# Patient Record
Sex: FEMALE | Race: BLACK OR AFRICAN AMERICAN | NOT HISPANIC OR LATINO | Employment: OTHER | URBAN - METROPOLITAN AREA
[De-identification: names, ages, dates, MRNs, and addresses within clinical notes are randomized per-mention and may not be internally consistent; named-entity substitution may affect disease eponyms.]

---

## 2017-09-26 ENCOUNTER — HOSPITAL ENCOUNTER (EMERGENCY)
Facility: HOSPITAL | Age: 62
Discharge: HOME | End: 2017-09-26
Attending: EMERGENCY MEDICINE | Admitting: EMERGENCY MEDICINE
Payer: COMMERCIAL

## 2017-09-26 ENCOUNTER — APPOINTMENT (EMERGENCY)
Dept: RADIOLOGY | Facility: HOSPITAL | Age: 62
End: 2017-09-26
Payer: COMMERCIAL

## 2017-09-26 ENCOUNTER — APPOINTMENT (EMERGENCY)
Dept: CT IMAGING | Facility: HOSPITAL | Age: 62
End: 2017-09-26
Payer: COMMERCIAL

## 2017-09-26 VITALS
TEMPERATURE: 97.6 F | HEART RATE: 94 BPM | RESPIRATION RATE: 20 BRPM | WEIGHT: 292 LBS | OXYGEN SATURATION: 98 % | DIASTOLIC BLOOD PRESSURE: 94 MMHG | SYSTOLIC BLOOD PRESSURE: 112 MMHG

## 2017-09-26 DIAGNOSIS — H10.9 CONJUNCTIVITIS: Primary | ICD-10-CM

## 2017-09-26 LAB
ANION GAP SERPL CALCULATED.3IONS-SCNC: 7 MMOL/L (ref 4–13)
ATRIAL RATE: 96 BPM
BASOPHILS # BLD AUTO: 0.03 THOUSANDS/ΜL (ref 0–0.1)
BASOPHILS NFR BLD AUTO: 0 % (ref 0–1)
BUN SERPL-MCNC: 14 MG/DL (ref 5–25)
CALCIUM SERPL-MCNC: 9.2 MG/DL (ref 8.3–10.1)
CHLORIDE SERPL-SCNC: 105 MMOL/L (ref 100–108)
CO2 SERPL-SCNC: 30 MMOL/L (ref 21–32)
CREAT SERPL-MCNC: 1.12 MG/DL (ref 0.6–1.3)
EOSINOPHIL # BLD AUTO: 0.42 THOUSAND/ΜL (ref 0–0.61)
EOSINOPHIL NFR BLD AUTO: 4 % (ref 0–6)
ERYTHROCYTE [DISTWIDTH] IN BLOOD BY AUTOMATED COUNT: 15.2 % (ref 11.6–15.1)
GFR SERPL CREATININE-BSD FRML MDRD: 60 ML/MIN/1.73SQ M
GLUCOSE SERPL-MCNC: 140 MG/DL (ref 65–140)
HCT VFR BLD AUTO: 38.5 % (ref 34.8–46.1)
HGB BLD-MCNC: 13.1 G/DL (ref 11.5–15.4)
LYMPHOCYTES # BLD AUTO: 4.01 THOUSANDS/ΜL (ref 0.6–4.47)
LYMPHOCYTES NFR BLD AUTO: 38 % (ref 14–44)
MCH RBC QN AUTO: 29.8 PG (ref 26.8–34.3)
MCHC RBC AUTO-ENTMCNC: 34 G/DL (ref 31.4–37.4)
MCV RBC AUTO: 88 FL (ref 82–98)
MONOCYTES # BLD AUTO: 0.73 THOUSAND/ΜL (ref 0.17–1.22)
MONOCYTES NFR BLD AUTO: 7 % (ref 4–12)
NEUTROPHILS # BLD AUTO: 5.3 THOUSANDS/ΜL (ref 1.85–7.62)
NEUTS SEG NFR BLD AUTO: 51 % (ref 43–75)
NRBC BLD AUTO-RTO: 0 /100 WBCS
P AXIS: 54 DEGREES
PLATELET # BLD AUTO: 261 THOUSANDS/UL (ref 149–390)
PMV BLD AUTO: 9.7 FL (ref 8.9–12.7)
POTASSIUM SERPL-SCNC: 3.7 MMOL/L (ref 3.5–5.3)
PR INTERVAL: 164 MS
QRS AXIS: 37 DEGREES
QRSD INTERVAL: 82 MS
QT INTERVAL: 336 MS
QTC INTERVAL: 424 MS
RBC # BLD AUTO: 4.4 MILLION/UL (ref 3.81–5.12)
SODIUM SERPL-SCNC: 142 MMOL/L (ref 136–145)
SPECIMEN SOURCE: NORMAL
T WAVE AXIS: 61 DEGREES
TROPONIN I BLD-MCNC: 0 NG/ML (ref 0–0.08)
VENTRICULAR RATE: 96 BPM
WBC # BLD AUTO: 10.49 THOUSAND/UL (ref 4.31–10.16)

## 2017-09-26 PROCEDURE — 96361 HYDRATE IV INFUSION ADD-ON: CPT

## 2017-09-26 PROCEDURE — 36415 COLL VENOUS BLD VENIPUNCTURE: CPT | Performed by: EMERGENCY MEDICINE

## 2017-09-26 PROCEDURE — 93005 ELECTROCARDIOGRAM TRACING: CPT | Performed by: EMERGENCY MEDICINE

## 2017-09-26 PROCEDURE — 96360 HYDRATION IV INFUSION INIT: CPT

## 2017-09-26 PROCEDURE — 71020 HB CHEST X-RAY 2VW FRONTAL&LATL: CPT

## 2017-09-26 PROCEDURE — 85025 COMPLETE CBC W/AUTO DIFF WBC: CPT | Performed by: EMERGENCY MEDICINE

## 2017-09-26 PROCEDURE — 99284 EMERGENCY DEPT VISIT MOD MDM: CPT

## 2017-09-26 PROCEDURE — 80048 BASIC METABOLIC PNL TOTAL CA: CPT | Performed by: EMERGENCY MEDICINE

## 2017-09-26 PROCEDURE — 84484 ASSAY OF TROPONIN QUANT: CPT

## 2017-09-26 PROCEDURE — 70487 CT MAXILLOFACIAL W/DYE: CPT

## 2017-09-26 RX ORDER — PANTOPRAZOLE SODIUM 40 MG/1
40 TABLET, DELAYED RELEASE ORAL DAILY
COMMUNITY
End: 2018-09-14 | Stop reason: SDUPTHER

## 2017-09-26 RX ORDER — MONTELUKAST SODIUM 10 MG/1
10 TABLET ORAL DAILY
COMMUNITY
End: 2018-09-14 | Stop reason: SDUPTHER

## 2017-09-26 RX ORDER — LORATADINE 10 MG/1
10 TABLET ORAL DAILY
COMMUNITY
End: 2018-08-02 | Stop reason: SDUPTHER

## 2017-09-26 RX ORDER — ACETAMINOPHEN 325 MG/1
650 TABLET ORAL EVERY 6 HOURS PRN
COMMUNITY
End: 2018-09-14 | Stop reason: SDUPTHER

## 2017-09-26 RX ORDER — ATORVASTATIN CALCIUM 40 MG/1
40 TABLET, FILM COATED ORAL DAILY
COMMUNITY
End: 2018-09-14 | Stop reason: SDUPTHER

## 2017-09-26 RX ORDER — TOBRAMYCIN 3 MG/ML
1 SOLUTION/ DROPS OPHTHALMIC
Status: DISCONTINUED | OUTPATIENT
Start: 2017-09-26 | End: 2017-09-26 | Stop reason: HOSPADM

## 2017-09-26 RX ORDER — AMLODIPINE BESYLATE 10 MG/1
5 TABLET ORAL DAILY
COMMUNITY
End: 2018-09-14 | Stop reason: SDUPTHER

## 2017-09-26 RX ADMIN — IOHEXOL 85 ML: 350 INJECTION, SOLUTION INTRAVENOUS at 15:58

## 2017-09-26 RX ADMIN — TOBRAMYCIN 1 DROP: 3 SOLUTION OPHTHALMIC at 17:15

## 2017-09-26 RX ADMIN — SODIUM CHLORIDE 1000 ML: 0.9 INJECTION, SOLUTION INTRAVENOUS at 14:38

## 2017-12-24 ENCOUNTER — APPOINTMENT (EMERGENCY)
Dept: RADIOLOGY | Facility: HOSPITAL | Age: 62
DRG: 720 | End: 2017-12-24
Payer: COMMERCIAL

## 2017-12-24 ENCOUNTER — HOSPITAL ENCOUNTER (INPATIENT)
Facility: HOSPITAL | Age: 62
LOS: 3 days | Discharge: HOME | DRG: 720 | End: 2017-12-27
Attending: EMERGENCY MEDICINE | Admitting: FAMILY MEDICINE
Payer: COMMERCIAL

## 2017-12-24 DIAGNOSIS — R50.9 FEVER: ICD-10-CM

## 2017-12-24 DIAGNOSIS — R05.9 COUGH: ICD-10-CM

## 2017-12-24 DIAGNOSIS — R68.89 FLU-LIKE SYMPTOMS: Primary | ICD-10-CM

## 2017-12-24 PROBLEM — R79.89 ELEVATED SERUM CREATININE: Status: ACTIVE | Noted: 2017-12-24

## 2017-12-24 PROBLEM — A41.9 SEPSIS (HCC): Status: ACTIVE | Noted: 2017-12-24

## 2017-12-24 PROBLEM — E11.69 DIABETES MELLITUS TYPE 2 IN OBESE (HCC): Status: ACTIVE | Noted: 2017-12-24

## 2017-12-24 PROBLEM — E66.9 DIABETES MELLITUS TYPE 2 IN OBESE (HCC): Status: ACTIVE | Noted: 2017-12-24

## 2017-12-24 PROBLEM — K21.9 GERD (GASTROESOPHAGEAL REFLUX DISEASE): Status: ACTIVE | Noted: 2017-12-24

## 2017-12-24 PROBLEM — I10 HTN (HYPERTENSION): Status: ACTIVE | Noted: 2017-12-24

## 2017-12-24 PROBLEM — E66.01 MORBID OBESITY (HCC): Status: ACTIVE | Noted: 2017-12-24

## 2017-12-24 PROBLEM — J45.901 ASTHMA EXACERBATION: Status: ACTIVE | Noted: 2017-12-24

## 2017-12-24 PROBLEM — E78.5 HLD (HYPERLIPIDEMIA): Status: ACTIVE | Noted: 2017-12-24

## 2017-12-24 LAB
ALBUMIN SERPL BCP-MCNC: 3.5 G/DL (ref 3.5–5)
ALP SERPL-CCNC: 102 U/L (ref 46–116)
ALT SERPL W P-5'-P-CCNC: 23 U/L (ref 12–78)
ANION GAP SERPL CALCULATED.3IONS-SCNC: 10 MMOL/L (ref 4–13)
APTT PPP: 31 SECONDS (ref 23–35)
AST SERPL W P-5'-P-CCNC: 18 U/L (ref 5–45)
BASOPHILS # BLD AUTO: 0.02 THOUSANDS/ΜL (ref 0–0.1)
BASOPHILS NFR BLD AUTO: 0 % (ref 0–1)
BILIRUB SERPL-MCNC: 0.29 MG/DL (ref 0.2–1)
BUN SERPL-MCNC: 14 MG/DL (ref 5–25)
CALCIUM SERPL-MCNC: 8.9 MG/DL (ref 8.3–10.1)
CHLORIDE SERPL-SCNC: 99 MMOL/L (ref 100–108)
CO2 SERPL-SCNC: 28 MMOL/L (ref 21–32)
CREAT SERPL-MCNC: 1.37 MG/DL (ref 0.6–1.3)
EOSINOPHIL # BLD AUTO: 0.11 THOUSAND/ΜL (ref 0–0.61)
EOSINOPHIL NFR BLD AUTO: 1 % (ref 0–6)
ERYTHROCYTE [DISTWIDTH] IN BLOOD BY AUTOMATED COUNT: 14.5 % (ref 11.6–15.1)
GFR SERPL CREATININE-BSD FRML MDRD: 47 ML/MIN/1.73SQ M
GLUCOSE SERPL-MCNC: 113 MG/DL (ref 65–140)
HCT VFR BLD AUTO: 38.9 % (ref 34.8–46.1)
HGB BLD-MCNC: 13.1 G/DL (ref 11.5–15.4)
INR PPP: 1.04 (ref 0.86–1.16)
LACTATE SERPL-SCNC: 1.3 MMOL/L (ref 0.5–2)
LACTATE SERPL-SCNC: 2.5 MMOL/L (ref 0.5–2)
LYMPHOCYTES # BLD AUTO: 2.08 THOUSANDS/ΜL (ref 0.6–4.47)
LYMPHOCYTES NFR BLD AUTO: 20 % (ref 14–44)
MCH RBC QN AUTO: 29.6 PG (ref 26.8–34.3)
MCHC RBC AUTO-ENTMCNC: 33.7 G/DL (ref 31.4–37.4)
MCV RBC AUTO: 88 FL (ref 82–98)
MONOCYTES # BLD AUTO: 1.42 THOUSAND/ΜL (ref 0.17–1.22)
MONOCYTES NFR BLD AUTO: 14 % (ref 4–12)
NEUTROPHILS # BLD AUTO: 6.67 THOUSANDS/ΜL (ref 1.85–7.62)
NEUTS SEG NFR BLD AUTO: 65 % (ref 43–75)
NRBC BLD AUTO-RTO: 0 /100 WBCS
NT-PROBNP SERPL-MCNC: 217 PG/ML
PLATELET # BLD AUTO: 222 THOUSANDS/UL (ref 149–390)
PMV BLD AUTO: 9.9 FL (ref 8.9–12.7)
POTASSIUM SERPL-SCNC: 4 MMOL/L (ref 3.5–5.3)
PROT SERPL-MCNC: 7.7 G/DL (ref 6.4–8.2)
PROTHROMBIN TIME: 13.6 SECONDS (ref 12.1–14.4)
RBC # BLD AUTO: 4.43 MILLION/UL (ref 3.81–5.12)
SODIUM SERPL-SCNC: 137 MMOL/L (ref 136–145)
SPECIMEN SOURCE: NORMAL
TROPONIN I BLD-MCNC: 0.01 NG/ML (ref 0–0.08)
WBC # BLD AUTO: 10.3 THOUSAND/UL (ref 4.31–10.16)

## 2017-12-24 PROCEDURE — 93005 ELECTROCARDIOGRAM TRACING: CPT

## 2017-12-24 PROCEDURE — 87449 NOS EACH ORGANISM AG IA: CPT | Performed by: PHYSICIAN ASSISTANT

## 2017-12-24 PROCEDURE — 85730 THROMBOPLASTIN TIME PARTIAL: CPT

## 2017-12-24 PROCEDURE — 87798 DETECT AGENT NOS DNA AMP: CPT | Performed by: EMERGENCY MEDICINE

## 2017-12-24 PROCEDURE — 96361 HYDRATE IV INFUSION ADD-ON: CPT

## 2017-12-24 PROCEDURE — 87040 BLOOD CULTURE FOR BACTERIA: CPT

## 2017-12-24 PROCEDURE — 83605 ASSAY OF LACTIC ACID: CPT

## 2017-12-24 PROCEDURE — 71020 HB CHEST X-RAY 2VW FRONTAL&LATL: CPT

## 2017-12-24 PROCEDURE — 85025 COMPLETE CBC W/AUTO DIFF WBC: CPT

## 2017-12-24 PROCEDURE — 84484 ASSAY OF TROPONIN QUANT: CPT

## 2017-12-24 PROCEDURE — 80053 COMPREHEN METABOLIC PANEL: CPT

## 2017-12-24 PROCEDURE — 94640 AIRWAY INHALATION TREATMENT: CPT

## 2017-12-24 PROCEDURE — 83880 ASSAY OF NATRIURETIC PEPTIDE: CPT | Performed by: EMERGENCY MEDICINE

## 2017-12-24 PROCEDURE — 99285 EMERGENCY DEPT VISIT HI MDM: CPT

## 2017-12-24 PROCEDURE — 96365 THER/PROPH/DIAG IV INF INIT: CPT

## 2017-12-24 PROCEDURE — 85610 PROTHROMBIN TIME: CPT

## 2017-12-24 PROCEDURE — 36415 COLL VENOUS BLD VENIPUNCTURE: CPT

## 2017-12-24 RX ORDER — ONDANSETRON 2 MG/ML
4 INJECTION INTRAMUSCULAR; INTRAVENOUS EVERY 6 HOURS PRN
Status: DISCONTINUED | OUTPATIENT
Start: 2017-12-24 | End: 2017-12-27 | Stop reason: HOSPADM

## 2017-12-24 RX ORDER — ASPIRIN 81 MG/1
81 TABLET ORAL DAILY
Status: DISCONTINUED | OUTPATIENT
Start: 2017-12-25 | End: 2017-12-27 | Stop reason: HOSPADM

## 2017-12-24 RX ORDER — BENZONATATE 100 MG/1
100 CAPSULE ORAL 3 TIMES DAILY
Status: DISCONTINUED | OUTPATIENT
Start: 2017-12-24 | End: 2017-12-27 | Stop reason: HOSPADM

## 2017-12-24 RX ORDER — METHYLPREDNISOLONE SODIUM SUCCINATE 125 MG/2ML
125 INJECTION, POWDER, LYOPHILIZED, FOR SOLUTION INTRAMUSCULAR; INTRAVENOUS ONCE
Status: COMPLETED | OUTPATIENT
Start: 2017-12-24 | End: 2017-12-25

## 2017-12-24 RX ORDER — ACETAMINOPHEN 325 MG/1
975 TABLET ORAL ONCE
Status: COMPLETED | OUTPATIENT
Start: 2017-12-24 | End: 2017-12-24

## 2017-12-24 RX ORDER — LORATADINE 10 MG/1
10 TABLET ORAL DAILY
Status: DISCONTINUED | OUTPATIENT
Start: 2017-12-25 | End: 2017-12-27 | Stop reason: HOSPADM

## 2017-12-24 RX ORDER — ACETAMINOPHEN 325 MG/1
650 TABLET ORAL EVERY 6 HOURS PRN
Status: DISCONTINUED | OUTPATIENT
Start: 2017-12-24 | End: 2017-12-27 | Stop reason: HOSPADM

## 2017-12-24 RX ORDER — AMLODIPINE BESYLATE 5 MG/1
5 TABLET ORAL DAILY
Status: DISCONTINUED | OUTPATIENT
Start: 2017-12-25 | End: 2017-12-27 | Stop reason: HOSPADM

## 2017-12-24 RX ORDER — IPRATROPIUM BROMIDE AND ALBUTEROL SULFATE 2.5; .5 MG/3ML; MG/3ML
3 SOLUTION RESPIRATORY (INHALATION) ONCE
Status: COMPLETED | OUTPATIENT
Start: 2017-12-24 | End: 2017-12-24

## 2017-12-24 RX ORDER — PANTOPRAZOLE SODIUM 40 MG/1
40 TABLET, DELAYED RELEASE ORAL DAILY
Status: DISCONTINUED | OUTPATIENT
Start: 2017-12-25 | End: 2017-12-27 | Stop reason: HOSPADM

## 2017-12-24 RX ORDER — HEPARIN SODIUM 5000 [USP'U]/ML
5000 INJECTION, SOLUTION INTRAVENOUS; SUBCUTANEOUS EVERY 8 HOURS SCHEDULED
Status: DISCONTINUED | OUTPATIENT
Start: 2017-12-24 | End: 2017-12-27 | Stop reason: HOSPADM

## 2017-12-24 RX ORDER — MONTELUKAST SODIUM 10 MG/1
10 TABLET ORAL DAILY
Status: DISCONTINUED | OUTPATIENT
Start: 2017-12-25 | End: 2017-12-27 | Stop reason: HOSPADM

## 2017-12-24 RX ORDER — AZITHROMYCIN 250 MG/1
500 TABLET, FILM COATED ORAL EVERY 24 HOURS
Status: DISCONTINUED | OUTPATIENT
Start: 2017-12-25 | End: 2017-12-26

## 2017-12-24 RX ORDER — ATORVASTATIN CALCIUM 40 MG/1
40 TABLET, FILM COATED ORAL DAILY
Status: DISCONTINUED | OUTPATIENT
Start: 2017-12-25 | End: 2017-12-27 | Stop reason: HOSPADM

## 2017-12-24 RX ORDER — LEVALBUTEROL 1.25 MG/.5ML
1.25 SOLUTION, CONCENTRATE RESPIRATORY (INHALATION)
Status: DISCONTINUED | OUTPATIENT
Start: 2017-12-24 | End: 2017-12-26

## 2017-12-24 RX ORDER — GUAIFENESIN 600 MG
600 TABLET, EXTENDED RELEASE 12 HR ORAL EVERY 12 HOURS SCHEDULED
Status: DISCONTINUED | OUTPATIENT
Start: 2017-12-24 | End: 2017-12-27 | Stop reason: HOSPADM

## 2017-12-24 RX ORDER — METHYLPREDNISOLONE SODIUM SUCCINATE 40 MG/ML
40 INJECTION, POWDER, LYOPHILIZED, FOR SOLUTION INTRAMUSCULAR; INTRAVENOUS EVERY 8 HOURS SCHEDULED
Status: DISCONTINUED | OUTPATIENT
Start: 2017-12-25 | End: 2017-12-25

## 2017-12-24 RX ADMIN — SODIUM CHLORIDE 1000 ML: 0.9 INJECTION, SOLUTION INTRAVENOUS at 19:50

## 2017-12-24 RX ADMIN — SODIUM CHLORIDE 1000 ML: 0.9 INJECTION, SOLUTION INTRAVENOUS at 18:40

## 2017-12-24 RX ADMIN — ACETAMINOPHEN 975 MG: 325 TABLET, FILM COATED ORAL at 18:38

## 2017-12-24 RX ADMIN — GUAIFENESIN 600 MG: 600 TABLET, EXTENDED RELEASE ORAL at 23:48

## 2017-12-24 RX ADMIN — CEFTRIAXONE 1000 MG: 1 INJECTION, SOLUTION INTRAVENOUS at 19:42

## 2017-12-24 RX ADMIN — AZITHROMYCIN MONOHYDRATE 500 MG: 500 INJECTION, POWDER, LYOPHILIZED, FOR SOLUTION INTRAVENOUS at 20:38

## 2017-12-24 RX ADMIN — IPRATROPIUM BROMIDE AND ALBUTEROL SULFATE 3 ML: .5; 3 SOLUTION RESPIRATORY (INHALATION) at 18:39

## 2017-12-24 RX ADMIN — HEPARIN SODIUM 5000 UNITS: 5000 INJECTION, SOLUTION INTRAVENOUS; SUBCUTANEOUS at 23:48

## 2017-12-24 RX ADMIN — BENZONATATE 100 MG: 100 CAPSULE ORAL at 23:48

## 2017-12-24 NOTE — ED ATTENDING ATTESTATION
Pushpa Vegas MD, saw and evaluated the patient  All available labs and X-rays were ordered by me or the resident and have been reviewed by myself  I discussed the patient with the resident / non-physician and agree with the resident's / non-physician practitioner's findings and plan as documented in the resident's / non-physician practicitioner's note, except where noted  At this point, I agree with the current assessment done in the ED  Chief Complaint   Patient presents with    Fever - 9 weeks to 74 years     Fever, chills, productive cough, white mucous for 2 months, chest pain, shortness of breath       This is a 60year old female presenting for URI symptoms x2 months worse in the last few  She has been having subjective fevers during this time  Feeling extremely short of breath  She believes that this represents flu  PMH:  - HLD  - HTN  - DM  - GERD  PSH:  - Hysterectomy  - Bilateral knee arthroscopy  No smoking, drinking, drugs  PE:  Vitals:    12/24/17 2327 12/25/17 0300 12/25/17 0700 12/25/17 0754   BP: 114/61 167/72 101/59    Pulse: 98 100 105    Resp: 22 18 20    Temp: 99 2 °F (37 3 °C) 98 4 °F (36 9 °C) 100 5 °F (38 1 °C)    TempSrc: Temporal Temporal Tympanic    SpO2: 94% 94% 96% 93%   Weight:       Height:       General: VSS, NAD, awake, alert  Well-nourished, well-developed  Appears stated age  Speaking normally in full sentences  Head: Normocephalic, atraumatic, nontender  Eyes: PERRL, EOM-I  No diplopia  No hyphema  No subconjunctival hemorrhages  Symmetrical lids  ENT: Atraumatic external nose and ears  Dry MM  No malocclusion  No stridor  Normal phonation  No drooling  Normal swallowing  Neck: Symmetric, trachea midline  No JVD  CV: Tachycardic (HR 120s) +S1/S2  No murmurs or gallops  Peripheral pulses +2 throughout  No chest wall tenderness  Lungs:   Coarse breath sounds bilaterally but low sounding 2/2 body hjabitus  No retractions  Mild tachypnea     Abd: +BS, soft, NT/ND    MSK:   FROM   Back:   No rashes  Skin: Dry, intact  Neuro: AAOx3, GCS 15, CN II-XII grossly intact  Motor grossly intact  Psychiatric/Behavioral: Appropriate mood and affect   Exam: deferred  A:  - Ill  P:  - Will do septic workup  - Likely admit   - Flu? PNA? UTI?  - 13 point ROS was performed and all are normal unless stated in the history above  - Nursing note reviewed  Vitals reviewed  - Orders placed by myself and/or advanced practitioner / resident     - Previous chart was reviewed  - No language barrier    - History obtained from patient  - There are no limitations to the history obtained  - Critical care time: Not applicable for this patient  Final Diagnosis:  1  Flu-like symptoms    2  Fever    3   Cough        ED Course      Medications   acetaminophen (TYLENOL) tablet 650 mg (650 mg Oral Given 12/25/17 0821)   amLODIPine (NORVASC) tablet 5 mg (5 mg Oral Not Given 12/25/17 0821)   aspirin (ECOTRIN LOW STRENGTH) EC tablet 81 mg (81 mg Oral Given 12/25/17 0821)   atorvastatin (LIPITOR) tablet 40 mg (40 mg Oral Given 12/25/17 0821)   loratadine (CLARITIN) tablet 10 mg (10 mg Oral Given 12/25/17 0821)   montelukast (SINGULAIR) tablet 10 mg (10 mg Oral Given 12/25/17 0821)   pantoprazole (PROTONIX) EC tablet 40 mg (40 mg Oral Given 12/25/17 0647)   ondansetron (ZOFRAN) injection 4 mg (not administered)   heparin (porcine) subcutaneous injection 5,000 Units (5,000 Units Subcutaneous Given 12/25/17 0647)   levalbuterol (XOPENEX) inhalation solution 1 25 mg (1 25 mg Nebulization Given 12/25/17 0754)   benzonatate (TESSALON PERLES) capsule 100 mg (100 mg Oral Given 12/25/17 0821)   guaiFENesin (MUCINEX) 12 hr tablet 600 mg (600 mg Oral Given 12/25/17 0821)   methylPREDNISolone sodium succinate (Solu-MEDROL) injection 125 mg (125 mg Intravenous Given 12/25/17 0041)     Followed by   methylPREDNISolone sodium succinate (Solu-MEDROL) injection 40 mg (40 mg Intravenous Given 12/25/17 0645)   azithromycin (ZITHROMAX) tablet 500 mg (not administered)   cefTRIAXone (ROCEPHIN) IVPB (premix) 1,000 mg (not administered)   sodium chloride 0 9 % bolus 1,000 mL (1,000 mL Intravenous New Bag 12/24/17 1840)   acetaminophen (TYLENOL) tablet 975 mg (975 mg Oral Given 12/24/17 1838)   ipratropium-albuterol (DUO-NEB) 0 5-2 5 mg/mL inhalation solution 3 mL (3 mL Nebulization Given 12/24/17 1839)   cefTRIAXone (ROCEPHIN) IVPB (premix) 1,000 mg (0 mg Intravenous Stopped 12/24/17 2022)   azithromycin (ZITHROMAX) 500 mg in sodium chloride 0 9% 250mL IVPB 500 mg (0 mg Intravenous Stopped 12/24/17 2154)   sodium chloride 0 9 % bolus 3,000 mL (0 mL Intravenous Stopped 12/24/17 2153)     XR chest 2 views   ED Interpretation   Poor penetrates, unable to evaluate lower lobes on the PA view  Possible consolidation in the lower lobes, no obvious consolidations in the upper lobes  Orders Placed This Encounter   Procedures    ED ECG Documentation Only    Blood culture #1    Blood culture #2    Influenza A/B and RSV by PCR (indicated for patients >2 mo of age)    Strep Pneumoniae, Urine    Sputum culture and Gram stain    XR chest 2 views    APTT    Protime-INR    CBC and differential    Comprehensive metabolic panel    Lactic Acid x2    B-type natriuretic peptide    Basic metabolic panel    CBC (With Platelets)    Platelet count    Legionella antigen, urine    Diet Odell/CHO Controlled; Consistent Carbohydrate Diet Level 1 (4 carb servings/60 grams CHO/meal)    Insert and maintain peripheral IV x 2 (18 gauge or >)    Continuous cardiac monitoring    POCT troponin    Nursing communcation Continue IV as ordered     [facility] Notify admitting physician    Notify admitting physician on arrival    Telemetry monitoring    Activity as tolerated    Vital signs (specify frequency)    Notify physician (specify)    Up with assistance    Up and OOB as tolerated    Place sequential compression device  Incentive spirometry    Level 1-Full Code: all life saving measures are indicated    Droplet isolation status    POCT urinalysis dipstick    ECG 12 lead    ECG 12 lead    Inpatient Admission (expected length of stay for this patient is greater than two midnights)     Labs Reviewed   CBC AND DIFFERENTIAL - Abnormal        Result Value Ref Range Status    WBC 10 30 (*) 4 31 - 10 16 Thousand/uL Final    RBC 4 43  3 81 - 5 12 Million/uL Final    Hemoglobin 13 1  11 5 - 15 4 g/dL Final    Hematocrit 38 9  34 8 - 46 1 % Final    MCV 88  82 - 98 fL Final    MCH 29 6  26 8 - 34 3 pg Final    MCHC 33 7  31 4 - 37 4 g/dL Final    RDW 14 5  11 6 - 15 1 % Final    MPV 9 9  8 9 - 12 7 fL Final    Platelets 992  091 - 390 Thousands/uL Final    nRBC 0  /100 WBCs Final    Neutrophils Relative 65  43 - 75 % Final    Lymphocytes Relative 20  14 - 44 % Final    Monocytes Relative 14 (*) 4 - 12 % Final    Eosinophils Relative 1  0 - 6 % Final    Basophils Relative 0  0 - 1 % Final    Neutrophils Absolute 6 67  1 85 - 7 62 Thousands/µL Final    Lymphocytes Absolute 2 08  0 60 - 4 47 Thousands/µL Final    Monocytes Absolute 1 42 (*) 0 17 - 1 22 Thousand/µL Final    Eosinophils Absolute 0 11  0 00 - 0 61 Thousand/µL Final    Basophils Absolute 0 02  0 00 - 0 10 Thousands/µL Final   COMPREHENSIVE METABOLIC PANEL - Abnormal     Sodium 137  136 - 145 mmol/L Final    Potassium 4 0  3 5 - 5 3 mmol/L Final    Chloride 99 (*) 100 - 108 mmol/L Final    CO2 28  21 - 32 mmol/L Final    Anion Gap 10  4 - 13 mmol/L Final    BUN 14  5 - 25 mg/dL Final    Creatinine 1 37 (*) 0 60 - 1 30 mg/dL Final    Comment: Standardized to IDMS reference method    Glucose 113  65 - 140 mg/dL Final    Comment:   If the patient is fasting, the ADA then defines impaired fasting glucose as > 100 mg/dL and diabetes as > or equal to 123 mg/dL    Specimen collection should occur prior to Sulfasalazine administration due to the potential for falsely depressed results  Specimen collection should occur prior to Sulfapyridine administration due to the potential for falsely elevated results  Calcium 8 9  8 3 - 10 1 mg/dL Final    AST 18  5 - 45 U/L Final    Comment:   Specimen collection should occur prior to Sulfasalazine administration due to the potential for falsely depressed results  ALT 23  12 - 78 U/L Final    Comment:   Specimen collection should occur prior to Sulfasalazine administration due to the potential for falsely depressed results  Alkaline Phosphatase 102  46 - 116 U/L Final    Total Protein 7 7  6 4 - 8 2 g/dL Final    Albumin 3 5  3 5 - 5 0 g/dL Final    Total Bilirubin 0 29  0 20 - 1 00 mg/dL Final    eGFR 47  ml/min/1 73sq m Final    Narrative:     National Kidney Disease Education Program recommendations are as follows:  GFR calculation is accurate only with a steady state creatinine  Chronic Kidney disease less than 60 ml/min/1 73 sq  meters  Kidney failure less than 15 ml/min/1 73 sq  meters  LACTIC ACID, PLASMA - Abnormal     LACTIC ACID 2 5 (*) 0 5 - 2 0 mmol/L Final    Narrative:     Result may be elevated if tourniquet was used during collection  APTT - Normal    PTT 31  23 - 35 seconds Final    Narrative: Therapeutic Heparin Range = 60-90 seconds   PROTIME-INR - Normal    Protime 13 6  12 1 - 14 4 seconds Final    INR 1 04  0 86 - 1 16 Final   LACTIC ACID, PLASMA - Normal    LACTIC ACID 1 3  0 5 - 2 0 mmol/L Final    Narrative:     Result may be elevated if tourniquet was used during collection     POCT TROPONIN - Normal    POC Troponin I 0 01  0 00 - 0 08 ng/ml Final    Specimen Type VENOUS   Final    Narrative:     [employer] i-Stat handheld analyzer 99% cutoff is > 0 08ng/mL in network Emergency Departments    o cTnI 99% cutoff is useful only when applied to patients in the clinical setting of myocardial ischemia  o cTnI 99% cutoff should be interpreted in the context of clinical history, ECG findings and possibly cardiac imaging to establish correct diagnosis  o cTnI 99% cutoff may be suggestive but clearly not indicative of a coronary event without the clinical setting of myocardial ischemia  BLOOD CULTURE   BLOOD CULTURE   INFLUENZA A/B AND RSV, PCR   POCT URINALYSIS DIPSTICK     Time reflects when diagnosis was documented in both MDM as applicable and the Disposition within this note     Time User Action Codes Description Comment    12/24/2017  8:00 PM Floy Deter Add [R68 89] Flu-like symptoms     12/24/2017  8:01 PM Floy Deter Add [R50 9] Fever     12/24/2017  8:01 PM Floy Deter Add [R05] Cough       ED Disposition     ED Disposition Condition Comment    Admit  Case was discussed with MAYELIN and the patient's admission status was agreed to be Admission Status: inpatient status to the service of Dr Anish Pisano  Follow-up Information    None       Current Discharge Medication List      CONTINUE these medications which have NOT CHANGED    Details   acetaminophen (TYLENOL) 325 mg tablet Take 650 mg by mouth every 6 (six) hours as needed for mild pain      amLODIPine (NORVASC) 10 mg tablet Take 5 mg by mouth daily      aspirin 81 MG tablet Take 81 mg by mouth daily      atorvastatin (LIPITOR) 40 mg tablet Take 40 mg by mouth daily      loratadine (CLARITIN) 10 mg tablet Take 10 mg by mouth daily      montelukast (SINGULAIR) 10 mg tablet Take 10 mg by mouth daily      pantoprazole (PROTONIX) 40 mg tablet Take 40 mg by mouth daily           No discharge procedures on file  Prior to Admission Medications   Prescriptions Last Dose Informant Patient Reported?  Taking?   acetaminophen (TYLENOL) 325 mg tablet   Yes Yes   Sig: Take 650 mg by mouth every 6 (six) hours as needed for mild pain   amLODIPine (NORVASC) 10 mg tablet   Yes Yes   Sig: Take 5 mg by mouth daily   aspirin 81 MG tablet   Yes Yes   Sig: Take 81 mg by mouth daily   atorvastatin (LIPITOR) 40 mg tablet   Yes Yes   Sig: Take 40 mg by mouth daily   loratadine (CLARITIN) 10 mg tablet   Yes Yes   Sig: Take 10 mg by mouth daily   montelukast (SINGULAIR) 10 mg tablet   Yes Yes   Sig: Take 10 mg by mouth daily   pantoprazole (PROTONIX) 40 mg tablet   Yes Yes   Sig: Take 40 mg by mouth daily      Facility-Administered Medications: None       Portions of the record may have been created with voice recognition software  Occasional wrong word or "sound a like" substitutions may have occurred due to the inherent limitations of voice recognition software  Read the chart carefully and recognize, using context, where substitutions have occurred      Electronically signed by:  Tami Buckner

## 2017-12-24 NOTE — ED NOTES
Dr Benito Luevano and Dr Brenda Avalos at pts bedside for evaluation        Nicholas Reis RN  12/24/17 1142

## 2017-12-24 NOTE — ED PROVIDER NOTES
History  Chief Complaint   Patient presents with    Fever - 9 weeks to 74 years     Fever, chills, productive cough, white mucous for 2 months, chest pain, shortness of breath      HPI  61 yo female with hx of DM, HTN, HLD and COPD presenting for evaluation of flu like symptoms  Patient states for the past 2 months, she has had intermittent flu like symptoms and a cough  However, 3 days ago, symptoms become worse again  She states she has a productive cough of white phlegm, does complain of intermittent chest pain especially when she coughs  Patient says she has had subjective fevers and chills throughout the past 3 days  She says the cough has gotten worse, says she has gotten lightheaded over the past couple days as well  Patient does complain of shortness of breath at rest because of the coughing  She denies any sore throat or rhinorrhea, she says she feels a little congested  Patient also complains of diffuse myalgias  Patient did not get flu shot  Patient stated that on Friday, she was around other sick people  Denies any dyusia, no diarrhea, no nausea or vomiting  She denies smoking history  Prior to Admission Medications   Prescriptions Last Dose Informant Patient Reported?  Taking?   acetaminophen (TYLENOL) 325 mg tablet   Yes Yes   Sig: Take 650 mg by mouth every 6 (six) hours as needed for mild pain   amLODIPine (NORVASC) 10 mg tablet   Yes Yes   Sig: Take 5 mg by mouth daily   aspirin 81 MG tablet   Yes Yes   Sig: Take 81 mg by mouth daily   atorvastatin (LIPITOR) 40 mg tablet   Yes Yes   Sig: Take 40 mg by mouth daily   loratadine (CLARITIN) 10 mg tablet   Yes Yes   Sig: Take 10 mg by mouth daily   montelukast (SINGULAIR) 10 mg tablet   Yes Yes   Sig: Take 10 mg by mouth daily   pantoprazole (PROTONIX) 40 mg tablet   Yes Yes   Sig: Take 40 mg by mouth daily      Facility-Administered Medications: None       Past Medical History:   Diagnosis Date    Diabetes mellitus ([facility] [address] )     GERD (gastroesophageal reflux disease)     Hyperlipidemia     Hypertension        Past Surgical History:   Procedure Laterality Date    BILATERAL KNEE ARTHROSCOPY      HYSTERECTOMY         History reviewed  No pertinent family history  I have reviewed and agree with the history as documented  Social History   Substance Use Topics    Smoking status: Never Smoker    Smokeless tobacco: Never Used    Alcohol use No        Review of Systems    Constitutional: Negative for appetite change, positive for chills and fever  HENT: Positive for congestion, negative for rhinorrhea and sore throat  Eyes: Negative for photophobia, pain and visual disturbance  Respiratory:  Positive for cough, chest tightness and shortness of breath  Cardiovascular:  Positive for chest pain, negative for palpitations and leg swelling  Gastrointestinal: Negative for abdominal pain, diarrhea, nausea and vomiting  Genitourinary: Negative for dysuria, flank pain and hematuria  Musculoskeletal: Negative for back pain, neck pain and neck stiffness  Skin: Negative for color change, rash and wound  Neurological: Negative for dizziness, numbness  Positive for headaches  All other systems reviewed and are negative      Physical Exam  ED Triage Vitals [12/24/17 1754]   Temperature Pulse Respirations Blood Pressure SpO2   (!) 103 °F (39 4 °C) (!) 129 (!) 30 (!) 169/104 95 %      Temp Source Heart Rate Source Patient Position - Orthostatic VS BP Location FiO2 (%)   Oral Monitor Sitting Left arm --      Pain Score       7           Orthostatic Vital Signs  Vitals:    12/24/17 1754 12/24/17 1820 12/24/17 1929 12/24/17 2021   BP: (!) 169/104 167/70 140/64 158/71   Pulse: (!) 129 (!) 126 (!) 119 (!) 112   Patient Position - Orthostatic VS: Sitting Lying Lying Lying       Physical Exam  /71   Pulse (!) 112   Temp (!) 101 9 °F (38 8 °C) (Oral)   Resp (!) 24   Wt 136 kg (300 lb)   SpO2 95%     General Appearance:  Alert, cooperative, no distress, appears stated age, obese   Head:  Normocephalic, without obvious abnormality, atraumatic   Eyes:  PERRL, conjunctiva/corneas clear, EOM's intact       Nose: Nares normal, septum midline,mucosa normal, no drainage or sinus tenderness   Throat: Lips, mucosa, and tongue normal; teeth and gums normal   Neck: Supple, symmetrical, trachea midline, no adenopathy   Back:   Symmetric, no curvature, ROM normal, no CVA tenderness   Lungs:   Crackles in the bases, mild wheezing in both lung fields   Heart:  Tachycardic, S1 and S2 normal, no murmur, rub, or gallop   Abdomen:   Soft, non-tender, bowel sounds active all four quadrants   Pelvic: Deferred   Extremities: Extremities normal, atraumatic, no cyanosis or edema   Pulses: 2+ and symmetric   Skin: Skin color, texture, turgor normal, no rashes or lesions   Neurologic:      Psychiatric: Moves all extremities, sensation and strength in tact in all extremities    Normal mood and affect         ED Medications  Medications   sodium chloride 0 9 % bolus 1,000 mL (1,000 mL Intravenous New Bag 12/24/17 1840)   azithromycin (ZITHROMAX) 500 mg in sodium chloride 0 9% 250mL IVPB 500 mg (not administered)   sodium chloride 0 9 % bolus 3,000 mL (1,000 mL Intravenous New Bag 12/24/17 1950)   acetaminophen (TYLENOL) tablet 975 mg (975 mg Oral Given 12/24/17 1838)   ipratropium-albuterol (DUO-NEB) 0 5-2 5 mg/mL inhalation solution 3 mL (3 mL Nebulization Given 12/24/17 1839)   cefTRIAXone (ROCEPHIN) IVPB (premix) 1,000 mg (0 mg Intravenous Stopped 12/24/17 2022)       Diagnostic Studies  Results Reviewed     Procedure Component Value Units Date/Time    Lactic Acid x2 [[id number]] Collected:  12/24/17 2005    Lab Status:   In process Specimen:  Blood from Arm, Left Updated:  12/24/17 2008    POCT troponin [[id number]]  (Normal) Collected:  12/24/17 1848    Lab Status:  Final result Updated:  12/24/17 1901     POC Troponin I 0 01 ng/ml      Specimen Type VENOUS Narrative:         [employer] i-Stat handheld analyzer 99% cutoff is > 0 08ng/mL in network Emergency Departments    o cTnI 99% cutoff is useful only when applied to patients in the clinical setting of myocardial ischemia  o cTnI 99% cutoff should be interpreted in the context of clinical history, ECG findings and possibly cardiac imaging to establish correct diagnosis  o cTnI 99% cutoff may be suggestive but clearly not indicative of a coronary event without the clinical setting of myocardial ischemia  Influenza A/B and RSV by PCR (indicated for patients >2 mo of age) [[id number]] Collected:  12/24/17 1853    Lab Status: In process Specimen:  Nasopharyngeal from Nasopharyngeal Swab Updated:  12/24/17 1900    Lactic Acid x2 [[id number]]  (Abnormal) Collected:  12/24/17 1805    Lab Status:  Final result Specimen:  Blood from Arm, Left Updated:  12/24/17 1845     LACTIC ACID 2 5 (HH) mmol/L     Narrative:         Result may be elevated if tourniquet was used during collection  B-type natriuretic peptide [[id number]]     Lab Status:  No result Specimen:  Blood     Comprehensive metabolic panel [[id number]]  (Abnormal) Collected:  12/24/17 1805    Lab Status:  Final result Specimen:  Blood from Arm, Left Updated:  12/24/17 1839     Sodium 137 mmol/L      Potassium 4 0 mmol/L      Chloride 99 (L) mmol/L      CO2 28 mmol/L      Anion Gap 10 mmol/L      BUN 14 mg/dL      Creatinine 1 37 (H) mg/dL      Glucose 113 mg/dL      Calcium 8 9 mg/dL      AST 18 U/L      ALT 23 U/L      Alkaline Phosphatase 102 U/L      Total Protein 7 7 g/dL      Albumin 3 5 g/dL      Total Bilirubin 0 29 mg/dL      eGFR 47 ml/min/1 73sq m     Narrative:         National Kidney Disease Education Program recommendations are as follows:  GFR calculation is accurate only with a steady state creatinine  Chronic Kidney disease less than 60 ml/min/1 73 sq  meters  Kidney failure less than 15 ml/min/1 73 sq  meters      APTT [[id number]]  (Normal) Collected: 12/24/17 1805    Lab Status:  Final result Specimen:  Blood from Arm, Left Updated:  12/24/17 1833     PTT 31 seconds     Narrative: Therapeutic Heparin Range = 60-90 seconds    Protime-INR [[id number]]  (Normal) Collected:  12/24/17 1805    Lab Status:  Final result Specimen:  Blood from Arm, Left Updated:  12/24/17 1833     Protime 13 6 seconds      INR 1 04    POCT urinalysis dipstick [[id number]]     Lab Status:  No result Specimen:  Urine     CBC and differential [[id number]]  (Abnormal) Collected:  12/24/17 1805    Lab Status:  Final result Specimen:  Blood from Arm, Left Updated:  12/24/17 1828     WBC 10 30 (H) Thousand/uL      RBC 4 43 Million/uL      Hemoglobin 13 1 g/dL      Hematocrit 38 9 %      MCV 88 fL      MCH 29 6 pg      MCHC 33 7 g/dL      RDW 14 5 %      MPV 9 9 fL      Platelets 686 Thousands/uL      nRBC 0 /100 WBCs      Neutrophils Relative 65 %      Lymphocytes Relative 20 %      Monocytes Relative 14 (H) %      Eosinophils Relative 1 %      Basophils Relative 0 %      Neutrophils Absolute 6 67 Thousands/µL      Lymphocytes Absolute 2 08 Thousands/µL      Monocytes Absolute 1 42 (H) Thousand/µL      Eosinophils Absolute 0 11 Thousand/µL      Basophils Absolute 0 02 Thousands/µL     Blood culture #2 [[id number]] Collected:  12/24/17 1805    Lab Status: In process Specimen:  Blood from Arm, Left Updated:  12/24/17 1821    Blood culture #1 [[id number]] Collected:  12/24/17 1815    Lab Status: In process Specimen:  Blood from Arm, Right Updated:  12/24/17 1821                 XR chest 2 views   ED Interpretation by Ortega Dietrich MD (12/24 1929)   Poor penetrates, unable to evaluate lower lobes on the PA view  Possible consolidation in the lower lobes, no obvious consolidations in the upper lobes              Procedures  ECG 12 Lead Documentation  Date/Time: 12/24/2017 6:52 PM  Performed by: Mary Chavez by: ISHMAEL Velasco     Indications / Diagnosis:  Tachycardia  ECG reviewed by me, the ED Provider: yes    Patient location:  ED  Previous ECG:     Previous ECG:  Unavailable  Interpretation:     Interpretation: abnormal    Rate:     ECG rate:  128    ECG rate assessment: tachycardic    Rhythm:     Rhythm: sinus tachycardia    Ectopy:     Ectopy: none    QRS:     QRS axis:  Normal    QRS intervals:  Normal  Conduction:     Conduction: normal    ST segments:     ST segments:  Normal          Phone Consults  ED Phone Contact    ED Course  ED Course          MDM   Patient with possible viral syndrome versus influenza versus pneumonia versus other infectious source  Patient does meet SIRS criteria with tachycardia and fever  Will get septic workup, fluid resuscitation, reassess  CritCare Time    Disposition  Final diagnoses:   Flu-like symptoms   Fever   Cough     Time reflects when diagnosis was documented in both MDM as applicable and the Disposition within this note     Time User Action Codes Description Comment    12/24/2017  8:00 PM Alin Daily Add [R68 89] Flu-like symptoms     12/24/2017  8:01 PM Alin Daily Add [R50 9] Fever     12/24/2017  8:01 PM Alin Daily Add [R05] Cough       ED Disposition     ED Disposition Condition Comment    Admit  Case was discussed with MAYELIN and the patient's admission status was agreed to be Admission Status: inpatient status to the service of Dr Radha Gomez  Follow-up Information    None       Patient's Medications   Discharge Prescriptions    No medications on file     No discharge procedures on file  ED Provider  Attending physically available and evaluated Robert Sanchez I managed the patient along with the ED Attending      Electronically Signed by         Amanda Jimenez MD  Resident  12/24/17 5542

## 2017-12-25 LAB
ANION GAP SERPL CALCULATED.3IONS-SCNC: 6 MMOL/L (ref 4–13)
ATRIAL RATE: 128 BPM
BACTERIA SPT RESP CULT: NORMAL
BUN SERPL-MCNC: 11 MG/DL (ref 5–25)
CALCIUM SERPL-MCNC: 8.6 MG/DL (ref 8.3–10.1)
CHLORIDE SERPL-SCNC: 103 MMOL/L (ref 100–108)
CO2 SERPL-SCNC: 29 MMOL/L (ref 21–32)
CREAT SERPL-MCNC: 1.12 MG/DL (ref 0.6–1.3)
ERYTHROCYTE [DISTWIDTH] IN BLOOD BY AUTOMATED COUNT: 14.7 % (ref 11.6–15.1)
FLUAV AG SPEC QL: DETECTED
FLUBV AG SPEC QL: ABNORMAL
GFR SERPL CREATININE-BSD FRML MDRD: 60 ML/MIN/1.73SQ M
GLUCOSE SERPL-MCNC: 174 MG/DL (ref 65–140)
GRAM STN SPEC: NORMAL
HCT VFR BLD AUTO: 38.4 % (ref 34.8–46.1)
HGB BLD-MCNC: 13 G/DL (ref 11.5–15.4)
L PNEUMO1 AG UR QL IA.RAPID: NEGATIVE
MCH RBC QN AUTO: 29.7 PG (ref 26.8–34.3)
MCHC RBC AUTO-ENTMCNC: 33.9 G/DL (ref 31.4–37.4)
MCV RBC AUTO: 88 FL (ref 82–98)
P AXIS: 75 DEGREES
PLATELET # BLD AUTO: 196 THOUSANDS/UL (ref 149–390)
PMV BLD AUTO: 9.9 FL (ref 8.9–12.7)
POTASSIUM SERPL-SCNC: 4.2 MMOL/L (ref 3.5–5.3)
PR INTERVAL: 140 MS
QRS AXIS: 69 DEGREES
QRSD INTERVAL: 82 MS
QT INTERVAL: 300 MS
QTC INTERVAL: 438 MS
RBC # BLD AUTO: 4.37 MILLION/UL (ref 3.81–5.12)
RSV B RNA SPEC QL NAA+PROBE: ABNORMAL
S PNEUM AG UR QL: NEGATIVE
SODIUM SERPL-SCNC: 138 MMOL/L (ref 136–145)
T WAVE AXIS: 73 DEGREES
VENTRICULAR RATE: 128 BPM
WBC # BLD AUTO: 6.44 THOUSAND/UL (ref 4.31–10.16)

## 2017-12-25 PROCEDURE — 94760 N-INVAS EAR/PLS OXIMETRY 1: CPT

## 2017-12-25 PROCEDURE — 85027 COMPLETE CBC AUTOMATED: CPT | Performed by: PHYSICIAN ASSISTANT

## 2017-12-25 PROCEDURE — 80048 BASIC METABOLIC PNL TOTAL CA: CPT | Performed by: PHYSICIAN ASSISTANT

## 2017-12-25 PROCEDURE — 87205 SMEAR GRAM STAIN: CPT | Performed by: PHYSICIAN ASSISTANT

## 2017-12-25 PROCEDURE — 94640 AIRWAY INHALATION TREATMENT: CPT

## 2017-12-25 RX ORDER — BUDESONIDE AND FORMOTEROL FUMARATE DIHYDRATE 160; 4.5 UG/1; UG/1
2 AEROSOL RESPIRATORY (INHALATION)
Status: DISCONTINUED | OUTPATIENT
Start: 2017-12-25 | End: 2017-12-27 | Stop reason: HOSPADM

## 2017-12-25 RX ORDER — SODIUM CHLORIDE FOR INHALATION 0.9 %
3 VIAL, NEBULIZER (ML) INHALATION
Status: DISCONTINUED | OUTPATIENT
Start: 2017-12-25 | End: 2017-12-26

## 2017-12-25 RX ADMIN — METHYLPREDNISOLONE SODIUM SUCCINATE 40 MG: 40 INJECTION, POWDER, FOR SOLUTION INTRAMUSCULAR; INTRAVENOUS at 06:45

## 2017-12-25 RX ADMIN — ISODIUM CHLORIDE 3 ML: 0.03 SOLUTION RESPIRATORY (INHALATION) at 19:46

## 2017-12-25 RX ADMIN — BENZONATATE 100 MG: 100 CAPSULE ORAL at 17:54

## 2017-12-25 RX ADMIN — BUDESONIDE AND FORMOTEROL FUMARATE DIHYDRATE 2 PUFF: 160; 4.5 AEROSOL RESPIRATORY (INHALATION) at 11:26

## 2017-12-25 RX ADMIN — BUDESONIDE AND FORMOTEROL FUMARATE DIHYDRATE 2 PUFF: 160; 4.5 AEROSOL RESPIRATORY (INHALATION) at 19:28

## 2017-12-25 RX ADMIN — BENZONATATE 100 MG: 100 CAPSULE ORAL at 08:21

## 2017-12-25 RX ADMIN — MONTELUKAST SODIUM 10 MG: 10 TABLET, FILM COATED ORAL at 08:21

## 2017-12-25 RX ADMIN — LEVALBUTEROL 1.25 MG: 1.25 SOLUTION, CONCENTRATE RESPIRATORY (INHALATION) at 13:18

## 2017-12-25 RX ADMIN — HEPARIN SODIUM 5000 UNITS: 5000 INJECTION, SOLUTION INTRAVENOUS; SUBCUTANEOUS at 14:52

## 2017-12-25 RX ADMIN — HEPARIN SODIUM 5000 UNITS: 5000 INJECTION, SOLUTION INTRAVENOUS; SUBCUTANEOUS at 21:44

## 2017-12-25 RX ADMIN — CEFTRIAXONE 1000 MG: 1 INJECTION, SOLUTION INTRAVENOUS at 19:27

## 2017-12-25 RX ADMIN — LEVALBUTEROL 1.25 MG: 1.25 SOLUTION, CONCENTRATE RESPIRATORY (INHALATION) at 19:35

## 2017-12-25 RX ADMIN — ATORVASTATIN CALCIUM 40 MG: 40 TABLET, FILM COATED ORAL at 08:21

## 2017-12-25 RX ADMIN — BENZONATATE 100 MG: 100 CAPSULE ORAL at 21:44

## 2017-12-25 RX ADMIN — LORATADINE 10 MG: 10 TABLET ORAL at 08:21

## 2017-12-25 RX ADMIN — ACETAMINOPHEN 650 MG: 325 TABLET, FILM COATED ORAL at 08:21

## 2017-12-25 RX ADMIN — ASPIRIN 81 MG: 81 TABLET, COATED ORAL at 08:21

## 2017-12-25 RX ADMIN — GUAIFENESIN 600 MG: 600 TABLET, EXTENDED RELEASE ORAL at 08:21

## 2017-12-25 RX ADMIN — AZITHROMYCIN 500 MG: 250 TABLET, FILM COATED ORAL at 19:26

## 2017-12-25 RX ADMIN — LEVALBUTEROL 1.25 MG: 1.25 SOLUTION, CONCENTRATE RESPIRATORY (INHALATION) at 07:54

## 2017-12-25 RX ADMIN — GUAIFENESIN 600 MG: 600 TABLET, EXTENDED RELEASE ORAL at 21:44

## 2017-12-25 RX ADMIN — HEPARIN SODIUM 5000 UNITS: 5000 INJECTION, SOLUTION INTRAVENOUS; SUBCUTANEOUS at 06:47

## 2017-12-25 RX ADMIN — METHYLPREDNISOLONE SODIUM SUCCINATE 125 MG: 125 INJECTION, POWDER, FOR SOLUTION INTRAMUSCULAR; INTRAVENOUS at 00:41

## 2017-12-25 RX ADMIN — PANTOPRAZOLE SODIUM 40 MG: 40 TABLET, DELAYED RELEASE ORAL at 06:47

## 2017-12-25 NOTE — H&P
H&P- Ce Araya 1955, 60 y o  female MRN: [id number]    Unit/Bed#: E2 -01 Encounter: [id number]    Primary Care Provider: Jes Rainey MD   Date and time admitted to hospital: 12/24/2017  5:55 PM      History and Physical - [facility]    Patient Information: Surinder Pereira 60 y o  female MRN: [id number]  Unit/Bed#: E2 -15 Encounter: [id number]  Admitting Physician: Jeromy Beaver PA-C  PCP: Jes Rainey MD  Date of Admission:  12/24/17    Assessment/Plan:    Hospital Problem List:     Principal Problem:    Sepsis [facility])  Active Problems:    Flu-like symptoms    Asthma exacerbation    Elevated serum creatinine    Morbid obesity ([facility] [address] )    GERD (gastroesophageal reflux disease)    Diabetes mellitus type 2 in obese ([facility] )    HTN (hypertension)    HLD (hyperlipidemia)      * Sepsis ([facility] )   Assessment & Plan    Suspect secondary to upper respiratory illness  Given acute symptoms consider secondary to URI complicated by asthmatic bronchitis, given intermittent symptoms and persistence of symptoms for the last 2 months consider infectious bronchitis, CXR appears clean in upper lungs b/l, pending official read but poor penetration due to decreased inspiration limiting examination of lower lung fields  By a high fever 103°, tachycardia and tachypnea, patient with only leukocytosis of 10  Lactic acid on admission 2 5 improved to 1 3 on 3/4 L of normal saline IV fluid bolus provided by ED  F/u blood cultures, pt rec'd rocephin/azithromax, will continue for now given modest Xray results  Check sputum culture, legionella/strep pneumo ag, agree w/o r/o flu by ED  Continue to treat pna        Asthma exacerbation   Assessment & Plan    Suspect 2* viral URI however s/sx have been intermittent and waxing/waning over last 2 mo, also consider infectious bronchitis superimposed on asthma vs occult PNA    Consider flu  Modest asthma exacerbation, pt w/constant smoke exposure from 2* smoke form neighbor, no known allergens  Pt on RA, mild conversational dyspnea, sitting up right w/mild abdominal muscle use  Recd duoneb in ED  Start on tessalon, mucinex, xopenex scheduled, given poor airway movement and modest moderate to end expiratory wheezing will provide solumedrol 125mg IV and switch to 40mg IV q 8H continue singulair, continue claritin        Flu-like symptoms   Assessment & Plan    Pt does not get the flu vaccine  Continue droplet precautions f/u flu by pcr        Elevated serum creatinine   Assessment & Plan    No ED but pt does appear dehydrated  rec'd 3L NS IVF bolus by ED provider  Will d/c 4th L NS IVF, repeat BMP in am, encourage PO intake        HLD (hyperlipidemia)   Assessment & Plan    Continue statin        HTN (hypertension)   Assessment & Plan    Continue amlodipine 5mg daily  Start hydralazine 5mg prn SBP >160mmHg        Diabetes mellitus type 2 in obese [facility])   Assessment & Plan    Check hgb a1c, pt not on any medications  Start POC BS and SSI given steroids          GERD (gastroesophageal reflux disease)   Assessment & Plan    Continue PPI        Morbid obesity ([address] )   Assessment & Plan    Pt may have component of restrictive lung disease given significant morbid obesity  Recommend OP f/u with pulmonology given asthma and morbid obesity                ·     VTE Prophylaxis: Heparin  / sequential compression device   Code Status: Full code  POLST: There is no POLST form on file for this patient (pre-hospital)    Anticipated Length of Stay:  Patient will be admitted on an Inpatient basis with an anticipated length of stay of  Greater than 2 midnights  Justification for Hospital Stay: sepsis, asthma exacerbation    Total Time for Visit, including Counseling / Coordination of Care: 45 minutes  Greater than 50% of this total time spent on direct patient counseling and coordination of care  Chief Complaint:   Shortness of breath, cough times 3 days      History of Present Illness:    Hayden Cain is a 60 y o  female who is a Catholic presents with PMH of asthma morbid obesity,, hypertension hyperlipidemia and diabetes coming the ED for progressive shortness of breath and cough for the last the in patient noticed that her symptoms were starting to flare when she was having difficulty completing her normal daily chores  She was becoming short of breath with exertion and presyncopal   She denies any falls  She reports her cough has been getting worse is most recently productive of yellowish to clear sputum  She has had subjective fevers, chills/rigors and muscle aches  She has substernal chest pain which is worse with cough and deep inspiration  She reports that she believes her friend had passed on his head cold to her  Overall, the patient reports this been ongoing intermittently with symptoms improving and resolving for the last 2 months  She has not seen anyone for this previously  She does not take her flu vaccine  She reports that her neighbor smoking in their apartment (it is a non smoking building) exacerbates her SOB  She has no pets, has thin rugs which were just cleaned, and is a never smoker  Review of Systems:    Review of Systems   Constitutional: Positive for chills, fatigue and fever  Negative for appetite change and diaphoresis  HENT: Positive for postnasal drip and rhinorrhea  Negative for congestion, sinus pain and sore throat  Respiratory: Positive for cough and shortness of breath  Cardiovascular: Negative for chest pain and palpitations  Gastrointestinal: Negative for abdominal pain, diarrhea, nausea and vomiting  Musculoskeletal: Negative for arthralgias  Neurological: Positive for light-headedness and headaches  All other systems reviewed and are negative        Past Medical and Surgical History:     Past Medical History:   Diagnosis Date    Diabetes mellitus ([facility] [address] )     GERD (gastroesophageal reflux disease)     Hyperlipidemia     Hypertension        Past Surgical History:   Procedure Laterality Date    BILATERAL KNEE ARTHROSCOPY      HYSTERECTOMY         Meds/Allergies:    Prior to Admission medications    Medication Sig Start Date End Date Taking? Authorizing Provider   acetaminophen (TYLENOL) 325 mg tablet Take 650 mg by mouth every 6 (six) hours as needed for mild pain   Yes Historical Provider, MD   amLODIPine (NORVASC) 10 mg tablet Take 5 mg by mouth daily   Yes Historical Provider, MD   aspirin 81 MG tablet Take 81 mg by mouth daily   Yes Historical Provider, MD   atorvastatin (LIPITOR) 40 mg tablet Take 40 mg by mouth daily   Yes Historical Provider, MD   loratadine (CLARITIN) 10 mg tablet Take 10 mg by mouth daily   Yes Historical Provider, MD   montelukast (SINGULAIR) 10 mg tablet Take 10 mg by mouth daily   Yes Historical Provider, MD   pantoprazole (PROTONIX) 40 mg tablet Take 40 mg by mouth daily   Yes Historical Provider, MD     I have reviewed home medications with patient personally  Allergies: Allergies   Allergen Reactions    Januvia [Sitagliptin] Swelling    Clindamycin     Hydrocodone     Morphine Hives    Omeprazole     Penicillins     Percocet [Oxycodone-Acetaminophen]     Tolterodine     Ciprofloxacin Rash       Social History:     Marital Status: Single   Occupation:   Patient Pre-hospital Living Situation:   Patient Pre-hospital Level of Mobility:   Patient Pre-hospital Diet Restrictions:   Substance Use History:   History   Alcohol Use No     History   Smoking Status    Never Smoker   Smokeless Tobacco    Never Used     History   Drug Use No       Family History:    History reviewed  No pertinent family history      Physical Exam:     Vitals:   Blood Pressure: 121/53 (12/24/17 2109)  Pulse: (!) 108 (12/24/17 2109)  Temperature: 99 °F (37 2 °C) (12/24/17 2109)  Temp Source: Temporal (12/24/17 2109)  Respirations: 20 (12/24/17 2109)  Weight - Scale: 136 kg (300 lb) (12/24/17 0534)  SpO2: 95 % (12/24/17 2109)    Physical Exam   Constitutional: She appears well-developed  Appears morbidly obese, appears stated age, with mild work of breathing   HENT:   Head: Normocephalic and atraumatic  Right Ear: External ear normal    Left Ear: External ear normal    Nose: Nose normal    Mouth/Throat: No oropharyngeal exudate  No posterior pharyngeal exudate or erythema, mucous membranes are significantly dry   Eyes: Conjunctivae are normal  Right eye exhibits no discharge  Left eye exhibits no discharge  No scleral icterus  Neck: Normal range of motion  Cardiovascular: Normal rate, regular rhythm, normal heart sounds and intact distal pulses  Exam reveals no gallop and no friction rub  No murmur heard  Pulmonary/Chest: No respiratory distress  She has wheezes  She has no rales  She exhibits no tenderness  Somewhat quiet mid to end expiratory wheezing in mid and lower lung fields bilaterally, significantly decreased air movement diffusely  Mild conversational dyspnea, mild abdominal muscle/work of breathing, no retractions or tripodding or nasal flaring   Abdominal: Soft  She exhibits no distension  There is no tenderness  There is no rebound and no guarding  Musculoskeletal: She exhibits edema (trace pretibial edema, no edema of dp b/l)  Lymphadenopathy:     She has no cervical adenopathy  Neurological: She is alert  Skin: Skin is warm and dry  She is not diaphoretic  Psychiatric: She has a normal mood and affect  Vitals reviewed  Additional Data:     Lab Results: I have personally reviewed pertinent reports          Results from last 7 days  Lab Units 12/24/17  1805   WBC Thousand/uL 10 30*   HEMOGLOBIN g/dL 13 1   HEMATOCRIT % 38 9   PLATELETS Thousands/uL 222   NEUTROS PCT % 65   LYMPHS PCT % 20   MONOS PCT % 14*   EOS PCT % 1       Results from last 7 days  Lab Units 12/24/17  1805   SODIUM mmol/L 137   POTASSIUM mmol/L 4 0   CHLORIDE mmol/L 99*   CO2 mmol/L 28 BUN mg/dL 14   CREATININE mg/dL 1 37*   CALCIUM mg/dL 8 9   TOTAL PROTEIN g/dL 7 7   BILIRUBIN TOTAL mg/dL 0 29   ALK PHOS U/L 102   ALT U/L 23   AST U/L 18   GLUCOSE RANDOM mg/dL 113       Results from last 7 days  Lab Units 12/24/17  1805   INR  1 04       Imaging: I have personally reviewed pertinent films in PACS and upper airways are free of infiltrate or v ascular congestion  lower airways appear opaque suspect 2* poor inspiration/penetration    No results found  EKG, Pathology, and Other Studies Reviewed on Admission:   · EKG:     Allscripts / Epic Records Reviewed: Yes     ** Please Note: This note has been constructed using a voice recognition system   **

## 2017-12-25 NOTE — PLAN OF CARE

## 2017-12-25 NOTE — ASSESSMENT & PLAN NOTE
No ED but pt does appear dehydrated  rec'd 3L NS IVF bolus by ED provider  Will d/c 4th L NS IVF, repeat BMP in am, encourage PO intake

## 2017-12-25 NOTE — ASSESSMENT & PLAN NOTE
Suspect secondary to upper respiratory illness    Given acute symptoms consider secondary to URI complicated by asthmatic bronchitis, given intermittent symptoms and persistence of symptoms for the last 2 months consider infectious bronchitis, CXR appears clean in upper lungs b/l, pending official read but poor penetration due to decreased inspiration limiting examination of lower lung fields  By a high fever 103°, tachycardia and tachypnea, patient with only leukocytosis of 10  Lactic acid on admission 2 5 improved to 1 3 on 3/4 L of normal saline IV fluid bolus provided by ED  F/u blood cultures, pt rec'd rocephin/azithromax, will continue for now given modest Xray results  Check sputum culture, legionella/strep pneumo ag, agree w/o r/o flu by ED  Continue antibiotics for possible occult CAP

## 2017-12-25 NOTE — PROGRESS NOTES
Progress Note - Ce Araya 60 y o  female MRN: [id number]    Unit/Bed#: E2 -01 Encounter: [id number]    Assessment/Plan:    Acute respiratory distress  appears related to COPD exacerbation with bronchitis, await final chest x-ray report, reviewed poor inspiratory effort continue azithromycin and Rocephin until cultures sputum and blood a finalized    COPD exacerbation   related to bronchitis restart inhalers continue nebulizer treatments and oxygen as needed will DC IV steroids    Morbid obesity   would benefit from weight loss    GERD     continue PPI for acid control    Dyslipidemia    continue statin for LDL control    Hypertension    control with Norvasc    Hyperglycemia   possible steroid induced will discontinue IV steroids carbohydrate restricted diet and check a m  glucoses     Subjective:   Still with cough mostly nonproductive less short of breath slight fever no chest pain nausea vomiting or diarrhea appetite okay    Objective:     Vitals: Blood pressure 101/59, pulse 105, temperature 100 5 °F (38 1 °C), temperature source Tympanic, resp  rate 20, height 4' 10" (1 473 m), weight 136 kg (300 lb), SpO2 93 %  ,Body mass index is 62 7 kg/m²          Results from last 7 days  Lab Units 12/25/17  0520 12/24/17  1805   WBC Thousand/uL 6 44 10 30*   HEMOGLOBIN g/dL 13 0 13 1   HEMATOCRIT % 38 4 38 9   PLATELETS Thousands/uL 196 222   INR   --  1 04       Results from last 7 days  Lab Units 12/25/17  0520 12/24/17  1805   SODIUM mmol/L 138 137   POTASSIUM mmol/L 4 2 4 0   CHLORIDE mmol/L 103 99*   CO2 mmol/L 29 28   BUN mg/dL 11 14   CREATININE mg/dL 1 12 1 37*   CALCIUM mg/dL 8 6 8 9   TOTAL PROTEIN g/dL  --  7 7   BILIRUBIN TOTAL mg/dL  --  0 29   ALK PHOS U/L  --  102   ALT U/L  --  23   AST U/L  --  18   GLUCOSE RANDOM mg/dL 174* 113       Scheduled Meds:    amLODIPine 5 mg Oral Daily   aspirin 81 mg Oral Daily   atorvastatin 40 mg Oral Daily   azithromycin 500 mg Oral Q24H   benzonatate 100 mg Oral TID   cefTRIAXone 1,000 mg Intravenous Q24H   guaiFENesin 600 mg Oral Q12H [facility]   heparin (porcine) 5,000 Units Subcutaneous Q8H [facility]   levalbuterol 1 25 mg Nebulization TID   loratadine 10 mg Oral Daily   methylPREDNISolone sodium succinate 40 mg Intravenous Q8H [facility]   montelukast 10 mg Oral Daily   pantoprazole 40 mg Oral Daily       Continuous Infusions:     Physical exam:  General appearance:  Alert oriented x3 no distress interaction appropriate  Head/Eyes:  Nonicteric PERRL EOMI  Neck:  Supple  Lungs:  Severe decreased BS bilateral no wheezing rhonchi or rales  Heart: normal S1 S2 regular  Abdomen:  Obese distended nontender with bowel sounds  Extremities:  1+ edema  Skin: no rash    Invasive Devices     Peripheral Intravenous Line            Peripheral IV 12/24/17 Left Forearm less than 1 day    Peripheral IV 12/24/17 Right Forearm less than 1 day                  VTE Pharmacologic Prophylaxis:  Heparin  VTE Mechanical Prophylaxis:  SCDs                     Counseling / Coordination of Care  Total floor / unit time spent today 30   minutes  Greater than 50% of total time was spent with the patient and / or family counseling and / or coordination of care    A description of the counseling / coordination of care:

## 2017-12-25 NOTE — ASSESSMENT & PLAN NOTE
Pt may have component of restrictive lung disease given significant morbid obesity  Recommend OP f/u with pulmonology given asthma and morbid obesity

## 2017-12-25 NOTE — ASSESSMENT & PLAN NOTE
Suspect 2* viral URI however s/sx have been intermittent and waxing/waning over last 2 mo, also consider infectious bronchitis superimposed on asthma vs occult PNA    Consider flu  Modest asthma exacerbation, pt w/constant smoke exposure from 2* smoke form neighbor, no known allergens  Pt on RA, mild conversational dyspnea, sitting up right w/mild abdominal muscle use  Recd duoneb in ED  Start on tessalon, mucinex, xopenex scheduled, given poor airway movement and modest moderate to end expiratory wheezing will provide solumedrol 125mg IV and switch to 40mg IV q 8H continue singulair, continue claritin

## 2017-12-25 NOTE — ED NOTES
Second bag of NSS started at this time  2,000mL still need to be administered        Laura Hendrix RN  12/24/17 4340

## 2017-12-26 LAB
ANION GAP SERPL CALCULATED.3IONS-SCNC: 5 MMOL/L (ref 4–13)
BUN SERPL-MCNC: 23 MG/DL (ref 5–25)
CALCIUM SERPL-MCNC: 8.8 MG/DL (ref 8.3–10.1)
CHLORIDE SERPL-SCNC: 105 MMOL/L (ref 100–108)
CO2 SERPL-SCNC: 30 MMOL/L (ref 21–32)
CREAT SERPL-MCNC: 1.28 MG/DL (ref 0.6–1.3)
GFR SERPL CREATININE-BSD FRML MDRD: 51 ML/MIN/1.73SQ M
GLUCOSE SERPL-MCNC: 133 MG/DL (ref 65–140)
POTASSIUM SERPL-SCNC: 4.1 MMOL/L (ref 3.5–5.3)
SODIUM SERPL-SCNC: 140 MMOL/L (ref 136–145)

## 2017-12-26 PROCEDURE — 94760 N-INVAS EAR/PLS OXIMETRY 1: CPT

## 2017-12-26 PROCEDURE — 80048 BASIC METABOLIC PNL TOTAL CA: CPT | Performed by: INTERNAL MEDICINE

## 2017-12-26 PROCEDURE — 94640 AIRWAY INHALATION TREATMENT: CPT

## 2017-12-26 RX ORDER — DIPHENHYDRAMINE HYDROCHLORIDE 50 MG/ML
25 INJECTION INTRAMUSCULAR; INTRAVENOUS EVERY 6 HOURS PRN
Status: DISCONTINUED | OUTPATIENT
Start: 2017-12-26 | End: 2017-12-27 | Stop reason: HOSPADM

## 2017-12-26 RX ORDER — PREDNISONE 20 MG/1
20 TABLET ORAL DAILY
Status: DISCONTINUED | OUTPATIENT
Start: 2017-12-26 | End: 2017-12-27 | Stop reason: HOSPADM

## 2017-12-26 RX ADMIN — HEPARIN SODIUM 5000 UNITS: 5000 INJECTION, SOLUTION INTRAVENOUS; SUBCUTANEOUS at 21:21

## 2017-12-26 RX ADMIN — BENZONATATE 100 MG: 100 CAPSULE ORAL at 09:36

## 2017-12-26 RX ADMIN — GUAIFENESIN 600 MG: 600 TABLET, EXTENDED RELEASE ORAL at 21:21

## 2017-12-26 RX ADMIN — ISODIUM CHLORIDE 3 ML: 0.03 SOLUTION RESPIRATORY (INHALATION) at 08:00

## 2017-12-26 RX ADMIN — GUAIFENESIN 600 MG: 600 TABLET, EXTENDED RELEASE ORAL at 09:36

## 2017-12-26 RX ADMIN — PREDNISONE 20 MG: 20 TABLET ORAL at 09:55

## 2017-12-26 RX ADMIN — HEPARIN SODIUM 5000 UNITS: 5000 INJECTION, SOLUTION INTRAVENOUS; SUBCUTANEOUS at 13:07

## 2017-12-26 RX ADMIN — MONTELUKAST SODIUM 10 MG: 10 TABLET, FILM COATED ORAL at 09:36

## 2017-12-26 RX ADMIN — DIPHENHYDRAMINE HYDROCHLORIDE 25 MG: 50 INJECTION, SOLUTION INTRAMUSCULAR; INTRAVENOUS at 16:11

## 2017-12-26 RX ADMIN — LORATADINE 10 MG: 10 TABLET ORAL at 09:36

## 2017-12-26 RX ADMIN — LEVALBUTEROL 1.25 MG: 1.25 SOLUTION, CONCENTRATE RESPIRATORY (INHALATION) at 08:00

## 2017-12-26 RX ADMIN — ATORVASTATIN CALCIUM 40 MG: 40 TABLET, FILM COATED ORAL at 09:36

## 2017-12-26 RX ADMIN — PANTOPRAZOLE SODIUM 40 MG: 40 TABLET, DELAYED RELEASE ORAL at 06:06

## 2017-12-26 RX ADMIN — DIPHENHYDRAMINE HYDROCHLORIDE 25 MG: 50 INJECTION, SOLUTION INTRAMUSCULAR; INTRAVENOUS at 09:55

## 2017-12-26 RX ADMIN — ASPIRIN 81 MG: 81 TABLET, COATED ORAL at 09:36

## 2017-12-26 RX ADMIN — AMLODIPINE BESYLATE 5 MG: 5 TABLET ORAL at 09:36

## 2017-12-26 RX ADMIN — BENZONATATE 100 MG: 100 CAPSULE ORAL at 21:21

## 2017-12-26 RX ADMIN — BENZONATATE 100 MG: 100 CAPSULE ORAL at 16:11

## 2017-12-26 RX ADMIN — ACETAMINOPHEN 650 MG: 325 TABLET, FILM COATED ORAL at 18:54

## 2017-12-26 RX ADMIN — BUDESONIDE AND FORMOTEROL FUMARATE DIHYDRATE 2 PUFF: 160; 4.5 AEROSOL RESPIRATORY (INHALATION) at 19:27

## 2017-12-26 RX ADMIN — HEPARIN SODIUM 5000 UNITS: 5000 INJECTION, SOLUTION INTRAVENOUS; SUBCUTANEOUS at 05:40

## 2017-12-26 NOTE — PROGRESS NOTES
Progress Note - Ce Araya 60 y o  female MRN: [id number]    Unit/Bed#: E2 -01 Encounter: [id number]    Assessment/Plan:    Acute respiratory distress  acute influenza A infection and related COPD exacerbation, will discontinue antibiotics blood cultures no growth negative Legionella and pneumococcal antigen and sputum appears normal conrad    COPD exacerbation   secondary to influenza A Will continue inhaler and add low-dose steroid    Influenza A    patient was symptoms greater than 48 hours will provide supportive care    Morbid obesity    would benefit from weight loss    Dyslipidemia    continue statin for LDL control     Bhavani Garcia    appears related to a Rocephin allergy this is been discontinued and will provide Benadryl p r n  Subjective:   Feeling better, developing bumps and itching of the skin mostly arms and chest less shortness of breath less cough no chest pain no nausea vomiting diarrhea no fevers chills today appetite good      Objective:     Vitals: Blood pressure 147/87, pulse 101, temperature 97 9 °F (36 6 °C), temperature source Tympanic, resp  rate 18, height 4' 10" (1 473 m), weight 136 kg (299 lb 13 2 oz), SpO2 96 %  ,Body mass index is 62 66 kg/m²  Results from last 7 days  Lab Units 12/25/17  0520 12/24/17  1805   WBC Thousand/uL 6 44 10 30*   HEMOGLOBIN g/dL 13 0 13 1   HEMATOCRIT % 38 4 38 9   PLATELETS Thousands/uL 196 222   INR   --  1 04       Results from last 7 days  Lab Units 12/26/17  0534  12/24/17  1805   SODIUM mmol/L 140  < > 137   POTASSIUM mmol/L 4 1  < > 4 0   CHLORIDE mmol/L 105  < > 99*   CO2 mmol/L 30  < > 28   BUN mg/dL 23  < > 14   CREATININE mg/dL 1 28  < > 1 37*   CALCIUM mg/dL 8 8  < > 8 9   TOTAL PROTEIN g/dL  --   --  7 7   BILIRUBIN TOTAL mg/dL  --   --  0 29   ALK PHOS U/L  --   --  102   ALT U/L  --   --  23   AST U/L  --   --  18   GLUCOSE RANDOM mg/dL 133  < > 113   < > = values in this interval not displayed      Scheduled Meds:    amLODIPine 5 mg Oral Daily   aspirin 81 mg Oral Daily   atorvastatin 40 mg Oral Daily   azithromycin 500 mg Oral Q24H   benzonatate 100 mg Oral TID   budesonide-formoterol 2 puff Inhalation BID   guaiFENesin 600 mg Oral Q12H [address]   heparin (porcine) 5,000 Units Subcutaneous Q8H [address]   loratadine 10 mg Oral Daily   montelukast 10 mg Oral Daily   pantoprazole 40 mg Oral Daily   predniSONE 20 mg Oral Daily       Continuous Infusions:     Physical exam:  General appearance:  Alert oriented x3 no distress interaction appropriate   Head/Eyes:  Nonicteric PERRL EOMI  Neck:  Supple  Lungs:  Decreased BS bilateral wheezing   Heart: normal S1 S2 regular  Abdomen:  Obese nontender with bowel sounds  Extremities: no edema  Skin: no rash    Invasive Devices     Peripheral Intravenous Line            Peripheral IV 12/24/17 Left Forearm 1 day    Peripheral IV 12/24/17 Right Forearm 1 day                  VTE Pharmacologic Prophylaxis:  heparin   VTE Mechanical Prophylaxis:  SCDs                     Counseling / Coordination of Care  Total floor / unit time spent today  30   minutes  Greater than 50% of total time was spent with the patient and / or family counseling and / or coordination of care    A description of the counseling / coordination of care:

## 2017-12-26 NOTE — PROGRESS NOTES
Pt c/o of swelling in lower lip and hives on b/l arms; notified [facility], Benedryl and prednisone given; pt at first was stating that she should not have steroids because she is allergic; allergies reviewed w/pt again; then pt was "not sure" about allergy to steroids; rechecked pt at 1100, pt stated she was feeling better after the benedryl

## 2017-12-26 NOTE — CASE MANAGEMENT
Initial Clinical Review    Admission: Date/Time/Statement: 12/24/17 @ 2002     Orders Placed This Encounter   Procedures    Inpatient Admission (expected length of stay for this patient is greater than two midnights)     Standing Status:   Standing     Number of Occurrences:   1     Order Specific Question:   Admitting Physician     Answer:   Tsering Puente [[id number]]     Order Specific Question:   Level of Care     Answer:   Med Surg [[id number]]     Order Specific Question:   Estimated length of stay     Answer:   More than 2 Midnights     Order Specific Question:   Certification     Answer:   I certify that inpatient services are medically necessary for this patient for a duration of greater than two midnights  See H&P and MD Progress Notes for additional information about the patient's course of treatment  ED: Date/Time/Mode of Arrival:   ED Arrival Information     Expected Arrival Acuity Means of Arrival Escorted By Service Admission Type    - 12/24/2017 17:47 Emergent Walk-In Self General Medicine Emergency    Arrival Complaint    Flu Symptoms          Chief Complaint:   Chief Complaint   Patient presents with    Fever - 9 weeks to 74 years     Fever, chills, productive cough, white mucous for 2 months, chest pain, shortness of breath        History of Illness:    Uvaldo Cranker is a 60 y o  female who [facility] a [facility] presents with PMH of asthma morbid obesity,, hypertension hyperlipidemia and diabetes coming the ED for progressive shortness of breath and cough for the last the in patient noticed that her symptoms were starting to flare when she was having difficulty completing her normal daily chores  She was becoming short of breath with exertion and presyncopal   She denies any falls  She reports her cough has been getting worse is most recently productive of yellowish to clear sputum  She has had subjective fevers, chills/rigors and muscle aches    She has substernal chest pain which is worse with cough and deep inspiration  She reports that she believes her friend had passed on his head cold to her  Overall, the patient reports this been ongoing intermittently with symptoms improving and resolving for the last 2 months  She has not seen anyone for this previously  She does not take her flu vaccine        She reports that her neighbor smoking in their apartment (it is a non smoking building) exacerbates her SOB  She has no pets, has thin rugs which were just cleaned, and is a never smoker        ED Vital Signs:   ED Triage Vitals [12/24/17 1754]   Temperature Pulse Respirations Blood Pressure SpO2   (!) 103 °F (39 4 °C) (!) 129 (!) 30 (!) 169/104 95 %      Temp Source Heart Rate Source Patient Position - Orthostatic VS BP Location FiO2 (%)   Oral Monitor Sitting Left arm --      Pain Score       7        Wt Readings from Last 1 Encounters:   12/26/17 136 kg (299 lb 13 2 oz)       Vital Signs (abnormal):   above    Abnormal Labs/Diagnostic Test Results:   BNP  217  + influenza   A  Lactic  Acid  2 5  Creat  1 37  WBC  10 30  CXR:    NAD      ED Treatment:   Medication Administration from 12/24/2017 1747 to 12/24/2017 2058       Date/Time Order Dose Route Action Action by Comments     12/24/2017 1840 sodium chloride 0 9 % bolus 1,000 mL 1,000 mL Intravenous Niki 37 Merari Callejsa RN      12/24/2017 1838 acetaminophen (TYLENOL) tablet 975 mg 975 mg Oral Given Merari Callejas RN      12/24/2017 1839 ipratropium-albuterol (DUO-NEB) 0 5-2 5 mg/mL inhalation solution 3 mL 3 mL Nebulization Given Merari Callejas RN      12/24/2017 2022 cefTRIAXone (ROCEPHIN) IVPB (premix) 1,000 mg 0 mg Intravenous Stopped Merari Callejas RN      12/24/2017 1942 cefTRIAXone (ROCEPHIN) IVPB (premix) 1,000 mg 1,000 mg Intravenous New Bag Anai Penn RN      12/24/2017 2038 azithromycin (ZITHROMAX) 500 mg in sodium chloride 0 9% 250mL IVPB 500 mg 500 mg Intravenous New Bag Anai Penn RN      12/24/2017 1950 sodium chloride 0 9 % bolus 3,000 mL 1,000 mL Intravenous New Bag Belia Mix RN           Past Medical/Surgical History: Active Ambulatory Problems     Diagnosis Date Noted    No Active Ambulatory Problems     Resolved Ambulatory Problems     Diagnosis Date Noted    No Resolved Ambulatory Problems     Past Medical History:   Diagnosis Date    Diabetes mellitus ([facility] )     GERD (gastroesophageal reflux disease)     Hyperlipidemia     Hypertension        Admitting Diagnosis: Cough [R05]  Fever [R50 9]  Flu-like symptoms [R68 89]    Age/Sex: 60 y o  female    Assessment/Plan:      Sepsis ([facility] )   Assessment & Plan     Suspect secondary to upper respiratory illness  Given acute symptoms consider secondary to URI complicated by asthmatic bronchitis, given intermittent symptoms and persistence of symptoms for the last 2 months consider infectious bronchitis, CXR appears clean in upper lungs b/l, pending official read but poor penetration due to decreased inspiration limiting examination of lower lung fields  By a high fever 103°, tachycardia and tachypnea, patient with only leukocytosis of 10  Lactic acid on admission 2 5 improved to 1 3 on 3/4 L of normal saline IV fluid bolus provided by ED  F/u blood cultures, pt rec'd rocephin/azithromax, will continue for now given modest Xray results  Check sputum culture, legionella/strep pneumo ag, agree w/o r/o flu by ED  Continue to treat pna      Asthma exacerbation   Assessment & Plan     Suspect 2* viral URI however s/sx have been intermittent and waxing/waning over last 2 mo, also consider infectious bronchitis superimposed on asthma vs occult PNA    Consider flu  Modest asthma exacerbation, pt w/constant smoke exposure from 2* smoke form neighbor, no known allergens  Pt on RA, mild conversational dyspnea, sitting up right w/mild abdominal muscle use  Recd duoneb in ED  Start on tessalon, mucinex, xopenex scheduled, given poor airway movement and modest moderate to end expiratory wheezing will provide solumedrol 125mg IV and switch to 40mg IV q 8H continue singulair, continue claritin       Flu-like symptoms   Assessment & Plan     Pt does not get the flu vaccine  Continue droplet precautions f/u flu by pcr       Elevated serum creatinine   Assessment & Plan     No ED but pt does appear dehydrated  rec'd 3L NS IVF bolus by ED provider  Will d/c 4th L NS IVF, repeat BMP in am, encourage PO intake        HLD (hyperlipidemia)   Assessment & Plan     Continue statin       HTN (hypertension)   Assessment & Plan     Continue amlodipine 5mg daily  Start hydralazine 5mg prn SBP >160mmHg         Diabetes mellitus type 2 in obese (HCC)   Assessment & Plan     Check hgb a1c, pt not on any medications  Start POC BS and SSI given steroids          GERD (gastroesophageal reflux disease)   Assessment & Plan     Continue PPI       Morbid obesity ([address] )   Assessment & Plan     Pt may have component of restrictive lung disease given significant morbid obesity  Recommend OP f/u with pulmonology given asthma and morbid obesity           Anticipated Length of Stay:  Patient will be admitted on an Inpatient basis with an anticipated length of stay of  Greater than 2 midnights     Justification for Hospital Stay: sepsis, asthma exacerbation                  Admission Orders:  Scheduled Meds:   amLODIPine 5 mg Oral Daily   aspirin 81 mg Oral Daily   atorvastatin 40 mg Oral Daily   benzonatate 100 mg Oral TID   budesonide-formoterol 2 puff Inhalation BID   guaiFENesin 600 mg Oral Q12H [facility]   heparin (porcine) 5,000 Units Subcutaneous Q8H [facility]   loratadine 10 mg Oral Daily   montelukast 10 mg Oral Daily   pantoprazole 40 mg Oral Daily   predniSONE 20 mg Oral Daily     Continuous Infusions:    PRN Meds:   acetaminophen    diphenhydrAMINE    ondansetron     Tele  CCD diet  Droplet precautions  Sputum c/s    Thank you,  [facility] in the [facility] by [facility] for 2017  Network Utilization Review Department  Phone: [phone]; Fax [phone]  ATTENTION: The Network Utilization Review Department is now centralized for our 7 Facilities  Make a note that we have a new phone and fax numbers for our Department  Please call with any questions or concerns to [phone] and carefully follow the prompts so that you are directed to the right person  All voicemails are confidential  Fax any determinations, approvals, denials, and requests for initial or continue stay review clinical to [phone]  Due to HIGH CALL volume, it would be easier if you could please send faxed requests to expedite your requests and in part, help us provide discharge notifications faster

## 2017-12-27 VITALS
RESPIRATION RATE: 20 BRPM | SYSTOLIC BLOOD PRESSURE: 142 MMHG | HEART RATE: 93 BPM | DIASTOLIC BLOOD PRESSURE: 80 MMHG | TEMPERATURE: 97.6 F | WEIGHT: 293 LBS | HEIGHT: 58 IN | OXYGEN SATURATION: 97 % | BODY MASS INDEX: 61.5 KG/M2

## 2017-12-27 PROBLEM — R79.89 ELEVATED SERUM CREATININE: Status: RESOLVED | Noted: 2017-12-24 | Resolved: 2017-12-27

## 2017-12-27 PROBLEM — J11.1 INFLUENZA: Status: ACTIVE | Noted: 2017-12-24

## 2017-12-27 PROBLEM — R73.9 HYPERGLYCEMIA: Status: ACTIVE | Noted: 2017-12-24

## 2017-12-27 PROBLEM — A41.9 SEPSIS (HCC): Status: RESOLVED | Noted: 2017-12-24 | Resolved: 2017-12-27

## 2017-12-27 RX ORDER — BUDESONIDE AND FORMOTEROL FUMARATE DIHYDRATE 160; 4.5 UG/1; UG/1
2 AEROSOL RESPIRATORY (INHALATION)
Qty: 1 INHALER | Refills: 0 | Status: SHIPPED | OUTPATIENT
Start: 2017-12-27 | End: 2018-08-28 | Stop reason: ALTCHOICE

## 2017-12-27 RX ORDER — PREDNISONE 20 MG/1
20 TABLET ORAL DAILY
Qty: 5 TABLET | Refills: 0 | Status: SHIPPED | OUTPATIENT
Start: 2017-12-28 | End: 2018-01-02

## 2017-12-27 RX ORDER — BENZONATATE 100 MG/1
100 CAPSULE ORAL 3 TIMES DAILY
Qty: 20 CAPSULE | Refills: 0 | Status: SHIPPED | OUTPATIENT
Start: 2017-12-27 | End: 2019-02-20 | Stop reason: HOSPADM

## 2017-12-27 RX ADMIN — ATORVASTATIN CALCIUM 40 MG: 40 TABLET, FILM COATED ORAL at 08:48

## 2017-12-27 RX ADMIN — PREDNISONE 20 MG: 20 TABLET ORAL at 08:48

## 2017-12-27 RX ADMIN — GUAIFENESIN 600 MG: 600 TABLET, EXTENDED RELEASE ORAL at 08:48

## 2017-12-27 RX ADMIN — DIPHENHYDRAMINE HYDROCHLORIDE 25 MG: 50 INJECTION, SOLUTION INTRAMUSCULAR; INTRAVENOUS at 04:12

## 2017-12-27 RX ADMIN — MONTELUKAST SODIUM 10 MG: 10 TABLET, FILM COATED ORAL at 08:48

## 2017-12-27 RX ADMIN — HEPARIN SODIUM 5000 UNITS: 5000 INJECTION, SOLUTION INTRAVENOUS; SUBCUTANEOUS at 06:26

## 2017-12-27 RX ADMIN — PANTOPRAZOLE SODIUM 40 MG: 40 TABLET, DELAYED RELEASE ORAL at 06:26

## 2017-12-27 RX ADMIN — AMLODIPINE BESYLATE 5 MG: 5 TABLET ORAL at 08:48

## 2017-12-27 RX ADMIN — LORATADINE 10 MG: 10 TABLET ORAL at 08:48

## 2017-12-27 RX ADMIN — ASPIRIN 81 MG: 81 TABLET, COATED ORAL at 08:48

## 2017-12-27 RX ADMIN — BUDESONIDE AND FORMOTEROL FUMARATE DIHYDRATE 2 PUFF: 160; 4.5 AEROSOL RESPIRATORY (INHALATION) at 08:48

## 2017-12-27 RX ADMIN — BENZONATATE 100 MG: 100 CAPSULE ORAL at 08:48

## 2017-12-27 NOTE — ASSESSMENT & PLAN NOTE
Suspect 2* flu A however s/sx have been intermittent and waxing/waning over last 2 mo, no PNA  Improved IV steroids, updraft inhalers, pt was weaned down to RA on day of discharge w/modest wheezing but significant improvement in s/sx  Will d/c home w/script for prednisone 20mg daily x 5d and new script for albuterol nebulizer inhalers  Refilled prescription for symbicort in case pt without  Continue singulair  Stable for d/c

## 2017-12-27 NOTE — DISCHARGE SUMMARY
Discharge- Ce Araya 1955, 60 y o  female MRN: [id number]    Unit/Bed#: E2 -01 Encounter: [id number]    Primary Care Provider: Miguel Sims MD   Date and time admitted to hospital: 12/24/2017  5:55 PM    Discharge Summary - [facility]    Patient Information: Rachel Duran 60 y o  female MRN: [id number]  Unit/Bed#: E2 -01 Encounter: [id number]    Discharging Physician / Practitioner: Magno Lares PA-C  PCP: Miguel Sims MD  Admission Date: 12/24/2017  Discharge Date: 12/27/17    Reason for Admission: asthma exacrbation and sepsis    Discharge Diagnoses:     Principal Problem (Resolved):    Sepsis ([facility] )  Active Problems:    Influenza    Asthma exacerbation    Morbid obesity ([facility] )    GERD (gastroesophageal reflux disease)    Hyperglycemia    HTN (hypertension)    HLD (hyperlipidemia)  Resolved Problems:    Elevated serum creatinine      Consultations During Hospital Stay:  · non    Procedures Performed:     · none    Significant Findings / Test Results:     · Influenza A positive  · CXR negative for PNA    Incidental Findings:   · Blood cx neg x48 h, no legionella/strep pneumo     Test Results Pending at Discharge (will require follow up):   ·      Outpatient Tests Requested:  ·     Complications:      Hospital Course:     Rachel Duran is a 60 y o  female patient who originally presented to the hospital on 12/24/2017 due to increased work of breathing and cough starting for 3 days prior to arrival   The patient reported that she was even short of breath and lightheaded and was having a cough productive of yellowish to clear sputum with subjective fevers chills and body aches  She had a substernal pleuritic chest pain with cough and inspiration on admission  She believes her friend may have given her a head cold  She does not take the flu vaccine as she reports that it gives her a pneumonia      Pt initially met sepsis by tachycardand high fever of 103 degrees and tachypnea  Lactic acid was 2 5 improved to 1 3  Blood cx negative for bacteremia to date  Asthma exacerbation   Assessment & Plan    Suspect 2* flu A however s/sx have been intermittent and waxing/waning over last 2 mo, no PNA  Improved IV steroids, updraft inhalers, pt was weaned down to RA on day of discharge w/modest wheezing but significant improvement in s/sx  Will d/c home w/script for prednisone 20mg daily x 5d and new script for albuterol nebulizer inhalers  Refilled prescription for symbicort in case pt without  Continue singulair  Stable for d/c        Influenza   Assessment & Plan    Influenza A, s/p 48H  Pt does not get the flu vaccine  Supportive care        Elevated serum creatinineresolved as of 12/27/2017   Assessment & Plan    No ED on admission improved w/3L NS bolus in ED          HLD (hyperlipidemia)   Assessment & Plan    Continue statin        HTN (hypertension)   Assessment & Plan    Stable on home regimen  Continue amlodipine 5mg daily          Hyperglycemia   Assessment & Plan    Steroid induced pt's fasting  today  Pt not on meds at home for DM  Encourage f/u with PCP        GERD (gastroesophageal reflux disease)   Assessment & Plan    Continue PPI        Morbid obesity ([address] )   Assessment & Plan    Pt may have component of restrictive lung disease given significant morbid obesity  Recommend OP f/u with pulmonology given asthma and morbid obesity                Condition at Discharge: good     Discharge Day Visit / Exam:     Subjective: The patient reports that her breathing is significantly improved  She is on room air, she has had no episodes of lightheadedness or dizziness  No chest pain  Her cough is still improving although still productive of whitish sputum  No diarrhea, no nausea or vomiting  The patient is ready for discharge and stable    Vitals: Blood Pressure: 142/80 (12/27/17 0700)  Pulse: 93 (12/27/17 0700)  Temperature: 97 6 °F (36 4 °C) (12/27/17 0700)  Temp Source: Tympanic (12/27/17 0700)  Respirations: 20 (12/27/17 0700)  Height: 4' 10" (147 3 cm) (12/24/17 2300)  Weight - Scale: 136 kg (299 lb 13 2 oz) (12/26/17 1032)  SpO2: 97 % (12/27/17 0700)  Exam:   Physical Exam   Constitutional: She appears well-developed  No distress  Morbidly obese   HENT:   Head: Normocephalic and atraumatic  Right Ear: External ear normal    Left Ear: External ear normal    Mouth/Throat: Oropharynx is clear and moist  No oropharyngeal exudate  Eyes: Conjunctivae are normal  Right eye exhibits no discharge  Left eye exhibits no discharge  No scleral icterus  Neck: Normal range of motion  Cardiovascular: Normal rate, regular rhythm, normal heart sounds and intact distal pulses  Exam reveals no gallop and no friction rub  No murmur heard  Pulmonary/Chest: No respiratory distress  She has wheezes (modest end expiratory wheezes in lower lung fields b/l)  She has no rales  She exhibits no tenderness  No wob, minimal conversational dyspnea on RA   Abdominal: Soft  She exhibits no distension  There is no tenderness  There is no rebound and no guarding  Musculoskeletal: She exhibits no edema  Neurological: She is alert  Skin: Skin is warm and dry  She is not diaphoretic  No erythema  Vitals reviewed  Discussion with Family:     Discharge instructions/Information to patient and family:   See after visit summary for information provided to patient and family  Provisions for Follow-Up Care:  See after visit summary for information related to follow-up care and any pertinent home health orders  Disposition:     Home    For Discharges to [facility]:   · Not Applicable to this Patient - Not Applicable to this Patient    Planned Readmission: none     Discharge Statement:  I spent 40 minutes discharging the patient  This time was spent on the day of discharge  I had direct contact with the patient on the day of discharge   Greater than 50% of the total time was spent examining patient, answering all patient questions, arranging and discussing plan of care with patient as well as directly providing post-discharge instructions  Additional time then spent on discharge activities  Discharge Medications:  See after visit summary for reconciled discharge medications provided to patient and family        ** Please Note: This note has been constructed using a voice recognition system **

## 2017-12-27 NOTE — PLAN OF CARE

## 2017-12-29 LAB
BACTERIA BLD CULT: NORMAL
BACTERIA BLD CULT: NORMAL

## 2018-05-07 LAB
ALBUMIN SERPL BCP-MCNC: 4 G/DL (ref 3–5.2)
ALP SERPL-CCNC: 95 U/L (ref 43–122)
ALT SERPL W P-5'-P-CCNC: 25 U/L (ref 9–52)
AMORPHOUS MATERIAL (HISTORICAL): ABNORMAL
ANION GAP SERPL CALCULATED.3IONS-SCNC: 10 MMOL/L (ref 5–14)
AST SERPL W P-5'-P-CCNC: 13 U/L (ref 14–36)
BACTERIA UR QL AUTO: ABNORMAL
BILIRUB SERPL-MCNC: 0.3 MG/DL
BILIRUB UR QL STRIP: NEGATIVE MG/DL
BUN SERPL-MCNC: 12 MG/DL (ref 5–25)
C-REACTIVE PROTEIN (HISTORICAL): 0.8 MG/DL
CALCIUM SERPL-MCNC: 9.2 MG/DL (ref 8.4–10.2)
CASTS/CASTS TYPE (HISTORICAL): ABNORMAL /LPF
CHLORIDE SERPL-SCNC: 104 MEQ/L (ref 97–108)
CHOLEST SERPL-MCNC: 219 MG/DL
CHOLEST/HDLC SERPL: 4.1 {RATIO}
CLARITY UR: CLEAR
CO2 SERPL-SCNC: 30 MMOL/L (ref 22–30)
COLOR UR: YELLOW
CREATINE, SERUM (HISTORICAL): 0.98 MG/DL (ref 0.6–1.2)
CREATININE, RANDOM URINE (HISTORICAL): 153.9 MG/DL (ref 50–200)
CRYSTAL TYPE (HISTORICAL): ABNORMAL /HPF
DEPRECATED RDW RBC AUTO: 15 %
EGFR (HISTORICAL): 57 ML/MIN/1.73 M2
ERYTHROCYTE SEDIMENTATION RATE (HISTORICAL): 51 MM (ref 1–20)
GLUCOSE SERPL-MCNC: 97 MG/DL (ref 70–99)
GLUCOSE UR STRIP-MCNC: NEGATIVE MG/DL
HCT VFR BLD AUTO: 40 % (ref 36–46)
HDLC SERPL-MCNC: 53 MG/DL
HGB BLD-MCNC: 13.2 G/DL (ref 12–16)
HGB UR QL STRIP.AUTO: ABNORMAL
KETONES UR STRIP-MCNC: NEGATIVE MG/DL
LDL/HDL RATIO (HISTORICAL): 2.6
LDLC SERPL CALC-MCNC: 140 MG/DL
LEUKOCYTE ESTERASE UR QL STRIP: ABNORMAL
MCH RBC QN AUTO: 28.9 PG (ref 26–34)
MCHC RBC AUTO-ENTMCNC: 32.9 % (ref 31–36)
MCV RBC AUTO: 88 FL (ref 80–100)
MICROALBUM.,U,RANDOM (HISTORICAL): 0.9 MG/DL
MICROALBUMIN/CREATININE RATIO (HISTORICAL): 5.8
MUCOUS THREADS URNS QL MICRO: ABNORMAL
NITRITE UR QL STRIP: NEGATIVE
NON-SQ EPI CELLS URNS QL MICRO: ABNORMAL
OTHER STN SPEC: ABNORMAL
PH UR STRIP.AUTO: 5 [PH] (ref 4.5–8)
PLATELET # BLD AUTO: 257 K/MCL (ref 150–450)
POTASSIUM SERPL-SCNC: 4 MEQ/L (ref 3.6–5)
PROT UR STRIP-MCNC: 15 MG/DL
RBC # BLD AUTO: 4.57 M/MCL (ref 4–5.2)
RBC #/AREA URNS AUTO: ABNORMAL /HPF
RHEUMATOID FACTOR (HISTORICAL): <8.6 IU/ML
SODIUM SERPL-SCNC: 144 MEQ/L (ref 137–147)
SP GR UR STRIP.AUTO: 1.01 (ref 1–1.04)
TOTAL PROTEIN (HISTORICAL): 6.7 G/DL (ref 5.9–8.4)
TRIGL SERPL-MCNC: 129 MG/DL
UROBILINOGEN UR QL STRIP.AUTO: NEGATIVE MG/DL (ref 0–1)
VLDLC SERPL CALC-MCNC: 26 MG/DL (ref 0–40)
WBC # BLD AUTO: 9.6 K/MCL (ref 4.5–11)
WBC #/AREA URNS AUTO: 5 /HPF

## 2018-05-09 LAB — ANTI-NUCLEAR ANTIBODY (ANA) (HISTORICAL): NORMAL

## 2018-08-02 DIAGNOSIS — T78.40XD ALLERGIC STATE, SUBSEQUENT ENCOUNTER: Primary | ICD-10-CM

## 2018-08-03 RX ORDER — LORATADINE 10 MG/1
10 TABLET ORAL DAILY
Qty: 30 TABLET | Refills: 1 | Status: SHIPPED | OUTPATIENT
Start: 2018-08-03 | End: 2018-09-14 | Stop reason: SDUPTHER

## 2018-08-24 RX ORDER — ALBUTEROL SULFATE 90 UG/1
AEROSOL, METERED RESPIRATORY (INHALATION)
COMMUNITY
Start: 2018-04-27 | End: 2019-05-24 | Stop reason: SDUPTHER

## 2018-08-28 ENCOUNTER — OFFICE VISIT (OUTPATIENT)
Dept: PULMONOLOGY | Facility: CLINIC | Age: 63
End: 2018-08-28
Payer: COMMERCIAL

## 2018-08-28 ENCOUNTER — HOSPITAL ENCOUNTER (OUTPATIENT)
Dept: RADIOLOGY | Facility: HOSPITAL | Age: 63
Discharge: HOME | End: 2018-08-28
Attending: INTERNAL MEDICINE
Payer: COMMERCIAL

## 2018-08-28 ENCOUNTER — TRANSCRIBE ORDERS (OUTPATIENT)
Dept: ADMINISTRATIVE | Facility: HOSPITAL | Age: 63
End: 2018-08-28

## 2018-08-28 VITALS
BODY MASS INDEX: 60.58 KG/M2 | TEMPERATURE: 98.5 F | DIASTOLIC BLOOD PRESSURE: 84 MMHG | RESPIRATION RATE: 20 BRPM | WEIGHT: 288.6 LBS | HEART RATE: 112 BPM | HEIGHT: 58 IN | OXYGEN SATURATION: 95 % | SYSTOLIC BLOOD PRESSURE: 130 MMHG

## 2018-08-28 DIAGNOSIS — R93.89 ABNORMAL X-RAY: ICD-10-CM

## 2018-08-28 DIAGNOSIS — R05.9 COUGH: ICD-10-CM

## 2018-08-28 DIAGNOSIS — R06.02 SOB (SHORTNESS OF BREATH): Primary | ICD-10-CM

## 2018-08-28 DIAGNOSIS — R93.89 ABNORMAL X-RAY: Primary | ICD-10-CM

## 2018-08-28 PROCEDURE — 99244 OFF/OP CNSLTJ NEW/EST MOD 40: CPT | Performed by: INTERNAL MEDICINE

## 2018-08-28 PROCEDURE — 71046 X-RAY EXAM CHEST 2 VIEWS: CPT

## 2018-08-28 NOTE — PATIENT INSTRUCTIONS
Please work on weight loss - work on sweets and iced tea  Please have your xray done today  Please increase exercise

## 2018-08-28 NOTE — PROGRESS NOTES
Pulmonary Initial Visit  Ce Araya 60 y o  female MRN: [id number]  @ Encounter: [id number]      Impressions/Recommendations:   Patient is a 65-year-old female with past medical history significant for diabetes and hypertension presents for initial pulmonary evaluation  The patient has a history of morbid obesity for which she was counseled for greater than 10 min on the need for weight loss including dietary and exercise modifications  The patient's daughter was in attendance with her and is in full agreement with dietary modifications including limiting swing iced tea as well as other sweets  The daughter states the mother eats many sweets and states she will help her with this  The patient was also previously prescribed CPAP for sleep apnea but states she is unable to tolerate this given mucus production  I have encouraged her to wear this for 0 5 hr to an hour prior to going to bed an attempt to ease the use of this while she is awake  Patient will attempt as well  The patient reports history of abnormal chest x-ray although after receiving records from [facility] the requested, there are no significant findings on the x-ray  I referred her for a PA and lateral chest x-ray to which my read there is no specific intraparenchymal abnormality but awaiting formal read  The patient may follow up in 3 months or sooner as necessary  History of Present Illness   HPI:  Gold Lord is a 60 y o  female with pmhx sig for DM, HTN who presents for evaluation of shortness of breath  The patient reports she has been short of breath for a while  It can be over very short distances such as going from the kitchen to the dining room  The patient is about 286lbs and her weight is unchanged  The patient recently had a cold but does report a chronic cough which is productive  She feels her breathing is improved with her inhaler    She was previously taking symbicort which was stopped because of cost   She is unsure if it truly helped and has not taken anything in several years  She is taking singulair/claritin  Inhalers: Albuterol inhaler - 2x/daily  Albuterol nebulizer - <1x/month    Review of systems:  12 point review of systems was completed and was otherwise negative except as listed in HPI  Historical Information   Past Medical History:   Diagnosis Date    Diabetes mellitus ([address] )     GERD (gastroesophageal reflux disease)     Hyperlipidemia     Hypertension     Seasonal allergies      Past Surgical History:   Procedure Laterality Date    BILATERAL KNEE ARTHROSCOPY      HYSTERECTOMY       Family History   Problem Relation Age of Onset    Coronary artery disease Father        Social History     Social History    Marital status: Single     Spouse name: N/A    Number of children: N/A    Years of education: N/A     Social History Main Topics    Smoking status: Passive Smoke Exposure - Never Smoker    Smokeless tobacco: Never Used      Comment: childhood smoke exposure    Alcohol use No    Drug use: No    Sexual activity: Not Asked     Other Topics Concern    None     Social History Narrative    WORK:    -  Homemaker        HOBBIES:    -  Denies exposure        PETS:    -  Previous cat; no birds        TRAVEL:    -  No recent travel        EXPOSURES:    -  Denies mold, down pillows, hot tubs       Meds/Allergies   No current facility-administered medications for this visit  (Not in a hospital admission)  Allergies   Allergen Reactions    Januvia [Sitagliptin] Swelling    Clindamycin     Hydrocodone     Morphine Hives    Omeprazole     Penicillins     Percocet [Oxycodone-Acetaminophen]     Tolterodine     Ciprofloxacin Rash       Vitals: Blood pressure 130/84, pulse (!) 112, temperature 98 5 °F (36 9 °C), temperature source Tympanic, resp  rate 20, height 4' 10" (1 473 m), weight 131 kg (288 lb 9 6 oz), SpO2 95 % , RA, Body mass index is 60 32 kg/m²      Physical Exam  General: Pleasant, Awake alert and oriented x 3, conversant without conversational dyspnea, NAD, normal affect  HEENT:  PERRL, Sclera noninjected, nonicteric OU, Nares patent, no nasal flaring, no nasal drainage, Mucous membranes, moist, no oral lesions, normal dentition  NECK: Trachea midline, no accessory muscle use, no stridor, no cervical or supraclavicular adenopathy, JVP not elevated  CARDIAC: Reg, single s1/S2, no m/r/g  PULM: diminished breath sounds throughout  CHEST: No gross deformities, equal chest expansion on inspiration bilaterally  ABD: Normoactive bowel sounds, soft nontender, nondistended, no rebound, no rigidity, no guarding; morbidly obese  EXT: No cyanosis, no clubbing, no edema, normal capillary refill  SKIN:  No rashes, no lesions  NEURO: no focal neurologic deficits, AAOx3, moving all extremities appropriately  MSK:  Slight deformity of right knee; ambulates with cane    Labs: I have personally reviewed pertinent lab results  Lab Results   Component Value Date    GLUCOSE 97 05/07/2018    CALCIUM 9 2 05/07/2018     05/07/2018    K 4 0 05/07/2018    CO2 30 05/07/2018     05/07/2018    BUN 12 05/07/2018    CREATININE 1 28 12/26/2017     Lab Results   Component Value Date    WBC 9 6 05/07/2018    HGB 13 2 05/07/2018    HCT 40 0 05/07/2018    MCV 88 05/07/2018     05/07/2018     Imaging and other studies: I have personally reviewed pertinent reports  and I have personally reviewed pertinent films in PACS  CT chest 02/14/2018: No pleural effusion no suspicious pulmonary mass no interstitial pulmonary fibrosis  CXR 1/16/2018:  Cardiomegaly  CXR 12/24/2017:  No active pulmonary disease  Limited study  Cardiomegaly  Pulmonary function testing:  I have personally reviewed pertinent reports     Received copy of records from outside hospital    1/16/2018  FEV1 1 14 60%  FVC 1 34 54%  FEV1/FVC 85% 109%  TLC 62%  DLCO 19% - limited interpretation    Echocardiogram:   No echocardiogram available for review  Keri Bragg, [address]

## 2018-09-04 ENCOUNTER — TELEPHONE (OUTPATIENT)
Dept: PULMONOLOGY | Facility: CLINIC | Age: 63
End: 2018-09-04

## 2018-09-04 NOTE — TELEPHONE ENCOUNTER
----- Message from Eloise Rivera MD sent at 9/4/2018  9:17 AM EDT -----  I have reviewed your testing  The results are normal   Please send message to patient

## 2018-09-14 ENCOUNTER — OFFICE VISIT (OUTPATIENT)
Dept: FAMILY MEDICINE CLINIC | Facility: CLINIC | Age: 63
End: 2018-09-14
Payer: COMMERCIAL

## 2018-09-14 VITALS
SYSTOLIC BLOOD PRESSURE: 140 MMHG | BODY MASS INDEX: 61.5 KG/M2 | HEIGHT: 58 IN | DIASTOLIC BLOOD PRESSURE: 80 MMHG | WEIGHT: 293 LBS | OXYGEN SATURATION: 98 % | TEMPERATURE: 96.6 F | RESPIRATION RATE: 20 BRPM | HEART RATE: 106 BPM

## 2018-09-14 DIAGNOSIS — Z23 NEED FOR VACCINATION: ICD-10-CM

## 2018-09-14 DIAGNOSIS — Z09 FOLLOW UP: Primary | ICD-10-CM

## 2018-09-14 DIAGNOSIS — T78.40XD ALLERGIC STATE, SUBSEQUENT ENCOUNTER: ICD-10-CM

## 2018-09-14 DIAGNOSIS — E78.00 HYPERCHOLESTEREMIA: ICD-10-CM

## 2018-09-14 DIAGNOSIS — R73.9 HYPERGLYCEMIA: ICD-10-CM

## 2018-09-14 DIAGNOSIS — E66.01 MORBID OBESITY (HCC): ICD-10-CM

## 2018-09-14 DIAGNOSIS — I10 ESSENTIAL HYPERTENSION: ICD-10-CM

## 2018-09-14 PROBLEM — R73.03 BORDERLINE DIABETES: Status: ACTIVE | Noted: 2017-12-24

## 2018-09-14 PROBLEM — Z96.653 HISTORY OF TOTAL KNEE REPLACEMENT, BILATERAL: Status: ACTIVE | Noted: 2018-04-27

## 2018-09-14 PROBLEM — M16.9 HIP OSTEOARTHRITIS: Status: ACTIVE | Noted: 2018-04-27

## 2018-09-14 PROBLEM — Z86.718 HISTORY OF DEEP VENOUS THROMBOSIS: Status: ACTIVE | Noted: 2018-04-27

## 2018-09-14 PROBLEM — Z90.710 H/O: HYSTERECTOMY: Status: ACTIVE | Noted: 2018-04-27

## 2018-09-14 PROCEDURE — 99213 OFFICE O/P EST LOW 20 MIN: CPT | Performed by: FAMILY MEDICINE

## 2018-09-14 PROCEDURE — 3725F SCREEN DEPRESSION PERFORMED: CPT | Performed by: FAMILY MEDICINE

## 2018-09-14 PROCEDURE — 3008F BODY MASS INDEX DOCD: CPT | Performed by: FAMILY MEDICINE

## 2018-09-14 RX ORDER — MONTELUKAST SODIUM 10 MG/1
10 TABLET ORAL DAILY
Qty: 90 TABLET | Refills: 1 | Status: SHIPPED | OUTPATIENT
Start: 2018-09-14 | End: 2018-12-17 | Stop reason: SDUPTHER

## 2018-09-14 RX ORDER — ACETAMINOPHEN 325 MG/1
650 TABLET ORAL EVERY 6 HOURS PRN
Qty: 30 TABLET | Refills: 0 | Status: SHIPPED | OUTPATIENT
Start: 2018-09-14 | End: 2019-02-22 | Stop reason: SDUPTHER

## 2018-09-14 RX ORDER — BLOOD SUGAR DIAGNOSTIC
STRIP MISCELLANEOUS
COMMUNITY
Start: 2018-06-21

## 2018-09-14 RX ORDER — PANTOPRAZOLE SODIUM 40 MG/1
40 TABLET, DELAYED RELEASE ORAL DAILY
Qty: 90 TABLET | Refills: 1 | Status: SHIPPED | OUTPATIENT
Start: 2018-09-14 | End: 2019-05-03 | Stop reason: SDUPTHER

## 2018-09-14 RX ORDER — BLOOD-GLUCOSE METER
EACH MISCELLANEOUS
COMMUNITY
Start: 2018-06-21

## 2018-09-14 RX ORDER — LORATADINE 10 MG/1
10 TABLET ORAL DAILY
Qty: 30 TABLET | Refills: 0 | Status: SHIPPED | OUTPATIENT
Start: 2018-09-14 | End: 2019-03-20 | Stop reason: ALTCHOICE

## 2018-09-14 RX ORDER — LANCETS
EACH MISCELLANEOUS
COMMUNITY
Start: 2018-06-21

## 2018-09-14 RX ORDER — AMLODIPINE BESYLATE 10 MG/1
5 TABLET ORAL DAILY
Qty: 90 TABLET | Refills: 1 | Status: SHIPPED | OUTPATIENT
Start: 2018-09-14 | End: 2018-12-18 | Stop reason: SDUPTHER

## 2018-09-14 RX ORDER — ATORVASTATIN CALCIUM 40 MG/1
40 TABLET, FILM COATED ORAL DAILY
Qty: 90 TABLET | Refills: 1 | Status: SHIPPED | OUTPATIENT
Start: 2018-09-14 | End: 2019-02-20 | Stop reason: HOSPADM

## 2018-09-14 NOTE — ASSESSMENT & PLAN NOTE
Patient states she was not diagnosed with diabetes, but her sugar was high in the past and she was started on metformin, but she was not taking it regularly  Instructed to continue metformin this point  Will check hemoglobin A1c and BMP  Follow-up in 3 months

## 2018-09-14 NOTE — ASSESSMENT & PLAN NOTE
Blood pressure currently well controlled with current antihypertensive therapy including amlodipine  No complaints of adverse effects, including visual changes, dizziness, headaches or  syncope  Will continue current therapy  Encouraged diet and exercise regimen as tolerated

## 2018-09-14 NOTE — PROGRESS NOTES
Assessment/Plan:    HTN (hypertension)  Blood pressure currently well controlled with current antihypertensive therapy including amlodipine  No complaints of adverse effects, including visual changes, dizziness, headaches or  syncope  Will continue current therapy  Encouraged diet and exercise regimen as tolerated      Borderline diabetes  Patient states she was not diagnosed with diabetes, but her sugar was high in the past and she was started on metformin, but she was not taking it regularly  Instructed to continue metformin this point  Will check hemoglobin A1c and BMP  Follow-up in 3 months       Diagnoses and all orders for this visit:    Follow up  -     aspirin 81 MG tablet; Take 1 tablet (81 mg total) by mouth daily  -     pantoprazole (PROTONIX) 40 mg tablet; Take 1 tablet (40 mg total) by mouth daily  -     acetaminophen (TYLENOL) 325 mg tablet; Take 2 tablets (650 mg total) by mouth every 6 (six) hours as needed for mild pain  -     CBC and differential; Future    Need for vaccination    Essential hypertension  -     amLODIPine (NORVASC) 10 mg tablet; Take 0 5 tablets (5 mg total) by mouth daily  -     CBC and differential; Future    Allergic state, subsequent encounter  -     loratadine (CLARITIN) 10 mg tablet; Take 1 tablet (10 mg total) by mouth daily  -     montelukast (SINGULAIR) 10 mg tablet; Take 1 tablet (10 mg total) by mouth daily    Hypercholesteremia  -     atorvastatin (LIPITOR) 40 mg tablet; Take 1 tablet (40 mg total) by mouth daily    Hyperglycemia  -     metFORMIN (GLUCOPHAGE) 500 mg tablet; Take 1 tablet (500 mg total) by mouth daily with breakfast  -     Basic metabolic panel; Future  -     HEMOGLOBIN A1C W/ EAG ESTIMATION;  Future    Morbid obesity ([facility] [address] )    Other orders  -     Cancel: TDAP VACCINE GREATER THAN OR EQUAL TO 8YO IM  -     Blood Glucose Monitoring Suppl (ACCU-CHEK GUIDE) w/Device KIT;   -     ACCU-CHEK GUIDE test strip;   -     ACCU-CHEK FASTCLIX LANCETS MISC; Subjective:      Patient ID: Akin Fierro is a 58 y o  female  Patient presents to follow up on chronic conditions including hypertension, and borderline diabetes, obesity  She does not check her sugar, and states that she is trying to have a healthy diet  She was prescribed metformin in the past, but stoped taking it on her own few months ago        The following portions of the patient's history were reviewed and updated as appropriate: allergies, current medications, past family history, past medical history, past social history, past surgical history and problem list      Review of Systems   Constitutional: Negative for chills, diaphoresis, fatigue and fever  HENT: Negative for congestion, ear discharge, ear pain, mouth sores, rhinorrhea, sore throat and trouble swallowing  Eyes: Negative for photophobia, pain and discharge  Respiratory: Negative for cough, chest tightness, shortness of breath and wheezing  Cardiovascular: Negative for chest pain, palpitations and leg swelling  Gastrointestinal: Negative for abdominal distention, abdominal pain, blood in stool, constipation, diarrhea and nausea  Genitourinary: Negative for difficulty urinating and frequency  Musculoskeletal: Positive for arthralgias and back pain  Negative for joint swelling and neck stiffness  Skin: Negative for color change, pallor and rash  Neurological: Negative for dizziness, syncope, numbness and headaches  Objective:      /80   Pulse (!) 106   Temp (!) 96 6 °F (35 9 °C) (Tympanic)   Resp 20   Ht 4' 10" (1 473 m)   Wt 134 kg (295 lb)   SpO2 98%   BMI 61 66 kg/m²          Physical Exam   Constitutional: She is oriented to person, place, and time  She appears well-developed and well-nourished  No distress  Morbidly obese   HENT:   Head: Normocephalic and atraumatic  Eyes: EOM are normal  Pupils are equal, round, and reactive to light  No scleral icterus  Neck: Normal range of motion  Neck supple  Cardiovascular: Normal rate, regular rhythm and normal heart sounds  Exam reveals no gallop and no friction rub  No murmur heard  Pulmonary/Chest: Effort normal and breath sounds normal  No respiratory distress  She has no wheezes  She has no rales  She exhibits no tenderness  Abdominal: Soft  Bowel sounds are normal  She exhibits no distension  There is no tenderness  There is no rebound  Musculoskeletal: Normal range of motion  She exhibits no edema, tenderness or deformity  Lymphadenopathy:     She has no cervical adenopathy  Neurological: She is alert and oriented to person, place, and time  Skin: Skin is warm and dry  No rash noted  No erythema  No pallor  Psychiatric: She has a normal mood and affect

## 2018-09-14 NOTE — PATIENT INSTRUCTIONS
Basic Carbohydrate Counting   AMBULATORY CARE:   Carbohydrate counting  is a way to plan your meals by counting the amount of carbohydrate in foods  Carbohydrates are the sugars, starches, and fiber found in fruit, grains, vegetables, and milk products  Carbohydrates increase your blood sugar levels  Carbohydrate counting can help you eat the right amount of carbohydrate to keep your blood sugar levels under control  What you need to know about planning meals using carbohydrate counting:  · A dietitian or healthcare provider will help you develop a healthy meal plan that works best for you  You will be taught how much carbohydrate to eat or drink for each meal and snack  Your meal plan will be based on your age, weight, usual food intake, and physical activity level  If you have diabetes, it will also include your blood sugar levels and diabetes medicine  Once you know how much carbohydrate you should eat, you can decide what type of food you want to eat  · You will need to know what foods contain carbohydrate and how much they contain  Keep track of the amount of carbohydrate in meals and snacks in order to follow your meal plan  Do not avoid carbohydrates or skip meals  Your blood sugar may fall too low if you do not eat enough carbohydrate or you skip meals  Foods that contain carbohydrate:   · Breads:  Each serving of food listed below contains about 15 g of carbohydrate   ¨ 1 slice of bread (1 ounce) or 1 flour or corn tortilla (6 inch)    ¨ ½ of a hamburger bun or ¼ of a large bagel (about 1 ounce)    ¨ 1 pancake (about 4 inches across and ¼ inch thick)    · Cereals and grains:  Serving sizes of ready-to-eat cereals vary  Look at the serving size and the total carbohydrate amount listed on the food label  Each serving of food listed below contains about 15 g of carbohydrate       ¨ ¾ cup of dry, unsweetened, ready-to-eat cereal or ¼ cup of low-fat granola     ¨ ½ cup of oatmeal or other cooked cereal ¨ ? cup of cooked rice or pasta    · Starchy vegetables and beans:  Each serving of food listed below contains about 15 g of carbohydrate   ¨ ½ cup of corn, green peas, sweet potatoes, or mashed potatoes    ¨ ¼ of a large baked potato    ¨ ½ cup of beans, lentils, and peas (garbanzo, krishnan, kidney, white, split, black-eyed)    · Crackers and snacks:  Each serving of food listed below contains about 15 g of carbohydrate   ¨ 3 page cracker squares or 8 animal crackers     ¨ 6 saltine-type crackers    ¨ 3 cups of popcorn or ¾ ounce of pretzels, potato chips, or tortilla chips    · Fruit:  Each serving of food listed below contains about 15 g of carbohydrate   ¨ 1 small (4 ounce) piece of fresh fruit or ¾ to 1 cup of fresh fruit    ¨ ½ cup of canned or frozen fruit, packed in natural juice    ¨ ½ cup (4 ounces) of unsweetened fruit juice    ¨ 2 tablespoons of dried fruit    · Desserts or sugary foods:  Each serving of food listed below contains about 15 g of carbohydrate   ¨ 2-inch square unfrosted cake or brownie     ¨ 2 small cookies    ¨ ½ cup of ice cream, frozen yogurt, or nondairy frozen yogurt    ¨ ¼ cup of sherbet or sorbet    ¨ 1 tablespoon of regular syrup, jam, or jelly    ¨ 2 tablespoons of light syrup    · Milk and yogurt:  Foods from the milk group contain about 12 g of carbohydrate per serving  ¨ 1 cup of fat-free or low-fat milk    ¨ 1 cup of soy milk    ¨ ? cup of fat-free, yogurt sweetened with artificial sweetener    · Non-starchy vegetables:  Each serving contains about 5 g of carbohydrate   Three servings of non-starch vegetables count as 1 carbohydrate serving  ¨ ½ cup of cooked vegetables or 1 cup of raw vegetables  This includes beets, broccoli, cabbage, cauliflower, cucumber, mushrooms, tomatoes, and zucchini    ¨ ½ cup of vegetable juice  How to use carbohydrate counting to plan meals:   · Count carbohydrate amounts using serving sizes:      ¨ Pasta dinner example:   You plan to have pasta, tossed salad, and an 8-ounce glass of milk  Your healthcare provider tells you that you may have 4 carbohydrate servings for dinner  One carbohydrate serving of pasta is ? cup  One cup of pasta will equal 3 carbohydrate servings  An 8-ounce glass of milk will count as 1 carbohydrate serving  These amounts of food would equal 4 carbohydrate servings  One cup of tossed salad does not count toward your carbohydrate servings  · Count carbohydrate amounts using food labels:  Find the total amount of carbohydrate in a packaged food by reading the food label  Food labels tell you the serving size of the food and the total carbohydrate amount in each serving  Find the serving size on the food label and then decide how many servings you will eat  Multiply the number of servings you plan to eat by the carbohydrate amount per serving  ¨ Granola bar snack example: Your meal plan allows you to have 2 carbohydrate servings (30 grams) of carbohydrate for a snack  You plan to eat 1 package of granola bars, which contains 2 bars  According to the food label, the serving size of food in this package is 1 bar  Each serving (1 bar) contains 25 grams of carbohydrate  The total amount of carbohydrate in this package of granola bars would be 50 g  Based on your meal plan, you should eat only 1 bar  Follow up with your healthcare provider as directed:  Write down your questions so you remember to ask them during your visits  © 2017 [employer] Information is for End User's use only and may not be sold, redistributed or otherwise used for commercial purposes  All illustrations and images included in CareNotes® are the copyrighted property of [employer] , [employer]  or [employer]  The above information is an  only  It is not intended as medical advice for individual conditions or treatments   Talk to your doctor, nurse or pharmacist before following any medical regimen to see if it is safe and effective for you

## 2018-09-14 NOTE — ASSESSMENT & PLAN NOTE
1-Advised to Become more active  Many types of physical activity can help, including walking  You can start with a few minutes a day and add more as you get stronger  2-Advised to Improve her diet  No single diet turns out to be better than any other  It is  healthy to have regular meal times and smaller portions, and not to skip meals  Avoid  sweets and processed snack foods, and instead eat more vegetables and fruits  3-Educated that Being obese increases her risk of developing many health problems    including Diabetes, High blood pressure, High cholesterol , Heart disease , Stroke, Sleep  apnea , Asthma, Cancer

## 2018-10-05 ENCOUNTER — APPOINTMENT (OUTPATIENT)
Dept: LAB | Facility: HOSPITAL | Age: 63
End: 2018-10-05
Payer: COMMERCIAL

## 2018-10-05 DIAGNOSIS — I10 ESSENTIAL HYPERTENSION: ICD-10-CM

## 2018-10-05 DIAGNOSIS — R73.9 HYPERGLYCEMIA: ICD-10-CM

## 2018-10-05 DIAGNOSIS — Z09 FOLLOW UP: ICD-10-CM

## 2018-10-05 LAB
ANION GAP SERPL CALCULATED.3IONS-SCNC: 8 MMOL/L (ref 5–14)
BASOPHILS # BLD AUTO: 0.1 THOUSANDS/ΜL (ref 0–0.1)
BASOPHILS NFR BLD AUTO: 1 % (ref 0–1)
BUN SERPL-MCNC: 17 MG/DL (ref 5–25)
CALCIUM SERPL-MCNC: 9.4 MG/DL (ref 8.4–10.2)
CHLORIDE SERPL-SCNC: 103 MMOL/L (ref 97–108)
CO2 SERPL-SCNC: 31 MMOL/L (ref 22–30)
CREAT SERPL-MCNC: 1.13 MG/DL (ref 0.6–1.2)
EOSINOPHIL # BLD AUTO: 0.4 THOUSAND/ΜL (ref 0–0.4)
EOSINOPHIL NFR BLD AUTO: 4 % (ref 0–6)
ERYTHROCYTE [DISTWIDTH] IN BLOOD BY AUTOMATED COUNT: 14.7 %
EST. AVERAGE GLUCOSE BLD GHB EST-MCNC: 140 MG/DL
GFR SERPL CREATININE-BSD FRML MDRD: 59 ML/MIN/1.73SQ M
GLUCOSE P FAST SERPL-MCNC: 94 MG/DL (ref 70–99)
HBA1C MFR BLD: 6.5 % (ref 4.2–6.3)
HCT VFR BLD AUTO: 41.6 % (ref 36–46)
HGB BLD-MCNC: 13.6 G/DL (ref 12–16)
LYMPHOCYTES # BLD AUTO: 3.4 THOUSANDS/ΜL (ref 0.5–4)
LYMPHOCYTES NFR BLD AUTO: 39 % (ref 20–50)
MCH RBC QN AUTO: 29.5 PG (ref 26–34)
MCHC RBC AUTO-ENTMCNC: 32.7 G/DL (ref 31–36)
MCV RBC AUTO: 90 FL (ref 80–100)
MONOCYTES # BLD AUTO: 0.6 THOUSAND/ΜL (ref 0.2–0.9)
MONOCYTES NFR BLD AUTO: 7 % (ref 1–10)
NEUTROPHILS # BLD AUTO: 4.3 THOUSANDS/ΜL (ref 1.8–7.8)
NEUTS SEG NFR BLD AUTO: 49 % (ref 45–65)
PLATELET # BLD AUTO: 292 THOUSANDS/UL (ref 150–450)
PMV BLD AUTO: 8.3 FL (ref 8.9–12.7)
POTASSIUM SERPL-SCNC: 3.8 MMOL/L (ref 3.6–5)
RBC # BLD AUTO: 4.61 MILLION/UL (ref 4–5.2)
SODIUM SERPL-SCNC: 142 MMOL/L (ref 137–147)
WBC # BLD AUTO: 8.7 THOUSAND/UL (ref 4.5–11)

## 2018-10-05 PROCEDURE — 36415 COLL VENOUS BLD VENIPUNCTURE: CPT

## 2018-10-05 PROCEDURE — 83036 HEMOGLOBIN GLYCOSYLATED A1C: CPT

## 2018-10-05 PROCEDURE — 85025 COMPLETE CBC W/AUTO DIFF WBC: CPT

## 2018-10-05 PROCEDURE — 80048 BASIC METABOLIC PNL TOTAL CA: CPT

## 2018-10-22 ENCOUNTER — TELEPHONE (OUTPATIENT)
Dept: FAMILY MEDICINE CLINIC | Facility: CLINIC | Age: 63
End: 2018-10-22

## 2018-10-24 NOTE — TELEPHONE ENCOUNTER
Called the patient and discussed the result of CBC, BMP and HgA1C, instructed to continue the same medications  Questions answered  Instructed to call office back and schedule a follow up on chronic condition appointment in 2-3 months

## 2018-11-06 ENCOUNTER — TELEPHONE (OUTPATIENT)
Dept: FAMILY MEDICINE CLINIC | Facility: CLINIC | Age: 63
End: 2018-11-06

## 2018-11-06 NOTE — TELEPHONE ENCOUNTER
Unfortunately, it is not as simple as just providing a new script  Insurance requires very specific documentation  Patient must be scheduled in our office to discuss and document the need for any power equipment  Once medical necessity for the equipment is documented, we are able to then fax a script to [facility]

## 2018-11-09 ENCOUNTER — OFFICE VISIT (OUTPATIENT)
Dept: PULMONOLOGY | Facility: CLINIC | Age: 63
End: 2018-11-09
Payer: COMMERCIAL

## 2018-11-09 VITALS
RESPIRATION RATE: 18 BRPM | DIASTOLIC BLOOD PRESSURE: 86 MMHG | OXYGEN SATURATION: 96 % | WEIGHT: 293 LBS | BODY MASS INDEX: 61.5 KG/M2 | SYSTOLIC BLOOD PRESSURE: 144 MMHG | HEIGHT: 58 IN | HEART RATE: 87 BPM | TEMPERATURE: 98.6 F

## 2018-11-09 DIAGNOSIS — E66.01 MORBID OBESITY (HCC): Primary | ICD-10-CM

## 2018-11-09 DIAGNOSIS — J30.9 ALLERGIC RHINITIS, UNSPECIFIED SEASONALITY, UNSPECIFIED TRIGGER: ICD-10-CM

## 2018-11-09 PROCEDURE — 99214 OFFICE O/P EST MOD 30 MIN: CPT | Performed by: INTERNAL MEDICINE

## 2018-11-09 NOTE — PROGRESS NOTES
Progress Note - Pulmonary   Ce Araya 58 y o  female MRN: [id number]   Encounter: [id number]      Assessment/Plan:  Patient is a 34-year-old female with past medical history significant for morbid obesity and restrictive lung disease who presents for follow-up  The patient reports she has not made significant dietary changes since her last visit  We counseled her extensively on the need for weight loss as well as changes to her diet  The patient reports he understands no attempt to cut out cookies and sweets from her diet  I reviewed her imaging and there is no significant abnormalities present on her imaging as well as her spirometry which is consistent with restriction  The patient was counseled on the need to continue to use her CPAP machine  She may follow up in 3 months or sooner as necessary  Plan:  -  Counseled on weight loss    Subjective: The patient reports she is currently battling a viral URI  She is taking a Bcomplex/C vitamins  The patient has been using her CPAP which she reports using nightly for 2-4 hours  The patient reports gaining weight since her last visit  She has not made any significant dietary changes  Inhaler Regimen:  Dulera - daily  Albuterol -     Remainder of 12 point review of systems negative except as described in HPI  The following portions of the patient's history were reviewed and updated as appropriate: allergies, current medications, past family history, past medical history, past social history, past surgical history and problem list      Objective:   Vitals: Blood pressure 144/86, pulse 87, temperature 98 6 °F (37 °C), temperature source Tympanic, resp  rate 18, height 4' 10" (1 473 m), weight 133 kg (294 lb), SpO2 96 % , RA, Body mass index is 61 45 kg/m²      Physical Exam  Gen: Awake, alert, oriented x 3, no acute distress  HEENT: Mucous membranes moist, no oral lesions, no thrush  NECK: No accessory muscle use, JVP not elevated  Cardiac: Regular, single S1, single S2, no murmurs, no rubs, no gallops  Lungs: diminished breath sounds at base  Abdomen: normoactive bowel sounds, soft nontender, nondistended, no rebound or rigidity, no guarding  Extremities: no cyanosis, no clubbing, no edema  MSK:  Strength equal in all extremities  Derm:  No rashes/lesions noted  Neuro:  Appropriate mood/affect    Labs: I have personally reviewed pertinent lab results  Lab Results   Component Value Date    WBC 8 70 10/05/2018    WBC 9 6 05/07/2018    HGB 13 6 10/05/2018    HGB 13 2 05/07/2018     10/05/2018     05/07/2018     Lab Results   Component Value Date    CREATININE 1 13 10/05/2018     Imaging and other studies: I have personally reviewed pertinent reports  CT Chest 2/14/18:  No pleural effusion; no pulmonary fibrosis  Pulmonary Function Testing: I have personally reviewed pertinent reports  Anjali Flores, [address]

## 2018-11-28 ENCOUNTER — OFFICE VISIT (OUTPATIENT)
Dept: FAMILY MEDICINE CLINIC | Facility: CLINIC | Age: 63
End: 2018-11-28
Payer: COMMERCIAL

## 2018-11-28 VITALS
HEART RATE: 100 BPM | SYSTOLIC BLOOD PRESSURE: 142 MMHG | TEMPERATURE: 98.7 F | BODY MASS INDEX: 60.61 KG/M2 | RESPIRATION RATE: 17 BRPM | DIASTOLIC BLOOD PRESSURE: 100 MMHG | WEIGHT: 290 LBS | OXYGEN SATURATION: 98 %

## 2018-11-28 DIAGNOSIS — R10.13 EPIGASTRIC PAIN: Primary | ICD-10-CM

## 2018-11-28 PROCEDURE — 1036F TOBACCO NON-USER: CPT | Performed by: FAMILY MEDICINE

## 2018-11-28 PROCEDURE — 99213 OFFICE O/P EST LOW 20 MIN: CPT | Performed by: FAMILY MEDICINE

## 2018-11-28 PROCEDURE — 93000 ELECTROCARDIOGRAM COMPLETE: CPT | Performed by: FAMILY MEDICINE

## 2018-11-28 RX ORDER — SUCRALFATE ORAL 1 G/10ML
1 SUSPENSION ORAL 4 TIMES DAILY
Qty: 420 ML | Refills: 0 | Status: ON HOLD | OUTPATIENT
Start: 2018-11-28 | End: 2019-02-13

## 2018-11-28 NOTE — ASSESSMENT & PLAN NOTE
Likely an exacerbation of gastritis, possibly viral   Given comorbidities, will get EKG  Recommended ER, patient declines  Will send carafate  Counseled patient to go to ER if not improving in 4-5 days  Go to ER with chest pain, or worsening N/V or dizziness  Reassess if not back to baseline in 1 week

## 2018-11-28 NOTE — PATIENT INSTRUCTIONS
Gastroenteritis   WHAT YOU NEED TO KNOW:   Gastroenteritis, or stomach flu, is an infection of the stomach and intestines  DISCHARGE INSTRUCTIONS:   Call 911 for any of the following:   · You have trouble breathing or a very fast pulse  Return to the emergency department if:   · You see blood in your diarrhea  · You cannot stop vomiting  · You have not urinated for 12 hours  · You feel like you are going to faint  Contact your healthcare provider if:   · You have a fever  · You continue to vomit or have diarrhea, even after treatment  · You see worms in your diarrhea  · Your mouth or eyes are dry  You are not urinating as much or as often  · You have questions or concerns about your condition or care  Medicines:   · Medicines  may be given to stop vomiting or diarrhea, decrease abdominal cramps, or treat an infection  · Take your medicine as directed  Contact your healthcare provider if you think your medicine is not helping or if you have side effects  Tell him or her if you are allergic to any medicine  Keep a list of the medicines, vitamins, and herbs you take  Include the amounts, and when and why you take them  Bring the list or the pill bottles to follow-up visits  Carry your medicine list with you in case of an emergency  Manage your symptoms:   · Drink liquids as directed  Ask your healthcare provider how much liquid to drink each day, and which liquids are best for you  You may also need to drink an oral rehydration solution (ORS)  An ORS has the right amounts of sugar, salt, and minerals in water to replace body fluids  · Eat bland foods  When you feel hungry, begin eating soft, bland foods  Examples are bananas, clear soup, potatoes, and applesauce  Do not have dairy products, alcohol, sugary drinks, or drinks with caffeine until you feel better  · Rest as much as possible  Slowly start to do more each day when you begin to feel better    Prevent the spread of gastroenteritis:  Gastroenteritis can spread easily  Keep yourself, your family, and your surroundings clean to help prevent the spread of gastroenteritis:  · Wash your hands often  Use soap and water  Wash your hands after you use the bathroom, change a child's diapers, or sneeze  Wash your hands before you prepare or eat food  · Clean surfaces and do laundry often  Wash your clothes and towels separately from the rest of the laundry  Clean surfaces in your home with antibacterial  or bleach  · Clean food thoroughly and cook safely  Wash raw vegetables before you cook  Cook meat, fish, and eggs fully  Do not use the same dishes for raw meat as you do for other foods  Refrigerate any leftover food immediately  · Be aware when you camp or travel  Drink only clean water  Do not drink from rivers or lakes unless you purify or boil the water first  When you travel, drink bottled water and do not add ice  Do not eat fruit that has not been peeled  Do not eat raw fish or meat that is not fully cooked  Follow up with your healthcare provider as directed:  Write down your questions so you remember to ask them during your visits  © 2017 [employer] Information is for End User's use only and may not be sold, redistributed or otherwise used for commercial purposes  All illustrations and images included in CareNotes® are the copyrighted property of [employer]  or [employer]  The above information is an  only  It is not intended as medical advice for individual conditions or treatments  Talk to your doctor, nurse or pharmacist before following any medical regimen to see if it is safe and effective for you

## 2018-11-28 NOTE — PROGRESS NOTES
Assessment/Plan:    Epigastric pain  Likely an exacerbation of gastritis, possibly viral   Given comorbidities, will get EKG  Recommended ER, patient declines  Will send carafate  Counseled patient to go to ER if not improving in 4-5 days  Go to ER with chest pain, or worsening N/V or dizziness  Reassess if not back to baseline in 1 week  Diagnoses and all orders for this visit:    Epigastric pain  -     POCT ECG  -     sucralfate (CARAFATE) 1 g/10 mL suspension; Take 10 mL (1 g total) by mouth 4 (four) times a day          Subjective:      Patient ID: Jazzy Hawkins is a 58 y o  female  Abdominal Pain: Patient complains of abdominal pain which she attributes to eating tainted lettuce on Thursday  The pain is described as burning, and is 6/10 in intensity  Pain is located in the epigastric with radiation to esophagus  Onset was 6 days ago  Symptoms have been gradually improving since  Aggravating factors: activity and eating  Alleviating factors: ginger ale, protonix  Associated symptoms: belching, chills, flatus, nausea, dizziness, sweats and loose stools  The patient denies fever, hematochezia, melena and myalgias  The following portions of the patient's history were reviewed and updated as appropriate: allergies, current medications, past family history, past medical history, past social history, past surgical history and problem list     Review of Systems   Constitutional: Positive for chills  Negative for fever  HENT: Negative for ear pain and sore throat  Eyes: Negative for pain and redness  Respiratory: Negative for cough and shortness of breath  Cardiovascular: Negative for chest pain, palpitations and leg swelling  Gastrointestinal: Positive for abdominal pain, diarrhea and nausea  Genitourinary: Negative for dysuria and hematuria  Musculoskeletal: Negative for back pain and neck pain  Neurological: Positive for dizziness  Negative for headaches  Psychiatric/Behavioral: Negative for dysphoric mood  The patient is not nervous/anxious  Objective:      /100 (BP Location: Left arm, Patient Position: Sitting, Cuff Size: Adult)   Pulse 100   Temp 98 7 °F (37 1 °C) (Temporal)   Resp 17   Wt 132 kg (290 lb)   SpO2 98%   BMI 60 61 kg/m²          Physical Exam   Constitutional: She is oriented to person, place, and time  She appears well-developed and well-nourished  HENT:   Head: Normocephalic and atraumatic  Right Ear: External ear normal    Left Ear: External ear normal    Nose: Nose normal    Mouth/Throat: Oropharynx is clear and moist    Eyes: Pupils are equal, round, and reactive to light  Conjunctivae and EOM are normal    Neck: Normal range of motion  Neck supple  Cardiovascular: Normal rate, regular rhythm, normal heart sounds and intact distal pulses  Pulmonary/Chest: Effort normal and breath sounds normal    Abdominal: Soft  Bowel sounds are normal  There is tenderness (mild TTP of epigastrum and LQ BL, no rebound or gaurding  Negative [place]  )  There is no rebound and no guarding  Neurological: She is alert and oriented to person, place, and time  Skin: Skin is warm and dry  Psychiatric: She has a normal mood and affect  Her behavior is normal        Procedures   Adult ECG Report      Name: Clara Guzman   Age: 58 y o  Gender: female       Rate: 97   Rhythm: normal sinus rhythm   QRS Axis: 21 degrees   [place] Interval: 170   QRS Duration: 86   QTc: 434   Voltages: low   Conduction Disturbances: none   Other Abnormalities: nonspecific morphology of QRS likely representing early BBB  Left atrial enlargement  Narrative Interpretation: No changes from EKG 12/2017

## 2018-12-17 DIAGNOSIS — T78.40XD ALLERGIC STATE, SUBSEQUENT ENCOUNTER: ICD-10-CM

## 2018-12-18 DIAGNOSIS — I10 ESSENTIAL HYPERTENSION: ICD-10-CM

## 2018-12-18 RX ORDER — MONTELUKAST SODIUM 10 MG/1
10 TABLET ORAL DAILY
Qty: 90 TABLET | Refills: 0 | Status: SHIPPED | OUTPATIENT
Start: 2018-12-18 | End: 2019-03-21 | Stop reason: SDUPTHER

## 2018-12-18 RX ORDER — AMLODIPINE BESYLATE 10 MG/1
10 TABLET ORAL DAILY
Qty: 90 TABLET | Refills: 0 | Status: SHIPPED | OUTPATIENT
Start: 2018-12-18 | End: 2019-02-20 | Stop reason: HOSPADM

## 2018-12-20 ENCOUNTER — TELEPHONE (OUTPATIENT)
Dept: FAMILY MEDICINE CLINIC | Facility: CLINIC | Age: 63
End: 2018-12-20

## 2018-12-20 NOTE — TELEPHONE ENCOUNTER
Tu Proctor from [facility] calling to request new script with language indicating "scooter evaluation"  Pls fax to [phone]    Call [phone] with any questions or concerns

## 2019-01-25 ENCOUNTER — TELEPHONE (OUTPATIENT)
Dept: FAMILY MEDICINE CLINIC | Facility: CLINIC | Age: 64
End: 2019-01-25

## 2019-01-29 ENCOUNTER — OFFICE VISIT (OUTPATIENT)
Dept: FAMILY MEDICINE CLINIC | Facility: CLINIC | Age: 64
End: 2019-01-29

## 2019-01-29 VITALS
WEIGHT: 293 LBS | HEART RATE: 100 BPM | BODY MASS INDEX: 61.66 KG/M2 | SYSTOLIC BLOOD PRESSURE: 128 MMHG | RESPIRATION RATE: 19 BRPM | TEMPERATURE: 97.3 F | OXYGEN SATURATION: 97 % | DIASTOLIC BLOOD PRESSURE: 86 MMHG

## 2019-01-29 DIAGNOSIS — S00.511A ABRASION OF LIP, INITIAL ENCOUNTER: ICD-10-CM

## 2019-01-29 DIAGNOSIS — J44.1 COPD WITH ACUTE EXACERBATION (HCC): Primary | ICD-10-CM

## 2019-01-29 DIAGNOSIS — R26.2 AMBULATORY DYSFUNCTION: ICD-10-CM

## 2019-01-29 PROCEDURE — 94640 AIRWAY INHALATION TREATMENT: CPT | Performed by: INTERNAL MEDICINE

## 2019-01-29 PROCEDURE — 99213 OFFICE O/P EST LOW 20 MIN: CPT | Performed by: INTERNAL MEDICINE

## 2019-01-29 RX ORDER — AMLODIPINE BESYLATE 5 MG/1
TABLET ORAL
COMMUNITY
Start: 2018-12-17 | End: 2019-02-20 | Stop reason: HOSPADM

## 2019-01-29 RX ORDER — AZITHROMYCIN 250 MG/1
250 TABLET, FILM COATED ORAL EVERY 24 HOURS
Qty: 4 TABLET | Refills: 0 | Status: SHIPPED | OUTPATIENT
Start: 2019-01-30 | End: 2019-02-02

## 2019-01-29 RX ORDER — AZITHROMYCIN 500 MG/1
500 TABLET, FILM COATED ORAL DAILY
Qty: 1 TABLET | Refills: 0 | Status: SHIPPED | OUTPATIENT
Start: 2019-01-29 | End: 2019-01-30

## 2019-01-29 RX ORDER — IPRATROPIUM BROMIDE AND ALBUTEROL SULFATE 2.5; .5 MG/3ML; MG/3ML
3 SOLUTION RESPIRATORY (INHALATION) ONCE
Status: COMPLETED | OUTPATIENT
Start: 2019-01-29 | End: 2019-01-29

## 2019-01-29 RX ORDER — KETOCONAZOLE 20 MG/G
CREAM TOPICAL DAILY
Qty: 15 G | Refills: 0 | Status: SHIPPED | OUTPATIENT
Start: 2019-01-29 | End: 2020-10-02

## 2019-01-29 RX ORDER — ASPIRIN 81 MG/1
TABLET ORAL
COMMUNITY
Start: 2018-12-17 | End: 2019-03-21 | Stop reason: SDUPTHER

## 2019-01-29 RX ORDER — PREDNISONE 20 MG/1
40 TABLET ORAL DAILY
Qty: 5 TABLET | Refills: 0 | Status: SHIPPED | OUTPATIENT
Start: 2019-01-29 | End: 2019-02-02

## 2019-01-29 RX ADMIN — IPRATROPIUM BROMIDE AND ALBUTEROL SULFATE 3 ML: 2.5; .5 SOLUTION RESPIRATORY (INHALATION) at 15:27

## 2019-01-30 NOTE — PROGRESS NOTES
Assessment/Plan:    Lip abrasion  Chronic condition that most likely has been exacerbated by weather, and pt continually disrupting healing with licking and peeling of scab  Have recommended Vasaline daily and ketoconazole daily  COPD with acute exacerbation (HCC)  Significant inspiratory and expiratory wheezing on PE, and moderate conversational dyspnea noted  Pt received Duo-Neb treatment in clinic and received some relief  Most likely exacerbation triggered by infection, may be both viral and bacterial    Have prescribed continued use of maintenance inhaler, daily, w/ albuterol, Q4 scheduled for 2-3 days  I have also prescribed steroid burst, 40 mgx 5 days  And Z-pack  Pt has been advised to take diabetic medication as prednisone will affect daily blood glucose  Diagnoses and all orders for this visit:    COPD with acute exacerbation ([facility] [address] )  -     ipratropium-albuterol (DUO-NEB) 0 5-2 5 mg/3 mL inhalation solution 3 mL; Take 3 mL by nebulization once   -     predniSONE 20 mg tablet; Take 2 tablets (40 mg total) by mouth daily for 4 days  -     azithromycin (ZITHROMAX) 250 mg tablet; Take 1 tablet (250 mg total) by mouth every 24 hours for 3 days  -     azithromycin (ZITHROMAX) 500 MG tablet; Take 1 tablet (500 mg total) by mouth daily for 1 day    Abrasion of lip, initial encounter  -     ketoconazole (NIZORAL) 2 % cream; Apply topically daily    Ambulatory dysfunction  -     Motorized Scooter    Other orders  -     amLODIPine (NORVASC) 5 mg tablet;   -     aspirin (ECOTRIN LOW STRENGTH) 81 mg EC tablet;           Subjective:      Patient ID: Tawnya Roberson is a 58 y o  female  Tawnya Roberson is a 58 y o  Female, w/ past medical hx significant for obesity, DMT2, HTN, and COPD presents for sick visit  Pt states that she has been experiencing upper URI sx for the past 3 weeks, w/o associated fevers   States she was much worse prior to today, and has been using OTC Mucinex, Robitussin and Vitamin C  States that cough has been persistent and has increased sputum production, clear colored  Had previous yellowish sputum that was blood-tinged which has now resolved  Confims chest tightness, SOB, cough, congestion and headache  Pt has not been vaccinated for flu, states that it leaves her susceptible to pneumonia  The following portions of the patient's history were reviewed and updated as appropriate: She  has a past medical history of Diabetes mellitus ([facility] ); GERD (gastroesophageal reflux disease); Hyperlipidemia; Hypertension; Seasonal allergies; and Sleep apnea  Patient Active Problem List    Diagnosis Date Noted    COPD with acute exacerbation ([facility] ) 01/29/2019    Lip abrasion 01/29/2019    Epigastric pain 11/28/2018    Class 3 severe obesity due to excess calories in adult [facility]) 09/14/2018    Abnormal x-ray 08/28/2018    H/O: hysterectomy 04/27/2018    Hip osteoarthritis 04/27/2018    History of total knee replacement, bilateral 04/27/2018    History of deep venous thrombosis 04/27/2018    Influenza 12/24/2017    Asthma exacerbation 12/24/2017    Morbid obesity ([facility] ) 12/24/2017    GERD (gastroesophageal reflux disease) 12/24/2017    Borderline diabetes 12/24/2017    Hypertension 12/24/2017    Hyperlipidemia 12/24/2017    Allergic rhinitis 12/15/2016    COPD (chronic obstructive pulmonary disease) ([facility] ) 12/15/2016    Obstructive sleep apnea of adult 06/25/2014    Colon polyp 05/29/2012     She  has a past surgical history that includes Hysterectomy and Bilateral knee arthroscopy  Her family history includes Coronary artery disease in her father  She  reports that she is a non-smoker but has been exposed to tobacco smoke  She has never used smokeless tobacco  She reports that she does not drink alcohol or use drugs    Current Outpatient Prescriptions   Medication Sig Dispense Refill    ACCU-CHEK FASTCLIX LANCETS MISC       ACCU-CHEK GUIDE test strip       acetaminophen (TYLENOL) 325 mg tablet Take 2 tablets (650 mg total) by mouth every 6 (six) hours as needed for mild pain (Patient not taking: Reported on 11/9/2018 ) 30 tablet 0    albuterol (5 mg/mL) 0 5 % nebulizer solution Take 0 5 mL by nebulization every 6 (six) hours as needed for wheezing or shortness of breath 20 mL 0    albuterol (VENTOLIN HFA) 90 mcg/act inhaler inhale 2 puff by inhalation route  every 4 - 6 hours as needed      amLODIPine (NORVASC) 10 mg tablet Take 1 tablet (10 mg total) by mouth daily 90 tablet 0    amLODIPine (NORVASC) 5 mg tablet       aspirin (ECOTRIN LOW STRENGTH) 81 mg EC tablet       aspirin 81 MG tablet Take 1 tablet (81 mg total) by mouth daily 90 tablet 1    atorvastatin (LIPITOR) 40 mg tablet Take 1 tablet (40 mg total) by mouth daily (Patient not taking: Reported on 11/9/2018 ) 90 tablet 1    [START ON 1/30/2019] azithromycin (ZITHROMAX) 250 mg tablet Take 1 tablet (250 mg total) by mouth every 24 hours for 3 days 4 tablet 0    azithromycin (ZITHROMAX) 500 MG tablet Take 1 tablet (500 mg total) by mouth daily for 1 day 1 tablet 0    benzonatate (TESSALON PERLES) 100 mg capsule Take 1 capsule by mouth 3 (three) times a day (Patient not taking: Reported on 11/9/2018 ) 20 capsule 0    Blood Glucose Monitoring Suppl (ACCU-CHEK GUIDE) w/Device KIT       ketoconazole (NIZORAL) 2 % cream Apply topically daily 15 g 0    loratadine (CLARITIN) 10 mg tablet Take 1 tablet (10 mg total) by mouth daily 30 tablet 0    metFORMIN (GLUCOPHAGE) 500 mg tablet Take 1 tablet (500 mg total) by mouth daily with breakfast 90 tablet 1    mometasone-formoterol (DULERA) 100-5 MCG/ACT inhaler Every 12 hours      montelukast (SINGULAIR) 10 mg tablet Take 1 tablet (10 mg total) by mouth daily 90 tablet 0    pantoprazole (PROTONIX) 40 mg tablet Take 1 tablet (40 mg total) by mouth daily 90 tablet 1    predniSONE 20 mg tablet Take 2 tablets (40 mg total) by mouth daily for 4 days 5 tablet 0    sucralfate (CARAFATE) 1 g/10 mL suspension Take 10 mL (1 g total) by mouth 4 (four) times a day 420 mL 0     No current facility-administered medications for this visit  Current Outpatient Prescriptions on File Prior to Visit   Medication Sig    ACCU-CHEK FASTCLIX LANCETS MISC     ACCU-CHEK GUIDE test strip     acetaminophen (TYLENOL) 325 mg tablet Take 2 tablets (650 mg total) by mouth every 6 (six) hours as needed for mild pain (Patient not taking: Reported on 11/9/2018 )    albuterol (5 mg/mL) 0 5 % nebulizer solution Take 0 5 mL by nebulization every 6 (six) hours as needed for wheezing or shortness of breath    albuterol (VENTOLIN HFA) 90 mcg/act inhaler inhale 2 puff by inhalation route  every 4 - 6 hours as needed    amLODIPine (NORVASC) 10 mg tablet Take 1 tablet (10 mg total) by mouth daily    aspirin 81 MG tablet Take 1 tablet (81 mg total) by mouth daily    atorvastatin (LIPITOR) 40 mg tablet Take 1 tablet (40 mg total) by mouth daily (Patient not taking: Reported on 11/9/2018 )    benzonatate (TESSALON PERLES) 100 mg capsule Take 1 capsule by mouth 3 (three) times a day (Patient not taking: Reported on 11/9/2018 )    Blood Glucose Monitoring Suppl (ACCU-CHEK GUIDE) w/Device KIT     loratadine (CLARITIN) 10 mg tablet Take 1 tablet (10 mg total) by mouth daily    metFORMIN (GLUCOPHAGE) 500 mg tablet Take 1 tablet (500 mg total) by mouth daily with breakfast    mometasone-formoterol (DULERA) 100-5 MCG/ACT inhaler Every 12 hours    montelukast (SINGULAIR) 10 mg tablet Take 1 tablet (10 mg total) by mouth daily    pantoprazole (PROTONIX) 40 mg tablet Take 1 tablet (40 mg total) by mouth daily    sucralfate (CARAFATE) 1 g/10 mL suspension Take 10 mL (1 g total) by mouth 4 (four) times a day     No current facility-administered medications on file prior to visit        She is allergic to [place] [sitagliptin]; clindamycin; hydrocodone; morphine; omeprazole; penicillins; percocet [oxycodone-acetaminophen]; tolterodine; and ciprofloxacin       Review of Systems   Constitutional: Negative for chills and fever  HENT: Positive for congestion, ear pain and sore throat  Respiratory: Positive for cough, chest tightness, shortness of breath and wheezing  Cardiovascular: Negative for chest pain and palpitations  Gastrointestinal: Negative for abdominal pain  Objective:      /86 (BP Location: Left arm, Patient Position: Sitting, Cuff Size: Standard)   Pulse 100   Temp (!) 97 3 °F (36 3 °C) (Temporal)   Resp 19   Wt 134 kg (295 lb)   SpO2 97%    L/min   BMI 61 66 kg/m²          Physical Exam   Constitutional: She appears well-developed and well-nourished  Appears out of breath during conversation   HENT:   Ears:    Nose: Nose normal    Mouth/Throat: Oropharynx is clear and moist    Eyes: Conjunctivae and EOM are normal    Neck: Normal range of motion  Neck supple  Cardiovascular: Regular rhythm  Tachycardia present  Pulmonary/Chest: She has wheezes in the right upper field, the right middle field, the left upper field and the left middle field  She has no rhonchi  Skin: Skin is warm and dry

## 2019-01-30 NOTE — ASSESSMENT & PLAN NOTE
Chronic condition that most likely has been exacerbated by weather, and pt continually disrupting healing with licking and peeling of scab  Have recommended Vasaline daily and ketoconazole daily

## 2019-01-30 NOTE — ASSESSMENT & PLAN NOTE
Significant inspiratory and expiratory wheezing on PE, and moderate conversational dyspnea noted  Pt received Duo-Neb treatment in clinic and received some relief  Most likely exacerbation triggered by infection, may be both viral and bacterial    Have prescribed continued use of maintenance inhaler, daily, w/ albuterol, Q4 scheduled for 2-3 days  I have also prescribed steroid burst, 40 mgx 5 days  And Z-pack  Pt has been advised to take diabetic medication as prednisone will affect daily blood glucose

## 2019-02-06 ENCOUNTER — TELEPHONE (OUTPATIENT)
Dept: FAMILY MEDICINE CLINIC | Facility: CLINIC | Age: 64
End: 2019-02-06

## 2019-02-06 DIAGNOSIS — R05.9 COUGH IN ADULT PATIENT: Primary | ICD-10-CM

## 2019-02-06 NOTE — TELEPHONE ENCOUNTER
Patient states at this moment she will not schedule she is going to wait till Friday to see if she feels better

## 2019-02-06 NOTE — TELEPHONE ENCOUNTER
Patient was here on 01/29/19 and saw Dr Ramsey Marino for a sameday for cough, she states she is still not better if you can please give her a call asap

## 2019-02-12 ENCOUNTER — APPOINTMENT (EMERGENCY)
Dept: RADIOLOGY | Facility: HOSPITAL | Age: 64
DRG: 951 | End: 2019-02-12
Payer: COMMERCIAL

## 2019-02-12 ENCOUNTER — APPOINTMENT (INPATIENT)
Dept: NON INVASIVE DIAGNOSTICS | Facility: HOSPITAL | Age: 64
DRG: 951 | End: 2019-02-12
Payer: COMMERCIAL

## 2019-02-12 ENCOUNTER — HOSPITAL ENCOUNTER (INPATIENT)
Facility: HOSPITAL | Age: 64
LOS: 8 days | Discharge: HOME HEALTH | DRG: 951 | End: 2019-02-20
Attending: EMERGENCY MEDICINE | Admitting: INTERNAL MEDICINE
Payer: COMMERCIAL

## 2019-02-12 DIAGNOSIS — J42 ACUTE EXACERBATION OF CHRONIC BRONCHITIS (HCC): Primary | ICD-10-CM

## 2019-02-12 DIAGNOSIS — R77.8 ELEVATED TROPONIN: ICD-10-CM

## 2019-02-12 DIAGNOSIS — I10 ESSENTIAL HYPERTENSION: ICD-10-CM

## 2019-02-12 DIAGNOSIS — I50.31 ACUTE DIASTOLIC CHF (CONGESTIVE HEART FAILURE) (HCC): ICD-10-CM

## 2019-02-12 DIAGNOSIS — E78.2 MIXED HYPERLIPIDEMIA: ICD-10-CM

## 2019-02-12 DIAGNOSIS — I21.4 NON-ST ELEVATION MYOCARDIAL INFARCTION (NSTEMI) (HCC): ICD-10-CM

## 2019-02-12 DIAGNOSIS — J20.9 ACUTE EXACERBATION OF CHRONIC BRONCHITIS (HCC): Primary | ICD-10-CM

## 2019-02-12 DIAGNOSIS — J44.1 COPD WITH ACUTE EXACERBATION (HCC): ICD-10-CM

## 2019-02-12 LAB
ANION GAP SERPL CALCULATED.3IONS-SCNC: 8 MMOL/L (ref 4–13)
BASOPHILS # BLD AUTO: 0.03 THOUSANDS/ΜL (ref 0–0.1)
BASOPHILS NFR BLD AUTO: 0 % (ref 0–1)
BUN SERPL-MCNC: 14 MG/DL (ref 5–25)
CALCIUM SERPL-MCNC: 9.1 MG/DL (ref 8.3–10.1)
CHLORIDE SERPL-SCNC: 106 MMOL/L (ref 100–108)
CO2 SERPL-SCNC: 29 MMOL/L (ref 21–32)
CREAT SERPL-MCNC: 1.07 MG/DL (ref 0.6–1.3)
EOSINOPHIL # BLD AUTO: 0.33 THOUSAND/ΜL (ref 0–0.61)
EOSINOPHIL NFR BLD AUTO: 3 % (ref 0–6)
ERYTHROCYTE [DISTWIDTH] IN BLOOD BY AUTOMATED COUNT: 15 % (ref 11.6–15.1)
EST. AVERAGE GLUCOSE BLD GHB EST-MCNC: 140 MG/DL
GFR SERPL CREATININE-BSD FRML MDRD: 63 ML/MIN/1.73SQ M
GLUCOSE SERPL-MCNC: 112 MG/DL (ref 65–140)
GLUCOSE SERPL-MCNC: 215 MG/DL (ref 65–140)
GLUCOSE SERPL-MCNC: 294 MG/DL (ref 65–140)
HBA1C MFR BLD: 6.5 % (ref 4.2–6.3)
HCT VFR BLD AUTO: 40.1 % (ref 34.8–46.1)
HGB BLD-MCNC: 13 G/DL (ref 11.5–15.4)
IMM GRANULOCYTES # BLD AUTO: 0.03 THOUSAND/UL (ref 0–0.2)
IMM GRANULOCYTES NFR BLD AUTO: 0 % (ref 0–2)
LYMPHOCYTES # BLD AUTO: 3.67 THOUSANDS/ΜL (ref 0.6–4.47)
LYMPHOCYTES NFR BLD AUTO: 34 % (ref 14–44)
MCH RBC QN AUTO: 29 PG (ref 26.8–34.3)
MCHC RBC AUTO-ENTMCNC: 32.4 G/DL (ref 31.4–37.4)
MCV RBC AUTO: 89 FL (ref 82–98)
MONOCYTES # BLD AUTO: 0.91 THOUSAND/ΜL (ref 0.17–1.22)
MONOCYTES NFR BLD AUTO: 9 % (ref 4–12)
NEUTROPHILS # BLD AUTO: 5.75 THOUSANDS/ΜL (ref 1.85–7.62)
NEUTS SEG NFR BLD AUTO: 54 % (ref 43–75)
NRBC BLD AUTO-RTO: 0 /100 WBCS
NT-PROBNP SERPL-MCNC: 365 PG/ML
PLATELET # BLD AUTO: 262 THOUSANDS/UL (ref 149–390)
PMV BLD AUTO: 9.3 FL (ref 8.9–12.7)
POTASSIUM SERPL-SCNC: 3.6 MMOL/L (ref 3.5–5.3)
RBC # BLD AUTO: 4.49 MILLION/UL (ref 3.81–5.12)
SODIUM SERPL-SCNC: 143 MMOL/L (ref 136–145)
WBC # BLD AUTO: 10.72 THOUSAND/UL (ref 4.31–10.16)

## 2019-02-12 PROCEDURE — 99285 EMERGENCY DEPT VISIT HI MDM: CPT

## 2019-02-12 PROCEDURE — 99223 1ST HOSP IP/OBS HIGH 75: CPT | Performed by: INTERNAL MEDICINE

## 2019-02-12 PROCEDURE — 36415 COLL VENOUS BLD VENIPUNCTURE: CPT | Performed by: EMERGENCY MEDICINE

## 2019-02-12 PROCEDURE — 83036 HEMOGLOBIN GLYCOSYLATED A1C: CPT | Performed by: INTERNAL MEDICINE

## 2019-02-12 PROCEDURE — 94640 AIRWAY INHALATION TREATMENT: CPT

## 2019-02-12 PROCEDURE — 82948 REAGENT STRIP/BLOOD GLUCOSE: CPT

## 2019-02-12 PROCEDURE — 87070 CULTURE OTHR SPECIMN AEROBIC: CPT | Performed by: INTERNAL MEDICINE

## 2019-02-12 PROCEDURE — 87205 SMEAR GRAM STAIN: CPT | Performed by: INTERNAL MEDICINE

## 2019-02-12 PROCEDURE — 87631 RESP VIRUS 3-5 TARGETS: CPT | Performed by: INTERNAL MEDICINE

## 2019-02-12 PROCEDURE — 71046 X-RAY EXAM CHEST 2 VIEWS: CPT

## 2019-02-12 PROCEDURE — 85025 COMPLETE CBC W/AUTO DIFF WBC: CPT | Performed by: EMERGENCY MEDICINE

## 2019-02-12 PROCEDURE — 94644 CONT INHLJ TX 1ST HOUR: CPT

## 2019-02-12 PROCEDURE — 80048 BASIC METABOLIC PNL TOTAL CA: CPT | Performed by: EMERGENCY MEDICINE

## 2019-02-12 PROCEDURE — 83880 ASSAY OF NATRIURETIC PEPTIDE: CPT | Performed by: EMERGENCY MEDICINE

## 2019-02-12 PROCEDURE — 94760 N-INVAS EAR/PLS OXIMETRY 1: CPT

## 2019-02-12 RX ORDER — PANTOPRAZOLE SODIUM 40 MG/1
40 TABLET, DELAYED RELEASE ORAL
Status: DISCONTINUED | OUTPATIENT
Start: 2019-02-13 | End: 2019-02-20 | Stop reason: HOSPADM

## 2019-02-12 RX ORDER — SODIUM CHLORIDE FOR INHALATION 0.9 %
3 VIAL, NEBULIZER (ML) INHALATION
Status: DISCONTINUED | OUTPATIENT
Start: 2019-02-12 | End: 2019-02-13

## 2019-02-12 RX ORDER — LEVALBUTEROL 1.25 MG/.5ML
1.25 SOLUTION, CONCENTRATE RESPIRATORY (INHALATION) EVERY 6 HOURS
Status: DISCONTINUED | OUTPATIENT
Start: 2019-02-12 | End: 2019-02-15

## 2019-02-12 RX ORDER — ASPIRIN 81 MG/1
81 TABLET ORAL DAILY
Status: DISCONTINUED | OUTPATIENT
Start: 2019-02-12 | End: 2019-02-20 | Stop reason: HOSPADM

## 2019-02-12 RX ORDER — GUAIFENESIN 600 MG
600 TABLET, EXTENDED RELEASE 12 HR ORAL EVERY 12 HOURS SCHEDULED
Status: DISCONTINUED | OUTPATIENT
Start: 2019-02-12 | End: 2019-02-20 | Stop reason: HOSPADM

## 2019-02-12 RX ORDER — AZITHROMYCIN 250 MG/1
500 TABLET, FILM COATED ORAL EVERY 24 HOURS
Status: DISCONTINUED | OUTPATIENT
Start: 2019-02-12 | End: 2019-02-13

## 2019-02-12 RX ORDER — ATORVASTATIN CALCIUM 40 MG/1
40 TABLET, FILM COATED ORAL
Status: DISCONTINUED | OUTPATIENT
Start: 2019-02-12 | End: 2019-02-14

## 2019-02-12 RX ORDER — MONTELUKAST SODIUM 10 MG/1
10 TABLET ORAL EVERY EVENING
Status: DISCONTINUED | OUTPATIENT
Start: 2019-02-12 | End: 2019-02-13

## 2019-02-12 RX ORDER — METHYLPREDNISOLONE SODIUM SUCCINATE 40 MG/ML
20 INJECTION, POWDER, LYOPHILIZED, FOR SOLUTION INTRAMUSCULAR; INTRAVENOUS EVERY 8 HOURS
Status: DISCONTINUED | OUTPATIENT
Start: 2019-02-12 | End: 2019-02-13

## 2019-02-12 RX ORDER — SUCRALFATE ORAL 1 G/10ML
1000 SUSPENSION ORAL 4 TIMES DAILY
Status: DISCONTINUED | OUTPATIENT
Start: 2019-02-12 | End: 2019-02-13

## 2019-02-12 RX ORDER — FUROSEMIDE 10 MG/ML
40 INJECTION INTRAMUSCULAR; INTRAVENOUS ONCE
Status: COMPLETED | OUTPATIENT
Start: 2019-02-12 | End: 2019-02-12

## 2019-02-12 RX ORDER — ONDANSETRON 2 MG/ML
4 INJECTION INTRAMUSCULAR; INTRAVENOUS EVERY 4 HOURS PRN
Status: DISCONTINUED | OUTPATIENT
Start: 2019-02-12 | End: 2019-02-20 | Stop reason: HOSPADM

## 2019-02-12 RX ORDER — ALBUTEROL SULFATE 90 UG/1
2 AEROSOL, METERED RESPIRATORY (INHALATION) EVERY 4 HOURS PRN
Status: DISCONTINUED | OUTPATIENT
Start: 2019-02-12 | End: 2019-02-20 | Stop reason: HOSPADM

## 2019-02-12 RX ORDER — AMLODIPINE BESYLATE 10 MG/1
10 TABLET ORAL DAILY
Status: DISCONTINUED | OUTPATIENT
Start: 2019-02-12 | End: 2019-02-12

## 2019-02-12 RX ORDER — METHYLPREDNISOLONE SODIUM SUCCINATE 125 MG/2ML
125 INJECTION, POWDER, LYOPHILIZED, FOR SOLUTION INTRAMUSCULAR; INTRAVENOUS ONCE
Status: COMPLETED | OUTPATIENT
Start: 2019-02-12 | End: 2019-02-12

## 2019-02-12 RX ORDER — BENZONATATE 100 MG/1
100 CAPSULE ORAL 3 TIMES DAILY
Status: DISCONTINUED | OUTPATIENT
Start: 2019-02-12 | End: 2019-02-13

## 2019-02-12 RX ORDER — AMLODIPINE BESYLATE 5 MG/1
5 TABLET ORAL DAILY
Status: DISCONTINUED | OUTPATIENT
Start: 2019-02-13 | End: 2019-02-20 | Stop reason: HOSPADM

## 2019-02-12 RX ORDER — SODIUM CHLORIDE FOR INHALATION 0.9 %
12 VIAL, NEBULIZER (ML) INHALATION ONCE
Status: COMPLETED | OUTPATIENT
Start: 2019-02-12 | End: 2019-02-12

## 2019-02-12 RX ORDER — FLUTICASONE FUROATE AND VILANTEROL 200; 25 UG/1; UG/1
1 POWDER RESPIRATORY (INHALATION)
Status: DISCONTINUED | OUTPATIENT
Start: 2019-02-12 | End: 2019-02-20 | Stop reason: HOSPADM

## 2019-02-12 RX ADMIN — INSULIN LISPRO 4 UNITS: 100 INJECTION, SOLUTION INTRAVENOUS; SUBCUTANEOUS at 17:04

## 2019-02-12 RX ADMIN — GUAIFENESIN 600 MG: 600 TABLET, EXTENDED RELEASE ORAL at 21:11

## 2019-02-12 RX ADMIN — FUROSEMIDE 40 MG: 10 INJECTION, SOLUTION INTRAMUSCULAR; INTRAVENOUS at 13:56

## 2019-02-12 RX ADMIN — IPRATROPIUM BROMIDE 1 MG: 0.5 SOLUTION RESPIRATORY (INHALATION) at 10:09

## 2019-02-12 RX ADMIN — METHYLPREDNISOLONE SODIUM SUCCINATE 20 MG: 40 INJECTION, POWDER, FOR SOLUTION INTRAMUSCULAR; INTRAVENOUS at 13:55

## 2019-02-12 RX ADMIN — SUCRALFATE 1000 MG: 1 SUSPENSION ORAL at 21:10

## 2019-02-12 RX ADMIN — BENZONATATE 100 MG: 100 CAPSULE ORAL at 17:04

## 2019-02-12 RX ADMIN — AZITHROMYCIN MONOHYDRATE 500 MG: 250 TABLET ORAL at 13:55

## 2019-02-12 RX ADMIN — GUAIFENESIN 600 MG: 600 TABLET, EXTENDED RELEASE ORAL at 13:56

## 2019-02-12 RX ADMIN — BENZONATATE 100 MG: 100 CAPSULE ORAL at 21:11

## 2019-02-12 RX ADMIN — LEVALBUTEROL 1.25 MG: 1.25 SOLUTION, CONCENTRATE RESPIRATORY (INHALATION) at 19:04

## 2019-02-12 RX ADMIN — ATORVASTATIN CALCIUM 40 MG: 40 TABLET, FILM COATED ORAL at 17:04

## 2019-02-12 RX ADMIN — ENOXAPARIN SODIUM 40 MG: 60 INJECTION SUBCUTANEOUS at 13:55

## 2019-02-12 RX ADMIN — METHYLPREDNISOLONE SODIUM SUCCINATE 20 MG: 40 INJECTION, POWDER, FOR SOLUTION INTRAMUSCULAR; INTRAVENOUS at 21:10

## 2019-02-12 RX ADMIN — METHYLPREDNISOLONE SODIUM SUCCINATE 125 MG: 125 INJECTION, POWDER, FOR SOLUTION INTRAMUSCULAR; INTRAVENOUS at 11:43

## 2019-02-12 RX ADMIN — IPRATROPIUM BROMIDE 0.5 MG: 0.5 SOLUTION RESPIRATORY (INHALATION) at 19:04

## 2019-02-12 RX ADMIN — ISODIUM CHLORIDE 12 ML: 0.03 SOLUTION RESPIRATORY (INHALATION) at 10:09

## 2019-02-12 RX ADMIN — ALBUTEROL SULFATE 10 MG: 2.5 SOLUTION RESPIRATORY (INHALATION) at 10:09

## 2019-02-12 RX ADMIN — INSULIN LISPRO 4 UNITS: 100 INJECTION, SOLUTION INTRAVENOUS; SUBCUTANEOUS at 21:13

## 2019-02-12 RX ADMIN — MONTELUKAST SODIUM 10 MG: 10 TABLET, COATED ORAL at 17:04

## 2019-02-12 RX ADMIN — ASPIRIN 81 MG: 81 TABLET, COATED ORAL at 13:56

## 2019-02-12 NOTE — PLAN OF CARE
Problem: Potential for Falls  Goal: Patient will remain free of falls  Description  INTERVENTIONS:  - Assess patient frequently for physical needs  -  Identify cognitive and physical deficits and behaviors that affect risk of falls  -  [place] fall precautions as indicated by assessment   - Educate patient/family on patient safety including physical limitations  - Instruct patient to call for assistance with activity based on assessment  - Modify environment to reduce risk of injury  - Consider OT/PT consult to assist with strengthening/mobility  Outcome: Progressing     Problem: PAIN - ADULT  Goal: Verbalizes/displays adequate comfort level or baseline comfort level  Description  Interventions:  - Encourage patient to monitor pain and request assistance  - Assess pain using appropriate pain scale  - Administer analgesics based on type and severity of pain and evaluate response  - Implement non-pharmacological measures as appropriate and evaluate response  - Consider cultural and social influences on pain and pain management  - Notify physician/advanced practitioner if interventions unsuccessful or patient reports new pain  Outcome: Progressing     Problem: Knowledge Deficit  Goal: Patient/family/caregiver demonstrates understanding of disease process, treatment plan, medications, and discharge instructions  Description  Complete learning assessment and assess knowledge base    Interventions:  - Provide teaching at level of understanding  - Provide teaching via preferred learning methods  Outcome: Progressing     Problem: RESPIRATORY - ADULT  Goal: Achieves optimal ventilation and oxygenation  Description  INTERVENTIONS:  - Assess for changes in respiratory status  - Assess for changes in mentation and behavior  - Position to facilitate oxygenation and minimize respiratory effort  - Oxygen administration by appropriate delivery method based on oxygen saturation (per order) or ABGs  - Initiate smoking cessation education as indicated  - Encourage broncho-pulmonary hygiene including cough, deep breathe, Incentive Spirometry  - Assess the need for suctioning and aspirate as needed  - Assess and instruct to report SOB or any respiratory difficulty  - Respiratory Therapy support as indicated  Outcome: Progressing

## 2019-02-12 NOTE — ED PROVIDER NOTES
History  Chief Complaint   Patient presents with    Cough     Cough for 1 5 months  Bringing up clear/white mucous  Fever at home  Also having back pain and SOB  Was on antibiotics and steroids  59 yo female with COPD/asthma brought to the ED by her  whom she called to pick her up, c/o ongoing, worsening cough for about 6 weeks, and dyspnea, not responding to home regimen with inhaler and nebulizer, with intermittent subjective fever, not measured  She was treated with steroids and azithromycin by PCP 1/29/19, which gave only temporary relief  History provided by:  Patient  Shortness of Breath   Severity:  Moderate  Duration:  6 weeks  Timing:  Constant  Progression:  Waxing and waning  Chronicity:  Recurrent  Context: URI    Relieved by:  Nothing  Worsened by:  Exertion, deep breathing, coughing and activity  Ineffective treatments:  Inhaler  Associated symptoms: cough    Associated symptoms: no abdominal pain, no fever (subjective, felt chills) and no sore throat        Prior to Admission Medications   Prescriptions Last Dose Informant Patient Reported? Taking?    ACCU-CHEK FASTCLIX LANCETS MISC   Yes No   ACCU-CHEK GUIDE test strip   Yes No   Blood Glucose Monitoring Suppl (ACCU-CHEK GUIDE) w/Device KIT   Yes No   acetaminophen (TYLENOL) 325 mg tablet   No No   Sig: Take 2 tablets (650 mg total) by mouth every 6 (six) hours as needed for mild pain   Patient not taking: Reported on 11/9/2018    albuterol (5 mg/mL) 0 5 % nebulizer solution   No No   Sig: Take 0 5 mL by nebulization every 6 (six) hours as needed for wheezing or shortness of breath   albuterol (VENTOLIN HFA) 90 mcg/act inhaler   Yes No   Sig: inhale 2 puff by inhalation route  every 4 - 6 hours as needed   amLODIPine (NORVASC) 10 mg tablet   No No   Sig: Take 1 tablet (10 mg total) by mouth daily   amLODIPine (NORVASC) 5 mg tablet   Yes No   aspirin (ECOTRIN LOW STRENGTH) 81 mg EC tablet   Yes No   aspirin 81 MG tablet   No No Sig: Take 1 tablet (81 mg total) by mouth daily   atorvastatin (LIPITOR) 40 mg tablet   No No   Sig: Take 1 tablet (40 mg total) by mouth daily   Patient not taking: Reported on 11/9/2018    benzonatate (TESSALON PERLES) 100 mg capsule   No No   Sig: Take 1 capsule by mouth 3 (three) times a day   Patient not taking: Reported on 11/9/2018    ketoconazole (NIZORAL) 2 % cream   No No   Sig: Apply topically daily   loratadine (CLARITIN) 10 mg tablet   No No   Sig: Take 1 tablet (10 mg total) by mouth daily   metFORMIN (GLUCOPHAGE) 500 mg tablet   No No   Sig: Take 1 tablet (500 mg total) by mouth daily with breakfast   mometasone-formoterol (DULERA) 100-5 MCG/ACT inhaler   Yes No   Sig: Every 12 hours   montelukast (SINGULAIR) 10 mg tablet   No No   Sig: Take 1 tablet (10 mg total) by mouth daily   pantoprazole (PROTONIX) 40 mg tablet   No No   Sig: Take 1 tablet (40 mg total) by mouth daily   sucralfate (CARAFATE) 1 g/10 mL suspension   No No   Sig: Take 10 mL (1 g total) by mouth 4 (four) times a day      Facility-Administered Medications: None       Past Medical History:   Diagnosis Date    Diabetes mellitus ([address] )     GERD (gastroesophageal reflux disease)     Hyperlipidemia     Hypertension     Seasonal allergies     Sleep apnea        Past Surgical History:   Procedure Laterality Date    BILATERAL KNEE ARTHROSCOPY      HYSTERECTOMY         Family History   Problem Relation Age of Onset    Coronary artery disease Father      I have reviewed and agree with the history as documented  Social History     Tobacco Use    Smoking status: Passive Smoke Exposure - Never Smoker    Smokeless tobacco: Never Used    Tobacco comment: childhood smoke exposure   Substance Use Topics    Alcohol use: No    Drug use: No        Review of Systems   Constitutional: Negative for fever (subjective, felt chills)  HENT: Negative for sore throat  Respiratory: Positive for cough and shortness of breath  Gastrointestinal: Negative for abdominal pain  All other systems reviewed and are negative  Physical Exam  Physical Exam   Constitutional: She is oriented to person, place, and time  Vital signs are normal  She appears well-developed and well-nourished  Non-toxic appearance  Morbid obesity, frequent prolonged expiratory phase cough, she is quite talkative and even loud, but is short of breath after prolonged phrases   HENT:   Head: Normocephalic and atraumatic  Right Ear: Tympanic membrane and external ear normal    Left Ear: Tympanic membrane and external ear normal    Nose: Nose normal    Mouth/Throat: Oropharynx is clear and moist    Eyes: Pupils are equal, round, and reactive to light  Conjunctivae and EOM are normal    Neck: Normal range of motion and full passive range of motion without pain  Neck supple  No Brudzinski's sign and no Kernig's sign noted  Cardiovascular: Normal rate, regular rhythm, normal heart sounds and intact distal pulses  No murmur heard  Pulses:       Dorsalis pedis pulses are 2+ on the right side, and 2+ on the left side  Posterior tibial pulses are 2+ on the right side, and 2+ on the left side  Pulmonary/Chest: Effort normal  Tachypnea noted  No respiratory distress  She has decreased breath sounds  She has wheezes (long end expiratory)  Abdominal: Soft  Bowel sounds are normal  She exhibits no distension  There is no tenderness  There is no rigidity, no rebound and no guarding  Musculoskeletal: Normal range of motion  Right lower leg: She exhibits edema  She exhibits no swelling  Left lower leg: She exhibits edema  She exhibits no swelling  Lymphadenopathy:     She has no cervical adenopathy  Neurological: She is alert and oriented to person, place, and time  She has normal strength and normal reflexes  No cranial nerve deficit or sensory deficit  Coordination and gait normal  GCS eye subscore is 4  GCS verbal subscore is 5   GCS motor subscore is 6  Skin: Skin is warm and dry  No rash noted  She is not diaphoretic  No pallor  Psychiatric: She has a normal mood and affect  Her speech is normal and behavior is normal  Judgment and thought content normal  Cognition and memory are normal    Nursing note and vitals reviewed        Vital Signs  ED Triage Vitals [02/12/19 0936]   Temperature Pulse Respirations Blood Pressure SpO2   98 4 °F (36 9 °C) 57 22 140/87 97 %      Temp Source Heart Rate Source Patient Position - Orthostatic VS BP Location FiO2 (%)   Oral Monitor Sitting Right arm --      Pain Score       7           Vitals:    02/12/19 0936   BP: 140/87   Pulse: 57   Patient Position - Orthostatic VS: Sitting       Visual Acuity      ED Medications  Medications   methylPREDNISolone sodium succinate (Solu-MEDROL) injection 125 mg (has no administration in time range)   albuterol inhalation solution 10 mg (10 mg Nebulization Given 2/12/19 1009)   ipratropium (ATROVENT) 0 02 % inhalation solution 1 mg (1 mg Nebulization Given 2/12/19 1009)   sodium chloride 0 9 % inhalation solution 12 mL (12 mL Nebulization Given 2/12/19 1009)       Diagnostic Studies  Results Reviewed     Procedure Component Value Units Date/Time    B-type natriuretic peptide [[id number]]  (Abnormal) Collected:  02/12/19 0954    Lab Status:  Final result Specimen:  Blood from Arm, Left Updated:  02/12/19 1023     NT-proBNP 365 pg/mL     Basic metabolic panel [[id number]] Collected:  02/12/19 0954    Lab Status:  Final result Specimen:  Blood from Arm, Left Updated:  02/12/19 1023     Sodium 143 mmol/L      Potassium 3 6 mmol/L      Chloride 106 mmol/L      CO2 29 mmol/L      ANION GAP 8 mmol/L      BUN 14 mg/dL      Creatinine 1 07 mg/dL      Glucose 112 mg/dL      Calcium 9 1 mg/dL      eGFR 63 ml/min/1 73sq m     Narrative:       National Kidney Disease Education Program recommendations are as follows:  GFR calculation is accurate only with a steady state creatinine  Chronic Kidney disease less than 60 ml/min/1 73 sq  meters  Kidney failure less than 15 ml/min/1 73 sq  meters  CBC and differential [[id number]]  (Abnormal) Collected:  02/12/19 0954    Lab Status:  Final result Specimen:  Blood from Arm, Left Updated:  02/12/19 1000     WBC 10 72 Thousand/uL      RBC 4 49 Million/uL      Hemoglobin 13 0 g/dL      Hematocrit 40 1 %      MCV 89 fL      MCH 29 0 pg      MCHC 32 4 g/dL      RDW 15 0 %      MPV 9 3 fL      Platelets 455 Thousands/uL      nRBC 0 /100 WBCs      Neutrophils Relative 54 %      Immat GRANS % 0 %      Lymphocytes Relative 34 %      Monocytes Relative 9 %      Eosinophils Relative 3 %      Basophils Relative 0 %      Neutrophils Absolute 5 75 Thousands/µL      Immature Grans Absolute 0 03 Thousand/uL      Lymphocytes Absolute 3 67 Thousands/µL      Monocytes Absolute 0 91 Thousand/µL      Eosinophils Absolute 0 33 Thousand/µL      Basophils Absolute 0 03 Thousands/µL                  XR chest 2 views   Final Result by Rosana Manzo MD (02/12 1027)      No acute cardiopulmonary disease  Workstation performed: [id number]                    Procedures  Procedures       Phone Contacts  ED Phone Contact    ED Course  ED Course as of Feb 12 1131   Tue Feb 12, 2019   1056 Negative xray   XR chest 2 views   1119 After DE PAZ, wheezing is diminished, she cannot take deep breath without paroxysm of cough, SpO2 94-95%, which is reassuring, she is however, home alone, no family, and more short of breath with exertion  I am recommending admission                                      MDM    Disposition  Final diagnoses:   Acute exacerbation of chronic bronchitis ([address] )     Time reflects when diagnosis was documented in both MDM as applicable and the Disposition within this note     Time User Action Codes Description Comment    2/12/2019 11:30 AM Shashi Aranda Add [J20 9,  J42] Acute exacerbation of chronic bronchitis [facility])       ED Disposition     ED Disposition Condition Date/Time Comment    Admit Stable Tue Feb 12, 2019 11:30 AM Case was discussed with Dr Servando Mas and the patient's admission status was agreed to be Admission Status: inpatient status to the service of Dr Servando Mas   Follow-up Information    None         Patient's Medications   Discharge Prescriptions    No medications on file     No discharge procedures on file      ED Provider  Electronically Signed by           Zac Franklin MD  02/12/19 [phone]

## 2019-02-12 NOTE — H&P
History and Physical - [facility]    Patient Information: Caitlyn Alvarado 58 y o  female MRN: [id number]  Unit/Bed#: ED 30 Encounter: [id number]  Admitting Physician: Henrry Perez DO  PCP: Ade Rider MD  Date of Admission:  02/12/19    Assessment/Plan:  1  Acute copd exacerbation- continue with bronchodilators  Continue dulera  Will start solumedrol  Will add ceftriaxone  Will r/o flu  Continue mucinex    2  Possible acute chf- bnp is elevated to 365  Will give one time dose of lasix IV  Will check echo  3  Type 2 diabetes- will write for insulin sliding scale  Hold metformin  May need long acting insulin while in the hospital if hyperglycemia with steroids    4  gerd- continue ppi  Continue carafate  5  Hyperlipidemia- statin    6  htn- continue norvasc  VTE Prophylaxis: Enoxaparin (Lovenox)  / sequential compression device   Code Status: full code    Anticipated Length of Stay:  Patient will be admitted on an Inpatient basis with an anticipated length of stay of  Greater than 2 midnights  Chief Complaint:   Shortness of breath  History of Present Illness:    Caitlyn Alvarado is a 58 y o  female who presents with progressive shortness of breath for about 6 weeks  She has a history of "bronchitis" and was using her nebs at home  No improvement  She recently saw her pcp and was treated with steroids ans azithromycin but despite this treatment her breathing has worsened  She continues to have a cough with white mucous  She stated she had a fever at home  No fever in ed  She states she had some improvement from the nebs given in the ed  She still feels significantly short of breath  She also has noticed swelling in her lower ext bilateral  No sick contacts  No n/v/d no abd pain    Review of Systems:    Review of Systems   Constitutional: Positive for fever  HENT: Negative  Eyes: Negative  Respiratory: Positive for cough, shortness of breath and wheezing  Cardiovascular: Negative  Gastrointestinal: Negative  Endocrine: Negative  Genitourinary: Negative  Musculoskeletal: Negative  Skin: Negative  Allergic/Immunologic: Negative  Neurological: Negative  Hematological: Negative  Psychiatric/Behavioral: Negative  Past Medical and Surgical History:     Past Medical History:   Diagnosis Date    Diabetes mellitus ([address] )     GERD (gastroesophageal reflux disease)     Hyperlipidemia     Hypertension     Seasonal allergies     Sleep apnea        Past Surgical History:   Procedure Laterality Date    BILATERAL KNEE ARTHROSCOPY      HYSTERECTOMY         Meds/Allergies:    Prior to Admission medications    Medication Sig Start Date End Date Taking?  Authorizing Provider   ACCUKathleenCHESEBASTIEN FASTCLIX LANCETS [facility]  6/21/18   Historical Provider, MD SALINASUAPRIL GUIDE test strip  6/21/18   Historical Provider, MD   acetaminophen (TYLENOL) 325 mg tablet Take 2 tablets (650 mg total) by mouth every 6 (six) hours as needed for mild pain  Patient not taking: Reported on 11/9/2018 9/14/18   Collin Sanchez MD   albuterol (5 mg/mL) 0 5 % nebulizer solution Take 0 5 mL by nebulization every 6 (six) hours as needed for wheezing or shortness of breath 12/27/17   [facility]   albuterol (VENTOLIN HFA) 90 mcg/act inhaler inhale 2 puff by inhalation route  every 4 - 6 hours as needed 4/27/18   Historical Provider, MD   amLODIPine (NORVASC) 10 mg tablet Take 1 tablet (10 mg total) by mouth daily 12/18/18   Collin Sanchez MD   amLODIPine (NORVASC) 5 mg tablet  12/17/18   Historical Provider, MD   aspirin (ECOTRIN LOW STRENGTH) 81 mg EC tablet  12/17/18   Historical Provider, MD   aspirin 81 MG tablet Take 1 tablet (81 mg total) by mouth daily 9/14/18   Collin Sanchez MD   atorvastatin (LIPITOR) 40 mg tablet Take 1 tablet (40 mg total) by mouth daily  Patient not taking: Reported on 11/9/2018 9/14/18   Collin Sanchez MD   benzonatate (TESSALON PERLES) 100 mg capsule Take 1 capsule by mouth 3 (three) times a day  Patient not taking: Reported on 11/9/2018 12/27/17   Jewel Grain, DO   Blood Glucose Monitoring Suppl (ACCU-CHEK GUIDE) w/Device KIT  6/21/18   Historical Provider, MD   ketoconazole (NIZORAL) 2 % cream Apply topically daily 1/29/19   Alex Montelongo MD   loratadine (CLARITIN) 10 mg tablet Take 1 tablet (10 mg total) by mouth daily 9/14/18   Jacey Orta MD   metFORMIN (GLUCOPHAGE) 500 mg tablet Take 1 tablet (500 mg total) by mouth daily with breakfast 9/14/18   Jacey Orta MD   mometasone-formoterol (DULERA) 100-5 MCG/ACT inhaler Every 12 hours 4/27/18   Historical Provider, MD   montelukast (SINGULAIR) 10 mg tablet Take 1 tablet (10 mg total) by mouth daily 12/18/18   Jacey Orta MD   pantoprazole (PROTONIX) 40 mg tablet Take 1 tablet (40 mg total) by mouth daily 9/14/18   Jacey Orta MD   sucralfate (CARAFATE) 1 g/10 mL suspension Take 10 mL (1 g total) by mouth 4 (four) times a day 11/28/18   Olman Camacho MD     I have reviewed home medications with patient personally  Allergies:    Allergies   Allergen Reactions    Januvia [Sitagliptin] Swelling    Clindamycin     Hydrocodone     Morphine Hives    Omeprazole     Penicillins     Percocet [Oxycodone-Acetaminophen]     Tolterodine     Ciprofloxacin Rash       Social History:     Marital Status: Single     Substance Use History:   Social History     Substance and Sexual Activity   Alcohol Use No     Social History     Tobacco Use   Smoking Status Passive Smoke Exposure - Never Smoker   Smokeless Tobacco Never Used   Tobacco Comment    childhood smoke exposure     Social History     Substance and Sexual Activity   Drug Use No       Family History:    Family History   Problem Relation Age of Onset    Coronary artery disease Father        Physical Exam:     Vitals:   Blood Pressure: 116/65 (02/12/19 1138)  Pulse: (!) 109 (02/12/19 1138)  Temperature: 98 4 °F (36 9 °C) (02/12/19 3937)  Temp Source: Oral (02/12/19 0936)  Respirations: 22 (02/12/19 1138)  Weight - Scale: 134 kg (295 lb 3 1 oz) (02/12/19 0936)  SpO2: 96 % (02/12/19 1138)    Physical Exam   Constitutional: She is oriented to person, place, and time  No distress  HENT:   Head: Normocephalic and atraumatic  Eyes: Pupils are equal, round, and reactive to light  EOM are normal    Neck: Normal range of motion  Neck supple  Cardiovascular: Normal rate  Pulmonary/Chest: Effort normal    Minimal wheezing bilateral  Decrease breath sounds at bases bilateral   Abdominal: Soft  Bowel sounds are normal  She exhibits no distension and no mass  There is no tenderness  There is no guarding  Musculoskeletal: Normal range of motion  Neurological: She is alert and oriented to person, place, and time  Skin: Skin is warm and dry  She is not diaphoretic  Psychiatric: She has a normal mood and affect  Additional Data:     Lab Results: I have personally reviewed pertinent reports  Results from last 7 days   Lab Units 02/12/19  0954   WBC Thousand/uL 10 72*   HEMOGLOBIN g/dL 13 0   HEMATOCRIT % 40 1   PLATELETS Thousands/uL 262   NEUTROS PCT % 54   LYMPHS PCT % 34   MONOS PCT % 9   EOS PCT % 3     Results from last 7 days   Lab Units 02/12/19  0954   POTASSIUM mmol/L 3 6   CHLORIDE mmol/L 106   CO2 mmol/L 29   BUN mg/dL 14   CREATININE mg/dL 1 07   CALCIUM mg/dL 9 1           Imaging: I have personally reviewed pertinent reports  Xr Chest 2 Views    Result Date: 2/12/2019  Narrative: CHEST INDICATION:   cough  COMPARISON:  8/28/2018 EXAM PERFORMED/VIEWS:  XR CHEST PA & LATERAL FINDINGS: Cardiomediastinal silhouette appears unremarkable  The lungs are clear  No pneumothorax or pleural effusion  Osseous structures appear within normal limits for patient age  Impression: No acute cardiopulmonary disease  Workstation performed: [id number]     [employer] Records Reviewed:  Yes

## 2019-02-13 LAB
ANION GAP SERPL CALCULATED.3IONS-SCNC: 11 MMOL/L (ref 4–13)
ATRIAL RATE: 117 BPM
BUN SERPL-MCNC: 22 MG/DL (ref 5–25)
CALCIUM SERPL-MCNC: 9.3 MG/DL (ref 8.3–10.1)
CHLORIDE SERPL-SCNC: 105 MMOL/L (ref 100–108)
CO2 SERPL-SCNC: 28 MMOL/L (ref 21–32)
CREAT SERPL-MCNC: 1.13 MG/DL (ref 0.6–1.3)
ERYTHROCYTE [DISTWIDTH] IN BLOOD BY AUTOMATED COUNT: 15.3 % (ref 11.6–15.1)
FLUAV AG SPEC QL: NOT DETECTED
FLUBV AG SPEC QL: NOT DETECTED
GFR SERPL CREATININE-BSD FRML MDRD: 59 ML/MIN/1.73SQ M
GLUCOSE SERPL-MCNC: 175 MG/DL (ref 65–140)
GLUCOSE SERPL-MCNC: 192 MG/DL (ref 65–140)
GLUCOSE SERPL-MCNC: 223 MG/DL (ref 65–140)
GLUCOSE SERPL-MCNC: 255 MG/DL (ref 65–140)
GLUCOSE SERPL-MCNC: 260 MG/DL (ref 65–140)
HCT VFR BLD AUTO: 39.8 % (ref 34.8–46.1)
HGB BLD-MCNC: 12.9 G/DL (ref 11.5–15.4)
MCH RBC QN AUTO: 28.7 PG (ref 26.8–34.3)
MCHC RBC AUTO-ENTMCNC: 32.4 G/DL (ref 31.4–37.4)
MCV RBC AUTO: 89 FL (ref 82–98)
P AXIS: 54 DEGREES
PLATELET # BLD AUTO: 269 THOUSANDS/UL (ref 149–390)
PMV BLD AUTO: 9.9 FL (ref 8.9–12.7)
POTASSIUM SERPL-SCNC: 3.7 MMOL/L (ref 3.5–5.3)
PR INTERVAL: 144 MS
QRS AXIS: 37 DEGREES
QRSD INTERVAL: 94 MS
QT INTERVAL: 334 MS
QTC INTERVAL: 465 MS
RBC # BLD AUTO: 4.49 MILLION/UL (ref 3.81–5.12)
RSV B RNA SPEC QL NAA+PROBE: NOT DETECTED
SODIUM SERPL-SCNC: 144 MMOL/L (ref 136–145)
T WAVE AXIS: 65 DEGREES
TROPONIN I SERPL-MCNC: 0.16 NG/ML
TROPONIN I SERPL-MCNC: 2.56 NG/ML
TROPONIN I SERPL-MCNC: 4.18 NG/ML
VENTRICULAR RATE: 117 BPM
WBC # BLD AUTO: 12.61 THOUSAND/UL (ref 4.31–10.16)

## 2019-02-13 PROCEDURE — 99232 SBSQ HOSP IP/OBS MODERATE 35: CPT | Performed by: INTERNAL MEDICINE

## 2019-02-13 PROCEDURE — 94760 N-INVAS EAR/PLS OXIMETRY 1: CPT

## 2019-02-13 PROCEDURE — 82948 REAGENT STRIP/BLOOD GLUCOSE: CPT

## 2019-02-13 PROCEDURE — 93005 ELECTROCARDIOGRAM TRACING: CPT

## 2019-02-13 PROCEDURE — 80048 BASIC METABOLIC PNL TOTAL CA: CPT | Performed by: INTERNAL MEDICINE

## 2019-02-13 PROCEDURE — 94640 AIRWAY INHALATION TREATMENT: CPT

## 2019-02-13 PROCEDURE — 85027 COMPLETE CBC AUTOMATED: CPT | Performed by: INTERNAL MEDICINE

## 2019-02-13 PROCEDURE — 84484 ASSAY OF TROPONIN QUANT: CPT | Performed by: INTERNAL MEDICINE

## 2019-02-13 PROCEDURE — 93010 ELECTROCARDIOGRAM REPORT: CPT | Performed by: INTERNAL MEDICINE

## 2019-02-13 RX ORDER — BENZONATATE 100 MG/1
200 CAPSULE ORAL 3 TIMES DAILY
Status: DISCONTINUED | OUTPATIENT
Start: 2019-02-13 | End: 2019-02-16

## 2019-02-13 RX ORDER — DILTIAZEM HYDROCHLORIDE 5 MG/ML
5 INJECTION INTRAVENOUS EVERY 6 HOURS PRN
Status: DISCONTINUED | OUTPATIENT
Start: 2019-02-13 | End: 2019-02-16

## 2019-02-13 RX ORDER — ACETAMINOPHEN 325 MG/1
650 TABLET ORAL EVERY 6 HOURS PRN
Status: DISCONTINUED | OUTPATIENT
Start: 2019-02-13 | End: 2019-02-20 | Stop reason: HOSPADM

## 2019-02-13 RX ORDER — GUAIFENESIN 100 MG/5ML
200 SOLUTION ORAL EVERY 4 HOURS PRN
Status: DISCONTINUED | OUTPATIENT
Start: 2019-02-13 | End: 2019-02-20 | Stop reason: HOSPADM

## 2019-02-13 RX ORDER — METHYLPREDNISOLONE SODIUM SUCCINATE 40 MG/ML
20 INJECTION, POWDER, LYOPHILIZED, FOR SOLUTION INTRAMUSCULAR; INTRAVENOUS EVERY 12 HOURS SCHEDULED
Status: DISCONTINUED | OUTPATIENT
Start: 2019-02-13 | End: 2019-02-14

## 2019-02-13 RX ORDER — NITROGLYCERIN 0.4 MG/1
0.4 TABLET SUBLINGUAL
Status: DISCONTINUED | OUTPATIENT
Start: 2019-02-13 | End: 2019-02-20 | Stop reason: HOSPADM

## 2019-02-13 RX ORDER — AZITHROMYCIN 250 MG/1
250 TABLET, FILM COATED ORAL EVERY 24 HOURS
Status: DISCONTINUED | OUTPATIENT
Start: 2019-02-14 | End: 2019-02-17

## 2019-02-13 RX ADMIN — METHYLPREDNISOLONE SODIUM SUCCINATE 20 MG: 40 INJECTION, POWDER, FOR SOLUTION INTRAMUSCULAR; INTRAVENOUS at 13:36

## 2019-02-13 RX ADMIN — INSULIN LISPRO 3 UNITS: 100 INJECTION, SOLUTION INTRAVENOUS; SUBCUTANEOUS at 22:27

## 2019-02-13 RX ADMIN — FLUTICASONE FUROATE AND VILANTEROL TRIFENATATE 1 PUFF: 200; 25 POWDER RESPIRATORY (INHALATION) at 08:38

## 2019-02-13 RX ADMIN — ENOXAPARIN SODIUM 40 MG: 60 INJECTION SUBCUTANEOUS at 08:38

## 2019-02-13 RX ADMIN — AMLODIPINE BESYLATE 5 MG: 5 TABLET ORAL at 08:38

## 2019-02-13 RX ADMIN — ASPIRIN 81 MG: 81 TABLET, COATED ORAL at 08:37

## 2019-02-13 RX ADMIN — ALBUTEROL SULFATE 2 PUFF: 90 AEROSOL, METERED RESPIRATORY (INHALATION) at 22:25

## 2019-02-13 RX ADMIN — LEVALBUTEROL 1.25 MG: 1.25 SOLUTION, CONCENTRATE RESPIRATORY (INHALATION) at 07:34

## 2019-02-13 RX ADMIN — IPRATROPIUM BROMIDE 0.5 MG: 0.5 SOLUTION RESPIRATORY (INHALATION) at 13:24

## 2019-02-13 RX ADMIN — ACETAMINOPHEN 650 MG: 325 TABLET, FILM COATED ORAL at 15:29

## 2019-02-13 RX ADMIN — ATORVASTATIN CALCIUM 40 MG: 40 TABLET, FILM COATED ORAL at 16:47

## 2019-02-13 RX ADMIN — PANTOPRAZOLE SODIUM 40 MG: 40 TABLET, DELAYED RELEASE ORAL at 05:58

## 2019-02-13 RX ADMIN — INSULIN LISPRO 4 UNITS: 100 INJECTION, SOLUTION INTRAVENOUS; SUBCUTANEOUS at 17:22

## 2019-02-13 RX ADMIN — LEVALBUTEROL 1.25 MG: 1.25 SOLUTION, CONCENTRATE RESPIRATORY (INHALATION) at 13:23

## 2019-02-13 RX ADMIN — GUAIFENESIN 600 MG: 600 TABLET, EXTENDED RELEASE ORAL at 21:26

## 2019-02-13 RX ADMIN — LEVALBUTEROL 1.25 MG: 1.25 SOLUTION, CONCENTRATE RESPIRATORY (INHALATION) at 01:02

## 2019-02-13 RX ADMIN — IPRATROPIUM BROMIDE 0.5 MG: 0.5 SOLUTION RESPIRATORY (INHALATION) at 01:02

## 2019-02-13 RX ADMIN — AZITHROMYCIN MONOHYDRATE 500 MG: 250 TABLET ORAL at 13:36

## 2019-02-13 RX ADMIN — BENZONATATE 100 MG: 100 CAPSULE ORAL at 08:37

## 2019-02-13 RX ADMIN — GUAIFENESIN 600 MG: 600 TABLET, EXTENDED RELEASE ORAL at 08:37

## 2019-02-13 RX ADMIN — BENZONATATE 200 MG: 100 CAPSULE ORAL at 21:26

## 2019-02-13 RX ADMIN — INSULIN LISPRO 2 UNITS: 100 INJECTION, SOLUTION INTRAVENOUS; SUBCUTANEOUS at 08:38

## 2019-02-13 RX ADMIN — METHYLPREDNISOLONE SODIUM SUCCINATE 20 MG: 40 INJECTION, POWDER, FOR SOLUTION INTRAMUSCULAR; INTRAVENOUS at 21:31

## 2019-02-13 RX ADMIN — INSULIN LISPRO 6 UNITS: 100 INJECTION, SOLUTION INTRAVENOUS; SUBCUTANEOUS at 12:15

## 2019-02-13 RX ADMIN — BENZONATATE 200 MG: 100 CAPSULE ORAL at 16:47

## 2019-02-13 RX ADMIN — IPRATROPIUM BROMIDE 0.5 MG: 0.5 SOLUTION RESPIRATORY (INHALATION) at 07:34

## 2019-02-13 RX ADMIN — METHYLPREDNISOLONE SODIUM SUCCINATE 20 MG: 40 INJECTION, POWDER, FOR SOLUTION INTRAMUSCULAR; INTRAVENOUS at 05:58

## 2019-02-13 NOTE — UTILIZATION REVIEW
Initial Clinical Review    Admission: Date/Time/Statement: 2/12/19 @ 1131   Orders Placed This Encounter   Procedures    Inpatient Admission     Standing Status:   Standing     Number of Occurrences:   1     Order Specific Question:   Admitting Physician     Answer:   Serena Ramesh     Order Specific Question:   Level of Care     Answer:   Med Surg [16]     Order Specific Question:   Estimated length of stay     Answer:   More than 2 Midnights     Order Specific Question:   Certification     Answer:   I certify that inpatient services are medically necessary for this patient for a duration of greater than two midnights  See H&P and MD Progress Notes for additional information about the patient's course of treatment  ED: Date/Time/Mode of Arrival:   ED Arrival Information     Expected Arrival Acuity Means of Arrival Escorted By Service Admission Type    - 2/12/2019 09:24 Urgent Wheelchair Family Member General Medicine Urgent    Arrival Complaint    Coughing,asthma        Chief Complaint:   Chief Complaint   Patient presents with    Cough     Cough for 1 5 months  Bringing up clear/white mucous  Fever at home  Also having back pain and SOB  Was on antibiotics and steroids  History of Illness: 57 yo female with COPD/asthma brought to the ED by her  whom she called to pick her up, c/o ongoing, worsening cough for about 6 weeks, and dyspnea, not responding to home regimen with inhaler and nebulizer, with intermittent subjective fever, not measured  She was treated with steroids and azithromycin by PCP 1/29/19, which gave only temporary relief     quite talkative  but is short of breath after prolonged phrases   Tachypnea noted  She has decreased breath sounds,  wheezes (long end expiratory)       ED Vital Signs:   ED Triage Vitals [02/12/19 0936]   Temperature Pulse Respirations Blood Pressure SpO2   98 4 °F (36 9 °C) 57 22 140/87 97 %      Temp Source Heart Rate Source Patient Position - Orthostatic VS BP Location FiO2 (%)   Oral Monitor Sitting Right arm --      Pain Score       7        Wt Readings from Last 1 Encounters:   02/13/19 (!) 137 kg (301 lb 5 9 oz)     Vital Signs (abnormal):  See above    Pertinent Labs/Diagnostic Test Results:   NTpro   WBC's 10 72  CXR: No acute cardiopulmonary disease  ED Treatment:   Medication Administration from 02/12/2019 0924 to 02/12/2019 1242       Date/Time Order Dose Route Action Action by Comments     02/12/2019 1009 albuterol inhalation solution 10 mg 10 mg Nebulization Given       02/12/2019 1009 ipratropium (ATROVENT) 0 02 % inhalation solution 1 mg 1 mg Nebulization Given       02/12/2019 1009 sodium chloride 0 9 % inhalation solution 12 mL 12 mL Nebulization Given       02/12/2019 1143 methylPREDNISolone sodium succinate (Solu-MEDROL) injection 125 mg 125 mg Intravenous Given          Past Medical/Surgical History:   Past Medical History:   Diagnosis Date    Diabetes mellitus ([place] [address] )     GERD (gastroesophageal reflux disease)     Hyperlipidemia     Hypertension     Seasonal allergies     Sleep apnea      Admitting Diagnosis: Cough [R05]  Acute exacerbation of chronic bronchitis ([facility] ) [J20 9, J42]     Age/Sex: 58 y o  female  Assessment/Plan:   57 yo Female admitted with Acute COPD Exac - progressive SOB and failed OP TX with steroids, azithromycin and Nebs  Still feels significantly SOB after tx in ED  Continue bronchodilators,  IV Solumedrol,  Add azithroycin,    Mucinex  R/O flu  Give one time dose of IV Lasix for possible CHF as BNP elevated and some swelling in bilat LE's   Check ECHO      Admission Orders:  IP  Scheduled Meds:   Current Facility-Administered Medications:  albuterol 2 puff Inhalation Q4H PRN    amLODIPine 5 mg Oral Daily    aspirin 81 mg Oral Daily    atorvastatin 40 mg Oral Daily With Dinner    azithromycin 500 mg Oral Q24H    benzonatate 100 mg Oral TID    enoxaparin 40 mg Subcutaneous Daily fluticasone-vilanterol 1 puff Inhalation Daily    guaiFENesin 600 mg Oral Q12H AINSLEY    insulin lispro 1-6 Units Subcutaneous HS    insulin lispro 2-12 Units Subcutaneous TID AC    ipratropium 0 5 mg Nebulization Q6H    levalbuterol 1 25 mg Nebulization Q6H    methylPREDNISolone sodium succinate 20 mg Intravenous Q8H    montelukast 10 mg Oral QPM    ondansetron 4 mg Intravenous Q4H PRN    pantoprazole 40 mg Oral Early Morning    sucralfate 1,000 mg Oral 4x Daily      Lasix  40 IV X 1  ECHO  SCD's  Sputum cx,  INF A/B and RSV  CBC,  BMP in am    2/13: 97 6 - 110 - 20   124/95   RA 96%  BS:  175,  255  WBC's 12 61  Continues with JAEGER,  Barky, productive cough  Meds as above      Network Utilization Review Department  Phone: [phone]; Fax [phone]  [email or web address]  ATTENTION: Please call with any questions or concerns to [phone]  and carefully listen to the prompts so that you are directed to the right person  Send all requests for admission clinical reviews, approved or denied determinations and any other requests to fax [phone]   All voicemails are confidential

## 2019-02-13 NOTE — PLAN OF CARE
Problem: Potential for Falls  Goal: Patient will remain free of falls  Description  INTERVENTIONS:  - Assess patient frequently for physical needs  -  Identify cognitive and physical deficits and behaviors that affect risk of falls    -  [place] fall precautions as indicated by assessment   - Educate patient/family on patient safety including physical limitations  - Instruct patient to call for assistance with activity based on assessment  - Modify environment to reduce risk of injury  - Consider OT/PT consult to assist with strengthening/mobility  Outcome: Progressing     Problem: PAIN - ADULT  Goal: Verbalizes/displays adequate comfort level or baseline comfort level  Description  Interventions:  - Encourage patient to monitor pain and request assistance  - Assess pain using appropriate pain scale  - Administer analgesics based on type and severity of pain and evaluate response  - Implement non-pharmacological measures as appropriate and evaluate response  - Consider cultural and social influences on pain and pain management  - Notify physician/advanced practitioner if interventions unsuccessful or patient reports new pain  Outcome: Progressing     Problem: INFECTION - ADULT  Goal: Absence or prevention of progression during hospitalization  Description  INTERVENTIONS:  - Assess and monitor for signs and symptoms of infection  - Monitor lab/diagnostic results  - Monitor all insertion sites, i e  indwelling lines, tubes, and drains  - Monitor endotracheal (as able) and nasal secretions for changes in amount and color  - [place] appropriate cooling/warming therapies per order  - Administer medications as ordered  - Instruct and encourage patient and family to use good hand hygiene technique  - Identify and instruct in appropriate isolation precautions for identified infection/condition  Outcome: Progressing     Problem: Knowledge Deficit  Goal: Patient/family/caregiver demonstrates understanding of disease process, treatment plan, medications, and discharge instructions  Description  Complete learning assessment and assess knowledge base    Interventions:  - Provide teaching at level of understanding  - Provide teaching via preferred learning methods  Outcome: Progressing     Problem: RESPIRATORY - ADULT  Goal: Achieves optimal ventilation and oxygenation  Description  INTERVENTIONS:  - Assess for changes in respiratory status  - Assess for changes in mentation and behavior  - Position to facilitate oxygenation and minimize respiratory effort  - Oxygen administration by appropriate delivery method based on oxygen saturation (per order) or ABGs  - Initiate smoking cessation education as indicated  - Encourage broncho-pulmonary hygiene including cough, deep breathe, Incentive Spirometry  - Assess the need for suctioning and aspirate as needed  - Assess and instruct to report SOB or any respiratory difficulty  - Respiratory Therapy support as indicated  Outcome: Progressing     Problem: METABOLIC, FLUID AND ELECTROLYTES - ADULT  Goal: Glucose maintained within target range  Description  INTERVENTIONS:  - Monitor Blood Glucose as ordered  - Assess for signs and symptoms of hyperglycemia and hypoglycemia  - Administer ordered medications to maintain glucose within target range  - Assess nutritional intake and initiate nutrition service referral as needed  Outcome: Progressing

## 2019-02-13 NOTE — PLAN OF CARE
Problem: Potential for Falls  Goal: Patient will remain free of falls  Description  INTERVENTIONS:  - Assess patient frequently for physical needs  -  Identify cognitive and physical deficits and behaviors that affect risk of falls    -  [place] fall precautions as indicated by assessment   - Educate patient/family on patient safety including physical limitations  - Instruct patient to call for assistance with activity based on assessment  - Modify environment to reduce risk of injury  - Consider OT/PT consult to assist with strengthening/mobility  Outcome: Progressing     Problem: PAIN - ADULT  Goal: Verbalizes/displays adequate comfort level or baseline comfort level  Description  Interventions:  - Encourage patient to monitor pain and request assistance  - Assess pain using appropriate pain scale  - Administer analgesics based on type and severity of pain and evaluate response  - Implement non-pharmacological measures as appropriate and evaluate response  - Consider cultural and social influences on pain and pain management  - Notify physician/advanced practitioner if interventions unsuccessful or patient reports new pain  Outcome: Progressing     Problem: INFECTION - ADULT  Goal: Absence or prevention of progression during hospitalization  Description  INTERVENTIONS:  - Assess and monitor for signs and symptoms of infection  - Monitor lab/diagnostic results  - Monitor all insertion sites, i e  indwelling lines, tubes, and drains  - Monitor endotracheal (as able) and nasal secretions for changes in amount and color  - [place] appropriate cooling/warming therapies per order  - Administer medications as ordered  - Instruct and encourage patient and family to use good hand hygiene technique  - Identify and instruct in appropriate isolation precautions for identified infection/condition  Outcome: Progressing     Problem: Knowledge Deficit  Goal: Patient/family/caregiver demonstrates understanding of disease process, treatment plan, medications, and discharge instructions  Description  Complete learning assessment and assess knowledge base    Interventions:  - Provide teaching at level of understanding  - Provide teaching via preferred learning methods  Outcome: Progressing     Problem: RESPIRATORY - ADULT  Goal: Achieves optimal ventilation and oxygenation  Description  INTERVENTIONS:  - Assess for changes in respiratory status  - Assess for changes in mentation and behavior  - Position to facilitate oxygenation and minimize respiratory effort  - Oxygen administration by appropriate delivery method based on oxygen saturation (per order) or ABGs  - Initiate smoking cessation education as indicated  - Encourage broncho-pulmonary hygiene including cough, deep breathe, Incentive Spirometry  - Assess the need for suctioning and aspirate as needed  - Assess and instruct to report SOB or any respiratory difficulty  - Respiratory Therapy support as indicated  Outcome: Progressing     Problem: METABOLIC, FLUID AND ELECTROLYTES - ADULT  Goal: Glucose maintained within target range  Description  INTERVENTIONS:  - Monitor Blood Glucose as ordered  - Assess for signs and symptoms of hyperglycemia and hypoglycemia  - Administer ordered medications to maintain glucose within target range  - Assess nutritional intake and initiate nutrition service referral as needed  Outcome: Progressing

## 2019-02-13 NOTE — PROGRESS NOTES
Received a call from another RN on the floor that my patient was experiencing chest pain  Came to check on the patient  She was found to be a little anxious  Complaining of some SOB and substernal chest pain  No pain radiating anywhere else on her body  Obtained a set of vitals  Paged Dr Phyllis Nash  Got an EKG and troponin lab  Awaiting call back from [facility]

## 2019-02-13 NOTE — PROGRESS NOTES
[facility] Progress Note  Patient: Wanda Gtz 58 y o  female   MRN: [id number]  PCP: Marielos Hardy MD  Unit/Bed#: [address] [id number] Encounter: [id number]  Date Of Visit: 02/13/19      Assessment/plan  1  Acute copd exacerbation- continue with bronchodilators  Continue dulera  Will decrease solumedrol to 12  Flu was negative  Continue mucinex     2  Possible acute chf- bnp is elevated to 365  S/p lasix IV x1  awaiting echo       3  Type 2 diabetes with hyperglycemia- continue insulin sliding scale  Hold metformin  Will start lantus of 5 units while on steroids if pt still has hyperglycemia with decrease of steroids    Will monitor blood glucose       4  gerd- continue ppi       5  Hyperlipidemia- statin     6  htn- continue norvasc  7  Sinus tach- will start cardizem prn  Likely due to number 1    8  Chest pain- atypical and slightly reproducible  Could be due to number 1  Will monitor on tele  Will await echo  ekg showing sinus tach  She does have elevated troponin which is likely due to nstemi type 2 from tachycardia  Will continue to monitor troponin  If troponin continues to increase will consult cardiology  Will start nitro prn      dispo- called her daughter and updated her  Discussed in full with nursing  Subjective:   Called to see pt due to chest pain  The pain is midsternal and it radiated to the top of her head  It is a pressure pain  It happened after she got IV solumedrol  It is a little better now  She does feel that she has worsening sob  No f/c no n/v/d no abd pain  She is worried that all the combination of medications are affecting her  Objective:     Vitals: Blood pressure 121/79, pulse (!) 116, temperature 97 6 °F (36 4 °C), temperature source Temporal, resp  rate 20, weight (!) 137 kg (301 lb 5 9 oz), SpO2 100 %  ,Body mass index is 62 99 kg/m²      Lab, Imaging and other studies:  Results from last 7 days   Lab Units 02/13/19  0607   WBC Thousand/uL 12 61* HEMOGLOBIN g/dL 12 9   HEMATOCRIT % 39 8   PLATELETS Thousands/uL 269     Results from last 7 days   Lab Units 02/13/19  0607   POTASSIUM mmol/L 3 7   CHLORIDE mmol/L 105   CO2 mmol/L 28   BUN mg/dL 22   CREATININE mg/dL 1 13   CALCIUM mg/dL 9 3     Results from last 7 days   Lab Units 02/13/19  1417   TROPONIN I ng/mL 0 16*     Lab Results   Component Value Date    BLOODCX No Growth After 5 Days  12/24/2017    BLOODCX No Growth After 5 Days  12/24/2017    SPUTUMCULTUR Culture too young- will reincubate 02/12/2019    SPUTUMCULTUR Test not performed  Suggest repeat specimen  12/25/2017         Xr Chest 2 Views    Result Date: 2/12/2019  Narrative: CHEST INDICATION:   cough  COMPARISON:  8/28/2018 EXAM PERFORMED/VIEWS:  XR CHEST PA & LATERAL FINDINGS: Cardiomediastinal silhouette appears unremarkable  The lungs are clear  No pneumothorax or pleural effusion  Osseous structures appear within normal limits for patient age  Impression: No acute cardiopulmonary disease   Workstation performed: [id number]       Scheduled Meds:   Current Facility-Administered Medications:  acetaminophen 650 mg Oral Q6H PRN Sharlene Ambron, DO   albuterol 2 puff Inhalation Q4H PRN Sharlene Ambron, DO   amLODIPine 5 mg Oral Daily Sharlene Ambron, DO   aspirin 81 mg Oral Daily Sharlene Ambron, DO   atorvastatin 40 mg Oral Daily With [place], DO   azithromycin 500 mg Oral Q24H Sharlene Ambron, DO   benzonatate 200 mg Oral TID Sharlene Ambron, DO   diltiazem 5 mg Intravenous Q6H PRN Sharlene Ambron, DO   enoxaparin 40 mg Subcutaneous Daily Sharlene Ambron, DO   fluticasone-vilanterol 1 puff Inhalation Daily Sharlene Ambron, DO   guaiFENesin 600 mg Oral Q12H [address] Sharlene Ambron, DO   insulin lispro 1-6 Units Subcutaneous HS Sharlene Ambron, DO   insulin lispro 2-12 Units Subcutaneous TID AC Sharlene Ambron, DO   ipratropium 0 5 mg Nebulization Q6H Sharlene Ambron, DO   levalbuterol 1 25 mg Nebulization Q6H Sharlene Ambron, DO methylPREDNISolone sodium succinate 20 mg Intravenous Q8H Sharlene Ambron, DO   ondansetron 4 mg Intravenous Q4H PRN Sharlene Ambron, DO   pantoprazole 40 mg Oral Early Morning Sharlene Ambron, DO     Continuous Infusions:    PRN Meds:   acetaminophen    albuterol    diltiazem    ondansetron      Physical exam:  Physical Exam  General appearance: alert and oriented, in no acute distress  Head: Normocephalic, without obvious abnormality, atraumatic  Eyes: conjunctivae/corneas clear  PERRL, EOM's intact  Fundi benign    Neck: no adenopathy, no carotid bruit, no JVD, supple, symmetrical, trachea midline and thyroid not enlarged, symmetric, no tenderness/mass/nodules  Lungs: mild wheezing bilateral  Heart: tachy s1 s2  Abdomen: soft, non-tender; bowel sounds normal; no masses,  no organomegaly  Extremities: edema +1 bilateral lower ext  Pulses: 2+ and symmetric  Skin: Skin color, texture, turgor normal  No rashes or lesions  Neurologic: Mental status: Alert, oriented, thought content appropriate      VTE Pharmacologic Prophylaxis:lovenox  VTE Mechanical Prophylaxis: sequential compression device    Counseling / Coordination of Care  Total floor / unit time spent today 20 minutes     Current Length of Stay: 1 day(s)    Current Patient Status: Inpatient       Code Status: Level 1 - Full Code

## 2019-02-14 ENCOUNTER — APPOINTMENT (INPATIENT)
Dept: NON INVASIVE DIAGNOSTICS | Facility: HOSPITAL | Age: 64
DRG: 951 | End: 2019-02-14
Payer: COMMERCIAL

## 2019-02-14 LAB
ANION GAP SERPL CALCULATED.3IONS-SCNC: 8 MMOL/L (ref 4–13)
BACTERIA SPT RESP CULT: ABNORMAL
BACTERIA SPT RESP CULT: ABNORMAL
BUN SERPL-MCNC: 24 MG/DL (ref 5–25)
CALCIUM SERPL-MCNC: 9.2 MG/DL (ref 8.3–10.1)
CHLORIDE SERPL-SCNC: 106 MMOL/L (ref 100–108)
CO2 SERPL-SCNC: 30 MMOL/L (ref 21–32)
CREAT SERPL-MCNC: 1.06 MG/DL (ref 0.6–1.3)
ERYTHROCYTE [DISTWIDTH] IN BLOOD BY AUTOMATED COUNT: 15.5 % (ref 11.6–15.1)
GFR SERPL CREATININE-BSD FRML MDRD: 64 ML/MIN/1.73SQ M
GLUCOSE SERPL-MCNC: 159 MG/DL (ref 65–140)
GLUCOSE SERPL-MCNC: 167 MG/DL (ref 65–140)
GLUCOSE SERPL-MCNC: 197 MG/DL (ref 65–140)
GLUCOSE SERPL-MCNC: 229 MG/DL (ref 65–140)
GLUCOSE SERPL-MCNC: 285 MG/DL (ref 65–140)
GRAM STN SPEC: ABNORMAL
HCT VFR BLD AUTO: 39.1 % (ref 34.8–46.1)
HGB BLD-MCNC: 12.9 G/DL (ref 11.5–15.4)
MCH RBC QN AUTO: 29.1 PG (ref 26.8–34.3)
MCHC RBC AUTO-ENTMCNC: 33 G/DL (ref 31.4–37.4)
MCV RBC AUTO: 88 FL (ref 82–98)
PLATELET # BLD AUTO: 264 THOUSANDS/UL (ref 149–390)
PMV BLD AUTO: 9.7 FL (ref 8.9–12.7)
POTASSIUM SERPL-SCNC: 4.3 MMOL/L (ref 3.5–5.3)
RBC # BLD AUTO: 4.43 MILLION/UL (ref 3.81–5.12)
SODIUM SERPL-SCNC: 144 MMOL/L (ref 136–145)
TROPONIN I SERPL-MCNC: 3.55 NG/ML
WBC # BLD AUTO: 18.11 THOUSAND/UL (ref 4.31–10.16)

## 2019-02-14 PROCEDURE — 82948 REAGENT STRIP/BLOOD GLUCOSE: CPT

## 2019-02-14 PROCEDURE — 99254 IP/OBS CNSLTJ NEW/EST MOD 60: CPT | Performed by: INTERNAL MEDICINE

## 2019-02-14 PROCEDURE — 80048 BASIC METABOLIC PNL TOTAL CA: CPT | Performed by: INTERNAL MEDICINE

## 2019-02-14 PROCEDURE — 94760 N-INVAS EAR/PLS OXIMETRY 1: CPT

## 2019-02-14 PROCEDURE — 93306 TTE W/DOPPLER COMPLETE: CPT | Performed by: INTERNAL MEDICINE

## 2019-02-14 PROCEDURE — 93306 TTE W/DOPPLER COMPLETE: CPT

## 2019-02-14 PROCEDURE — 84484 ASSAY OF TROPONIN QUANT: CPT | Performed by: INTERNAL MEDICINE

## 2019-02-14 PROCEDURE — 85027 COMPLETE CBC AUTOMATED: CPT | Performed by: INTERNAL MEDICINE

## 2019-02-14 PROCEDURE — 94640 AIRWAY INHALATION TREATMENT: CPT

## 2019-02-14 PROCEDURE — 99232 SBSQ HOSP IP/OBS MODERATE 35: CPT | Performed by: INTERNAL MEDICINE

## 2019-02-14 RX ORDER — PREDNISONE 20 MG/1
40 TABLET ORAL DAILY
Status: DISCONTINUED | OUTPATIENT
Start: 2019-02-15 | End: 2019-02-17

## 2019-02-14 RX ORDER — ATORVASTATIN CALCIUM 80 MG/1
80 TABLET, FILM COATED ORAL
Status: DISCONTINUED | OUTPATIENT
Start: 2019-02-14 | End: 2019-02-20 | Stop reason: HOSPADM

## 2019-02-14 RX ORDER — TORSEMIDE 20 MG/1
20 TABLET ORAL DAILY
Status: DISCONTINUED | OUTPATIENT
Start: 2019-02-14 | End: 2019-02-20 | Stop reason: HOSPADM

## 2019-02-14 RX ADMIN — INSULIN LISPRO 2 UNITS: 100 INJECTION, SOLUTION INTRAVENOUS; SUBCUTANEOUS at 07:51

## 2019-02-14 RX ADMIN — ATORVASTATIN CALCIUM 80 MG: 80 TABLET, FILM COATED ORAL at 17:42

## 2019-02-14 RX ADMIN — LEVALBUTEROL 1.25 MG: 1.25 SOLUTION, CONCENTRATE RESPIRATORY (INHALATION) at 19:45

## 2019-02-14 RX ADMIN — IPRATROPIUM BROMIDE 0.5 MG: 0.5 SOLUTION RESPIRATORY (INHALATION) at 07:52

## 2019-02-14 RX ADMIN — IPRATROPIUM BROMIDE 0.5 MG: 0.5 SOLUTION RESPIRATORY (INHALATION) at 01:10

## 2019-02-14 RX ADMIN — GUAIFENESIN 600 MG: 600 TABLET, EXTENDED RELEASE ORAL at 22:27

## 2019-02-14 RX ADMIN — BENZONATATE 200 MG: 100 CAPSULE ORAL at 07:50

## 2019-02-14 RX ADMIN — ACETAMINOPHEN 650 MG: 325 TABLET, FILM COATED ORAL at 05:39

## 2019-02-14 RX ADMIN — FLUTICASONE FUROATE AND VILANTEROL TRIFENATATE 1 PUFF: 200; 25 POWDER RESPIRATORY (INHALATION) at 09:35

## 2019-02-14 RX ADMIN — INSULIN LISPRO 4 UNITS: 100 INJECTION, SOLUTION INTRAVENOUS; SUBCUTANEOUS at 11:52

## 2019-02-14 RX ADMIN — METHYLPREDNISOLONE SODIUM SUCCINATE 20 MG: 40 INJECTION, POWDER, FOR SOLUTION INTRAMUSCULAR; INTRAVENOUS at 09:35

## 2019-02-14 RX ADMIN — AZITHROMYCIN 250 MG: 250 TABLET, FILM COATED ORAL at 15:06

## 2019-02-14 RX ADMIN — INSULIN LISPRO 6 UNITS: 100 INJECTION, SOLUTION INTRAVENOUS; SUBCUTANEOUS at 17:41

## 2019-02-14 RX ADMIN — INSULIN LISPRO 2 UNITS: 100 INJECTION, SOLUTION INTRAVENOUS; SUBCUTANEOUS at 22:28

## 2019-02-14 RX ADMIN — ENOXAPARIN SODIUM 40 MG: 60 INJECTION SUBCUTANEOUS at 07:51

## 2019-02-14 RX ADMIN — IPRATROPIUM BROMIDE 0.5 MG: 0.5 SOLUTION RESPIRATORY (INHALATION) at 13:06

## 2019-02-14 RX ADMIN — GUAIFENESIN 600 MG: 600 TABLET, EXTENDED RELEASE ORAL at 07:50

## 2019-02-14 RX ADMIN — BENZONATATE 200 MG: 100 CAPSULE ORAL at 22:28

## 2019-02-14 RX ADMIN — IPRATROPIUM BROMIDE 0.5 MG: 0.5 SOLUTION RESPIRATORY (INHALATION) at 19:45

## 2019-02-14 RX ADMIN — BENZONATATE 200 MG: 100 CAPSULE ORAL at 15:06

## 2019-02-14 RX ADMIN — LEVALBUTEROL 1.25 MG: 1.25 SOLUTION, CONCENTRATE RESPIRATORY (INHALATION) at 01:10

## 2019-02-14 RX ADMIN — LEVALBUTEROL 1.25 MG: 1.25 SOLUTION, CONCENTRATE RESPIRATORY (INHALATION) at 13:06

## 2019-02-14 RX ADMIN — TORSEMIDE 20 MG: 20 TABLET ORAL at 11:52

## 2019-02-14 RX ADMIN — PANTOPRAZOLE SODIUM 40 MG: 40 TABLET, DELAYED RELEASE ORAL at 05:39

## 2019-02-14 RX ADMIN — ACETAMINOPHEN 650 MG: 325 TABLET, FILM COATED ORAL at 13:21

## 2019-02-14 RX ADMIN — GUAIFENESIN 200 MG: 100 SOLUTION ORAL at 18:44

## 2019-02-14 RX ADMIN — LEVALBUTEROL 1.25 MG: 1.25 SOLUTION, CONCENTRATE RESPIRATORY (INHALATION) at 07:52

## 2019-02-14 RX ADMIN — AMLODIPINE BESYLATE 5 MG: 5 TABLET ORAL at 07:50

## 2019-02-14 RX ADMIN — ASPIRIN 81 MG: 81 TABLET, COATED ORAL at 07:50

## 2019-02-14 NOTE — CONSULTS
Cardiology Consult  02/14/19    Referring Physian: DO MAYELIN Blevins    Chief Complain/Reason for Referal:     IMPRESSION/RECOMMENDATIONS/DISCUSSION:    1  Acute COPD exacerbation, likely secondary to acute bronchitis  2  Accelerated hypertension  3  Morbid obesity  4  Dyslipidemia  5  Type 2 NSTEMI secondary to COPD/bronchitis/hypertension, probable underlying CAD      · Continue treatment of COPD exacerbation and bronchitis per primary service  · Would not recommend inpatient ischemic evaluation at this time in light of ongoing respiratory infection  I have had a long discussion with the patient regarding the finding of elevated troponin, the possibility of underlying CAD, especially considering her risk factors of diabetes, dyslipidemia, hypertension, and significant obesity  Ideally, coronary angiography will be pursued to investigate for any coronary disease  I have discussed this option with her, and she is strongly against coronary angiography at this time and in the future  She states I would not want this unless it is absolutely necessary " In light of comorbidities, I think it is reasonable to start treatment for presumed CAD with aspirin, high-dose statin, and aggressive blood pressure control  Recommend sleep study as an outpatient  Suspect noninvasive testing such as nuclear stress test, coronary CTA, and stress echocardiogram will be significantly limited in this morbidly obese patient  Transthoracic echocardiogram is pending  · Low suspicion of acute decompensated congestive heart failure, but in light of mild lower extremity edema and minimally elevated NTproBNP from baseline, will start torsemide 20 mg p o   Daily--watch renal fx, K  · Called pt's daughter, Wellington Taylor ([phone]), but no answer, left message with pager number left for call back      ======================================================    HPI:  I am seeing this patient in cardiology consultation for:      Murtaza Zavaleta Ilya Hilario is a 58 y o  morbidly obese female with a history of diabetes, dyslipidemia, hypertension, right hip osteoarthritis, who presents to the hospital 2 days ago with history of bronchitis since December  She has been coughing significantly since then, with productive sputum  She denies fevers or chills, and presented due to increased lower extremity edema and coughing/shortness of breath  She denies exertional chest pain with walking, but admits to some discomfort while and after coughing  She has not had advanced cardiac testing in the past   At this time she is chest pain-free          Past Medical History:   Diagnosis Date    Diabetes mellitus ([facility] [address] )     GERD (gastroesophageal reflux disease)     Hyperlipidemia     Hypertension     Seasonal allergies     Sleep apnea          Scheduled Meds:  Current Facility-Administered Medications:  acetaminophen 650 mg Oral Q6H PRN Sharlene Ambron, DO   albuterol 2 puff Inhalation Q4H PRN Sharlene Ambron, DO   amLODIPine 5 mg Oral Daily Sharlene Ambron, DO   aspirin 81 mg Oral Daily Sharlene Ambron, DO   atorvastatin 40 mg Oral Daily With [place], DO   azithromycin 250 mg Oral Q24H Sharlene Ambron, DO   benzonatate 200 mg Oral TID Sharlene Ambron, DO   diltiazem 5 mg Intravenous Q6H PRN Sharlene Ambron, DO   enoxaparin 40 mg Subcutaneous Daily Sharlene Ambron, DO   fluticasone-vilanterol 1 puff Inhalation Daily Sharlene Ambron, DO   guaiFENesin 600 mg Oral Q12H [facility] Sharlene Ambron, DO   guaiFENesin 200 mg Oral Q4H PRN Sharlene Ambron, DO   insulin lispro 1-6 Units Subcutaneous HS Sharlene Ambron, DO   insulin lispro 2-12 Units Subcutaneous TID AC Sharlene Ambron, DO   ipratropium 0 5 mg Nebulization Q6H Sharlene Ambron, DO   levalbuterol 1 25 mg Nebulization Q6H Sharlene Ambron, DO   methylPREDNISolone sodium succinate 20 mg Intravenous Q12H [facility] Sharlene Ambron, DO   nitroglycerin 0 4 mg Sublingual Q5 Min PRN Sharlene Ambron, DO   ondansetron 4 mg Intravenous Q4H PRN Sharlene Ambron, DO   pantoprazole 40 mg Oral Early Morning Sharlene Ambron, DO     Continuous Infusions:   PRN Meds:   acetaminophen    albuterol    diltiazem    guaiFENesin    nitroglycerin    ondansetron  Allergies   Allergen Reactions    Januvia [Sitagliptin] Swelling    Clindamycin     Hydrocodone     Morphine Hives    Omeprazole     Penicillins     Percocet [Oxycodone-Acetaminophen]     Tolterodine     Ciprofloxacin Rash     I reviewed the Home Medication list in the chart       Family History   Problem Relation Age of Onset    Coronary artery disease Father        Social History     Socioeconomic History    Marital status: Single     Spouse name: Not on file    Number of children: Not on file    Years of education: Not on file    Highest education level: Not on file   Occupational History    Not on file   Social Needs    Financial resource strain: Not on file    Food insecurity:     Worry: Not on file     Inability: Not on file    Transportation needs:     Medical: Not on file     Non-medical: Not on file   Tobacco Use    Smoking status: Passive Smoke Exposure - Never Smoker    Smokeless tobacco: Never Used    Tobacco comment: childhood smoke exposure   Substance and Sexual Activity    Alcohol use: No    Drug use: No    Sexual activity: Not on file   Lifestyle    Physical activity:     Days per week: Not on file     Minutes per session: Not on file    Stress: Not on file   Relationships    Social connections:     Talks on phone: Not on file     Gets together: Not on file     Attends Buddhist service: Not on file     Active member of club or organization: Not on file     Attends meetings of clubs or organizations: Not on file     Relationship status: Not on file    Intimate partner violence:     Fear of current or ex partner: Not on file     Emotionally abused: Not on file     Physically abused: Not on file     Forced sexual activity: Not on file   Other Topics Concern    Not on file   Social History Narrative    WORK:    -  Homemaker        HOBBIES:    -  Denies exposure        PETS:    -  Previous cat; no birds        TRAVEL:    -  No recent travel        EXPOSURES:    -  Denies mold, down pillows, hot tubs       Review of Systems - as per HPI, all others reviewed and negative  Vitals:    02/14/19 0752   BP:    Pulse:    Resp:    Temp:    SpO2: 98%     I/O       02/12 0701 - 02/13 0700 02/13 0701 - 02/14 0700 02/14 0701 - 02/15 0700    P  O  240      Total Intake(mL/kg) 240 (1 8)      Net +240                 Weight (last 2 days)     Date/Time   Weight    02/14/19 0600   138 (304 24)  (Abnormal)     02/13/19 0600   137 (301 37)  (Abnormal)     02/12/19 0936   134 (295 2)              GEN: NAD, Alert  HEENT: Mucus membranes moist, pink conjunctivae  EYES: Pupils equal, sclera anicteric  NECK: No JVD/HJR, no carotid bruit  CARDIOVASCULAR: RRR, No murmur, rub, gallops S1,S2, no parasternal heave/thrill  LUNGS: Clear To auscultation bilaterally  ABDOMEN: Soft, nondistended, no hepatic systolic pulsation  EXTREMITIES/VASCULAR: No edema    PSYCH: Normal Affect by limited examination  NEURO: Grossly intact by limited examination    HEME: No significant bleeding, bruising, petechia by limited examination  SKIN: No significant rashes by limited examination  MSK:  Normal upper extremity and trunk strengths by limited examination      EKG:  Sinus rhythm, nonspecific ST segment abnormality  TELE:  Sinus rhythm, no events  CXR:  No acute cardiopulmonary disease      Results from last 7 days   Lab Units 02/14/19  0716 02/13/19  0607 02/12/19  0954   WBC Thousand/uL 18 11* 12 61* 10 72*   HEMOGLOBIN g/dL 12 9 12 9 13 0   HEMATOCRIT % 39 1 39 8 40 1   PLATELETS Thousands/uL 264 269 262   NEUTROS PCT %  --   --  54   MONOS PCT %  --   --  9     Results from last 7 days   Lab Units 02/14/19  0716 02/13/19  0607 02/12/19  0954   POTASSIUM mmol/L 4 3 3 7 3 6   CHLORIDE mmol/L 106 105 106   CO2 mmol/L 30 28 29   BUN mg/dL 24 22 14   CREATININE mg/dL 1 06 1 13 1 07   CALCIUM mg/dL 9 2 9 3 9 1     Results from last 7 days   Lab Units 02/14/19  0716 02/13/19  0607 02/12/19  0954   POTASSIUM mmol/L 4 3 3 7 3 6   CHLORIDE mmol/L 106 105 106   CO2 mmol/L 30 28 29   BUN mg/dL 24 22 14   CREATININE mg/dL 1 06 1 13 1 07   CALCIUM mg/dL 9 2 9 3 9 1     No results found for: TROPONINT      Results from last 7 days   Lab Units 02/14/19  0213   TROPONIN I ng/mL 3 55*                       I have personally reviewed the EKG, CXR and Telemetry images directly  Patient Active Problem List    Diagnosis Date Noted    COPD with acute exacerbation ([place] [address] ) 01/29/2019     Priority: Low    Lip abrasion 01/29/2019     Priority: Low    Epigastric pain 11/28/2018     Priority: Low    Class 3 severe obesity due to excess calories in adult ([place] [address] ) 09/14/2018     Priority: Low    Abnormal x-ray 08/28/2018     Priority: Low    H/O: hysterectomy 04/27/2018     Priority: Low    Hip osteoarthritis 04/27/2018     Priority: Low    History of total knee replacement, bilateral 04/27/2018     Priority: Low    History of deep venous thrombosis 04/27/2018     Priority: Low    Influenza 12/24/2017     Priority: Low    Asthma exacerbation 12/24/2017     Priority: Low    Morbid obesity ([place] [address] ) 12/24/2017     Priority: Low    GERD (gastroesophageal reflux disease) 12/24/2017     Priority: Low    Borderline diabetes 12/24/2017     Priority: Low    Hypertension 12/24/2017     Priority: Low    Hyperlipidemia 12/24/2017     Priority: Low    Allergic rhinitis 12/15/2016     Priority: Low    COPD (chronic obstructive pulmonary disease) ([place] [address] ) 12/15/2016     Priority: Low    Obstructive sleep apnea of adult 06/25/2014     Priority: Low    Colon polyp 05/29/2012     Priority: Low       Portions of the record may have been created with voice recognition software   Occasional wrong word or "sound a like" substitutions may have occurred due to the inherent limitations of voice recognition software  Read the chart carefully and recognize, using context, where substitutions have occurred

## 2019-02-14 NOTE — PROGRESS NOTES
Fourth Troponin collected 02:13 result 3 55  RR NP Manuel contacted and alerted to downward trend  No further Troponin's are ordered

## 2019-02-14 NOTE — PROGRESS NOTES
[facility] Progress Note  Patient: Manas Smith 58 y o  female   MRN: [id number]  PCP: Felix Hendricks MD  Unit/Bed#: [address] [place] [address] Encounter: [id number]  Date Of Visit: 02/14/19      Assessment/plan  1  Acute copd exacerbation- continue with bronchodilators  Continue dulera  start prednisone taper  Flu was negative  Continue mucinex     2  Acute diastolic chf- appreciate cardiology recommendations  Started pm demadex 20mg       3  Type 2 diabetes with hyperglycemia-improving with taper of steroid  Continue insulin sliding scale  Continue to hold metformin       4  gerd- continue ppi       5  Hyperlipidemia- statin     6  htn- continue norvasc       7  Sinus tach-due to number 1  Continue cardizem prn    8  Elevated troponin- due to nstemi type 2 with probable underlying cad  Pt will need a cath once pulmonary status is stable  Possibly arrange as outpt  Continue asa, and statin  No metoprolol due to copd  dispo- possible d/c in 24 to 48 hours if cardiology agrees  Subjective:   Pt seen and examined  Pt states her breathing is better  She is still having a cough but improved  Pt denies further chest pain  No f/c no n/v/d no abd pain  Objective:     Vitals: Blood pressure (!) 140/109, pulse (!) 107, temperature 98 5 °F (36 9 °C), temperature source Temporal, resp  rate 19, weight (!) 138 kg (304 lb 3 8 oz), SpO2 97 %  ,Body mass index is 63 59 kg/m²      Lab, Imaging and other studies:  Results from last 7 days   Lab Units 02/14/19  0716   WBC Thousand/uL 18 11*   HEMOGLOBIN g/dL 12 9   HEMATOCRIT % 39 1   PLATELETS Thousands/uL 264     Results from last 7 days   Lab Units 02/14/19  0716   POTASSIUM mmol/L 4 3   CHLORIDE mmol/L 106   CO2 mmol/L 30   BUN mg/dL 24   CREATININE mg/dL 1 06   CALCIUM mg/dL 9 2     Results from last 7 days   Lab Units 02/14/19  0213 02/13/19  2304 02/13/19  1950   TROPONIN I ng/mL 3 55* 4 18* 2 56*     Lab Results   Component Value Date    BLOODCX No Growth After 5 Days  12/24/2017    BLOODCX No Growth After 5 Days  12/24/2017    SPUTUMCULTUR 4+ Growth of  02/12/2019    SPUTUMCULTUR  02/12/2019     Commensal respiratory conrad only; No significant growth of Staph aureus/MRSA or Pseudomonas aeruginosa  SPUTUMCULTUR Test not performed  Suggest repeat specimen  12/25/2017         Xr Chest 2 Views    Result Date: 2/12/2019  Narrative: CHEST INDICATION:   cough  COMPARISON:  8/28/2018 EXAM PERFORMED/VIEWS:  XR CHEST PA & LATERAL FINDINGS: Cardiomediastinal silhouette appears unremarkable  The lungs are clear  No pneumothorax or pleural effusion  Osseous structures appear within normal limits for patient age  Impression: No acute cardiopulmonary disease   Workstation performed: [id number]       Scheduled Meds:   Current Facility-Administered Medications:  acetaminophen 650 mg Oral Q6H PRN Sharlene Ambron, DO   albuterol 2 puff Inhalation Q4H PRN Sharlene Ambron, DO   amLODIPine 5 mg Oral Daily Sharlene Ambron, DO   aspirin 81 mg Oral Daily Sharlene Ambron, DO   atorvastatin 80 mg Oral Daily With [employer], DO   azithromycin 250 mg Oral Q24H Sharlene Ambron, DO   benzonatate 200 mg Oral TID Sharlene Ambron, DO   diltiazem 5 mg Intravenous Q6H PRN Sharlene Ambron, DO   enoxaparin 40 mg Subcutaneous Daily Sharlene Ambron, DO   fluticasone-vilanterol 1 puff Inhalation Daily Sharlene Ambron, DO   guaiFENesin 600 mg Oral Q12H [address] Sharlene Ambron, DO   guaiFENesin 200 mg Oral Q4H PRN Sharlene Ambron, DO   insulin lispro 1-6 Units Subcutaneous HS Sharlene Ambron, DO   insulin lispro 2-12 Units Subcutaneous TID AC Sharlene Ambron, DO   ipratropium 0 5 mg Nebulization Q6H Sharlene Ambron, DO   levalbuterol 1 25 mg Nebulization Q6H Sharlene Ambron, DO   nitroglycerin 0 4 mg Sublingual Q5 Min PRN Sharlene Ambron, DO   ondansetron 4 mg Intravenous Q4H PRN Sharlene Ambron, DO   pantoprazole 40 mg Oral Early Morning Sharlene Ambron, DO   [START ON 2/15/2019] predniSONE 40 mg Oral Daily Sharlene Root DO   torsemide 20 mg Oral Daily Jace Cabrera DO     Continuous Infusions:    PRN Meds:   acetaminophen    albuterol    diltiazem    guaiFENesin    nitroglycerin    ondansetron      Physical exam:  Physical Exam  General appearance: alert and oriented, in no acute distress  Head: Normocephalic, without obvious abnormality, atraumatic  Eyes: conjunctivae/corneas clear  PERRL, EOM's intact  Fundi benign  Neck: no adenopathy, no carotid bruit, no JVD, supple, symmetrical, trachea midline and thyroid not enlarged, symmetric, no tenderness/mass/nodules  Lungs: minimal coarse breath sounds   no wheezing bilateral  Heart: regular rate and rhythm, S1, S2 normal, no murmur, click, rub or gallop  Abdomen: soft, non-tender; bowel sounds normal; no masses,  no organomegaly  Extremities: edema +1 edema bilateral lower ext  Pulses: 2+ and symmetric  Skin: Skin color, texture, turgor normal  No rashes or lesions  Neurologic: Mental status: Alert, oriented, thought content appropriate      VTE Pharmacologic Prophylaxis: Enoxaparin (Lovenox)  VTE Mechanical Prophylaxis: sequential compression device    Counseling / Coordination of Care  Total floor / unit time spent today 20 minutes    Current Length of Stay: 2 day(s)    Current Patient Status: Inpatient       Code Status: Level 1 - Full Code

## 2019-02-14 NOTE — SOCIAL WORK
CM met with patient at bedside to address discharge needs  CM pointed out name/number on the white board and communicated she was that individual      Patient [place] a senior high [place] one block from the hospital; no AUDREY, and there is an elevator to her floor  Patient claims difficulty with steps  Patient is independent with ADLs and functional mobility  Food shopping is done with dtr once a month as she lives in [place] & meal prep is done by patient  Patient has multiple DMEs: cane, RW, shower chair and electric scooter  She also owns a CPAP machine which she uses on occasion  Patient is able to ambulate without assistance from a seated or laying position  No hx of VNA reported  Patient is not involved in any community activities  Patient is on SSD  PCP identified Dr Anna Noonan  No POA identified but dtr, Kevin Curiel, is permitted to be patient's healthcare representative, spokesperson for the family and designated caregiver if/when needed  Patient uses [facility] for Rx needs as they deliver; patient made aware of [facility] in house  Patient does not drive; reports she will use public transportation to transport home at discharge  CM will likely offer a Lyft ride  Is this not a readmission within the last 30 days  CM will remain available and follow as needed  CM also encouraged patient to follow up with all/any recommended appointments after discharge  Patient advised of importance for patient and family to participate in managing patients medical well being  22-Jul-2018 17:24

## 2019-02-14 NOTE — PROGRESS NOTES
After patient reported SOB and substernal chest pain at approximately 14:00 02/13 and EKG and Troponin's were ordered  First Troponin collected 14:51 result 0 16  Second Troponin collected 19:50 result 2 56  RR NP Manuel paged and alerted to troponin trending up  RR NP verbally ordered continued troponin's until results start to trend down    Third Troponin collected 23:04 result 4 18

## 2019-02-14 NOTE — PLAN OF CARE
Problem: Potential for Falls  Goal: Patient will remain free of falls  Description  INTERVENTIONS:  - Assess patient frequently for physical needs  -  Identify cognitive and physical deficits and behaviors that affect risk of falls    -  [place] fall precautions as indicated by assessment   - Educate patient/family on patient safety including physical limitations  - Instruct patient to call for assistance with activity based on assessment  - Modify environment to reduce risk of injury  - Consider OT/PT consult to assist with strengthening/mobility  Outcome: Progressing     Problem: PAIN - ADULT  Goal: Verbalizes/displays adequate comfort level or baseline comfort level  Description  Interventions:  - Encourage patient to monitor pain and request assistance  - Assess pain using appropriate pain scale  - Administer analgesics based on type and severity of pain and evaluate response  - Implement non-pharmacological measures as appropriate and evaluate response  - Consider cultural and social influences on pain and pain management  - Notify physician/advanced practitioner if interventions unsuccessful or patient reports new pain  Outcome: Progressing     Problem: INFECTION - ADULT  Goal: Absence or prevention of progression during hospitalization  Description  INTERVENTIONS:  - Assess and monitor for signs and symptoms of infection  - Monitor lab/diagnostic results  - Monitor all insertion sites, i e  indwelling lines, tubes, and drains  - Monitor endotracheal (as able) and nasal secretions for changes in amount and color  - [place] appropriate cooling/warming therapies per order  - Administer medications as ordered  - Instruct and encourage patient and family to use good hand hygiene technique  - Identify and instruct in appropriate isolation precautions for identified infection/condition  Outcome: Progressing     Problem: Knowledge Deficit  Goal: Patient/family/caregiver demonstrates understanding of disease process, treatment plan, medications, and discharge instructions  Description  Complete learning assessment and assess knowledge base    Interventions:  - Provide teaching at level of understanding  - Provide teaching via preferred learning methods  Outcome: Progressing     Problem: RESPIRATORY - ADULT  Goal: Achieves optimal ventilation and oxygenation  Description  INTERVENTIONS:  - Assess for changes in respiratory status  - Assess for changes in mentation and behavior  - Position to facilitate oxygenation and minimize respiratory effort  - Oxygen administration by appropriate delivery method based on oxygen saturation (per order) or ABGs  - Initiate smoking cessation education as indicated  - Encourage broncho-pulmonary hygiene including cough, deep breathe, Incentive Spirometry  - Assess the need for suctioning and aspirate as needed  - Assess and instruct to report SOB or any respiratory difficulty  - Respiratory Therapy support as indicated  Outcome: Progressing     Problem: METABOLIC, FLUID AND ELECTROLYTES - ADULT  Goal: Glucose maintained within target range  Description  INTERVENTIONS:  - Monitor Blood Glucose as ordered  - Assess for signs and symptoms of hyperglycemia and hypoglycemia  - Administer ordered medications to maintain glucose within target range  - Assess nutritional intake and initiate nutrition service referral as needed  Outcome: Progressing     Problem: Prexisting or High Potential for Compromised Skin Integrity  Goal: Skin integrity is maintained or improved  Description  INTERVENTIONS:  - Identify patients at risk for skin breakdown  - Assess and monitor skin integrity  - Assess and monitor nutrition and hydration status  - Monitor labs (i e  albumin)  - Assess for incontinence   - Turn and reposition patient  - Assist with mobility/ambulation  - Relieve pressure over bony prominences  - Avoid friction and shearing  - Provide appropriate hygiene as needed including keeping skin clean and dry  - Evaluate need for skin moisturizer/barrier cream  - Collaborate with interdisciplinary team (i e  Nutrition, Rehabilitation, etc )   - Patient/family teaching  Outcome: Progressing

## 2019-02-15 LAB
ANION GAP SERPL CALCULATED.3IONS-SCNC: 8 MMOL/L (ref 4–13)
BUN SERPL-MCNC: 23 MG/DL (ref 5–25)
CALCIUM SERPL-MCNC: 8.6 MG/DL (ref 8.3–10.1)
CHLORIDE SERPL-SCNC: 106 MMOL/L (ref 100–108)
CO2 SERPL-SCNC: 31 MMOL/L (ref 21–32)
CREAT SERPL-MCNC: 1.06 MG/DL (ref 0.6–1.3)
GFR SERPL CREATININE-BSD FRML MDRD: 64 ML/MIN/1.73SQ M
GLUCOSE SERPL-MCNC: 112 MG/DL (ref 65–140)
GLUCOSE SERPL-MCNC: 117 MG/DL (ref 65–140)
GLUCOSE SERPL-MCNC: 120 MG/DL (ref 65–140)
GLUCOSE SERPL-MCNC: 175 MG/DL (ref 65–140)
GLUCOSE SERPL-MCNC: 219 MG/DL (ref 65–140)
POTASSIUM SERPL-SCNC: 3.5 MMOL/L (ref 3.5–5.3)
SODIUM SERPL-SCNC: 145 MMOL/L (ref 136–145)

## 2019-02-15 PROCEDURE — 80048 BASIC METABOLIC PNL TOTAL CA: CPT | Performed by: INTERNAL MEDICINE

## 2019-02-15 PROCEDURE — 99232 SBSQ HOSP IP/OBS MODERATE 35: CPT | Performed by: INTERNAL MEDICINE

## 2019-02-15 PROCEDURE — 82948 REAGENT STRIP/BLOOD GLUCOSE: CPT

## 2019-02-15 PROCEDURE — 94760 N-INVAS EAR/PLS OXIMETRY 1: CPT

## 2019-02-15 PROCEDURE — 94640 AIRWAY INHALATION TREATMENT: CPT

## 2019-02-15 RX ORDER — LEVALBUTEROL 1.25 MG/.5ML
1.25 SOLUTION, CONCENTRATE RESPIRATORY (INHALATION)
Status: DISCONTINUED | OUTPATIENT
Start: 2019-02-15 | End: 2019-02-18

## 2019-02-15 RX ADMIN — INSULIN LISPRO 1 UNITS: 100 INJECTION, SOLUTION INTRAVENOUS; SUBCUTANEOUS at 22:02

## 2019-02-15 RX ADMIN — PANTOPRAZOLE SODIUM 40 MG: 40 TABLET, DELAYED RELEASE ORAL at 05:08

## 2019-02-15 RX ADMIN — LEVALBUTEROL 1.25 MG: 1.25 SOLUTION, CONCENTRATE RESPIRATORY (INHALATION) at 08:22

## 2019-02-15 RX ADMIN — GUAIFENESIN 600 MG: 600 TABLET, EXTENDED RELEASE ORAL at 22:02

## 2019-02-15 RX ADMIN — IPRATROPIUM BROMIDE 0.5 MG: 0.5 SOLUTION RESPIRATORY (INHALATION) at 01:08

## 2019-02-15 RX ADMIN — PREDNISONE 40 MG: 20 TABLET ORAL at 08:25

## 2019-02-15 RX ADMIN — GUAIFENESIN 600 MG: 600 TABLET, EXTENDED RELEASE ORAL at 08:25

## 2019-02-15 RX ADMIN — LEVALBUTEROL 1.25 MG: 1.25 SOLUTION, CONCENTRATE RESPIRATORY (INHALATION) at 14:13

## 2019-02-15 RX ADMIN — ATORVASTATIN CALCIUM 80 MG: 80 TABLET, FILM COATED ORAL at 16:50

## 2019-02-15 RX ADMIN — IPRATROPIUM BROMIDE 0.5 MG: 0.5 SOLUTION RESPIRATORY (INHALATION) at 19:53

## 2019-02-15 RX ADMIN — BENZONATATE 200 MG: 100 CAPSULE ORAL at 22:02

## 2019-02-15 RX ADMIN — AMLODIPINE BESYLATE 5 MG: 5 TABLET ORAL at 08:26

## 2019-02-15 RX ADMIN — LEVALBUTEROL 1.25 MG: 1.25 SOLUTION, CONCENTRATE RESPIRATORY (INHALATION) at 01:08

## 2019-02-15 RX ADMIN — FLUTICASONE FUROATE AND VILANTEROL TRIFENATATE 1 PUFF: 200; 25 POWDER RESPIRATORY (INHALATION) at 08:18

## 2019-02-15 RX ADMIN — BENZONATATE 200 MG: 100 CAPSULE ORAL at 08:24

## 2019-02-15 RX ADMIN — AZITHROMYCIN 250 MG: 250 TABLET, FILM COATED ORAL at 14:38

## 2019-02-15 RX ADMIN — ENOXAPARIN SODIUM 40 MG: 60 INJECTION SUBCUTANEOUS at 09:19

## 2019-02-15 RX ADMIN — ACETAMINOPHEN 650 MG: 325 TABLET, FILM COATED ORAL at 03:36

## 2019-02-15 RX ADMIN — IPRATROPIUM BROMIDE 0.5 MG: 0.5 SOLUTION RESPIRATORY (INHALATION) at 08:22

## 2019-02-15 RX ADMIN — LEVALBUTEROL 1.25 MG: 1.25 SOLUTION, CONCENTRATE RESPIRATORY (INHALATION) at 19:53

## 2019-02-15 RX ADMIN — BENZONATATE 200 MG: 100 CAPSULE ORAL at 16:50

## 2019-02-15 RX ADMIN — GUAIFENESIN 200 MG: 100 SOLUTION ORAL at 03:36

## 2019-02-15 RX ADMIN — INSULIN LISPRO 4 UNITS: 100 INJECTION, SOLUTION INTRAVENOUS; SUBCUTANEOUS at 17:49

## 2019-02-15 RX ADMIN — TORSEMIDE 20 MG: 20 TABLET ORAL at 08:25

## 2019-02-15 RX ADMIN — ASPIRIN 81 MG: 81 TABLET, COATED ORAL at 08:25

## 2019-02-15 RX ADMIN — IPRATROPIUM BROMIDE 0.5 MG: 0.5 SOLUTION RESPIRATORY (INHALATION) at 14:13

## 2019-02-15 NOTE — PROGRESS NOTES
Progress Note - Cardiology   Ce Araya 58 y o  female MRN: [id number]  Unit/Bed#: [place] 2 -01 Encounter: [id number]      Assessment:     1  Acute COPD exacerbation likely secondary to acute bronchitis  2  Diabetes  3  Hypertension  4  Dyslipidemia  5  Type 2 non STEMI secondary to COPD/bronchitis/hypertension with probable underlying coronary disease     Discussion/Recommendations:    Patient declined cardiac catheterization yesterday when speaking with Dr Joey Mcnair  I do understand she may need hip replacement in near future and we spoke that it might be best to have coronary arteries visualized before her surgery  She will think about this  On torsemide  On Norvasc for hypertension, aspirin/statin for presumed coronary disease      Subjective:  Feels breathing is a bit better but still short      Physical Exam:  GEN:  NAD  HEENT:  MMM, NCAT, pink conjunctiva, EOMI, nonicteric sclera  CV:  NO JVD/HJR, RR, NO M/R/G, +S1/S2, NO PARASTERNAL HEAVE/THRILL, NO LE EDEMA, NO HEPATIC SYSTOLIC PULSATION, WARM EXTREMITIES  RESP:  CTAB/L  ABD:  SOFT, NT, NO GROSS ORGANOMEGALY        Vitals:   /90 (BP Location: Right arm)   Pulse (!) 107   Temp 98 2 °F (36 8 °C) (Temporal)   Resp 19   Wt (!) 138 kg (303 lb 9 2 oz)   SpO2 97%   BMI 63 45 kg/m²   Vitals:    02/14/19 0600 02/15/19 0600   Weight: (!) 138 kg (304 lb 3 8 oz) (!) 138 kg (303 lb 9 2 oz)     No intake or output data in the 24 hours ending 02/15/19 0837      TELEMETRY:  No events  Lab Results:  Results from last 7 days   Lab Units 02/14/19  0716   WBC Thousand/uL 18 11*   HEMOGLOBIN g/dL 12 9   HEMATOCRIT % 39 1   PLATELETS Thousands/uL 264     Results from last 7 days   Lab Units 02/15/19  0629   POTASSIUM mmol/L 3 5   CHLORIDE mmol/L 106   CO2 mmol/L 31   BUN mg/dL 23   CREATININE mg/dL 1 06   CALCIUM mg/dL 8 6     Results from last 7 days   Lab Units 02/15/19  0629   POTASSIUM mmol/L 3 5   CHLORIDE mmol/L 106   CO2 mmol/L 31   BUN mg/dL 23   CREATININE mg/dL 1 06   CALCIUM mg/dL 8 6             Medications:    Current Facility-Administered Medications:     acetaminophen (TYLENOL) tablet 650 mg, 650 mg, Oral, Q6H PRN, Sharlene Ambron, DO, 650 mg at 02/15/19 0336    albuterol (PROVENTIL HFA,VENTOLIN HFA) inhaler 2 puff, 2 puff, Inhalation, Q4H PRN, Sharlene Ambron, DO, 2 puff at 02/13/19 2225    amLODIPine (NORVASC) tablet 5 mg, 5 mg, Oral, Daily, Sharlene Ambron, DO, 5 mg at 02/14/19 0750    aspirin (ECOTRIN LOW STRENGTH) EC tablet 81 mg, 81 mg, Oral, Daily, Sharlene Ambron, DO, 81 mg at 02/14/19 0750    atorvastatin (LIPITOR) tablet 80 mg, 80 mg, Oral, Daily With Dinner, Rujul Cabrera, DO, 80 mg at 02/14/19 1742    azithromycin (ZITHROMAX) tablet 250 mg, 250 mg, Oral, Q24H, Sharlene Ambron, DO, 250 mg at 02/14/19 1506    benzonatate (TESSALON PERLES) capsule 200 mg, 200 mg, Oral, TID, Sharlene Ambron, DO, 200 mg at 02/14/19 2228    diltiazem (CARDIZEM) injection 5 mg, 5 mg, Intravenous, Q6H PRN, Sharlene Ambron, DO    enoxaparin (LOVENOX) subcutaneous injection 40 mg, 40 mg, Subcutaneous, Daily, Sharlene Ambron, DO, 40 mg at 02/14/19 0751    fluticasone-vilanterol (BREO ELLIPTA) 200-25 MCG/INH inhaler 1 puff, 1 puff, Inhalation, Daily, Sharlene Ambron, DO, 1 puff at 02/14/19 0935    guaiFENesin (MUCINEX) 12 hr tablet 600 mg, 600 mg, Oral, Q12H [facility], Sharlene Ambron, DO, 600 mg at 02/14/19 2227    guaiFENesin (ROBITUSSIN) oral solution 200 mg, 200 mg, Oral, Q4H PRN, Sharlene Ambron, DO, 200 mg at 02/15/19 0336    insulin lispro (HumaLOG) 100 units/mL subcutaneous injection 1-6 Units, 1-6 Units, Subcutaneous, , Sharlene Root DO, 2 Units at 02/14/19 2228    insulin lispro (HumaLOG) 100 units/mL subcutaneous injection 2-12 Units, 2-12 Units, Subcutaneous, TID AC, 6 Units at 02/14/19 1741 **AND** Fingerstick Glucose (POCT), , , TID ACSharlene DO    ipratropium (ATROVENT) 0 02 % inhalation solution 0 5 mg, 0 5 mg, Nebulization, Q6H, Sharlene Ambron, DO, 0 5 mg at 02/15/19 2319    levalbuterol [facility]) inhalation solution 1 25 mg, 1 25 mg, Nebulization, Q6H, 1 25 mg at 02/15/19 3520 **AND** [DISCONTINUED] sodium chloride 0 9 % inhalation solution 3 mL, 3 mL, Nebulization, TID, Sharlene Ambron, DO    nitroglycerin (NITROSTAT) SL tablet 0 4 mg, 0 4 mg, Sublingual, Q5 Min PRN, Sharlene Ambron, DO    ondansetron (ZOFRAN) injection 4 mg, 4 mg, Intravenous, Q4H PRN, Sharlene Ambron, DO    pantoprazole (PROTONIX) EC tablet 40 mg, 40 mg, Oral, Early Morning, Sharlene Ambron, DO, 40 mg at 02/15/19 0508    predniSONE tablet 40 mg, 40 mg, Oral, Daily, Sharlene Ambron, DO    torsemide (DEMADEX) tablet 20 mg, 20 mg, Oral, Daily, Rujul Cabrera, DO, 20 mg at 02/14/19 1152    Portions of the record may have been created with voice recognition software  Occasional wrong word or "sound a like" substitutions may have occurred due to the inherent limitations of voice recognition software  Read the chart carefully and recognize, using context, where substitutions have occurred

## 2019-02-15 NOTE — PLAN OF CARE
Problem: Potential for Falls  Goal: Patient will remain free of falls  Description  INTERVENTIONS:  - Assess patient frequently for physical needs  -  Identify cognitive and physical deficits and behaviors that affect risk of falls    -  [place] fall precautions as indicated by assessment   - Educate patient/family on patient safety including physical limitations  - Instruct patient to call for assistance with activity based on assessment  - Modify environment to reduce risk of injury  - Consider OT/PT consult to assist with strengthening/mobility  Outcome: Progressing     Problem: PAIN - ADULT  Goal: Verbalizes/displays adequate comfort level or baseline comfort level  Description  Interventions:  - Encourage patient to monitor pain and request assistance  - Assess pain using appropriate pain scale  - Administer analgesics based on type and severity of pain and evaluate response  - Implement non-pharmacological measures as appropriate and evaluate response  - Consider cultural and social influences on pain and pain management  - Notify physician/advanced practitioner if interventions unsuccessful or patient reports new pain  Outcome: Progressing     Problem: INFECTION - ADULT  Goal: Absence or prevention of progression during hospitalization  Description  INTERVENTIONS:  - Assess and monitor for signs and symptoms of infection  - Monitor lab/diagnostic results  - Monitor all insertion sites, i e  indwelling lines, tubes, and drains  - Monitor endotracheal (as able) and nasal secretions for changes in amount and color  - [place] appropriate cooling/warming therapies per order  - Administer medications as ordered  - Instruct and encourage patient and family to use good hand hygiene technique  - Identify and instruct in appropriate isolation precautions for identified infection/condition  Outcome: Progressing     Problem: Knowledge Deficit  Goal: Patient/family/caregiver demonstrates understanding of disease process, treatment plan, medications, and discharge instructions  Description  Complete learning assessment and assess knowledge base    Interventions:  - Provide teaching at level of understanding  - Provide teaching via preferred learning methods  Outcome: Progressing     Problem: RESPIRATORY - ADULT  Goal: Achieves optimal ventilation and oxygenation  Description  INTERVENTIONS:  - Assess for changes in respiratory status  - Assess for changes in mentation and behavior  - Position to facilitate oxygenation and minimize respiratory effort  - Oxygen administration by appropriate delivery method based on oxygen saturation (per order) or ABGs  - Initiate smoking cessation education as indicated  - Encourage broncho-pulmonary hygiene including cough, deep breathe, Incentive Spirometry  - Assess the need for suctioning and aspirate as needed  - Assess and instruct to report SOB or any respiratory difficulty  - Respiratory Therapy support as indicated  Outcome: Progressing     Problem: METABOLIC, FLUID AND ELECTROLYTES - ADULT  Goal: Glucose maintained within target range  Description  INTERVENTIONS:  - Monitor Blood Glucose as ordered  - Assess for signs and symptoms of hyperglycemia and hypoglycemia  - Administer ordered medications to maintain glucose within target range  - Assess nutritional intake and initiate nutrition service referral as needed  Outcome: Progressing     Problem: Prexisting or High Potential for Compromised Skin Integrity  Goal: Skin integrity is maintained or improved  Description  INTERVENTIONS:  - Identify patients at risk for skin breakdown  - Assess and monitor skin integrity  - Assess and monitor nutrition and hydration status  - Monitor labs (i e  albumin)  - Assess for incontinence   - Turn and reposition patient  - Assist with mobility/ambulation  - Relieve pressure over bony prominences  - Avoid friction and shearing  - Provide appropriate hygiene as needed including keeping skin clean and dry  - Evaluate need for skin moisturizer/barrier cream  - Collaborate with interdisciplinary team (i e  Nutrition, Rehabilitation, etc )   - Patient/family teaching  Outcome: Progressing     Problem: DISCHARGE PLANNING - CARE MANAGEMENT  Goal: Discharge to post-acute care or home with appropriate resources  Description  INTERVENTIONS:  - Conduct assessment to determine patient/family and health care team treatment goals, and need for post-acute services based on payer coverage, community resources, and patient preferences, and barriers to discharge  - Address psychosocial, clinical, and financial barriers to discharge as identified in assessment in conjunction with the patient/family and health care team  - Arrange appropriate level of post-acute services according to patient?s   needs and preference and payer coverage in collaboration with the physician and health care team  - Communicate with and update the patient/family, physician, and health care team regarding progress on the discharge plan  - Arrange appropriate transportation to post-acute venues  Outcome: Progressing

## 2019-02-15 NOTE — PROGRESS NOTES
[facility] Internal Medicine Progress Note  Patient: Shagufta Wolf 58 y o  female   MRN: [id number]  PCP: Lieutenant Ernie MD  [facility] [place] [address] Encounter: [id number]  Date Of Visit: 02/15/19      Assessment/plan  1  Acute copd exacerbation- resolving continue with bronchodilators  Continue dulera  continue prednisone taper  Flu was negative  Continue mucinex     2  Acute diastolic chf- appreciate cardiology recommendations  continue demadex 20mg       3  Type 2 diabetes with hyperglycemia-improving with taper of steroid  Continue insulin sliding scale  Continue to hold metformin       4  gerd- continue ppi       5  Hyperlipidemia- statin     6  htn- continue norvasc       7  Sinus tach-due to number 1  Continue cardizem prn     8  Elevated troponin- due to nstemi type 2 with probable underlying cad  Pt will need a cath once pulmonary status is stable  Possibly arrange as outpt  Continue asa, and statin  No metoprolol due to copd  Pt now asking if this should be inpt  Will discuss with cardiology tomorrow about the timing of heart cath     dispo- possible d/c in 24 to 48 hours if cardiology agrees  Will need to discuss with cardiology tomorrow about timing of heart cath    Discussed with her daughter and nurse    Subjective:   Pt seen and examined  Pt thinks she may be willing to go forward with the heart cath now  She is breathing better  Her cough is better  No cp no n/v/d no abd pain    Objective:     Vitals: Blood pressure 126/82, pulse 102, temperature 98 2 °F (36 8 °C), temperature source Temporal, resp  rate 18, weight (!) 138 kg (303 lb 9 2 oz), SpO2 97 %  ,Body mass index is 63 45 kg/m²      Lab, Imaging and other studies:  Results from last 7 days   Lab Units 02/14/19  0716   WBC Thousand/uL 18 11*   HEMOGLOBIN g/dL 12 9   HEMATOCRIT % 39 1   PLATELETS Thousands/uL 264     Results from last 7 days   Lab Units 02/15/19  0629   POTASSIUM mmol/L 3 5   CHLORIDE mmol/L 106   CO2 mmol/L 31   BUN mg/dL 23   CREATININE mg/dL 1 06   CALCIUM mg/dL 8 6     Results from last 7 days   Lab Units 02/14/19  0213 02/13/19  2304 02/13/19  1950   TROPONIN I ng/mL 3 55* 4 18* 2 56*     Lab Results   Component Value Date    BLOODCX No Growth After 5 Days  12/24/2017    BLOODCX No Growth After 5 Days  12/24/2017    SPUTUMCULTUR 4+ Growth of  02/12/2019    SPUTUMCULTUR  02/12/2019     Commensal respiratory conrad only; No significant growth of Staph aureus/MRSA or Pseudomonas aeruginosa  SPUTUMCULTUR Test not performed  Suggest repeat specimen  12/25/2017         Xr Chest 2 Views    Result Date: 2/12/2019  Narrative: CHEST INDICATION:   cough  COMPARISON:  8/28/2018 EXAM PERFORMED/VIEWS:  XR CHEST PA & LATERAL FINDINGS: Cardiomediastinal silhouette appears unremarkable  The lungs are clear  No pneumothorax or pleural effusion  Osseous structures appear within normal limits for patient age  Impression: No acute cardiopulmonary disease   Workstation performed: [id number]       Scheduled Meds:   Current Facility-Administered Medications:  acetaminophen 650 mg Oral Q6H PRN Sharlene Ambron, DO   albuterol 2 puff Inhalation Q4H PRN Sharlene Ambron, DO   amLODIPine 5 mg Oral Daily Sharlene Ambron, DO   aspirin 81 mg Oral Daily Sharlene Ambron, DO   atorvastatin 80 mg Oral Daily With [employer], DO   azithromycin 250 mg Oral Q24H Sharlene Ambron, DO   benzonatate 200 mg Oral TID Sharlene Ambron, DO   diltiazem 5 mg Intravenous Q6H PRN Sharlene Ambron, DO   enoxaparin 40 mg Subcutaneous Daily Sharlene Ambron, DO   fluticasone-vilanterol 1 puff Inhalation Daily Sharlene Ambron, DO   guaiFENesin 600 mg Oral Q12H [facility] Sharlene Ambron, DO   guaiFENesin 200 mg Oral Q4H PRN Sharlene Ambron, DO   insulin lispro 1-6 Units Subcutaneous HS Sharlene Ambron, DO   insulin lispro 2-12 Units Subcutaneous TID AC Sharlene Ambron, DO   ipratropium 0 5 mg Nebulization Q6H Sharlene Ambron, DO   levalbuterol 1 25 mg Nebulization Q6H Sharlene Ambron, DO nitroglycerin 0 4 mg Sublingual Q5 Min PRN Sharlene Ambron, DO   ondansetron 4 mg Intravenous Q4H PRN Sharlene Ambron, DO   pantoprazole 40 mg Oral Early Morning Sharlene Ambron, DO   predniSONE 40 mg Oral Daily Sharlene Ambron, DO   torsemide 20 mg Oral Daily Rujul Cabrera, DO     Continuous Infusions:    PRN Meds:   acetaminophen    albuterol    diltiazem    guaiFENesin    nitroglycerin    ondansetron      Physical exam:  Physical Exam  General appearance: alert and oriented, in no acute distress  Head: Normocephalic, without obvious abnormality, atraumatic  Eyes: conjunctivae/corneas clear  PERRL, EOM's intact  Fundi benign    Neck: no adenopathy, no carotid bruit, no JVD, supple, symmetrical, trachea midline and thyroid not enlarged, symmetric, no tenderness/mass/nodules  Lungs: minimal wheeze bilateral  Heart: tachy s1 s2   Abdomen: soft, non-tender; bowel sounds normal; no masses,  no organomegaly  Extremities: extremities normal, warm and well-perfused; no cyanosis, clubbing, or edema  Pulses: 2+ and symmetric  Skin: Skin color, texture, turgor normal  No rashes or lesions  Neurologic: Mental status: Alert, oriented, thought content appropriate      VTE Pharmacologic Prophylaxis: Enoxaparin (Lovenox)  VTE Mechanical Prophylaxis: sequential compression device    Counseling / Coordination of Care  Total floor / unit time spent today 20 minutes     Current Length of Stay: 3 day(s)    Current Patient Status: Inpatient       Code Status: Level 1 - Full Code

## 2019-02-15 NOTE — PLAN OF CARE
Problem: Potential for Falls  Goal: Patient will remain free of falls  Description  INTERVENTIONS:  - Assess patient frequently for physical needs  -  Identify cognitive and physical deficits and behaviors that affect risk of falls    -  [place] fall precautions as indicated by assessment   - Educate patient/family on patient safety including physical limitations  - Instruct patient to call for assistance with activity based on assessment  - Modify environment to reduce risk of injury  - Consider OT/PT consult to assist with strengthening/mobility  Outcome: Progressing     Problem: PAIN - ADULT  Goal: Verbalizes/displays adequate comfort level or baseline comfort level  Description  Interventions:  - Encourage patient to monitor pain and request assistance  - Assess pain using appropriate pain scale  - Administer analgesics based on type and severity of pain and evaluate response  - Implement non-pharmacological measures as appropriate and evaluate response  - Consider cultural and social influences on pain and pain management  - Notify physician/advanced practitioner if interventions unsuccessful or patient reports new pain  Outcome: Progressing     Problem: INFECTION - ADULT  Goal: Absence or prevention of progression during hospitalization  Description  INTERVENTIONS:  - Assess and monitor for signs and symptoms of infection  - Monitor lab/diagnostic results  - Monitor all insertion sites, i e  indwelling lines, tubes, and drains  - Monitor endotracheal (as able) and nasal secretions for changes in amount and color  - [place] appropriate cooling/warming therapies per order  - Administer medications as ordered  - Instruct and encourage patient and family to use good hand hygiene technique  - Identify and instruct in appropriate isolation precautions for identified infection/condition  Outcome: Progressing     Problem: Knowledge Deficit  Goal: Patient/family/caregiver demonstrates understanding of disease process, treatment plan, medications, and discharge instructions  Description  Complete learning assessment and assess knowledge base    Interventions:  - Provide teaching at level of understanding  - Provide teaching via preferred learning methods  Outcome: Progressing     Problem: RESPIRATORY - ADULT  Goal: Achieves optimal ventilation and oxygenation  Description  INTERVENTIONS:  - Assess for changes in respiratory status  - Assess for changes in mentation and behavior  - Position to facilitate oxygenation and minimize respiratory effort  - Oxygen administration by appropriate delivery method based on oxygen saturation (per order) or ABGs  - Initiate smoking cessation education as indicated  - Encourage broncho-pulmonary hygiene including cough, deep breathe, Incentive Spirometry  - Assess the need for suctioning and aspirate as needed  - Assess and instruct to report SOB or any respiratory difficulty  - Respiratory Therapy support as indicated  Outcome: Progressing     Problem: METABOLIC, FLUID AND ELECTROLYTES - ADULT  Goal: Glucose maintained within target range  Description  INTERVENTIONS:  - Monitor Blood Glucose as ordered  - Assess for signs and symptoms of hyperglycemia and hypoglycemia  - Administer ordered medications to maintain glucose within target range  - Assess nutritional intake and initiate nutrition service referral as needed  Outcome: Progressing     Problem: Prexisting or High Potential for Compromised Skin Integrity  Goal: Skin integrity is maintained or improved  Description  INTERVENTIONS:  - Identify patients at risk for skin breakdown  - Assess and monitor skin integrity  - Assess and monitor nutrition and hydration status  - Monitor labs (i e  albumin)  - Assess for incontinence   - Turn and reposition patient  - Assist with mobility/ambulation  - Relieve pressure over bony prominences  - Avoid friction and shearing  - Provide appropriate hygiene as needed including keeping skin clean and dry  - Evaluate need for skin moisturizer/barrier cream  - Collaborate with interdisciplinary team (i e  Nutrition, Rehabilitation, etc )   - Patient/family teaching  Outcome: Progressing     Problem: DISCHARGE PLANNING - CARE MANAGEMENT  Goal: Discharge to post-acute care or home with appropriate resources  Description  INTERVENTIONS:  - Conduct assessment to determine patient/family and health care team treatment goals, and need for post-acute services based on payer coverage, community resources, and patient preferences, and barriers to discharge  - Address psychosocial, clinical, and financial barriers to discharge as identified in assessment in conjunction with the patient/family and health care team  - Arrange appropriate level of post-acute services according to patient?s   needs and preference and payer coverage in collaboration with the physician and health care team  - Communicate with and update the patient/family, physician, and health care team regarding progress on the discharge plan  - Arrange appropriate transportation to post-acute venues  Outcome: Progressing

## 2019-02-16 LAB
ANION GAP SERPL CALCULATED.3IONS-SCNC: 6 MMOL/L (ref 4–13)
BUN SERPL-MCNC: 25 MG/DL (ref 5–25)
CALCIUM SERPL-MCNC: 8.8 MG/DL (ref 8.3–10.1)
CHLORIDE SERPL-SCNC: 103 MMOL/L (ref 100–108)
CO2 SERPL-SCNC: 30 MMOL/L (ref 21–32)
CREAT SERPL-MCNC: 0.98 MG/DL (ref 0.6–1.3)
ERYTHROCYTE [DISTWIDTH] IN BLOOD BY AUTOMATED COUNT: 15.8 % (ref 11.6–15.1)
GFR SERPL CREATININE-BSD FRML MDRD: 71 ML/MIN/1.73SQ M
GLUCOSE SERPL-MCNC: 110 MG/DL (ref 65–140)
GLUCOSE SERPL-MCNC: 125 MG/DL (ref 65–140)
GLUCOSE SERPL-MCNC: 136 MG/DL (ref 65–140)
GLUCOSE SERPL-MCNC: 196 MG/DL (ref 65–140)
GLUCOSE SERPL-MCNC: 82 MG/DL (ref 65–140)
HCT VFR BLD AUTO: 39.8 % (ref 34.8–46.1)
HGB BLD-MCNC: 12.8 G/DL (ref 11.5–15.4)
MCH RBC QN AUTO: 28.8 PG (ref 26.8–34.3)
MCHC RBC AUTO-ENTMCNC: 32.2 G/DL (ref 31.4–37.4)
MCV RBC AUTO: 90 FL (ref 82–98)
PLATELET # BLD AUTO: 250 THOUSANDS/UL (ref 149–390)
PMV BLD AUTO: 9.6 FL (ref 8.9–12.7)
POTASSIUM SERPL-SCNC: 5.3 MMOL/L (ref 3.5–5.3)
RBC # BLD AUTO: 4.44 MILLION/UL (ref 3.81–5.12)
SODIUM SERPL-SCNC: 139 MMOL/L (ref 136–145)
WBC # BLD AUTO: 16.08 THOUSAND/UL (ref 4.31–10.16)

## 2019-02-16 PROCEDURE — 82948 REAGENT STRIP/BLOOD GLUCOSE: CPT

## 2019-02-16 PROCEDURE — 80048 BASIC METABOLIC PNL TOTAL CA: CPT | Performed by: INTERNAL MEDICINE

## 2019-02-16 PROCEDURE — 94640 AIRWAY INHALATION TREATMENT: CPT

## 2019-02-16 PROCEDURE — 94760 N-INVAS EAR/PLS OXIMETRY 1: CPT

## 2019-02-16 PROCEDURE — 85027 COMPLETE CBC AUTOMATED: CPT | Performed by: INTERNAL MEDICINE

## 2019-02-16 PROCEDURE — 99232 SBSQ HOSP IP/OBS MODERATE 35: CPT | Performed by: INTERNAL MEDICINE

## 2019-02-16 RX ADMIN — LEVALBUTEROL 1.25 MG: 1.25 SOLUTION, CONCENTRATE RESPIRATORY (INHALATION) at 20:34

## 2019-02-16 RX ADMIN — LEVALBUTEROL 1.25 MG: 1.25 SOLUTION, CONCENTRATE RESPIRATORY (INHALATION) at 08:35

## 2019-02-16 RX ADMIN — INSULIN LISPRO 2 UNITS: 100 INJECTION, SOLUTION INTRAVENOUS; SUBCUTANEOUS at 18:12

## 2019-02-16 RX ADMIN — IPRATROPIUM BROMIDE 0.5 MG: 0.5 SOLUTION RESPIRATORY (INHALATION) at 14:05

## 2019-02-16 RX ADMIN — BENZONATATE 200 MG: 100 CAPSULE ORAL at 08:58

## 2019-02-16 RX ADMIN — AZITHROMYCIN 250 MG: 250 TABLET, FILM COATED ORAL at 13:52

## 2019-02-16 RX ADMIN — LEVALBUTEROL 1.25 MG: 1.25 SOLUTION, CONCENTRATE RESPIRATORY (INHALATION) at 01:27

## 2019-02-16 RX ADMIN — ATORVASTATIN CALCIUM 80 MG: 80 TABLET, FILM COATED ORAL at 18:10

## 2019-02-16 RX ADMIN — ENOXAPARIN SODIUM 40 MG: 60 INJECTION SUBCUTANEOUS at 08:58

## 2019-02-16 RX ADMIN — LEVALBUTEROL 1.25 MG: 1.25 SOLUTION, CONCENTRATE RESPIRATORY (INHALATION) at 14:05

## 2019-02-16 RX ADMIN — GUAIFENESIN 200 MG: 100 SOLUTION ORAL at 02:02

## 2019-02-16 RX ADMIN — AMLODIPINE BESYLATE 5 MG: 5 TABLET ORAL at 08:54

## 2019-02-16 RX ADMIN — GUAIFENESIN 200 MG: 100 SOLUTION ORAL at 21:31

## 2019-02-16 RX ADMIN — ASPIRIN 81 MG: 81 TABLET, COATED ORAL at 08:54

## 2019-02-16 RX ADMIN — PANTOPRAZOLE SODIUM 40 MG: 40 TABLET, DELAYED RELEASE ORAL at 05:15

## 2019-02-16 RX ADMIN — IPRATROPIUM BROMIDE 0.5 MG: 0.5 SOLUTION RESPIRATORY (INHALATION) at 01:27

## 2019-02-16 RX ADMIN — IPRATROPIUM BROMIDE 0.5 MG: 0.5 SOLUTION RESPIRATORY (INHALATION) at 08:35

## 2019-02-16 RX ADMIN — GUAIFENESIN 600 MG: 600 TABLET, EXTENDED RELEASE ORAL at 08:55

## 2019-02-16 RX ADMIN — PREDNISONE 40 MG: 20 TABLET ORAL at 08:55

## 2019-02-16 RX ADMIN — ACETAMINOPHEN 650 MG: 325 TABLET, FILM COATED ORAL at 01:55

## 2019-02-16 RX ADMIN — IPRATROPIUM BROMIDE 0.5 MG: 0.5 SOLUTION RESPIRATORY (INHALATION) at 20:34

## 2019-02-16 RX ADMIN — TORSEMIDE 20 MG: 20 TABLET ORAL at 08:56

## 2019-02-16 NOTE — PLAN OF CARE
Problem: Potential for Falls  Goal: Patient will remain free of falls  Description  INTERVENTIONS:  - Assess patient frequently for physical needs  -  Identify cognitive and physical deficits and behaviors that affect risk of falls    -  [place] fall precautions as indicated by assessment   - Educate patient/family on patient safety including physical limitations  - Instruct patient to call for assistance with activity based on assessment  - Modify environment to reduce risk of injury  - Consider OT/PT consult to assist with strengthening/mobility  2/16/2019 0222 by Joycie Canavan, RN  Outcome: Progressing  2/16/2019 0214 by Joycie Canavan, RN  Outcome: Progressing     Problem: PAIN - ADULT  Goal: Verbalizes/displays adequate comfort level or baseline comfort level  Description  Interventions:  - Encourage patient to monitor pain and request assistance  - Assess pain using appropriate pain scale  - Administer analgesics based on type and severity of pain and evaluate response  - Implement non-pharmacological measures as appropriate and evaluate response  - Consider cultural and social influences on pain and pain management  - Notify physician/advanced practitioner if interventions unsuccessful or patient reports new pain  2/16/2019 0222 by Joycie Canavan, RN  Outcome: Progressing  2/16/2019 0214 by Joycie Canavan, RN  Outcome: Progressing     Problem: INFECTION - ADULT  Goal: Absence or prevention of progression during hospitalization  Description  INTERVENTIONS:  - Assess and monitor for signs and symptoms of infection  - Monitor lab/diagnostic results  - Monitor all insertion sites, i e  indwelling lines, tubes, and drains  - Monitor endotracheal (as able) and nasal secretions for changes in amount and color  - [place] appropriate cooling/warming therapies per order  - Administer medications as ordered  - Instruct and encourage patient and family to use good hand hygiene technique  - Identify and instruct in appropriate isolation precautions for identified infection/condition  2/16/2019 0222 by Clay Pichardo RN  Outcome: Progressing  2/16/2019 0214 by Clay Pichardo RN  Outcome: Progressing     Problem: Knowledge Deficit  Goal: Patient/family/caregiver demonstrates understanding of disease process, treatment plan, medications, and discharge instructions  Description  Complete learning assessment and assess knowledge base    Interventions:  - Provide teaching at level of understanding  - Provide teaching via preferred learning methods  2/16/2019 0222 by Clay Pichardo RN  Outcome: Progressing  2/16/2019 0214 by Clay Pichardo RN  Outcome: Progressing     Problem: RESPIRATORY - ADULT  Goal: Achieves optimal ventilation and oxygenation  Description  INTERVENTIONS:  - Assess for changes in respiratory status  - Assess for changes in mentation and behavior  - Position to facilitate oxygenation and minimize respiratory effort  - Oxygen administration by appropriate delivery method based on oxygen saturation (per order) or ABGs  - Initiate smoking cessation education as indicated  - Encourage broncho-pulmonary hygiene including cough, deep breathe, Incentive Spirometry  - Assess the need for suctioning and aspirate as needed  - Assess and instruct to report SOB or any respiratory difficulty  - Respiratory Therapy support as indicated  2/16/2019 0222 by Clay Pichardo RN  Outcome: Progressing  2/16/2019 0214 by Clay Pichardo RN  Outcome: Progressing     Problem: METABOLIC, FLUID AND ELECTROLYTES - ADULT  Goal: Glucose maintained within target range  Description  INTERVENTIONS:  - Monitor Blood Glucose as ordered  - Assess for signs and symptoms of hyperglycemia and hypoglycemia  - Administer ordered medications to maintain glucose within target range  - Assess nutritional intake and initiate nutrition service referral as needed  2/16/2019 0222 by Clay Pichardo RN  Outcome: Progressing  2/16/2019 0214 by Clay Pichardo RN  Outcome: Progressing     Problem: Prexisting or High Potential for Compromised Skin Integrity  Goal: Skin integrity is maintained or improved  Description  INTERVENTIONS:  - Identify patients at risk for skin breakdown  - Assess and monitor skin integrity  - Assess and monitor nutrition and hydration status  - Monitor labs (i e  albumin)  - Assess for incontinence   - Turn and reposition patient  - Assist with mobility/ambulation  - Relieve pressure over bony prominences  - Avoid friction and shearing  - Provide appropriate hygiene as needed including keeping skin clean and dry  - Evaluate need for skin moisturizer/barrier cream  - Collaborate with interdisciplinary team (i e  Nutrition, Rehabilitation, etc )   - Patient/family teaching  2/16/2019 0222 by Kendra Hunter RN  Outcome: Progressing  2/16/2019 0214 by Kendra Hunter RN  Outcome: Progressing     Problem: DISCHARGE PLANNING - CARE MANAGEMENT  Goal: Discharge to post-acute care or home with appropriate resources  Description  INTERVENTIONS:  - Conduct assessment to determine patient/family and health care team treatment goals, and need for post-acute services based on payer coverage, community resources, and patient preferences, and barriers to discharge  - Address psychosocial, clinical, and financial barriers to discharge as identified in assessment in conjunction with the patient/family and health care team  - Arrange appropriate level of post-acute services according to patient?s   needs and preference and payer coverage in collaboration with the physician and health care team  - Communicate with and update the patient/family, physician, and health care team regarding progress on the discharge plan  - Arrange appropriate transportation to post-acute venues  2/16/2019 0222 by Kendra Hunter RN  Outcome: Progressing  2/16/2019 0214 by Kendra Hunter RN  Outcome: Progressing

## 2019-02-16 NOTE — PLAN OF CARE
Problem: Potential for Falls  Goal: Patient will remain free of falls  Description  INTERVENTIONS:  - Assess patient frequently for physical needs  -  Identify cognitive and physical deficits and behaviors that affect risk of falls    -  [place] fall precautions as indicated by assessment   - Educate patient/family on patient safety including physical limitations  - Instruct patient to call for assistance with activity based on assessment  - Modify environment to reduce risk of injury  - Consider OT/PT consult to assist with strengthening/mobility  Outcome: Progressing     Problem: PAIN - ADULT  Goal: Verbalizes/displays adequate comfort level or baseline comfort level  Description  Interventions:  - Encourage patient to monitor pain and request assistance  - Assess pain using appropriate pain scale  - Administer analgesics based on type and severity of pain and evaluate response  - Implement non-pharmacological measures as appropriate and evaluate response  - Consider cultural and social influences on pain and pain management  - Notify physician/advanced practitioner if interventions unsuccessful or patient reports new pain  Outcome: Progressing     Problem: INFECTION - ADULT  Goal: Absence or prevention of progression during hospitalization  Description  INTERVENTIONS:  - Assess and monitor for signs and symptoms of infection  - Monitor lab/diagnostic results  - Monitor all insertion sites, i e  indwelling lines, tubes, and drains  - Monitor endotracheal (as able) and nasal secretions for changes in amount and color  - [place] appropriate cooling/warming therapies per order  - Administer medications as ordered  - Instruct and encourage patient and family to use good hand hygiene technique  - Identify and instruct in appropriate isolation precautions for identified infection/condition  Outcome: Progressing     Problem: Knowledge Deficit  Goal: Patient/family/caregiver demonstrates understanding of disease process, treatment plan, medications, and discharge instructions  Description  Complete learning assessment and assess knowledge base    Interventions:  - Provide teaching at level of understanding  - Provide teaching via preferred learning methods  Outcome: Progressing     Problem: RESPIRATORY - ADULT  Goal: Achieves optimal ventilation and oxygenation  Description  INTERVENTIONS:  - Assess for changes in respiratory status  - Assess for changes in mentation and behavior  - Position to facilitate oxygenation and minimize respiratory effort  - Oxygen administration by appropriate delivery method based on oxygen saturation (per order) or ABGs  - Initiate smoking cessation education as indicated  - Encourage broncho-pulmonary hygiene including cough, deep breathe, Incentive Spirometry  - Assess the need for suctioning and aspirate as needed  - Assess and instruct to report SOB or any respiratory difficulty  - Respiratory Therapy support as indicated  Outcome: Progressing     Problem: METABOLIC, FLUID AND ELECTROLYTES - ADULT  Goal: Glucose maintained within target range  Description  INTERVENTIONS:  - Monitor Blood Glucose as ordered  - Assess for signs and symptoms of hyperglycemia and hypoglycemia  - Administer ordered medications to maintain glucose within target range  - Assess nutritional intake and initiate nutrition service referral as needed  Outcome: Progressing     Problem: Prexisting or High Potential for Compromised Skin Integrity  Goal: Skin integrity is maintained or improved  Description  INTERVENTIONS:  - Identify patients at risk for skin breakdown  - Assess and monitor skin integrity  - Assess and monitor nutrition and hydration status  - Monitor labs (i e  albumin)  - Assess for incontinence   - Turn and reposition patient  - Assist with mobility/ambulation  - Relieve pressure over bony prominences  - Avoid friction and shearing  - Provide appropriate hygiene as needed including keeping skin clean and dry  - Evaluate need for skin moisturizer/barrier cream  - Collaborate with interdisciplinary team (i e  Nutrition, Rehabilitation, etc )   - Patient/family teaching  Outcome: Progressing     Problem: DISCHARGE PLANNING - CARE MANAGEMENT  Goal: Discharge to post-acute care or home with appropriate resources  Description  INTERVENTIONS:  - Conduct assessment to determine patient/family and health care team treatment goals, and need for post-acute services based on payer coverage, community resources, and patient preferences, and barriers to discharge  - Address psychosocial, clinical, and financial barriers to discharge as identified in assessment in conjunction with the patient/family and health care team  - Arrange appropriate level of post-acute services according to patient?s   needs and preference and payer coverage in collaboration with the physician and health care team  - Communicate with and update the patient/family, physician, and health care team regarding progress on the discharge plan  - Arrange appropriate transportation to post-acute venues  Outcome: Progressing

## 2019-02-16 NOTE — PLAN OF CARE
Problem: Potential for Falls  Goal: Patient will remain free of falls  Description  INTERVENTIONS:  - Assess patient frequently for physical needs  -  Identify cognitive and physical deficits and behaviors that affect risk of falls    -  [place] fall precautions as indicated by assessment   - Educate patient/family on patient safety including physical limitations  - Instruct patient to call for assistance with activity based on assessment  - Modify environment to reduce risk of injury  - Consider OT/PT consult to assist with strengthening/mobility  Outcome: Progressing     Problem: PAIN - ADULT  Goal: Verbalizes/displays adequate comfort level or baseline comfort level  Description  Interventions:  - Encourage patient to monitor pain and request assistance  - Assess pain using appropriate pain scale  - Administer analgesics based on type and severity of pain and evaluate response  - Implement non-pharmacological measures as appropriate and evaluate response  - Consider cultural and social influences on pain and pain management  - Notify physician/advanced practitioner if interventions unsuccessful or patient reports new pain  Outcome: Progressing     Problem: INFECTION - ADULT  Goal: Absence or prevention of progression during hospitalization  Description  INTERVENTIONS:  - Assess and monitor for signs and symptoms of infection  - Monitor lab/diagnostic results  - Monitor all insertion sites, i e  indwelling lines, tubes, and drains  - Monitor endotracheal (as able) and nasal secretions for changes in amount and color  - [place] appropriate cooling/warming therapies per order  - Administer medications as ordered  - Instruct and encourage patient and family to use good hand hygiene technique  - Identify and instruct in appropriate isolation precautions for identified infection/condition  Outcome: Progressing     Problem: Knowledge Deficit  Goal: Patient/family/caregiver demonstrates understanding of disease process, treatment plan, medications, and discharge instructions  Description  Complete learning assessment and assess knowledge base    Interventions:  - Provide teaching at level of understanding  - Provide teaching via preferred learning methods  Outcome: Progressing     Problem: RESPIRATORY - ADULT  Goal: Achieves optimal ventilation and oxygenation  Description  INTERVENTIONS:  - Assess for changes in respiratory status  - Assess for changes in mentation and behavior  - Position to facilitate oxygenation and minimize respiratory effort  - Oxygen administration by appropriate delivery method based on oxygen saturation (per order) or ABGs  - Initiate smoking cessation education as indicated  - Encourage broncho-pulmonary hygiene including cough, deep breathe, Incentive Spirometry  - Assess the need for suctioning and aspirate as needed  - Assess and instruct to report SOB or any respiratory difficulty  - Respiratory Therapy support as indicated  Outcome: Progressing     Problem: METABOLIC, FLUID AND ELECTROLYTES - ADULT  Goal: Glucose maintained within target range  Description  INTERVENTIONS:  - Monitor Blood Glucose as ordered  - Assess for signs and symptoms of hyperglycemia and hypoglycemia  - Administer ordered medications to maintain glucose within target range  - Assess nutritional intake and initiate nutrition service referral as needed  Outcome: Progressing     Problem: Prexisting or High Potential for Compromised Skin Integrity  Goal: Skin integrity is maintained or improved  Description  INTERVENTIONS:  - Identify patients at risk for skin breakdown  - Assess and monitor skin integrity  - Assess and monitor nutrition and hydration status  - Monitor labs (i e  albumin)  - Assess for incontinence   - Turn and reposition patient  - Assist with mobility/ambulation  - Relieve pressure over bony prominences  - Avoid friction and shearing  - Provide appropriate hygiene as needed including keeping skin clean and dry  - Evaluate need for skin moisturizer/barrier cream  - Collaborate with interdisciplinary team (i e  Nutrition, Rehabilitation, etc )   - Patient/family teaching  Outcome: Progressing     Problem: DISCHARGE PLANNING - CARE MANAGEMENT  Goal: Discharge to post-acute care or home with appropriate resources  Description  INTERVENTIONS:  - Conduct assessment to determine patient/family and health care team treatment goals, and need for post-acute services based on payer coverage, community resources, and patient preferences, and barriers to discharge  - Address psychosocial, clinical, and financial barriers to discharge as identified in assessment in conjunction with the patient/family and health care team  - Arrange appropriate level of post-acute services according to patient?s   needs and preference and payer coverage in collaboration with the physician and health care team  - Communicate with and update the patient/family, physician, and health care team regarding progress on the discharge plan  - Arrange appropriate transportation to post-acute venues  Outcome: Progressing

## 2019-02-16 NOTE — PROGRESS NOTES
Progress Note - Ce Araya 58 y o  female MRN: [id number]    Unit/Bed#: [place] 2 -01 Encounter: [id number]      Subjective: The patient feels somewhat better  She has less cough  She is not bringing up any sputum  She denies wheezing  She has had no recent chest pain or shortness of breath  She denies nausea or vomiting  Physical Exam:   Temp:  [97 4 °F (36 3 °C)-98 2 °F (36 8 °C)] 97 4 °F (36 3 °C)  HR:  [] 92  Resp:  [18] 18  BP: (126-155)/(82-99) 155/93    Gen:  Well-developed, severely obese, in no distress  Neck:  Supple  No lymphadenopathy, goiter, or bruit  Heart:  Regular rhythm  No murmur, gallop, or rub  Lungs:  Clear to auscultation and percussion  No wheezing, rales, or rhonchi    Abd:  Soft with active bowel sounds  No mass, tenderness, organomegaly  Extremities:  No clubbing, cyanosis, or edema  No calf tenderness  Neuro:  Alert and oriented  No focal sign  Skin:  Warm and dry      LABS:   CBC:   Lab Results   Component Value Date    WBC 16 08 (H) 02/16/2019    HGB 12 8 02/16/2019    HCT 39 8 02/16/2019    MCV 90 02/16/2019     02/16/2019    MCH 28 8 02/16/2019    MCHC 32 2 02/16/2019    RDW 15 8 (H) 02/16/2019    MPV 9 6 02/16/2019   , CMP:   Lab Results   Component Value Date    SODIUM 139 02/16/2019    K 5 3 02/16/2019     02/16/2019    CO2 30 02/16/2019    BUN 25 02/16/2019    CREATININE 0 98 02/16/2019    CALCIUM 8 8 02/16/2019    EGFR 71 02/16/2019           Assessment/Plan:  1  Acute exacerbation of COPD  2  Acute diastolic congestive heart failure, resolved  3  Type 2 diabetes with hyperglycemia, improving  4  Esophageal reflux  5  Hyperlipidemia  6  Hypertension  7  Non ST elevation MI type 2 and probable underlying coronary artery disease  8  Morbid obesity    The patient has improved  Her respiratory status is stable on oral medications  Her heart failure appears compensated    I had a long talk with her today regarding the appropriateness of cardiac catheterization  She is under the impression that this has been scheduled for Monday  I spoke with Dr Scherrie Fabry of Cardiology  He will review the situation further with the patient        VTE Pharmacologic Prophylaxis: Enoxaparin (Lovenox)  VTE Mechanical Prophylaxis: sequential compression device

## 2019-02-17 LAB
GLUCOSE SERPL-MCNC: 102 MG/DL (ref 65–140)
GLUCOSE SERPL-MCNC: 194 MG/DL (ref 65–140)
GLUCOSE SERPL-MCNC: 254 MG/DL (ref 65–140)
GLUCOSE SERPL-MCNC: 79 MG/DL (ref 65–140)

## 2019-02-17 PROCEDURE — 94760 N-INVAS EAR/PLS OXIMETRY 1: CPT

## 2019-02-17 PROCEDURE — 82948 REAGENT STRIP/BLOOD GLUCOSE: CPT

## 2019-02-17 PROCEDURE — 94640 AIRWAY INHALATION TREATMENT: CPT

## 2019-02-17 PROCEDURE — 99232 SBSQ HOSP IP/OBS MODERATE 35: CPT | Performed by: INTERNAL MEDICINE

## 2019-02-17 RX ADMIN — ATORVASTATIN CALCIUM 80 MG: 80 TABLET, FILM COATED ORAL at 18:43

## 2019-02-17 RX ADMIN — IPRATROPIUM BROMIDE 0.5 MG: 0.5 SOLUTION RESPIRATORY (INHALATION) at 14:26

## 2019-02-17 RX ADMIN — IPRATROPIUM BROMIDE 0.5 MG: 0.5 SOLUTION RESPIRATORY (INHALATION) at 01:40

## 2019-02-17 RX ADMIN — GUAIFENESIN 600 MG: 600 TABLET, EXTENDED RELEASE ORAL at 09:15

## 2019-02-17 RX ADMIN — ENOXAPARIN SODIUM 40 MG: 60 INJECTION SUBCUTANEOUS at 09:27

## 2019-02-17 RX ADMIN — GUAIFENESIN 600 MG: 600 TABLET, EXTENDED RELEASE ORAL at 20:46

## 2019-02-17 RX ADMIN — TORSEMIDE 20 MG: 20 TABLET ORAL at 09:16

## 2019-02-17 RX ADMIN — IPRATROPIUM BROMIDE 0.5 MG: 0.5 SOLUTION RESPIRATORY (INHALATION) at 20:17

## 2019-02-17 RX ADMIN — LEVALBUTEROL 1.25 MG: 1.25 SOLUTION, CONCENTRATE RESPIRATORY (INHALATION) at 14:27

## 2019-02-17 RX ADMIN — ASPIRIN 81 MG: 81 TABLET, COATED ORAL at 09:15

## 2019-02-17 RX ADMIN — LEVALBUTEROL 1.25 MG: 1.25 SOLUTION, CONCENTRATE RESPIRATORY (INHALATION) at 01:40

## 2019-02-17 RX ADMIN — LEVALBUTEROL 1.25 MG: 1.25 SOLUTION, CONCENTRATE RESPIRATORY (INHALATION) at 08:31

## 2019-02-17 RX ADMIN — INSULIN LISPRO 3 UNITS: 100 INJECTION, SOLUTION INTRAVENOUS; SUBCUTANEOUS at 21:53

## 2019-02-17 RX ADMIN — ACETAMINOPHEN 650 MG: 325 TABLET, FILM COATED ORAL at 18:42

## 2019-02-17 RX ADMIN — PANTOPRAZOLE SODIUM 40 MG: 40 TABLET, DELAYED RELEASE ORAL at 05:12

## 2019-02-17 RX ADMIN — INSULIN LISPRO 2 UNITS: 100 INJECTION, SOLUTION INTRAVENOUS; SUBCUTANEOUS at 18:44

## 2019-02-17 RX ADMIN — ACETAMINOPHEN 650 MG: 325 TABLET, FILM COATED ORAL at 10:15

## 2019-02-17 RX ADMIN — FLUTICASONE FUROATE AND VILANTEROL TRIFENATATE 1 PUFF: 200; 25 POWDER RESPIRATORY (INHALATION) at 09:23

## 2019-02-17 RX ADMIN — LEVALBUTEROL 1.25 MG: 1.25 SOLUTION, CONCENTRATE RESPIRATORY (INHALATION) at 20:17

## 2019-02-17 RX ADMIN — PREDNISONE 40 MG: 20 TABLET ORAL at 09:17

## 2019-02-17 RX ADMIN — AMLODIPINE BESYLATE 5 MG: 5 TABLET ORAL at 09:14

## 2019-02-17 RX ADMIN — IPRATROPIUM BROMIDE 0.5 MG: 0.5 SOLUTION RESPIRATORY (INHALATION) at 08:31

## 2019-02-17 NOTE — PROGRESS NOTES
[facility] Internal [facility] Progress Note  Patient: Audrey Maurice 58 y o  female   MRN: [id number]  PCP: Daisha Bar MD  Unit/Bed#: [facility] [place] [facility] [id number] Encounter: [id number]  Date Of Visit: 02/17/19      Assessment/plan  1  Acute copd exacerbation- resolving continue with bronchodilators  Continue dulera  continue prednisone taper  Will decrease to 30mg   Flu was negative  Continue mucinex     2  Acute diastolic chf- appreciate cardiology recommendations  continue demadex 20mg       3  Type 2 diabetes with hyperglycemia-improving with taper of steroid  Continue insulin sliding scale  Continue to hold metformin       4  gerd- continue ppi       5  Hyperlipidemia- statin     6  htn- continue norvasc       7  Sinus tach-due to number 1  Continue cardizem prn     8  Elevated troponin- due to nstemi type 2 with probable underlying cad  Pt will need a cath once pulmonary status is stable  Possibly arrange as outpt  Continue asa, and statin  No metoprolol due to copd  Pt now asking if this should be inpt  Did reach out to cardiology about timing of heart cath  Will make pt npo after midnight incase she decides to go for heart cath tomorrow  dispo- if pt decides against heart cath tomorrow and she is stable she can be discharged to home       Subjective:   Pt seen and examined  Pt is breathing okay  She is unsure if she wants to go for heart cath  She thinks she wants it inpt  She wants to discuss it further with cardiology  She does not want to go home until she discusses heart cath with cardiology more  She would like some of her medications looked at  She thinks she is on too many meds  No f/c no cp no sob no n/v/d no abd pain  Objective:     Vitals: Blood pressure 112/93, pulse 98, temperature (!) 97 3 °F (36 3 °C), temperature source Temporal, resp  rate 18, weight 130 kg (287 lb 11 2 oz), SpO2 96 %  ,Body mass index is 60 13 kg/m²      Lab, Imaging and other studies:  Results from last 7 days   Lab Units 02/16/19  0546   WBC Thousand/uL 16 08*   HEMOGLOBIN g/dL 12 8   HEMATOCRIT % 39 8   PLATELETS Thousands/uL 250     Results from last 7 days   Lab Units 02/16/19  0546   POTASSIUM mmol/L 5 3   CHLORIDE mmol/L 103   CO2 mmol/L 30   BUN mg/dL 25   CREATININE mg/dL 0 98   CALCIUM mg/dL 8 8     Results from last 7 days   Lab Units 02/14/19  0213 02/13/19  2304 02/13/19  1950   TROPONIN I ng/mL 3 55* 4 18* 2 56*     Lab Results   Component Value Date    BLOODCX No Growth After 5 Days  12/24/2017    BLOODCX No Growth After 5 Days  12/24/2017    SPUTUMCULTUR 4+ Growth of  02/12/2019    SPUTUMCULTUR  02/12/2019     Commensal respiratory conrad only; No significant growth of Staph aureus/MRSA or Pseudomonas aeruginosa  SPUTUMCULTUR Test not performed  Suggest repeat specimen  12/25/2017         Xr Chest 2 Views    Result Date: 2/12/2019  Narrative: CHEST INDICATION:   cough  COMPARISON:  8/28/2018 EXAM PERFORMED/VIEWS:  XR CHEST PA & LATERAL FINDINGS: Cardiomediastinal silhouette appears unremarkable  The lungs are clear  No pneumothorax or pleural effusion  Osseous structures appear within normal limits for patient age  Impression: No acute cardiopulmonary disease   Workstation performed: [id number]       Scheduled Meds:   Current Facility-Administered Medications:  acetaminophen 650 mg Oral Q6H PRN Sharlene Ambron, DO   albuterol 2 puff Inhalation Q4H PRN Sharlene Ambron, DO   amLODIPine 5 mg Oral Daily Sharlene Ambron, DO   aspirin 81 mg Oral Daily Sharlene Ambron, DO   atorvastatin 80 mg Oral Daily With [employer], DO   enoxaparin 40 mg Subcutaneous Daily Sharlene Ambron, DO   fluticasone-vilanterol 1 puff Inhalation Daily Sharlene Ambron, DO   guaiFENesin 600 mg Oral Q12H [facility] Sharlene Ambron, DO   guaiFENesin 200 mg Oral Q4H PRN Sharlene Ambron, DO   insulin lispro 1-6 Units Subcutaneous HS Sharlene Ambron, DO   insulin lispro 2-12 Units Subcutaneous TID AC Sharlene Ambron, DO   ipratropium 0 5 mg Nebulization Q6H Sharlene Ambron, DO   levalbuterol 1 25 mg Nebulization Q6H Sharlene Ambron, DO   nitroglycerin 0 4 mg Sublingual Q5 Min PRN Sharlene Ambron, DO   ondansetron 4 mg Intravenous Q4H PRN Sharlene Ambron, DO   pantoprazole 40 mg Oral Early Morning Sharlene Ambron, DO   [START ON 2/18/2019] predniSONE 30 mg Oral Daily Sharlene Ambron, DO   torsemide 20 mg Oral Daily Rujul Cabrera, DO     Continuous Infusions:    PRN Meds:   acetaminophen    albuterol    guaiFENesin    nitroglycerin    ondansetron      Physical exam:  Physical Exam  General appearance: alert and oriented, in no acute distress  Head: Normocephalic, without obvious abnormality, atraumatic  Eyes: conjunctivae/corneas clear  PERRL, EOM's intact  Fundi benign    Neck: no adenopathy, no carotid bruit, no JVD, supple, symmetrical, trachea midline and thyroid not enlarged, symmetric, no tenderness/mass/nodules  Lungs: clear to auscultation bilaterally and no wheezing bilateral  Heart: regular rate and rhythm, S1, S2 normal, no murmur, click, rub or gallop  Abdomen: soft, non-tender; bowel sounds normal; no masses,  no organomegaly  Extremities: extremities normal, warm and well-perfused; no cyanosis, clubbing, or edema  Pulses: 2+ and symmetric  Skin: Skin color, texture, turgor normal  No rashes or lesions  Neurologic: Mental status: Alert, oriented, thought content appropriate      VTE Pharmacologic Prophylaxis: Enoxaparin (Lovenox)  VTE Mechanical Prophylaxis: sequential compression device    Counseling / Coordination of Care  Total floor / unit time spent today 20 minutes     Current Length of Stay: 5 day(s)    Current Patient Status: Inpatient       Code Status: Level 1 - Full Code

## 2019-02-17 NOTE — PLAN OF CARE
Problem: Potential for Falls  Goal: Patient will remain free of falls  Description  INTERVENTIONS:  - Assess patient frequently for physical needs  -  Identify cognitive and physical deficits and behaviors that affect risk of falls    -  [place] fall precautions as indicated by assessment   - Educate patient/family on patient safety including physical limitations  - Instruct patient to call for assistance with activity based on assessment  - Modify environment to reduce risk of injury  - Consider OT/PT consult to assist with strengthening/mobility  Outcome: Progressing     Problem: PAIN - ADULT  Goal: Verbalizes/displays adequate comfort level or baseline comfort level  Description  Interventions:  - Encourage patient to monitor pain and request assistance  - Assess pain using appropriate pain scale  - Administer analgesics based on type and severity of pain and evaluate response  - Implement non-pharmacological measures as appropriate and evaluate response  - Consider cultural and social influences on pain and pain management  - Notify physician/advanced practitioner if interventions unsuccessful or patient reports new pain  Outcome: Progressing     Problem: INFECTION - ADULT  Goal: Absence or prevention of progression during hospitalization  Description  INTERVENTIONS:  - Assess and monitor for signs and symptoms of infection  - Monitor lab/diagnostic results  - Monitor all insertion sites, i e  indwelling lines, tubes, and drains  - Monitor endotracheal (as able) and nasal secretions for changes in amount and color  - [place] appropriate cooling/warming therapies per order  - Administer medications as ordered  - Instruct and encourage patient and family to use good hand hygiene technique  - Identify and instruct in appropriate isolation precautions for identified infection/condition  Outcome: Progressing     Problem: Knowledge Deficit  Goal: Patient/family/caregiver demonstrates understanding of disease process, treatment plan, medications, and discharge instructions  Description  Complete learning assessment and assess knowledge base    Interventions:  - Provide teaching at level of understanding  - Provide teaching via preferred learning methods  Outcome: Progressing     Problem: RESPIRATORY - ADULT  Goal: Achieves optimal ventilation and oxygenation  Description  INTERVENTIONS:  - Assess for changes in respiratory status  - Assess for changes in mentation and behavior  - Position to facilitate oxygenation and minimize respiratory effort  - Oxygen administration by appropriate delivery method based on oxygen saturation (per order) or ABGs  - Initiate smoking cessation education as indicated  - Encourage broncho-pulmonary hygiene including cough, deep breathe, Incentive Spirometry  - Assess the need for suctioning and aspirate as needed  - Assess and instruct to report SOB or any respiratory difficulty  - Respiratory Therapy support as indicated  Outcome: Progressing     Problem: METABOLIC, FLUID AND ELECTROLYTES - ADULT  Goal: Glucose maintained within target range  Description  INTERVENTIONS:  - Monitor Blood Glucose as ordered  - Assess for signs and symptoms of hyperglycemia and hypoglycemia  - Administer ordered medications to maintain glucose within target range  - Assess nutritional intake and initiate nutrition service referral as needed  Outcome: Progressing     Problem: Prexisting or High Potential for Compromised Skin Integrity  Goal: Skin integrity is maintained or improved  Description  INTERVENTIONS:  - Identify patients at risk for skin breakdown  - Assess and monitor skin integrity  - Assess and monitor nutrition and hydration status  - Monitor labs (i e  albumin)  - Assess for incontinence   - Turn and reposition patient  - Assist with mobility/ambulation  - Relieve pressure over bony prominences  - Avoid friction and shearing  - Provide appropriate hygiene as needed including keeping skin clean and dry  - Evaluate need for skin moisturizer/barrier cream  - Collaborate with interdisciplinary team (i e  Nutrition, Rehabilitation, etc )   - Patient/family teaching  Outcome: Progressing     Problem: DISCHARGE PLANNING - CARE MANAGEMENT  Goal: Discharge to post-acute care or home with appropriate resources  Description  INTERVENTIONS:  - Conduct assessment to determine patient/family and health care team treatment goals, and need for post-acute services based on payer coverage, community resources, and patient preferences, and barriers to discharge  - Address psychosocial, clinical, and financial barriers to discharge as identified in assessment in conjunction with the patient/family and health care team  - Arrange appropriate level of post-acute services according to patient?s   needs and preference and payer coverage in collaboration with the physician and health care team  - Communicate with and update the patient/family, physician, and health care team regarding progress on the discharge plan  - Arrange appropriate transportation to post-acute venues  Outcome: Progressing

## 2019-02-17 NOTE — PLAN OF CARE
Problem: Potential for Falls  Goal: Patient will remain free of falls  Description  INTERVENTIONS:  - Assess patient frequently for physical needs  -  Identify cognitive and physical deficits and behaviors that affect risk of falls    -  [place] fall precautions as indicated by assessment   - Educate patient/family on patient safety including physical limitations  - Instruct patient to call for assistance with activity based on assessment  - Modify environment to reduce risk of injury  - Consider OT/PT consult to assist with strengthening/mobility  2/17/2019 0736 by Dwight Marques RN  Outcome: Progressing  2/17/2019 0735 by Dwight Marques RN  Outcome: Progressing  2/17/2019 0732 by Dwight Marques RN  Outcome: Progressing     Problem: PAIN - ADULT  Goal: Verbalizes/displays adequate comfort level or baseline comfort level  Description  Interventions:  - Encourage patient to monitor pain and request assistance  - Assess pain using appropriate pain scale  - Administer analgesics based on type and severity of pain and evaluate response  - Implement non-pharmacological measures as appropriate and evaluate response  - Consider cultural and social influences on pain and pain management  - Notify physician/advanced practitioner if interventions unsuccessful or patient reports new pain  2/17/2019 0736 by Dwight Marques RN  Outcome: Progressing  2/17/2019 0735 by Dwight Marques RN  Outcome: Progressing  2/17/2019 0732 by Dwight Marques RN  Outcome: Progressing     Problem: INFECTION - ADULT  Goal: Absence or prevention of progression during hospitalization  Description  INTERVENTIONS:  - Assess and monitor for signs and symptoms of infection  - Monitor lab/diagnostic results  - Monitor all insertion sites, i e  indwelling lines, tubes, and drains  - Monitor endotracheal (as able) and nasal secretions for changes in amount and color  - [place] appropriate cooling/warming therapies per order  - Administer medications as ordered  - Instruct and encourage patient and family to use good hand hygiene technique  - Identify and instruct in appropriate isolation precautions for identified infection/condition  2/17/2019 0736 by Arturo Horvath RN  Outcome: Progressing  2/17/2019 0735 by Arturo Horvath RN  Outcome: Progressing  2/17/2019 0732 by Arturo Horvath RN  Outcome: Progressing     Problem: Knowledge Deficit  Goal: Patient/family/caregiver demonstrates understanding of disease process, treatment plan, medications, and discharge instructions  Description  Complete learning assessment and assess knowledge base    Interventions:  - Provide teaching at level of understanding  - Provide teaching via preferred learning methods  2/17/2019 0736 by Arturo Horvath RN  Outcome: Progressing  2/17/2019 0735 by Arturo Horvath RN  Outcome: Progressing  2/17/2019 0732 by Arturo Horvath RN  Outcome: Progressing     Problem: RESPIRATORY - ADULT  Goal: Achieves optimal ventilation and oxygenation  Description  INTERVENTIONS:  - Assess for changes in respiratory status  - Assess for changes in mentation and behavior  - Position to facilitate oxygenation and minimize respiratory effort  - Oxygen administration by appropriate delivery method based on oxygen saturation (per order) or ABGs  - Initiate smoking cessation education as indicated  - Encourage broncho-pulmonary hygiene including cough, deep breathe, Incentive Spirometry  - Assess the need for suctioning and aspirate as needed  - Assess and instruct to report SOB or any respiratory difficulty  - Respiratory Therapy support as indicated  2/17/2019 0736 by Arturo Horvath RN  Outcome: Progressing  2/17/2019 0735 by Arturo Horvath RN  Outcome: Progressing  2/17/2019 0732 by Arturo Horvath RN  Outcome: Progressing     Problem: METABOLIC, FLUID AND ELECTROLYTES - ADULT  Goal: Glucose maintained within target range  Description  INTERVENTIONS:  - Monitor Blood Glucose as ordered  - Assess for signs and symptoms of hyperglycemia and hypoglycemia  - Administer ordered medications to maintain glucose within target range  - Assess nutritional intake and initiate nutrition service referral as needed  2/17/2019 0736 by Raul Roque RN  Outcome: Progressing  2/17/2019 0735 by Raul Roque RN  Outcome: Progressing  2/17/2019 0732 by Raul Roque RN  Outcome: Progressing     Problem: Prexisting or High Potential for Compromised Skin Integrity  Goal: Skin integrity is maintained or improved  Description  INTERVENTIONS:  - Identify patients at risk for skin breakdown  - Assess and monitor skin integrity  - Assess and monitor nutrition and hydration status  - Monitor labs (i e  albumin)  - Assess for incontinence   - Turn and reposition patient  - Assist with mobility/ambulation  - Relieve pressure over bony prominences  - Avoid friction and shearing  - Provide appropriate hygiene as needed including keeping skin clean and dry  - Evaluate need for skin moisturizer/barrier cream  - Collaborate with interdisciplinary team (i e  Nutrition, Rehabilitation, etc )   - Patient/family teaching  2/17/2019 0736 by Raul Roque RN  Outcome: Progressing  2/17/2019 0735 by Raul Roque RN  Outcome: Progressing  2/17/2019 0732 by Raul Roque RN  Outcome: Progressing     Problem: DISCHARGE PLANNING - CARE MANAGEMENT  Goal: Discharge to post-acute care or home with appropriate resources  Description  INTERVENTIONS:  - Conduct assessment to determine patient/family and health care team treatment goals, and need for post-acute services based on payer coverage, community resources, and patient preferences, and barriers to discharge  - Address psychosocial, clinical, and financial barriers to discharge as identified in assessment in conjunction with the patient/family and health care team  - Arrange appropriate level of post-acute services according to patient?s   needs and preference and payer coverage in collaboration with the physician and health care team  - Communicate with and update the patient/family, physician, and health care team regarding progress on the discharge plan  - Arrange appropriate transportation to post-acute venues  2/17/2019 0736 by Douglas Holguin RN  Outcome: Progressing  2/17/2019 0735 by Douglas Holguin RN  Outcome: Progressing  2/17/2019 0732 by Douglas Holguin RN  Outcome: Progressing

## 2019-02-18 ENCOUNTER — APPOINTMENT (INPATIENT)
Dept: NON INVASIVE DIAGNOSTICS | Facility: HOSPITAL | Age: 64
DRG: 951 | End: 2019-02-18
Attending: INTERNAL MEDICINE
Payer: COMMERCIAL

## 2019-02-18 PROBLEM — I21.4 NON-ST ELEVATION MYOCARDIAL INFARCTION (NSTEMI) (HCC): Status: ACTIVE | Noted: 2019-02-18

## 2019-02-18 PROBLEM — Z79.4 TYPE 2 DIABETES MELLITUS WITHOUT COMPLICATION, WITH LONG-TERM CURRENT USE OF INSULIN (HCC): Status: ACTIVE | Noted: 2019-02-18

## 2019-02-18 PROBLEM — E11.9 TYPE 2 DIABETES MELLITUS WITHOUT COMPLICATION, WITH LONG-TERM CURRENT USE OF INSULIN (HCC): Status: ACTIVE | Noted: 2019-02-18

## 2019-02-18 PROBLEM — I50.31 ACUTE DIASTOLIC CHF (CONGESTIVE HEART FAILURE) (HCC): Status: ACTIVE | Noted: 2019-02-18

## 2019-02-18 LAB
ANION GAP SERPL CALCULATED.3IONS-SCNC: 9 MMOL/L (ref 4–13)
BUN SERPL-MCNC: 26 MG/DL (ref 5–25)
CALCIUM SERPL-MCNC: 8.7 MG/DL (ref 8.3–10.1)
CHLORIDE SERPL-SCNC: 106 MMOL/L (ref 100–108)
CO2 SERPL-SCNC: 32 MMOL/L (ref 21–32)
CREAT SERPL-MCNC: 1.06 MG/DL (ref 0.6–1.3)
ERYTHROCYTE [DISTWIDTH] IN BLOOD BY AUTOMATED COUNT: 15.1 % (ref 11.6–15.1)
GFR SERPL CREATININE-BSD FRML MDRD: 64 ML/MIN/1.73SQ M
GLUCOSE SERPL-MCNC: 162 MG/DL (ref 65–140)
GLUCOSE SERPL-MCNC: 219 MG/DL (ref 65–140)
GLUCOSE SERPL-MCNC: 91 MG/DL (ref 65–140)
GLUCOSE SERPL-MCNC: 97 MG/DL (ref 65–140)
GLUCOSE SERPL-MCNC: 97 MG/DL (ref 65–140)
HCT VFR BLD AUTO: 39.7 % (ref 34.8–46.1)
HGB BLD-MCNC: 12.9 G/DL (ref 11.5–15.4)
MCH RBC QN AUTO: 28.6 PG (ref 26.8–34.3)
MCHC RBC AUTO-ENTMCNC: 32.5 G/DL (ref 31.4–37.4)
MCV RBC AUTO: 88 FL (ref 82–98)
PLATELET # BLD AUTO: 242 THOUSANDS/UL (ref 149–390)
PMV BLD AUTO: 9.7 FL (ref 8.9–12.7)
POTASSIUM SERPL-SCNC: 3.3 MMOL/L (ref 3.5–5.3)
RBC # BLD AUTO: 4.51 MILLION/UL (ref 3.81–5.12)
SODIUM SERPL-SCNC: 147 MMOL/L (ref 136–145)
WBC # BLD AUTO: 17.63 THOUSAND/UL (ref 4.31–10.16)

## 2019-02-18 PROCEDURE — 93454 CORONARY ARTERY ANGIO S&I: CPT | Performed by: INTERNAL MEDICINE

## 2019-02-18 PROCEDURE — C1874 STENT, COATED/COV W/DEL SYS: HCPCS

## 2019-02-18 PROCEDURE — 80048 BASIC METABOLIC PNL TOTAL CA: CPT | Performed by: INTERNAL MEDICINE

## 2019-02-18 PROCEDURE — C1760 CLOSURE DEV, VASC: HCPCS | Performed by: INTERNAL MEDICINE

## 2019-02-18 PROCEDURE — 82948 REAGENT STRIP/BLOOD GLUCOSE: CPT

## 2019-02-18 PROCEDURE — 94640 AIRWAY INHALATION TREATMENT: CPT

## 2019-02-18 PROCEDURE — 99153 MOD SED SAME PHYS/QHP EA: CPT | Performed by: INTERNAL MEDICINE

## 2019-02-18 PROCEDURE — 99152 MOD SED SAME PHYS/QHP 5/>YRS: CPT | Performed by: INTERNAL MEDICINE

## 2019-02-18 PROCEDURE — C1894 INTRO/SHEATH, NON-LASER: HCPCS | Performed by: INTERNAL MEDICINE

## 2019-02-18 PROCEDURE — B2111ZZ FLUOROSCOPY OF MULTIPLE CORONARY ARTERIES USING LOW OSMOLAR CONTRAST: ICD-10-PCS | Performed by: INTERNAL MEDICINE

## 2019-02-18 PROCEDURE — 027034Z DILATION OF CORONARY ARTERY, ONE ARTERY WITH DRUG-ELUTING INTRALUMINAL DEVICE, PERCUTANEOUS APPROACH: ICD-10-PCS | Performed by: INTERNAL MEDICINE

## 2019-02-18 PROCEDURE — 92928 PRQ TCAT PLMT NTRAC ST 1 LES: CPT | Performed by: INTERNAL MEDICINE

## 2019-02-18 PROCEDURE — C9600 PERC DRUG-EL COR STENT SING: HCPCS | Performed by: INTERNAL MEDICINE

## 2019-02-18 PROCEDURE — C1769 GUIDE WIRE: HCPCS | Performed by: INTERNAL MEDICINE

## 2019-02-18 PROCEDURE — 99232 SBSQ HOSP IP/OBS MODERATE 35: CPT | Performed by: INTERNAL MEDICINE

## 2019-02-18 PROCEDURE — C1887 CATHETER, GUIDING: HCPCS | Performed by: INTERNAL MEDICINE

## 2019-02-18 PROCEDURE — 94760 N-INVAS EAR/PLS OXIMETRY 1: CPT

## 2019-02-18 PROCEDURE — 99232 SBSQ HOSP IP/OBS MODERATE 35: CPT | Performed by: FAMILY MEDICINE

## 2019-02-18 PROCEDURE — 85347 COAGULATION TIME ACTIVATED: CPT

## 2019-02-18 PROCEDURE — C1725 CATH, TRANSLUMIN NON-LASER: HCPCS | Performed by: INTERNAL MEDICINE

## 2019-02-18 PROCEDURE — 85027 COMPLETE CBC AUTOMATED: CPT | Performed by: INTERNAL MEDICINE

## 2019-02-18 RX ORDER — LEVALBUTEROL 1.25 MG/.5ML
1.25 SOLUTION, CONCENTRATE RESPIRATORY (INHALATION)
Status: DISCONTINUED | OUTPATIENT
Start: 2019-02-19 | End: 2019-02-20 | Stop reason: HOSPADM

## 2019-02-18 RX ORDER — CLOPIDOGREL BISULFATE 75 MG/1
300 TABLET ORAL ONCE
Status: COMPLETED | OUTPATIENT
Start: 2019-02-18 | End: 2019-02-18

## 2019-02-18 RX ORDER — CLOPIDOGREL BISULFATE 75 MG/1
75 TABLET ORAL DAILY
Status: DISCONTINUED | OUTPATIENT
Start: 2019-02-19 | End: 2019-02-20 | Stop reason: HOSPADM

## 2019-02-18 RX ORDER — MIDAZOLAM HYDROCHLORIDE 1 MG/ML
INJECTION INTRAMUSCULAR; INTRAVENOUS CODE/TRAUMA/SEDATION MEDICATION
Status: COMPLETED | OUTPATIENT
Start: 2019-02-18 | End: 2019-02-18

## 2019-02-18 RX ORDER — CLOPIDOGREL BISULFATE 75 MG/1
300 TABLET ORAL ONCE
Status: DISCONTINUED | OUTPATIENT
Start: 2019-02-19 | End: 2019-02-18

## 2019-02-18 RX ORDER — LIDOCAINE HYDROCHLORIDE 10 MG/ML
INJECTION, SOLUTION INFILTRATION; PERINEURAL CODE/TRAUMA/SEDATION MEDICATION
Status: COMPLETED | OUTPATIENT
Start: 2019-02-18 | End: 2019-02-18

## 2019-02-18 RX ORDER — SODIUM CHLORIDE 9 MG/ML
75 INJECTION, SOLUTION INTRAVENOUS CONTINUOUS
Status: DISPENSED | OUTPATIENT
Start: 2019-02-18 | End: 2019-02-19

## 2019-02-18 RX ORDER — POTASSIUM CHLORIDE 20 MEQ/1
20 TABLET, EXTENDED RELEASE ORAL ONCE
Status: COMPLETED | OUTPATIENT
Start: 2019-02-18 | End: 2019-02-18

## 2019-02-18 RX ORDER — SODIUM CHLORIDE 9 MG/ML
INJECTION, SOLUTION INTRAVENOUS
Status: COMPLETED | OUTPATIENT
Start: 2019-02-18 | End: 2019-02-18

## 2019-02-18 RX ORDER — FENTANYL CITRATE 50 UG/ML
INJECTION, SOLUTION INTRAMUSCULAR; INTRAVENOUS CODE/TRAUMA/SEDATION MEDICATION
Status: COMPLETED | OUTPATIENT
Start: 2019-02-18 | End: 2019-02-18

## 2019-02-18 RX ORDER — HEPARIN SODIUM 1000 [USP'U]/ML
INJECTION, SOLUTION INTRAVENOUS; SUBCUTANEOUS CODE/TRAUMA/SEDATION MEDICATION
Status: COMPLETED | OUTPATIENT
Start: 2019-02-18 | End: 2019-02-18

## 2019-02-18 RX ADMIN — FLUTICASONE FUROATE AND VILANTEROL TRIFENATATE 1 PUFF: 200; 25 POWDER RESPIRATORY (INHALATION) at 08:11

## 2019-02-18 RX ADMIN — SODIUM CHLORIDE 75 ML/HR: 0.9 INJECTION, SOLUTION INTRAVENOUS at 18:34

## 2019-02-18 RX ADMIN — IPRATROPIUM BROMIDE 0.5 MG: 0.5 SOLUTION RESPIRATORY (INHALATION) at 01:10

## 2019-02-18 RX ADMIN — INSULIN LISPRO 2 UNITS: 100 INJECTION, SOLUTION INTRAVENOUS; SUBCUTANEOUS at 21:16

## 2019-02-18 RX ADMIN — IOHEXOL 140 ML: 350 INJECTION, SOLUTION INTRAVENOUS at 17:53

## 2019-02-18 RX ADMIN — IPRATROPIUM BROMIDE 0.5 MG: 0.5 SOLUTION RESPIRATORY (INHALATION) at 19:33

## 2019-02-18 RX ADMIN — PREDNISONE 30 MG: 5 TABLET ORAL at 12:53

## 2019-02-18 RX ADMIN — MIDAZOLAM 1 MG: 1 INJECTION INTRAMUSCULAR; INTRAVENOUS at 17:48

## 2019-02-18 RX ADMIN — FENTANYL CITRATE 25 MCG: 50 INJECTION, SOLUTION INTRAMUSCULAR; INTRAVENOUS at 17:29

## 2019-02-18 RX ADMIN — HEPARIN SODIUM 7000 UNITS: 1000 INJECTION INTRAVENOUS; SUBCUTANEOUS at 17:36

## 2019-02-18 RX ADMIN — FENTANYL CITRATE 50 MCG: 50 INJECTION, SOLUTION INTRAMUSCULAR; INTRAVENOUS at 17:51

## 2019-02-18 RX ADMIN — ASPIRIN 81 MG: 81 TABLET, COATED ORAL at 08:09

## 2019-02-18 RX ADMIN — MIDAZOLAM 1 MG: 1 INJECTION INTRAMUSCULAR; INTRAVENOUS at 17:38

## 2019-02-18 RX ADMIN — FENTANYL CITRATE 50 MCG: 50 INJECTION, SOLUTION INTRAMUSCULAR; INTRAVENOUS at 17:12

## 2019-02-18 RX ADMIN — TORSEMIDE 20 MG: 20 TABLET ORAL at 08:09

## 2019-02-18 RX ADMIN — LEVALBUTEROL 1.25 MG: 1.25 SOLUTION, CONCENTRATE RESPIRATORY (INHALATION) at 08:36

## 2019-02-18 RX ADMIN — FENTANYL CITRATE 25 MCG: 50 INJECTION, SOLUTION INTRAMUSCULAR; INTRAVENOUS at 17:37

## 2019-02-18 RX ADMIN — MIDAZOLAM 1 MG: 1 INJECTION INTRAMUSCULAR; INTRAVENOUS at 17:28

## 2019-02-18 RX ADMIN — LIDOCAINE HYDROCHLORIDE 7 ML: 10 INJECTION, SOLUTION INFILTRATION; PERINEURAL at 17:19

## 2019-02-18 RX ADMIN — LEVALBUTEROL 1.25 MG: 1.25 SOLUTION, CONCENTRATE RESPIRATORY (INHALATION) at 14:31

## 2019-02-18 RX ADMIN — IPRATROPIUM BROMIDE 0.5 MG: 0.5 SOLUTION RESPIRATORY (INHALATION) at 08:36

## 2019-02-18 RX ADMIN — MIDAZOLAM 2 MG: 1 INJECTION INTRAMUSCULAR; INTRAVENOUS at 17:20

## 2019-02-18 RX ADMIN — FENTANYL CITRATE 25 MCG: 50 INJECTION, SOLUTION INTRAMUSCULAR; INTRAVENOUS at 17:27

## 2019-02-18 RX ADMIN — MIDAZOLAM 2 MG: 1 INJECTION INTRAMUSCULAR; INTRAVENOUS at 17:13

## 2019-02-18 RX ADMIN — PANTOPRAZOLE SODIUM 40 MG: 40 TABLET, DELAYED RELEASE ORAL at 06:19

## 2019-02-18 RX ADMIN — MIDAZOLAM 2 MG: 1 INJECTION INTRAMUSCULAR; INTRAVENOUS at 17:51

## 2019-02-18 RX ADMIN — ATORVASTATIN CALCIUM 80 MG: 80 TABLET, FILM COATED ORAL at 20:31

## 2019-02-18 RX ADMIN — GUAIFENESIN 600 MG: 600 TABLET, EXTENDED RELEASE ORAL at 20:31

## 2019-02-18 RX ADMIN — SODIUM CHLORIDE 100 ML/HR: 0.9 INJECTION, SOLUTION INTRAVENOUS at 17:03

## 2019-02-18 RX ADMIN — AMLODIPINE BESYLATE 5 MG: 5 TABLET ORAL at 08:10

## 2019-02-18 RX ADMIN — CLOPIDOGREL BISULFATE 300 MG: 75 TABLET ORAL at 11:49

## 2019-02-18 RX ADMIN — LEVALBUTEROL 1.25 MG: 1.25 SOLUTION, CONCENTRATE RESPIRATORY (INHALATION) at 01:10

## 2019-02-18 RX ADMIN — POTASSIUM CHLORIDE 20 MEQ: 1500 TABLET, EXTENDED RELEASE ORAL at 10:44

## 2019-02-18 RX ADMIN — LEVALBUTEROL 1.25 MG: 1.25 SOLUTION, CONCENTRATE RESPIRATORY (INHALATION) at 19:33

## 2019-02-18 RX ADMIN — FENTANYL CITRATE 25 MCG: 50 INJECTION, SOLUTION INTRAMUSCULAR; INTRAVENOUS at 17:48

## 2019-02-18 RX ADMIN — IPRATROPIUM BROMIDE 0.5 MG: 0.5 SOLUTION RESPIRATORY (INHALATION) at 14:31

## 2019-02-18 RX ADMIN — MIDAZOLAM 1 MG: 1 INJECTION INTRAMUSCULAR; INTRAVENOUS at 17:30

## 2019-02-18 RX ADMIN — FENTANYL CITRATE 50 MCG: 50 INJECTION, SOLUTION INTRAMUSCULAR; INTRAVENOUS at 17:20

## 2019-02-18 NOTE — PLAN OF CARE
Problem: Potential for Falls  Goal: Patient will remain free of falls  Description  INTERVENTIONS:  - Assess patient frequently for physical needs  -  Identify cognitive and physical deficits and behaviors that affect risk of falls    -  [place] fall precautions as indicated by assessment   - Educate patient/family on patient safety including physical limitations  - Instruct patient to call for assistance with activity based on assessment  - Modify environment to reduce risk of injury  - Consider OT/PT consult to assist with strengthening/mobility  Outcome: Progressing     Problem: PAIN - ADULT  Goal: Verbalizes/displays adequate comfort level or baseline comfort level  Description  Interventions:  - Encourage patient to monitor pain and request assistance  - Assess pain using appropriate pain scale  - Administer analgesics based on type and severity of pain and evaluate response  - Implement non-pharmacological measures as appropriate and evaluate response  - Consider cultural and social influences on pain and pain management  - Notify physician/advanced practitioner if interventions unsuccessful or patient reports new pain  Outcome: Progressing     Problem: INFECTION - ADULT  Goal: Absence or prevention of progression during hospitalization  Description  INTERVENTIONS:  - Assess and monitor for signs and symptoms of infection  - Monitor lab/diagnostic results  - Monitor all insertion sites, i e  indwelling lines, tubes, and drains  - Monitor endotracheal (as able) and nasal secretions for changes in amount and color  - [place] appropriate cooling/warming therapies per order  - Administer medications as ordered  - Instruct and encourage patient and family to use good hand hygiene technique  - Identify and instruct in appropriate isolation precautions for identified infection/condition  Outcome: Progressing     Problem: Knowledge Deficit  Goal: Patient/family/caregiver demonstrates understanding of disease process, treatment plan, medications, and discharge instructions  Description  Complete learning assessment and assess knowledge base    Interventions:  - Provide teaching at level of understanding  - Provide teaching via preferred learning methods  Outcome: Progressing     Problem: RESPIRATORY - ADULT  Goal: Achieves optimal ventilation and oxygenation  Description  INTERVENTIONS:  - Assess for changes in respiratory status  - Assess for changes in mentation and behavior  - Position to facilitate oxygenation and minimize respiratory effort  - Oxygen administration by appropriate delivery method based on oxygen saturation (per order) or ABGs  - Initiate smoking cessation education as indicated  - Encourage broncho-pulmonary hygiene including cough, deep breathe, Incentive Spirometry  - Assess the need for suctioning and aspirate as needed  - Assess and instruct to report SOB or any respiratory difficulty  - Respiratory Therapy support as indicated  Outcome: Progressing     Problem: METABOLIC, FLUID AND ELECTROLYTES - ADULT  Goal: Glucose maintained within target range  Description  INTERVENTIONS:  - Monitor Blood Glucose as ordered  - Assess for signs and symptoms of hyperglycemia and hypoglycemia  - Administer ordered medications to maintain glucose within target range  - Assess nutritional intake and initiate nutrition service referral as needed  Outcome: Progressing     Problem: Prexisting or High Potential for Compromised Skin Integrity  Goal: Skin integrity is maintained or improved  Description  INTERVENTIONS:  - Identify patients at risk for skin breakdown  - Assess and monitor skin integrity  - Assess and monitor nutrition and hydration status  - Monitor labs (i e  albumin)  - Assess for incontinence   - Turn and reposition patient  - Assist with mobility/ambulation  - Relieve pressure over bony prominences  - Avoid friction and shearing  - Provide appropriate hygiene as needed including keeping skin clean and dry  - Evaluate need for skin moisturizer/barrier cream  - Collaborate with interdisciplinary team (i e  Nutrition, Rehabilitation, etc )   - Patient/family teaching  Outcome: Progressing     Problem: DISCHARGE PLANNING - CARE MANAGEMENT  Goal: Discharge to post-acute care or home with appropriate resources  Description  INTERVENTIONS:  - Conduct assessment to determine patient/family and health care team treatment goals, and need for post-acute services based on payer coverage, community resources, and patient preferences, and barriers to discharge  - Address psychosocial, clinical, and financial barriers to discharge as identified in assessment in conjunction with the patient/family and health care team  - Arrange appropriate level of post-acute services according to patient?s   needs and preference and payer coverage in collaboration with the physician and health care team  - Communicate with and update the patient/family, physician, and health care team regarding progress on the discharge plan  - Arrange appropriate transportation to post-acute venues  Outcome: Progressing

## 2019-02-18 NOTE — ASSESSMENT & PLAN NOTE
Continue Demadex 20 mg  Appreciate Cardiology recommendations  Continue daily weights, I&Os    Continue low-salt diet

## 2019-02-18 NOTE — PROGRESS NOTES
Progress Note - Cardiology   Ce Araya 58 y o  female MRN: [id number]  Unit/Bed#: [facility] [address] 2 [address] 87 227-01 Encounter: [id number]      Assessment/Recommendations/Discussion:     1  Acute COPD exacerbation, likely secondary to acute bronchitis  2  Accelerated hypertension  3  Morbid obesity  4  Dyslipidemia  5  Type 2 NSTEMI secondary to COPD/bronchitis/hypertension, probable underlying CAD, peak troponin 4 1      · In light of elevated troponin, multiple risk factors for coronary disease, in anticipation of hip surgery in the near future, have recommended ischemic evaluation  Suspect noninvasive testing such as nuclear stress test, coronary CTA, and stress echocardiogram will be significantly limited in this morbidly obese patient  Extensively discussed indications, risks, and benefits of coronary angiography, and patient is agreeable to proceeding  Will schedule for tomorrow  300 mg clopidogrel tomorrow morning  Continue low-dose aspirin          Subjective:  Patient seen and examined, feels better, no further shortness of breath       Physical Exam:  GEN:  NAD  HEENT:  MMM, NCAT, pink conjunctiva, EOMI, nonicteric sclera  CV:  NO JVD/HJR, RR, NO M/R/G, +S1/S2, NO PARASTERNAL HEAVE/THRILL, minimal LE EDEMA, NO HEPATIC SYSTOLIC PULSATION, WARM EXTREMITIES  RESP:  CTAB/L, but distant breath sounds  ABD:  SOFT, NT, NO GROSS ORGANOMEGALY        Vitals:   /69 (BP Location: Right arm)   Pulse 93   Temp 98 1 °F (36 7 °C) (Temporal)   Resp 20   Wt 133 kg (292 lb 12 3 oz)   SpO2 97%   BMI 61 19 kg/m²   Vitals:    02/17/19 0515 02/18/19 0600   Weight: 130 kg (287 lb 11 2 oz) 133 kg (292 lb 12 3 oz)       Intake/Output Summary (Last 24 hours) at 2/18/2019 1105  Last data filed at 2/18/2019 1001  Gross per 24 hour   Intake    Output 900 ml   Net -900 ml         Lab Results:  Results from last 7 days   Lab Units 02/18/19  0605   WBC Thousand/uL 17 63*   HEMOGLOBIN g/dL 12 9   HEMATOCRIT % 39 7   PLATELETS Thousands/uL 242     Results from last 7 days   Lab Units 02/18/19  0605   POTASSIUM mmol/L 3 3*   CHLORIDE mmol/L 106   CO2 mmol/L 32   BUN mg/dL 26*   CREATININE mg/dL 1 06   CALCIUM mg/dL 8 7     Results from last 7 days   Lab Units 02/18/19  0605   POTASSIUM mmol/L 3 3*   CHLORIDE mmol/L 106   CO2 mmol/L 32   BUN mg/dL 26*   CREATININE mg/dL 1 06   CALCIUM mg/dL 8 7           Medications:    Current Facility-Administered Medications:     acetaminophen (TYLENOL) tablet 650 mg, 650 mg, Oral, Q6H PRN, Sharlene Ambron, DO, 650 mg at 02/17/19 1842    albuterol (PROVENTIL HFA,VENTOLIN HFA) inhaler 2 puff, 2 puff, Inhalation, Q4H PRN, Sharlene Ambron, DO, 2 puff at 02/13/19 2225    amLODIPine (NORVASC) tablet 5 mg, 5 mg, Oral, Daily, Sharlene Ambron, DO, 5 mg at 02/18/19 0810    aspirin (ECOTRIN LOW STRENGTH) EC tablet 81 mg, 81 mg, Oral, Daily, Sharlene Ambron, DO, 81 mg at 02/18/19 0809    atorvastatin (LIPITOR) tablet 80 mg, 80 mg, Oral, Daily With Dinner, Rujul Cabrera, DO, 80 mg at 02/17/19 1843    [START ON 2/19/2019] clopidogrel (PLAVIX) tablet 300 mg, 300 mg, Oral, Once, Rujul Cabrera, DO    enoxaparin (LOVENOX) subcutaneous injection 40 mg, 40 mg, Subcutaneous, Daily, Sharlene Ambron, DO, 40 mg at 02/17/19 0927    fluticasone-vilanterol (BREO ELLIPTA) 200-25 MCG/INH inhaler 1 puff, 1 puff, Inhalation, Daily, Sharlene Ambron, DO, 1 puff at 02/18/19 0811    guaiFENesin (MUCINEX) 12 hr tablet 600 mg, 600 mg, Oral, Q12H [facility], Sharlene Ambron, DO, 600 mg at 02/17/19 2046    guaiFENesin (ROBITUSSIN) oral solution 200 mg, 200 mg, Oral, Q4H PRN, Sharlene Ambron, DO, 200 mg at 02/16/19 2131    insulin lispro (HumaLOG) 100 units/mL subcutaneous injection 1-6 Units, 1-6 Units, Subcutaneous, HS, Sharlene Root DO, 3 Units at 02/17/19 2153    insulin lispro (HumaLOG) 100 units/mL subcutaneous injection 2-12 Units, 2-12 Units, Subcutaneous, TID AC, 2 Units at 02/17/19 1844 **AND** Fingerstick Glucose (POCT), , , TID AC, Sharlene Ambron, DO    ipratropium (ATROVENT) 0 02 % inhalation solution 0 5 mg, 0 5 mg, Nebulization, Q6H, Sharlene Ambron, DO, 0 5 mg at 02/18/19 0836    levalbuterol (XOPENEX) inhalation solution 1 25 mg, 1 25 mg, Nebulization, Q6H, 1 25 mg at 02/18/19 0836 **AND** [DISCONTINUED] sodium chloride 0 9 % inhalation solution 3 mL, 3 mL, Nebulization, TID, Sharlene Ambron, DO    nitroglycerin (NITROSTAT) SL tablet 0 4 mg, 0 4 mg, Sublingual, Q5 Min PRN, Sharlene Ambron, DO    ondansetron (ZOFRAN) injection 4 mg, 4 mg, Intravenous, Q4H PRN, Sharlene Ambron, DO    pantoprazole (PROTONIX) EC tablet 40 mg, 40 mg, Oral, Early Morning, Sharlene Ambron, DO, 40 mg at 02/18/19 0619    predniSONE tablet 30 mg, 30 mg, Oral, Daily, Sharlene Ambron, DO, Stopped at 02/18/19 0807    torsemide (DEMADEX) tablet 20 mg, 20 mg, Oral, Daily, Rujul Cabrera, DO, 20 mg at 02/18/19 0809    This note was completed in part utilizing M-Modal Fluency Direct Software  Grammatical errors, random word insertions, spelling mistakes, and incomplete sentences may be an occasional consequence of this system secondary to software limitations, ambient noise, and hardware issues  If you have any questions or concerns about the content, text, or information contained within the body of this dictation, please contact the provider for clarification

## 2019-02-18 NOTE — ASSESSMENT & PLAN NOTE
Patient has been seen and evaluated by Cardiology  Due to elevated troponin most recent troponin of 3 55 on 02/14, patient will be taken for cardiac catheterization tomorrow  Patient has risk factors for coronary artery disease-and since patient will require hip surgery intervention, she will need some form of noninvasive testing as nuclear stress testing, coronary CTA, and stress echocardiogram will be significantly limited in this morbidly obese patient  Will receive 300 mg Plavix in am tomorrow  Keep NPO after midnight

## 2019-02-18 NOTE — PROGRESS NOTES
Pt  Sent for Cardiac Cath  Pt  Sent with paperwork  Report called into [facility] [address]  All personal items and meds delivered to pt  New room

## 2019-02-18 NOTE — ASSESSMENT & PLAN NOTE
Lab Results   Component Value Date    HGBA1C 6 5 (H) 02/12/2019       Recent Labs     02/17/19  2151 02/18/19  0725 02/18/19  1120 02/18/19  1626   POCGLU 254* 91 97 162*       Blood Sugar Average: Last 72 hrs:  (P) 378 [id number]   Continue sliding scale insulin, diabetic diet, Accu-Cheks q i d  Dave Le

## 2019-02-18 NOTE — PLAN OF CARE
Problem: Potential for Falls  Goal: Patient will remain free of falls  Description  INTERVENTIONS:  - Assess patient frequently for physical needs  -  Identify cognitive and physical deficits and behaviors that affect risk of falls    -  [place] fall precautions as indicated by assessment   - Educate patient/family on patient safety including physical limitations  - Instruct patient to call for assistance with activity based on assessment  - Modify environment to reduce risk of injury  - Consider OT/PT consult to assist with strengthening/mobility  Outcome: Progressing     Problem: PAIN - ADULT  Goal: Verbalizes/displays adequate comfort level or baseline comfort level  Description  Interventions:  - Encourage patient to monitor pain and request assistance  - Assess pain using appropriate pain scale  - Administer analgesics based on type and severity of pain and evaluate response  - Implement non-pharmacological measures as appropriate and evaluate response  - Consider cultural and social influences on pain and pain management  - Notify physician/advanced practitioner if interventions unsuccessful or patient reports new pain  Outcome: Progressing     Problem: INFECTION - ADULT  Goal: Absence or prevention of progression during hospitalization  Description  INTERVENTIONS:  - Assess and monitor for signs and symptoms of infection  - Monitor lab/diagnostic results  - Monitor all insertion sites, i e  indwelling lines, tubes, and drains  - Monitor endotracheal (as able) and nasal secretions for changes in amount and color  - [place] appropriate cooling/warming therapies per order  - Administer medications as ordered  - Instruct and encourage patient and family to use good hand hygiene technique  - Identify and instruct in appropriate isolation precautions for identified infection/condition  Outcome: Progressing     Problem: Knowledge Deficit  Goal: Patient/family/caregiver demonstrates understanding of disease process, treatment plan, medications, and discharge instructions  Description  Complete learning assessment and assess knowledge base    Interventions:  - Provide teaching at level of understanding  - Provide teaching via preferred learning methods  Outcome: Progressing     Problem: RESPIRATORY - ADULT  Goal: Achieves optimal ventilation and oxygenation  Description  INTERVENTIONS:  - Assess for changes in respiratory status  - Assess for changes in mentation and behavior  - Position to facilitate oxygenation and minimize respiratory effort  - Oxygen administration by appropriate delivery method based on oxygen saturation (per order) or ABGs  - Initiate smoking cessation education as indicated  - Encourage broncho-pulmonary hygiene including cough, deep breathe, Incentive Spirometry  - Assess the need for suctioning and aspirate as needed  - Assess and instruct to report SOB or any respiratory difficulty  - Respiratory Therapy support as indicated  Outcome: Progressing     Problem: METABOLIC, FLUID AND ELECTROLYTES - ADULT  Goal: Glucose maintained within target range  Description  INTERVENTIONS:  - Monitor Blood Glucose as ordered  - Assess for signs and symptoms of hyperglycemia and hypoglycemia  - Administer ordered medications to maintain glucose within target range  - Assess nutritional intake and initiate nutrition service referral as needed  Outcome: Progressing     Problem: Prexisting or High Potential for Compromised Skin Integrity  Goal: Skin integrity is maintained or improved  Description  INTERVENTIONS:  - Identify patients at risk for skin breakdown  - Assess and monitor skin integrity  - Assess and monitor nutrition and hydration status  - Monitor labs (i e  albumin)  - Assess for incontinence   - Turn and reposition patient  - Assist with mobility/ambulation  - Relieve pressure over bony prominences  - Avoid friction and shearing  - Provide appropriate hygiene as needed including keeping skin clean and dry  - Evaluate need for skin moisturizer/barrier cream  - Collaborate with interdisciplinary team (i e  Nutrition, Rehabilitation, etc )   - Patient/family teaching  Outcome: Progressing     Problem: DISCHARGE PLANNING - CARE MANAGEMENT  Goal: Discharge to post-acute care or home with appropriate resources  Description  INTERVENTIONS:  - Conduct assessment to determine patient/family and health care team treatment goals, and need for post-acute services based on payer coverage, community resources, and patient preferences, and barriers to discharge  - Address psychosocial, clinical, and financial barriers to discharge as identified in assessment in conjunction with the patient/family and health care team  - Arrange appropriate level of post-acute services according to patient?s   needs and preference and payer coverage in collaboration with the physician and health care team  - Communicate with and update the patient/family, physician, and health care team regarding progress on the discharge plan  - Arrange appropriate transportation to post-acute venues  Outcome: Progressing

## 2019-02-18 NOTE — PROGRESS NOTES
Progress Note - Shagufta Wolf 1955, 58 y o  female MRN: [id number]    Unit/Bed#: Kimi Pryor 2 [address] 87 227-01 Encounter: [id number]    Primary Care Provider: Lieutenant Ernie MD   Date and time admitted to hospital: 2/12/2019  9:27 AM        * COPD with acute exacerbation [facility])  Assessment & Plan  Acute COPD exacerbation is resolving  Continue bronchodilators, Dulera, prednisone taper 30 mg  Non-ST elevation myocardial infarction (NSTEMI) [facility])  Assessment & Plan  Patient has been seen and evaluated by Cardiology  Due to elevated troponin most recent troponin of 3 55 on 02/14, patient will be taken for cardiac catheterization tomorrow  Patient has risk factors for coronary artery disease-and since patient will require hip surgery intervention, she will need some form of noninvasive testing as nuclear stress testing, coronary CTA, and stress echocardiogram will be significantly limited in this morbidly obese patient  Will receive 300 mg Plavix in am tomorrow  Keep NPO after midnight  Acute diastolic CHF (congestive heart failure) (HCC)  Assessment & Plan  Continue Demadex 20 mg  Appreciate Cardiology recommendations  Continue daily weights, I&Os  Continue low-salt diet    Hypertension  Assessment & Plan  Continue blood pressure control with amlodipine 5 mg daily  Type 2 diabetes mellitus without complication, with long-term current use of insulin [facility])  Assessment & Plan  Lab Results   Component Value Date    HGBA1C 6 5 (H) 02/12/2019       Recent Labs     02/17/19  2151 02/18/19  0725 02/18/19  1120 02/18/19  1626   POCGLU 254* 91 97 162*       Blood Sugar Average: Last 72 hrs:  (P) 659 [id number]   Continue sliding scale insulin, diabetic diet, Accu-Cheks q i d  Hyperlipidemia  Assessment & Plan  Continue Lipitor 80 mg daily  Morbid obesity [facility])  Assessment & Plan  Patient will need weight loss through dietary modification      GERD (gastroesophageal reflux disease)  Assessment & Plan  Continue Protonix 40 mg daily  VTE Pharmacologic Prophylaxis:   Pharmacologic: Enoxaparin (Lovenox)  Mechanical VTE Prophylaxis in Place: No    Patient Centered Rounds: I have performed bedside rounds with nursing staff today  Discussions with Specialists or Other Care Team Provider:  Yes    Education and Discussions with Family / Patient:  Yes    Time Spent for Care: 20 minutes  More than 50% of total time spent on counseling and coordination of care as described above  Current Length of Stay: 6 day(s)    Current Patient Status: Inpatient   Certification Statement: The patient will continue to require additional inpatient hospital stay due to Requires cardiac catheterization    Discharge Plan: To be discharged to home with [facility] when medically stable    Code Status: Level 1 - Full Code      Subjective:   Patient denies any chest pain or shortness of breath currently  Objective:     Vitals:   Temp (24hrs), Av 7 °F (36 5 °C), Min:97 1 °F (36 2 °C), Max:98 1 °F (36 7 °C)    Temp:  [97 1 °F (36 2 °C)-98 1 °F (36 7 °C)] 97 8 °F (36 6 °C)  HR:  [] 102  Resp:  [19-20] 19  BP: (137-143)/() 138/108  SpO2:  [94 %-99 %] 99 %  Body mass index is 61 19 kg/m²  Input and Output Summary (last 24 hours): Intake/Output Summary (Last 24 hours) at 2019 1758  Last data filed at 2019 1001  Gross per 24 hour   Intake    Output 400 ml   Net -400 ml       Physical Exam:     Physical Exam   Constitutional: She is oriented to person, place, and time  She appears well-developed and well-nourished  No distress  HENT:   Head: Normocephalic and atraumatic  Eyes: Pupils are equal, round, and reactive to light  EOM are normal    Cardiovascular: Normal rate, regular rhythm and normal heart sounds  Pulmonary/Chest: Effort normal  She has no wheezes  Decreased breath sounds bilaterally  Abdominal: Soft   Bowel sounds are normal    Musculoskeletal: She exhibits no edema or tenderness  Neurological: She is alert and oriented to person, place, and time  Skin: Skin is warm and dry  She is not diaphoretic  Additional Data:     Labs:    Results from last 7 days   Lab Units 02/18/19  0605  02/12/19  0954   WBC Thousand/uL 17 63*   < > 10 72*   HEMOGLOBIN g/dL 12 9   < > 13 0   HEMATOCRIT % 39 7   < > 40 1   PLATELETS Thousands/uL 242   < > 262   NEUTROS PCT %  --   --  54   LYMPHS PCT %  --   --  34   MONOS PCT %  --   --  9   EOS PCT %  --   --  3    < > = values in this interval not displayed  Results from last 7 days   Lab Units 02/18/19  0605   SODIUM mmol/L 147*   POTASSIUM mmol/L 3 3*   CHLORIDE mmol/L 106   CO2 mmol/L 32   BUN mg/dL 26*   CREATININE mg/dL 1 06   ANION GAP mmol/L 9   CALCIUM mg/dL 8 7   GLUCOSE RANDOM mg/dL 97         Results from last 7 days   Lab Units 02/18/19  1626 02/18/19  1120 02/18/19  0725 02/17/19  2151 02/17/19  1625 02/17/19  1111 02/17/19  0715 02/16/19  2051 02/16/19  1603 02/16/19  1117 02/16/19  0743 02/15/19  2109   POC GLUCOSE mg/dl 162* 97 91 254* 194* 102 79 125 196* 136 82 175*     Results from last 7 days   Lab Units 02/12/19  0954   HEMOGLOBIN A1C % 6 5*               * I Have Reviewed All Lab Data Listed Above  * Additional Pertinent Lab Tests Reviewed: [facility] Admission Reviewed    Imaging:    Imaging Reports Reviewed Today Include:  None available  Imaging Personally Reviewed by Myself Includes:  None    Recent Cultures (last 7 days):     Results from last 7 days   Lab Units 02/12/19  1344 02/12/19  1342   SPUTUM CULTURE  4+ Growth of   Commensal respiratory conrad only; No significant growth of Staph aureus/MRSA or Pseudomonas aeruginosa    --    GRAM STAIN RESULT  2+ Epithelial cells per low power field*  Rare Polys*  2+ Gram positive cocci in pairs*  1+ Gram negative rods*  --    INFLUENZA B PCR   --  Not Detected   RSV PCR   --  Not Detected       Last 24 Hours Medication List:     Current Facility-Administered Medications:  acetaminophen 650 mg Oral Q6H PRN Sharlene Ambron, DO    albuterol 2 puff Inhalation Q4H PRN Sharlene Ambron, DO    amLODIPine 5 mg Oral Daily Sharlene Ambron, DO    aspirin 81 mg Oral Daily Sharlene Ambron, DO    atorvastatin 80 mg Oral Daily With [employer], DO    enoxaparin 40 mg Subcutaneous Daily Sharlene Ambron, DO    fentanyl citrate (PF)  Intravenous Code/Trauma/Sedation Bryan Suárez MD    fluticasone-vilanterol 1 puff Inhalation Daily Sharlene Ambron, DO    guaiFENesin 600 mg Oral Q12H [facility] Sharlene Ambron, DO    guaiFENesin 200 mg Oral Q4H PRN Sharlene Ambron, DO    heparin (porcine)   Code/Trauma/Sedation Bryan Suárez MD    insulin lispro 1-6 Units Subcutaneous HS Sharlene Ambron, DO    insulin lispro 2-12 Units Subcutaneous TID AC Sharlene Ambron, DO    iohexol  Intravenous Code/Trauma/Sedation Bryan Suárez MD    ipratropium 0 5 mg Nebulization Q6H Sharlene Ambron, DO    levalbuterol 1 25 mg Nebulization Q6H Sharlene Ambron, DO    lidocaine   Code/Trauma/Sedation Bryan Suárez MD    midazolam   Code/Trauma/Sedation Bryan Suárez MD    nitroglycerin 0 4 mg Sublingual Q5 Min PRN Sharlene Ambron, DO    ondansetron 4 mg Intravenous Q4H PRN Sharlene Ambron, DO    pantoprazole 40 mg Oral Early Morning Sharlene Ambron, DO    predniSONE 30 mg Oral Daily Sharlene Ambron, DO    sodium chloride   Code/Trauma/Sedation Continuous Bryan Suárez MD Last Rate: 100 mL/hr (02/18/19 1703)   torsemide 20 mg Oral Daily Rulanetet Cabrera DO         Today, Patient Was Seen By: Robin Nino MD    ** Please Note: Dictation voice to text software may have been used in the creation of this document   **

## 2019-02-18 NOTE — PLAN OF CARE
Problem: Potential for Falls  Goal: Patient will remain free of falls  Description  INTERVENTIONS:  - Assess patient frequently for physical needs  -  Identify cognitive and physical deficits and behaviors that affect risk of falls    -  [place] fall precautions as indicated by assessment   - Educate patient/family on patient safety including physical limitations  - Instruct patient to call for assistance with activity based on assessment  - Modify environment to reduce risk of injury  - Consider OT/PT consult to assist with strengthening/mobility  2/18/2019 0739 by Mayte Mora RN  Outcome: Progressing  2/18/2019 0737 by Mayte Mora RN  Outcome: Progressing     Problem: PAIN - ADULT  Goal: Verbalizes/displays adequate comfort level or baseline comfort level  Description  Interventions:  - Encourage patient to monitor pain and request assistance  - Assess pain using appropriate pain scale  - Administer analgesics based on type and severity of pain and evaluate response  - Implement non-pharmacological measures as appropriate and evaluate response  - Consider cultural and social influences on pain and pain management  - Notify physician/advanced practitioner if interventions unsuccessful or patient reports new pain  2/18/2019 0739 by Mayte Mora RN  Outcome: Progressing  2/18/2019 0737 by Mayte Mora RN  Outcome: Progressing     Problem: INFECTION - ADULT  Goal: Absence or prevention of progression during hospitalization  Description  INTERVENTIONS:  - Assess and monitor for signs and symptoms of infection  - Monitor lab/diagnostic results  - Monitor all insertion sites, i e  indwelling lines, tubes, and drains  - Monitor endotracheal (as able) and nasal secretions for changes in amount and color  - [place] appropriate cooling/warming therapies per order  - Administer medications as ordered  - Instruct and encourage patient and family to use good hand hygiene technique  - Identify and instruct in appropriate isolation precautions for identified infection/condition  2/18/2019 0739 by Grupo Ruvalcaba RN  Outcome: Progressing  2/18/2019 0737 by Grupo Ruvalcaba RN  Outcome: Progressing     Problem: Knowledge Deficit  Goal: Patient/family/caregiver demonstrates understanding of disease process, treatment plan, medications, and discharge instructions  Description  Complete learning assessment and assess knowledge base    Interventions:  - Provide teaching at level of understanding  - Provide teaching via preferred learning methods  2/18/2019 0739 by Grupo Ruvalcaba RN  Outcome: Progressing  2/18/2019 0737 by Grupo Ruvalcaba RN  Outcome: Progressing     Problem: RESPIRATORY - ADULT  Goal: Achieves optimal ventilation and oxygenation  Description  INTERVENTIONS:  - Assess for changes in respiratory status  - Assess for changes in mentation and behavior  - Position to facilitate oxygenation and minimize respiratory effort  - Oxygen administration by appropriate delivery method based on oxygen saturation (per order) or ABGs  - Initiate smoking cessation education as indicated  - Encourage broncho-pulmonary hygiene including cough, deep breathe, Incentive Spirometry  - Assess the need for suctioning and aspirate as needed  - Assess and instruct to report SOB or any respiratory difficulty  - Respiratory Therapy support as indicated  2/18/2019 0739 by Grupo Ruvalcaba RN  Outcome: Progressing  2/18/2019 0737 by Grupo Ruvalcaba RN  Outcome: Progressing     Problem: METABOLIC, FLUID AND ELECTROLYTES - ADULT  Goal: Glucose maintained within target range  Description  INTERVENTIONS:  - Monitor Blood Glucose as ordered  - Assess for signs and symptoms of hyperglycemia and hypoglycemia  - Administer ordered medications to maintain glucose within target range  - Assess nutritional intake and initiate nutrition service referral as needed  2/18/2019 0739 by Grupo Ruvalcaba RN  Outcome: Progressing  2/18/2019 0737 by Terry Kamara RN  Outcome: Progressing     Problem: Prexisting or High Potential for Compromised Skin Integrity  Goal: Skin integrity is maintained or improved  Description  INTERVENTIONS:  - Identify patients at risk for skin breakdown  - Assess and monitor skin integrity  - Assess and monitor nutrition and hydration status  - Monitor labs (i e  albumin)  - Assess for incontinence   - Turn and reposition patient  - Assist with mobility/ambulation  - Relieve pressure over bony prominences  - Avoid friction and shearing  - Provide appropriate hygiene as needed including keeping skin clean and dry  - Evaluate need for skin moisturizer/barrier cream  - Collaborate with interdisciplinary team (i e  Nutrition, Rehabilitation, etc )   - Patient/family teaching  2/18/2019 0739 by Terry Kamara RN  Outcome: Progressing  2/18/2019 0737 by Terry Kamara RN  Outcome: Progressing     Problem: DISCHARGE PLANNING - CARE MANAGEMENT  Goal: Discharge to post-acute care or home with appropriate resources  Description  INTERVENTIONS:  - Conduct assessment to determine patient/family and health care team treatment goals, and need for post-acute services based on payer coverage, community resources, and patient preferences, and barriers to discharge  - Address psychosocial, clinical, and financial barriers to discharge as identified in assessment in conjunction with the patient/family and health care team  - Arrange appropriate level of post-acute services according to patient?s   needs and preference and payer coverage in collaboration with the physician and health care team  - Communicate with and update the patient/family, physician, and health care team regarding progress on the discharge plan  - Arrange appropriate transportation to post-acute venues  2/18/2019 0739 by Terry Kamara RN  Outcome: Progressing  2/18/2019 0737 by Terry Kamara RN  Outcome: Progressing

## 2019-02-18 NOTE — NURSING NOTE
Pt arrived to [place] [address] 4 post cath at 1815  Agree with previous RN assessment, pt resting comfortably in bed with call bell in reach  Puncture site at groin clean dry intact, pt denies any pain  Will continue to monitor closely

## 2019-02-19 LAB
ANION GAP SERPL CALCULATED.3IONS-SCNC: 6 MMOL/L (ref 4–13)
BASOPHILS # BLD AUTO: 0.01 THOUSANDS/ΜL (ref 0–0.1)
BASOPHILS NFR BLD AUTO: 0 % (ref 0–1)
BUN SERPL-MCNC: 23 MG/DL (ref 5–25)
CALCIUM SERPL-MCNC: 8.9 MG/DL (ref 8.3–10.1)
CHLORIDE SERPL-SCNC: 107 MMOL/L (ref 100–108)
CO2 SERPL-SCNC: 32 MMOL/L (ref 21–32)
CREAT SERPL-MCNC: 1.14 MG/DL (ref 0.6–1.3)
EOSINOPHIL # BLD AUTO: 0.01 THOUSAND/ΜL (ref 0–0.61)
EOSINOPHIL NFR BLD AUTO: 0 % (ref 0–6)
ERYTHROCYTE [DISTWIDTH] IN BLOOD BY AUTOMATED COUNT: 15.7 % (ref 11.6–15.1)
GFR SERPL CREATININE-BSD FRML MDRD: 59 ML/MIN/1.73SQ M
GLUCOSE SERPL-MCNC: 107 MG/DL (ref 65–140)
GLUCOSE SERPL-MCNC: 133 MG/DL (ref 65–140)
GLUCOSE SERPL-MCNC: 200 MG/DL (ref 65–140)
GLUCOSE SERPL-MCNC: 97 MG/DL (ref 65–140)
HCT VFR BLD AUTO: 40.3 % (ref 34.8–46.1)
HGB BLD-MCNC: 12.9 G/DL (ref 11.5–15.4)
IMM GRANULOCYTES # BLD AUTO: 0.04 THOUSAND/UL (ref 0–0.2)
IMM GRANULOCYTES NFR BLD AUTO: 0 % (ref 0–2)
LYMPHOCYTES # BLD AUTO: 4.57 THOUSANDS/ΜL (ref 0.6–4.47)
LYMPHOCYTES NFR BLD AUTO: 30 % (ref 14–44)
MCH RBC QN AUTO: 28.7 PG (ref 26.8–34.3)
MCHC RBC AUTO-ENTMCNC: 32 G/DL (ref 31.4–37.4)
MCV RBC AUTO: 90 FL (ref 82–98)
MONOCYTES # BLD AUTO: 0.91 THOUSAND/ΜL (ref 0.17–1.22)
MONOCYTES NFR BLD AUTO: 6 % (ref 4–12)
NEUTROPHILS # BLD AUTO: 9.6 THOUSANDS/ΜL (ref 1.85–7.62)
NEUTS SEG NFR BLD AUTO: 64 % (ref 43–75)
NRBC BLD AUTO-RTO: 0 /100 WBCS
PLATELET # BLD AUTO: 244 THOUSANDS/UL (ref 149–390)
PMV BLD AUTO: 9.9 FL (ref 8.9–12.7)
POTASSIUM SERPL-SCNC: 3.7 MMOL/L (ref 3.5–5.3)
RBC # BLD AUTO: 4.49 MILLION/UL (ref 3.81–5.12)
SODIUM SERPL-SCNC: 145 MMOL/L (ref 136–145)
WBC # BLD AUTO: 15.14 THOUSAND/UL (ref 4.31–10.16)

## 2019-02-19 PROCEDURE — 94760 N-INVAS EAR/PLS OXIMETRY 1: CPT

## 2019-02-19 PROCEDURE — 82948 REAGENT STRIP/BLOOD GLUCOSE: CPT

## 2019-02-19 PROCEDURE — 85025 COMPLETE CBC W/AUTO DIFF WBC: CPT | Performed by: FAMILY MEDICINE

## 2019-02-19 PROCEDURE — 80048 BASIC METABOLIC PNL TOTAL CA: CPT | Performed by: FAMILY MEDICINE

## 2019-02-19 PROCEDURE — 94640 AIRWAY INHALATION TREATMENT: CPT

## 2019-02-19 PROCEDURE — 99232 SBSQ HOSP IP/OBS MODERATE 35: CPT | Performed by: INTERNAL MEDICINE

## 2019-02-19 PROCEDURE — 99232 SBSQ HOSP IP/OBS MODERATE 35: CPT | Performed by: FAMILY MEDICINE

## 2019-02-19 RX ADMIN — CLOPIDOGREL BISULFATE 75 MG: 75 TABLET ORAL at 10:04

## 2019-02-19 RX ADMIN — LEVALBUTEROL 1.25 MG: 1.25 SOLUTION, CONCENTRATE RESPIRATORY (INHALATION) at 19:33

## 2019-02-19 RX ADMIN — ENOXAPARIN SODIUM 40 MG: 60 INJECTION SUBCUTANEOUS at 10:08

## 2019-02-19 RX ADMIN — ACETAMINOPHEN 650 MG: 325 TABLET, FILM COATED ORAL at 19:45

## 2019-02-19 RX ADMIN — ATORVASTATIN CALCIUM 80 MG: 80 TABLET, FILM COATED ORAL at 15:58

## 2019-02-19 RX ADMIN — IPRATROPIUM BROMIDE 0.5 MG: 0.5 SOLUTION RESPIRATORY (INHALATION) at 13:57

## 2019-02-19 RX ADMIN — PANTOPRAZOLE SODIUM 40 MG: 40 TABLET, DELAYED RELEASE ORAL at 05:21

## 2019-02-19 RX ADMIN — INSULIN LISPRO 4 UNITS: 100 INJECTION, SOLUTION INTRAVENOUS; SUBCUTANEOUS at 16:46

## 2019-02-19 RX ADMIN — LEVALBUTEROL 1.25 MG: 1.25 SOLUTION, CONCENTRATE RESPIRATORY (INHALATION) at 09:31

## 2019-02-19 RX ADMIN — PREDNISONE 30 MG: 5 TABLET ORAL at 10:02

## 2019-02-19 RX ADMIN — IPRATROPIUM BROMIDE 0.5 MG: 0.5 SOLUTION RESPIRATORY (INHALATION) at 09:32

## 2019-02-19 RX ADMIN — FLUTICASONE FUROATE AND VILANTEROL TRIFENATATE 1 PUFF: 200; 25 POWDER RESPIRATORY (INHALATION) at 10:09

## 2019-02-19 RX ADMIN — INSULIN LISPRO 4 UNITS: 100 INJECTION, SOLUTION INTRAVENOUS; SUBCUTANEOUS at 10:05

## 2019-02-19 RX ADMIN — ACETAMINOPHEN 650 MG: 325 TABLET, FILM COATED ORAL at 10:05

## 2019-02-19 RX ADMIN — ASPIRIN 81 MG: 81 TABLET, COATED ORAL at 10:04

## 2019-02-19 RX ADMIN — LEVALBUTEROL 1.25 MG: 1.25 SOLUTION, CONCENTRATE RESPIRATORY (INHALATION) at 13:57

## 2019-02-19 RX ADMIN — GUAIFENESIN 600 MG: 600 TABLET, EXTENDED RELEASE ORAL at 10:04

## 2019-02-19 RX ADMIN — AMLODIPINE BESYLATE 5 MG: 5 TABLET ORAL at 09:59

## 2019-02-19 RX ADMIN — GUAIFENESIN 600 MG: 600 TABLET, EXTENDED RELEASE ORAL at 21:45

## 2019-02-19 RX ADMIN — IPRATROPIUM BROMIDE 0.5 MG: 0.5 SOLUTION RESPIRATORY (INHALATION) at 19:33

## 2019-02-19 RX ADMIN — TORSEMIDE 20 MG: 20 TABLET ORAL at 10:04

## 2019-02-19 NOTE — ASSESSMENT & PLAN NOTE
Continue Demadex 20 mg daily  Appreciate Cardiology recommendations  Continue daily weights, I&Os    Continue low-salt diet

## 2019-02-19 NOTE — PROGRESS NOTES
Progress Note - Cardiology   Ce Araya 58 y o  female MRN: [id number]  Unit/Bed#: E4 -01 Encounter: [id number]      Assessment/Recommendations/Discussion:   1  Acute COPD exacerbation, likely secondary to acute bronchitis  2  Accelerated hypertension  3  Morbid obesity  4  Dyslipidemia  5  Type 2 NSTEMI secondary to COPD/bronchitis/hypertension, underlying CAD, peak troponin 4 1  6  CAD s/p PCI/ALTHEA to LAD 2/18/19    · Continue DAPT  · Continue high dose statin, low dose torsemide  · Continue amlodipine for BP control  · F/U with our office in 1 month  · No BB in light of recent COPD exacerbation  · Will s/o, please call if questions      Subjective: Pt seen/examined    Feels well, no CP      Physical Exam:  GEN:  NAD  HEENT:  MMM, NCAT, pink conjunctiva, EOMI, nonicteric sclera  CV:  NO JVD/HJR, RR, NO M/R/G, +S1/S2, NO PARASTERNAL HEAVE/THRILL, NO LE EDEMA, NO HEPATIC SYSTOLIC PULSATION, WARM EXTREMITIES  RESP:  CTAB/L  ABD:  SOFT, NT, NO GROSS ORGANOMEGALY        Vitals:   /90   Pulse 94   Temp 97 7 °F (36 5 °C) (Temporal)   Resp 18   Wt 128 kg (282 lb 3 oz)   SpO2 98%   BMI 58 98 kg/m²   Vitals:    02/18/19 0600 02/19/19 0600   Weight: 133 kg (292 lb 12 3 oz) 128 kg (282 lb 3 oz)       Intake/Output Summary (Last 24 hours) at 2/19/2019 1134  Last data filed at 2/19/2019 0856  Gross per 24 hour   Intake 1561 25 ml   Output 1050 ml   Net 511 25 ml       TELEMETRY: SR  Lab Results:  Results from last 7 days   Lab Units 02/19/19  0530   WBC Thousand/uL 15 14*   HEMOGLOBIN g/dL 12 9   HEMATOCRIT % 40 3   PLATELETS Thousands/uL 244     Results from last 7 days   Lab Units 02/19/19  0530   POTASSIUM mmol/L 3 7   CHLORIDE mmol/L 107   CO2 mmol/L 32   BUN mg/dL 23   CREATININE mg/dL 1 14   CALCIUM mg/dL 8 9     Results from last 7 days   Lab Units 02/19/19  0530   POTASSIUM mmol/L 3 7   CHLORIDE mmol/L 107   CO2 mmol/L 32   BUN mg/dL 23   CREATININE mg/dL 1 14   CALCIUM mg/dL 8 9 Medications:    Current Facility-Administered Medications:     acetaminophen (TYLENOL) tablet 650 mg, 650 mg, Oral, Q6H PRN, Sharlene Ambron, DO, 650 mg at 02/19/19 1005    albuterol (PROVENTIL HFA,VENTOLIN HFA) inhaler 2 puff, 2 puff, Inhalation, Q4H PRN, Sharlene Ambron, DO, 2 puff at 02/13/19 2225    amLODIPine (NORVASC) tablet 5 mg, 5 mg, Oral, Daily, Sharlene Ambron, DO, 5 mg at 02/19/19 0959    aspirin (ECOTRIN LOW STRENGTH) EC tablet 81 mg, 81 mg, Oral, Daily, Sharlene Ambron, DO, 81 mg at 02/19/19 1004    atorvastatin (LIPITOR) tablet 80 mg, 80 mg, Oral, Daily With Dinner, Rujul Cabrera, DO, 80 mg at 02/18/19 2031    clopidogrel (PLAVIX) tablet 75 mg, 75 mg, Oral, Daily, Brandan Hough MD, 75 mg at 02/19/19 1004    enoxaparin (LOVENOX) subcutaneous injection 40 mg, 40 mg, Subcutaneous, Daily, Sharlene Ambron, DO, 40 mg at 02/19/19 1008    fluticasone-vilanterol (BREO ELLIPTA) 200-25 MCG/INH inhaler 1 puff, 1 puff, Inhalation, Daily, Sharlene Ambron, DO, 1 puff at 02/19/19 1009    guaiFENesin (MUCINEX) 12 hr tablet 600 mg, 600 mg, Oral, Q12H [facility], Sharlene Ambron, DO, 600 mg at 02/19/19 1004    guaiFENesin (ROBITUSSIN) oral solution 200 mg, 200 mg, Oral, Q4H PRN, Sharlene Ambron, DO, 200 mg at 02/16/19 2131    insulin lispro (HumaLOG) 100 units/mL subcutaneous injection 1-6 Units, 1-6 Units, Subcutaneous, HS, Sharlene Ambron, DO, 2 Units at 02/18/19 2116    insulin lispro (HumaLOG) 100 units/mL subcutaneous injection 2-12 Units, 2-12 Units, Subcutaneous, TID AC, 4 Units at 02/19/19 1005 **AND** Fingerstick Glucose (POCT), , , TID AC, Sharlene Root DO    ipratropium (ATROVENT) 0 02 % inhalation solution 0 5 mg, 0 5 mg, Nebulization, TID, Carmencita Carpenter MD, 0 5 mg at 02/19/19 0932    levalbuterol (XOPENEX) inhalation solution 1 25 mg, 1 25 mg, Nebulization, TID, 1 25 mg at 02/19/19 0931 **AND** [DISCONTINUED] sodium chloride 0 9 % inhalation solution 3 mL, 3 mL, Nebulization, TID, Sharlene Root DO   nitroglycerin (NITROSTAT) SL tablet 0 4 mg, 0 4 mg, Sublingual, Q5 Min PRN, Sharlene Ambron, DO    ondansetron (ZOFRAN) injection 4 mg, 4 mg, Intravenous, Q4H PRN, Sharlene Ambron, DO    pantoprazole (PROTONIX) EC tablet 40 mg, 40 mg, Oral, Early Morning, Sharlene Ambron, DO, 40 mg at 02/19/19 0521    predniSONE tablet 30 mg, 30 mg, Oral, Daily, Sharlene Ambron, DO, 30 mg at 02/19/19 1002    torsemide (DEMADEX) tablet 20 mg, 20 mg, Oral, Daily, Rujul Cabrera, DO, 20 mg at 02/19/19 1004    This note was completed in part utilizing M-Modal Fluency Direct Software  Grammatical errors, random word insertions, spelling mistakes, and incomplete sentences may be an occasional consequence of this system secondary to software limitations, ambient noise, and hardware issues  If you have any questions or concerns about the content, text, or information contained within the body of this dictation, please contact the provider for clarification

## 2019-02-19 NOTE — PROGRESS NOTES
Progress Note - Julio C Castillo 1955, 58 y o  female MRN: [id number]    Unit/Bed#: E4 -01 Encounter: [id number]    Primary Care Provider: Erick Moran MD   Date and time admitted to hospital: 2/12/2019  9:27 AM        * COPD with acute exacerbation ([facility] [address] )  Assessment & Plan  Acute COPD exacerbation is resolving  Continue bronchodilators, Dulera, prednisone taper 30 mg  Non-ST elevation myocardial infarction (NSTEMI) [facility])  Assessment & Plan  Patient has been seen and evaluated by Cardiology  Due to elevated troponin most recent troponin of 3 55 on 02/14, patient will be taken for cardiac catheterization  Patient has risk factors for coronary artery disease-and since patient will require hip surgery intervention, she will need some form of noninvasive testing as nuclear stress testing, coronary CTA, and stress echocardiogram will be significantly limited in this morbidly obese patient  Cardiac catheterization showed-Left main: Normal   LAD: The vessel was normal sized  There was an 90% stenosis in mid vessel  Circumflex: The vessel was normal sized and dominant, giving rise to an OM branch, a posterolateral branch and a small PDA  No significant lesions were seen  RCA: The vessel was normal sized and non-dominant  No lesions were seen  Drug-eluting stent was placed in the 90% lesion in the mid LAD  Continue dual anti-platelet therapy with aspirin and Plavix  Continue high-dose statin and low-dose torsemide  Avoid beta-blockers due to recent acute COPD exacerbation  Continue amlodipine for blood pressure control  Acute diastolic CHF (congestive heart failure) (HCC)  Assessment & Plan  Continue Demadex 20 mg daily  Appreciate Cardiology recommendations  Continue daily weights, I&Os  Continue low-salt diet    Hypertension  Assessment & Plan  Continue blood pressure control with amlodipine 5 mg daily      Type 2 diabetes mellitus without complication, with long-term current use of insulin [facility])  Assessment & Plan  Lab Results   Component Value Date    HGBA1C 6 5 (H) 2019       Recent Labs     19  1626 19  2108 19  1136 19  1507   POCGLU 162* 219* 107 200*       Blood Sugar Average: Last 72 hrs:  (P) 146   Continue sliding scale insulin, diabetic diet, Accu-Cheks q i d  Hyperlipidemia  Assessment & Plan  Continue Lipitor 80 mg daily  Morbid obesity [facility])  Assessment & Plan  Patient will need weight loss through dietary modification  GERD (gastroesophageal reflux disease)  Assessment & Plan  Continue Protonix 40 mg daily  VTE Pharmacologic Prophylaxis:   Pharmacologic: Enoxaparin (Lovenox)  Mechanical VTE Prophylaxis in Place: No    Patient Centered Rounds: I have performed bedside rounds with nursing staff today  Discussions with Specialists or Other Care Team Provider:  Yes    Education and Discussions with Family / Patient:  Yes with daughter at bedside    Time Spent for Care: 20 minutes  More than 50% of total time spent on counseling and coordination of care as described above  Current Length of Stay: 7 day(s)    Current Patient Status: Inpatient   Certification Statement: The patient will continue to require additional inpatient hospital stay due to Recent acute COPD exacerbation with NSTEMI    Discharge Plan: To home with home health care possibly tomorrow    Code Status: Level 1 - Full Code      Subjective:   Patient denies any chest pain, shortness of breath, or cough  Objective:     Vitals:   Temp (24hrs), Av 2 °F (36 2 °C), Min:96 8 °F (36 °C), Max:97 7 °F (36 5 °C)    Temp:  [96 8 °F (36 °C)-97 7 °F (36 5 °C)] 97 4 °F (36 3 °C)  HR:  [] 106  Resp:  [18-20] 18  BP: ()/() 134/108  SpO2:  [91 %-100 %] 96 %  Body mass index is 58 98 kg/m²  Input and Output Summary (last 24 hours):        Intake/Output Summary (Last 24 hours) at 2019 1619  Last data filed at 2019 0856  Gross per 24 hour   Intake 1561 25 ml   Output 1050 ml   Net 511 25 ml       Physical Exam:     Physical Exam   Constitutional: She is oriented to person, place, and time  She appears well-developed and well-nourished  No distress  Morbidly obese body habitus   HENT:   Head: Normocephalic and atraumatic  Eyes: Pupils are equal, round, and reactive to light  EOM are normal    Cardiovascular: Normal rate and regular rhythm  Pulmonary/Chest: Effort normal and breath sounds normal  No stridor  She has no wheezes  Abdominal: Soft  Bowel sounds are normal  There is no tenderness  There is no guarding  Musculoskeletal: Normal range of motion  She exhibits no edema or tenderness  Neurological: She is alert and oriented to person, place, and time  Skin: Skin is warm and dry  She is not diaphoretic  Additional Data:     Labs:    Results from last 7 days   Lab Units 02/19/19  0530   WBC Thousand/uL 15 14*   HEMOGLOBIN g/dL 12 9   HEMATOCRIT % 40 3   PLATELETS Thousands/uL 244   NEUTROS PCT % 64   LYMPHS PCT % 30   MONOS PCT % 6   EOS PCT % 0     Results from last 7 days   Lab Units 02/19/19  0530   SODIUM mmol/L 145   POTASSIUM mmol/L 3 7   CHLORIDE mmol/L 107   CO2 mmol/L 32   BUN mg/dL 23   CREATININE mg/dL 1 14   ANION GAP mmol/L 6   CALCIUM mg/dL 8 9   GLUCOSE RANDOM mg/dL 97         Results from last 7 days   Lab Units 02/19/19  1507 02/19/19  1136 02/18/19  2108 02/18/19  1626 02/18/19  1120 02/18/19  0725 02/17/19  2151 02/17/19  1625 02/17/19  1111 02/17/19  0715 02/16/19  2051 02/16/19  1603   POC GLUCOSE mg/dl 200* 107 219* 162* 97 91 254* 194* 102 79 125 196*                   * I Have Reviewed All Lab Data Listed Above  * Additional Pertinent Lab Tests Reviewed:  [address] Admission Reviewed    Imaging:    Imaging Reports Reviewed Today Include:  None available  Imaging Personally Reviewed by Myself Includes:  None    Recent Cultures (last 7 days):           Last 24 Hours Medication List:     Current Facility-Administered Medications:  acetaminophen 650 mg Oral Q6H PRN Sharlene Ambron, DO   albuterol 2 puff Inhalation Q4H PRN Sharlene Ambron, DO   amLODIPine 5 mg Oral Daily Sharlene Ambron, DO   aspirin 81 mg Oral Daily Sharlene Ambron, DO   atorvastatin 80 mg Oral Daily With [employer], DO   clopidogrel 75 mg Oral Daily Maryla Bence, MD   enoxaparin 40 mg Subcutaneous Daily Sharlene Ambron, DO   fluticasone-vilanterol 1 puff Inhalation Daily Sharlene Ambron, DO   guaiFENesin 600 mg Oral Q12H [address] Sharlene Ambron, DO   guaiFENesin 200 mg Oral Q4H PRN Sharlene Ambron, DO   insulin lispro 1-6 Units Subcutaneous HS Sharlene Ambron, DO   insulin lispro 2-12 Units Subcutaneous TID AC Sharlene Ambron, DO   ipratropium 0 5 mg Nebulization TID Adela Cerrato MD   levalbuterol 1 25 mg Nebulization TID Adela Cerrato MD   nitroglycerin 0 4 mg Sublingual Q5 Min PRN Sharlene Ambron, DO   ondansetron 4 mg Intravenous Q4H PRN Sharlene Ambron, DO   pantoprazole 40 mg Oral Early Morning Sharlene Ambron, DO   predniSONE 30 mg Oral Daily Sharlene Ambron, DO   torsemide 20 mg Oral Daily Jace Cabrera,         Today, Patient Was Seen By: Adela Cerrtao MD    ** Please Note: Dictation voice to text software may have been used in the creation of this document   **

## 2019-02-19 NOTE — ASSESSMENT & PLAN NOTE
Lab Results   Component Value Date    HGBA1C 6 5 (H) 02/12/2019       Recent Labs     02/18/19  1626 02/18/19  2108 02/19/19  1136 02/19/19  1507   POCGLU 162* 219* 107 200*       Blood Sugar Average: Last 72 hrs:  (P) 146   Continue sliding scale insulin, diabetic diet, Accu-Cheks q i d  Bhanu Dawn

## 2019-02-19 NOTE — ASSESSMENT & PLAN NOTE
Patient has been seen and evaluated by Cardiology  Due to elevated troponin most recent troponin of 3 55 on 02/14, patient will be taken for cardiac catheterization  Patient has risk factors for coronary artery disease-and since patient will require hip surgery intervention, she will need some form of noninvasive testing as nuclear stress testing, coronary CTA, and stress echocardiogram will be significantly limited in this morbidly obese patient  Cardiac catheterization showed-Left main: Normal   LAD: The vessel was normal sized  There was an 90% stenosis in mid vessel  Circumflex: The vessel was normal sized and dominant, giving rise to an OM branch, a posterolateral branch and a small PDA  No significant lesions were seen  RCA: The vessel was normal sized and non-dominant  No lesions were seen  Drug-eluting stent was placed in the 90% lesion in the mid LAD  Continue dual anti-platelet therapy with aspirin and Plavix  Continue high-dose statin and low-dose torsemide  Avoid beta-blockers due to recent acute COPD exacerbation  Continue amlodipine for blood pressure control

## 2019-02-19 NOTE — PLAN OF CARE
Problem: Potential for Falls  Goal: Patient will remain free of falls  Description  INTERVENTIONS:  - Assess patient frequently for physical needs  -  Identify cognitive and physical deficits and behaviors that affect risk of falls    -  [place] fall precautions as indicated by assessment   - Educate patient/family on patient safety including physical limitations  - Instruct patient to call for assistance with activity based on assessment  - Modify environment to reduce risk of injury  - Consider OT/PT consult to assist with strengthening/mobility  Outcome: Progressing     Problem: PAIN - ADULT  Goal: Verbalizes/displays adequate comfort level or baseline comfort level  Description  Interventions:  - Encourage patient to monitor pain and request assistance  - Assess pain using appropriate pain scale  - Administer analgesics based on type and severity of pain and evaluate response  - Implement non-pharmacological measures as appropriate and evaluate response  - Consider cultural and social influences on pain and pain management  - Notify physician/advanced practitioner if interventions unsuccessful or patient reports new pain  Outcome: Progressing     Problem: INFECTION - ADULT  Goal: Absence or prevention of progression during hospitalization  Description  INTERVENTIONS:  - Assess and monitor for signs and symptoms of infection  - Monitor lab/diagnostic results  - Monitor all insertion sites, i e  indwelling lines, tubes, and drains  - Monitor endotracheal (as able) and nasal secretions for changes in amount and color  - [place] appropriate cooling/warming therapies per order  - Administer medications as ordered  - Instruct and encourage patient and family to use good hand hygiene technique  - Identify and instruct in appropriate isolation precautions for identified infection/condition  Outcome: Progressing     Problem: Knowledge Deficit  Goal: Patient/family/caregiver demonstrates understanding of disease process, treatment plan, medications, and discharge instructions  Description  Complete learning assessment and assess knowledge base    Interventions:  - Provide teaching at level of understanding  - Provide teaching via preferred learning methods  Outcome: Progressing     Problem: RESPIRATORY - ADULT  Goal: Achieves optimal ventilation and oxygenation  Description  INTERVENTIONS:  - Assess for changes in respiratory status  - Assess for changes in mentation and behavior  - Position to facilitate oxygenation and minimize respiratory effort  - Oxygen administration by appropriate delivery method based on oxygen saturation (per order) or ABGs  - Initiate smoking cessation education as indicated  - Encourage broncho-pulmonary hygiene including cough, deep breathe, Incentive Spirometry  - Assess the need for suctioning and aspirate as needed  - Assess and instruct to report SOB or any respiratory difficulty  - Respiratory Therapy support as indicated  Outcome: Progressing     Problem: METABOLIC, FLUID AND ELECTROLYTES - ADULT  Goal: Glucose maintained within target range  Description  INTERVENTIONS:  - Monitor Blood Glucose as ordered  - Assess for signs and symptoms of hyperglycemia and hypoglycemia  - Administer ordered medications to maintain glucose within target range  - Assess nutritional intake and initiate nutrition service referral as needed  Outcome: Progressing     Problem: Prexisting or High Potential for Compromised Skin Integrity  Goal: Skin integrity is maintained or improved  Description  INTERVENTIONS:  - Identify patients at risk for skin breakdown  - Assess and monitor skin integrity  - Assess and monitor nutrition and hydration status  - Monitor labs (i e  albumin)  - Assess for incontinence   - Turn and reposition patient  - Assist with mobility/ambulation  - Relieve pressure over bony prominences  - Avoid friction and shearing  - Provide appropriate hygiene as needed including keeping skin clean and dry  - Evaluate need for skin moisturizer/barrier cream  - Collaborate with interdisciplinary team (i e  Nutrition, Rehabilitation, etc )   - Patient/family teaching  Outcome: Progressing     Problem: DISCHARGE PLANNING - CARE MANAGEMENT  Goal: Discharge to post-acute care or home with appropriate resources  Description  INTERVENTIONS:  - Conduct assessment to determine patient/family and health care team treatment goals, and need for post-acute services based on payer coverage, community resources, and patient preferences, and barriers to discharge  - Address psychosocial, clinical, and financial barriers to discharge as identified in assessment in conjunction with the patient/family and health care team  - Arrange appropriate level of post-acute services according to patient?s   needs and preference and payer coverage in collaboration with the physician and health care team  - Communicate with and update the patient/family, physician, and health care team regarding progress on the discharge plan  - Arrange appropriate transportation to post-acute venues  Outcome: Progressing

## 2019-02-19 NOTE — PLAN OF CARE
Problem: Potential for Falls  Goal: Patient will remain free of falls  Description  INTERVENTIONS:  - Assess patient frequently for physical needs  -  Identify cognitive and physical deficits and behaviors that affect risk of falls    -  [place] fall precautions as indicated by assessment   - Educate patient/family on patient safety including physical limitations  - Instruct patient to call for assistance with activity based on assessment  - Modify environment to reduce risk of injury  - Consider OT/PT consult to assist with strengthening/mobility  Outcome: Progressing     Problem: PAIN - ADULT  Goal: Verbalizes/displays adequate comfort level or baseline comfort level  Description  Interventions:  - Encourage patient to monitor pain and request assistance  - Assess pain using appropriate pain scale  - Administer analgesics based on type and severity of pain and evaluate response  - Implement non-pharmacological measures as appropriate and evaluate response  - Consider cultural and social influences on pain and pain management  - Notify physician/advanced practitioner if interventions unsuccessful or patient reports new pain  Outcome: Progressing     Problem: INFECTION - ADULT  Goal: Absence or prevention of progression during hospitalization  Description  INTERVENTIONS:  - Assess and monitor for signs and symptoms of infection  - Monitor lab/diagnostic results  - Monitor all insertion sites, i e  indwelling lines, tubes, and drains  - Monitor endotracheal (as able) and nasal secretions for changes in amount and color  - [place] appropriate cooling/warming therapies per order  - Administer medications as ordered  - Instruct and encourage patient and family to use good hand hygiene technique  - Identify and instruct in appropriate isolation precautions for identified infection/condition  Outcome: Progressing     Problem: Knowledge Deficit  Goal: Patient/family/caregiver demonstrates understanding of disease process, treatment plan, medications, and discharge instructions  Description  Complete learning assessment and assess knowledge base    Interventions:  - Provide teaching at level of understanding  - Provide teaching via preferred learning methods  Outcome: Progressing     Problem: RESPIRATORY - ADULT  Goal: Achieves optimal ventilation and oxygenation  Description  INTERVENTIONS:  - Assess for changes in respiratory status  - Assess for changes in mentation and behavior  - Position to facilitate oxygenation and minimize respiratory effort  - Oxygen administration by appropriate delivery method based on oxygen saturation (per order) or ABGs  - Initiate smoking cessation education as indicated  - Encourage broncho-pulmonary hygiene including cough, deep breathe, Incentive Spirometry  - Assess the need for suctioning and aspirate as needed  - Assess and instruct to report SOB or any respiratory difficulty  - Respiratory Therapy support as indicated  Outcome: Progressing     Problem: METABOLIC, FLUID AND ELECTROLYTES - ADULT  Goal: Glucose maintained within target range  Description  INTERVENTIONS:  - Monitor Blood Glucose as ordered  - Assess for signs and symptoms of hyperglycemia and hypoglycemia  - Administer ordered medications to maintain glucose within target range  - Assess nutritional intake and initiate nutrition service referral as needed  Outcome: Progressing     Problem: Prexisting or High Potential for Compromised Skin Integrity  Goal: Skin integrity is maintained or improved  Description  INTERVENTIONS:  - Identify patients at risk for skin breakdown  - Assess and monitor skin integrity  - Assess and monitor nutrition and hydration status  - Monitor labs (i e  albumin)  - Assess for incontinence   - Turn and reposition patient  - Assist with mobility/ambulation  - Relieve pressure over bony prominences  - Avoid friction and shearing  - Provide appropriate hygiene as needed including keeping skin clean and dry  - Evaluate need for skin moisturizer/barrier cream  - Collaborate with interdisciplinary team (i e  Nutrition, Rehabilitation, etc )   - Patient/family teaching  Outcome: Progressing     Problem: DISCHARGE PLANNING - CARE MANAGEMENT  Goal: Discharge to post-acute care or home with appropriate resources  Description  INTERVENTIONS:  - Conduct assessment to determine patient/family and health care team treatment goals, and need for post-acute services based on payer coverage, community resources, and patient preferences, and barriers to discharge  - Address psychosocial, clinical, and financial barriers to discharge as identified in assessment in conjunction with the patient/family and health care team  - Arrange appropriate level of post-acute services according to patient?s   needs and preference and payer coverage in collaboration with the physician and health care team  - Communicate with and update the patient/family, physician, and health care team regarding progress on the discharge plan  - Arrange appropriate transportation to post-acute venues  Outcome: Progressing

## 2019-02-20 VITALS
HEART RATE: 103 BPM | RESPIRATION RATE: 18 BRPM | DIASTOLIC BLOOD PRESSURE: 83 MMHG | SYSTOLIC BLOOD PRESSURE: 131 MMHG | OXYGEN SATURATION: 98 % | BODY MASS INDEX: 59.35 KG/M2 | TEMPERATURE: 98 F | WEIGHT: 283.95 LBS

## 2019-02-20 LAB
GLUCOSE SERPL-MCNC: 77 MG/DL (ref 65–140)
GLUCOSE SERPL-MCNC: 83 MG/DL (ref 65–140)
GLUCOSE SERPL-MCNC: 84 MG/DL (ref 65–140)
KCT BLD-ACNC: 224 SEC (ref 89–137)
SPECIMEN SOURCE: ABNORMAL

## 2019-02-20 PROCEDURE — G8979 MOBILITY GOAL STATUS: HCPCS

## 2019-02-20 PROCEDURE — G8978 MOBILITY CURRENT STATUS: HCPCS

## 2019-02-20 PROCEDURE — 97163 PT EVAL HIGH COMPLEX 45 MIN: CPT

## 2019-02-20 PROCEDURE — 99238 HOSP IP/OBS DSCHRG MGMT 30/<: CPT | Performed by: FAMILY MEDICINE

## 2019-02-20 PROCEDURE — 94760 N-INVAS EAR/PLS OXIMETRY 1: CPT

## 2019-02-20 PROCEDURE — G8988 SELF CARE GOAL STATUS: HCPCS

## 2019-02-20 PROCEDURE — 97166 OT EVAL MOD COMPLEX 45 MIN: CPT

## 2019-02-20 PROCEDURE — G8987 SELF CARE CURRENT STATUS: HCPCS

## 2019-02-20 PROCEDURE — 82948 REAGENT STRIP/BLOOD GLUCOSE: CPT

## 2019-02-20 RX ORDER — CLOPIDOGREL BISULFATE 75 MG/1
75 TABLET ORAL DAILY
Qty: 30 TABLET | Refills: 3 | Status: SHIPPED | OUTPATIENT
Start: 2019-02-21 | End: 2019-07-30 | Stop reason: SDUPTHER

## 2019-02-20 RX ORDER — NITROGLYCERIN 0.4 MG/1
0.4 TABLET SUBLINGUAL
Qty: 60 TABLET | Refills: 0 | Status: SHIPPED | OUTPATIENT
Start: 2019-02-20 | End: 2021-04-03

## 2019-02-20 RX ORDER — PREDNISONE 20 MG/1
20 TABLET ORAL DAILY
Status: DISCONTINUED | OUTPATIENT
Start: 2019-02-21 | End: 2019-02-20 | Stop reason: HOSPADM

## 2019-02-20 RX ORDER — ATORVASTATIN CALCIUM 80 MG/1
80 TABLET, FILM COATED ORAL
Qty: 30 TABLET | Refills: 3 | Status: SHIPPED | OUTPATIENT
Start: 2019-02-20 | End: 2019-07-30 | Stop reason: SDUPTHER

## 2019-02-20 RX ORDER — TORSEMIDE 20 MG/1
20 TABLET ORAL DAILY
Qty: 30 TABLET | Refills: 3 | Status: SHIPPED | OUTPATIENT
Start: 2019-02-21 | End: 2019-07-30 | Stop reason: SDUPTHER

## 2019-02-20 RX ORDER — AMLODIPINE BESYLATE 5 MG/1
5 TABLET ORAL DAILY
Qty: 30 TABLET | Refills: 3 | Status: SHIPPED | OUTPATIENT
Start: 2019-02-21 | End: 2019-11-04 | Stop reason: SDUPTHER

## 2019-02-20 RX ORDER — PREDNISONE 20 MG/1
20 TABLET ORAL DAILY
Qty: 5 TABLET | Refills: 0 | Status: SHIPPED | OUTPATIENT
Start: 2019-02-21 | End: 2019-03-20 | Stop reason: ALTCHOICE

## 2019-02-20 RX ADMIN — ENOXAPARIN SODIUM 40 MG: 60 INJECTION SUBCUTANEOUS at 10:14

## 2019-02-20 RX ADMIN — LEVALBUTEROL 1.25 MG: 1.25 SOLUTION, CONCENTRATE RESPIRATORY (INHALATION) at 08:28

## 2019-02-20 RX ADMIN — AMLODIPINE BESYLATE 5 MG: 5 TABLET ORAL at 10:06

## 2019-02-20 RX ADMIN — CLOPIDOGREL BISULFATE 75 MG: 75 TABLET ORAL at 10:11

## 2019-02-20 RX ADMIN — GUAIFENESIN 600 MG: 600 TABLET, EXTENDED RELEASE ORAL at 10:10

## 2019-02-20 RX ADMIN — IPRATROPIUM BROMIDE 0.5 MG: 0.5 SOLUTION RESPIRATORY (INHALATION) at 08:28

## 2019-02-20 RX ADMIN — PANTOPRAZOLE SODIUM 40 MG: 40 TABLET, DELAYED RELEASE ORAL at 06:14

## 2019-02-20 RX ADMIN — FLUTICASONE FUROATE AND VILANTEROL TRIFENATATE 1 PUFF: 200; 25 POWDER RESPIRATORY (INHALATION) at 10:13

## 2019-02-20 RX ADMIN — TORSEMIDE 20 MG: 20 TABLET ORAL at 10:12

## 2019-02-20 RX ADMIN — ASPIRIN 81 MG: 81 TABLET, COATED ORAL at 10:10

## 2019-02-20 RX ADMIN — PREDNISONE 30 MG: 5 TABLET ORAL at 10:11

## 2019-02-20 NOTE — DISCHARGE SUMMARY
Discharge- Ce Araya 1955, 58 y o  female MRN: [id number]    Unit/Bed#: E4 -01 Encounter: [id number]    Primary Care Provider: Ciara Martini MD   Date and time admitted to hospital: 2/12/2019  9:27 AM        * COPD with acute exacerbation [facility])  Assessment & Plan  Acute COPD exacerbation is resolved  Continue bronchodilators, Dulera, prednisone taper 20 mg once daily and taper down by 10 mg every 3 days  Patient is hemodynamically stable for discharge to home with home health [facility] today  Non-ST elevation myocardial infarction (NSTEMI) [facility])  Assessment & Plan  Patient has been seen and evaluated by Cardiology  Due to elevated troponin most recent troponin of 3 55 on 02/14, patient will be taken for cardiac catheterization  Patient has risk factors for coronary artery disease-and since patient will require hip surgery intervention, she will need some form of noninvasive testing as nuclear stress testing, coronary CTA, and stress echocardiogram will be significantly limited in this morbidly obese patient  Cardiac catheterization showed-Left main: Normal   LAD: The vessel was normal sized  There was an 90% stenosis in mid vessel  Circumflex: The vessel was normal sized and dominant, giving rise to an OM branch, a posterolateral branch and a small PDA  No significant lesions were seen  RCA: The vessel was normal sized and non-dominant  No lesions were seen  Drug-eluting stent was placed in the 90% lesion in the mid LAD  Continue dual anti-platelet therapy with aspirin and Plavix  Continue high-dose statin and low-dose torsemide  Avoid beta-blockers due to recent acute COPD exacerbation  Continue amlodipine for blood pressure control  Patient is hemodynamically stable for discharge to home with home health [facility] today  Acute diastolic CHF (congestive heart failure) (HCC)  Assessment & Plan  Continue Demadex 20 mg daily  Appreciate Cardiology recommendations    Continue daily weights, I&Os   Continue low-salt diet    Hypertension  Assessment & Plan  Continue blood pressure control with amlodipine 5 mg daily  Type 2 diabetes mellitus without complication, with long-term current use of insulin [facility])  Assessment & Plan  Lab Results   Component Value Date    HGBA1C 6 5 (H) 02/12/2019       Recent Labs     02/19/19  1507 02/19/19  2125 02/20/19  0744 02/20/19  1053   POCGLU 200* 133 83 84       Blood Sugar Average: Last 72 hrs:  (P) 134 [id number]   Resume metformin upon discharge today  Hyperlipidemia  Assessment & Plan  Continue Lipitor 80 mg daily  Morbid obesity [facility])  Assessment & Plan  Patient will need weight loss through dietary modification  GERD (gastroesophageal reflux disease)  Assessment & Plan  Continue Protonix 40 mg daily  Discharging Physician / Practitioner: Roger Marie MD  PCP: Delvin Asher MD  Admission Date:   Admission Orders (From admission, onward)    Ordered        02/12/19 1130  Inpatient Admission  Once     Order ID Start Status   [id number] 02/12/19 1131 Completed              Discharge Date: 02/20/19    Resolved Problems  Date Reviewed: 2/20/2019    None          Consultations During Hospital Stay:  · Cardiology  Procedures Performed:   · Chest x-ray PA and lateral 02/12/2019, 12 lead EKG 02/13/2019, 2D echocardiogram 02/14/2019, cardiac catheterization 02/18/2019  Significant Findings / Test Results:   · Chest x-ray PA and lateral 2/12-no acute cardiopulmonary disease  · Twelve lead EKG 2/13-sinus tachycardia with nonspecific ST abnormality  When compared with EKG of 12/24/2017, STs now depressed in anterior leads  Nonspecific T-wave abnormality now evident in inferior leads  · 2D echocardiogram 2/14-normal left ventricular systolic function with ejection fraction estimated to be 55%  Study was inadequate for the evaluation of regional wall motion    Doppler parameters were consistent with abnormal left ventricular relaxation (grade 1 diastolic dysfunction)  Mild aortic valve stenosis  Trace pericardial effusion identified circumferential to the heart  Cardiac catheterization 2/18-Left main: Normal   LAD: The vessel was normal sized  There was an 90% stenosis in mid vessel  Circumflex: The vessel was normal sized and dominant, giving rise to an OM branch, a posterolateral branch and a small PDA  No significant lesions were seen  RCA: The vessel was normal sized and non-dominant  No lesions were seen      1ST LESION INTERVENTIONS:  Following pre-dilation, a Synergy MR 2 75 x 20mm drug-eluting stent was placed across the 90% lesion in mid LAD and deployed at a maximum inflation pressure of 16 ari  At the completion of the proceduire, there was no residual stenosis,  · and distal runoff was normal     Incidental Findings:   · None     Test Results Pending at Discharge (will require follow up): · None     Outpatient Tests Requested:  · None    Complications:  Non ST elevation myocardial infarction requiring cardiac catheterization  Reason for Admission:  Acute COPD exacerbation  Hospital Course:     Jacquelyn Garrido is a 58 y o  female patient who originally presented to the hospital on 2/12/2019 due to worsening progressive shortness of breath for about 6 weeks  Patient had used nebulizers at home with no improvement  She presented to the ED at [facility] and was noted to have acute COPD exacerbation  She was started on nebulized breathing treatments, IV steroids, and supplemental oxygen  Also given a one time dose of IV Lasix as it was thought that she may have been an acute diastolic CHF  She was admitted to the medical-surgical unit  She was kept on IV diuresis with Lasix  Her troponin levels trended upwards and she was diagnosed with a non ST elevation myocardial infarction  Cardiology evaluated patient and to go for cardiac catheterization    There was a 90% stenosis in the mid LAD that was stented with a drug-eluting stent  She tolerated the procedure well and there were no complications  She was kept on dual antiplatelet therapy  She was eventually transitioned to oral prednisone taper as her symptoms of COPD exacerbation improved  She was also transition to oral torsemide once she had achieved adequate diuresis  She was seen and evaluated by Physical therapy/Occupational therapy and recommended home with home health care by VNA  The patient is hemodynamically stable for discharge to home today  Please see above list of diagnoses and related plan for additional information  Condition at Discharge: stable     Discharge Day Visit / Exam:     * Please refer to separate progress note for these details *    Discussion with Family:  Yes with daughter at bedside  Discharge instructions/Information to patient and family:   See after visit summary for information provided to patient and family  Provisions for Follow-Up Care:  See after visit summary for information related to follow-up care and any pertinent home health orders  Disposition:     Home with [facility]NA Services (Reminder: Complete face to face encounter)    For Discharges to [facility]:   · Not Applicable to this Patient - Not Applicable to this Patient    Planned Readmission:  None     Discharge Statement:  I spent 25 minutes discharging the patient  This time was spent on the day of discharge  I had direct contact with the patient on the day of discharge  Greater than 50% of the total time was spent examining patient, answering all patient questions, arranging and discussing plan of care with patient as well as directly providing post-discharge instructions  Additional time then spent on discharge activities  Discharge Medications:  See after visit summary for reconciled discharge medications provided to patient and family        ** Please Note: This note has been constructed using a voice recognition system **

## 2019-02-20 NOTE — ASSESSMENT & PLAN NOTE
Lab Results   Component Value Date    HGBA1C 6 5 (H) 02/12/2019       Recent Labs     02/19/19  1507 02/19/19  2125 02/20/19  0744 02/20/19  1053   POCGLU 200* 133 83 84       Blood Sugar Average: Last 72 hrs:  (P) 134 [id number]   Resume metformin upon discharge today

## 2019-02-20 NOTE — ASSESSMENT & PLAN NOTE
Acute COPD exacerbation is resolved  Continue bronchodilators, Dulera, prednisone taper 20 mg once daily and taper down by 10 mg every 3 days  Patient is hemodynamically stable for discharge to home with home health [facility] today

## 2019-02-20 NOTE — PHYSICAL THERAPY NOTE
PT EVALUATION    Pt  Name: Vicente Vilchis  Pt  Age: 58 y o  MRN: [id number]  LENGTH OF STAY: 8    Patient Active Problem List   Diagnosis    Influenza    Asthma exacerbation    Morbid obesity ([place] [address] )    GERD (gastroesophageal reflux disease)    Borderline diabetes    Hypertension    Hyperlipidemia    Abnormal x-ray    Allergic rhinitis    Colon polyp    COPD (chronic obstructive pulmonary disease) ([place] [address] )    H/O: hysterectomy    Hip osteoarthritis    History of total knee replacement, bilateral    History of deep venous thrombosis    Obstructive sleep apnea of adult    Class 3 severe obesity due to excess calories in adult [facility])    Epigastric pain    COPD with acute exacerbation ([facility])    Lip abrasion    Non-ST elevation myocardial infarction (NSTEMI) ([facility])    Acute diastolic CHF (congestive heart failure) ([facility])    Type 2 diabetes mellitus without complication, with long-term current use of insulin ([facility])       Admitting Diagnoses:   Cough [R05]  Acute exacerbation of chronic bronchitis ([place] [address] ) [J20 9, J42]    Past Medical History:   Diagnosis Date    Diabetes mellitus ([place] [address] )     GERD (gastroesophageal reflux disease)     Hyperlipidemia     Hypertension     Seasonal allergies     Sleep apnea        Past Surgical History:   Procedure Laterality Date    BILATERAL KNEE ARTHROSCOPY      HYSTERECTOMY         Imaging Studies:  XR chest 2 views   Final Result by Belinda Cabello MD (02/12 1027)      No acute cardiopulmonary disease  Workstation performed: [id number]              02/20/19 1147   Note Type   Note type Eval only   Pain Assessment   Pain Score No Pain   Home Living   Type of Home Apartment  ([facility])   Home Layout One level; Other (Comment)   [address] [place] chair   Home Equipment Walker;Cane;Electric scooter   Prior Function   Level of [place] Independent with ADLs and functional mobility   Lives With Alone   Receives Help From Family   ADL Assistance Independent   Falls in the last 6 months 0   Comments Pt reports being I w/ overall functional mobility & ADL's; does not use DME for household amb but uses [employer] PRN; electric scooter for community distances   Restrictions/Precautions   Weight Bearing Precautions Per Order No   Other Precautions Fall Risk;Telemetry; Impulsive   General   Family/Caregiver Present No   Cognition   Overall Cognitive Status WFL   Arousal/Participation Alert   Orientation Level Oriented X4   Following Commands Follows one step commands without difficulty   RUE Assessment   RUE Assessment   (refer to OT)   LUE Assessment   LUE Assessment   (refert to OT)   RLE Assessment   RLE Assessment WFL   Strength RLE   RLE Overall Strength 4+/5   R Hip Flexion 4-/5   LLE Assessment   LLE Assessment WFL   Strength LLE   LLE Overall Strength 4+/5   Bed Mobility   Supine to Sit Unable to assess   Additional Comments pt OOB in chair pre & post session   Transfers   Sit to Stand 5  Supervision   Additional items Increased time required;Verbal cues   Stand to Sit 5  Supervision   Additional items Increased time required;Verbal cues   Additional Comments cues for hand placement   Ambulation/Elevation   Gait pattern Decreased foot clearance; Wide BONITA; Short stride; Inconsistent nazanin  (inc L toe out)   Gait Assistance 4  Minimal assist   Additional items Assist x 1;Verbal cues; Tactile cues   Assistive Device Bariatric Rolling walker   Distance 90'x2   Balance   Static Sitting Good   Static Standing Fair   Ambulatory Fair -   Endurance Deficit   Endurance Deficit Yes   Endurance Deficit Description deconditioning   Activity Tolerance   Activity Tolerance Patient limited by fatigue   Medical Staff Made Aware OVI Kaur   Nurse Made Aware Sonja   Assessment   Prognosis Good   Problem List Decreased strength;Decreased endurance; Impaired balance;Decreased mobility; Decreased safety awareness; Obesity   Assessment Pt  58 y  o female admitted for COPD with acute exacerbation (HCC) w/ NSTEMI, accelarated HTN, acute diastolic CHF & CAD  S/p PCI/ALTHEA to LAD on 2/18/19  Pt referred to PT for mobility assessment & D/C planning w/ orders of activity as tolerated and up w/ assistance  PTA, pt reports being I w/o AD for household distances but admits to using [facility] PRN; uses electric scooter for community distances  On eval, pt demonstrate dec mobility, balance, endurance & amb  Pt require S for transfers & minAx1 for amb w/ RW + cues for techniques  Gait deviations as above but no gross LOB noted  Pt can be impulsive but can be redirected  (+) SOB during amb but relieved at rest  SpO2 98% at RA  No dizziness reported t/o session  Nsg staff most recent vital signs as follows: /83 (BP Location: Right arm)   Pulse 103   Temp 98 °F (36 7 °C) (Temporal)   Resp 18   Wt 129 kg (283 lb 15 2 oz)   SpO2 98%   BMI 59 35 kg/m²   At end of session, pt remain OOB in chair w/o issues, call bell & phone in reach  Fall precautions reinforced w/ good understanding  Pt functioning minimally below baseline hence will continue skilled PT to improve function & safety  At this time,  will anticipate good progress in PT for safe D/C to home  Will recommend HHPT & family support at D/C  Pt will need to achieve PT goals prior to D/C for safe return to home  CM to follow  Nsg staff to continue to mobilized pt (OOB in chair for all meals & ambulate in room/unit) as tolerated to prevent further decline in function  Nsg notified      Barriers to Discharge Decreased caregiver support   Barriers to Discharge Comments pt home alone   Goals   Patient Goals go home today   STG Expiration Date 03/02/19   Short Term Goal #1 Goals to be met in 10 days; pt will be able to: 1) inc strength & balance by 1/2 grade to improve overall functional mobility & dec fall risk; 2) inc bed mobility to I for pt to be able to get in/OOB safely w/ proper techniques 100% of the time, to dec caregiver assistance & safely function at home; 3) inc transfers to modified I for pt to transition safely from one surface to another w/o % of the time, to dec caregiver assistance & safely function at home; 4) inc amb w/ RW approx  >80' w/ modified I for pt to ambulate household distances w/o any % of the time, to dec caregiver assistance & safely function at home; 5) inc barthel score to 70 to decrease overall risk for falls; 6) pt/caregiver ed   Treatment Day 0   Plan   Treatment/Interventions Functional transfer training;LE strengthening/ROM; Therapeutic exercise; Endurance training;Patient/family training;Bed mobility;Gait training;Spoke to nursing;OT   PT Frequency Other (Comment)  (3-5x/wk)   Recommendation   Recommendation Home PT; Home with family support   Equipment Recommended Walker  (pt has DME at home)   Barthel Index   Feeding 10   Bathing 0   Grooming Score 5   Dressing Score 5   Bladder Score 5   Bowels Score 10   Toilet Use Score 5   Transfers (Bed/Chair) Score 10   Mobility (Level Surface) Score 0   Stairs Score 0   Barthel Index Score 50   Hx/personal factors: co-morbidities, home alone, telemetry, use of AD, fall risk, assist w/ ADL's and obesity  Examination: dec mobility, dec balance, dec endurance, dec amb, moderate fall risk  Clinical: unpredictable (ongoing medical status, abnormal lab values and moderate fall risk)  Complexity: high     Javan Hall, PT

## 2019-02-20 NOTE — SOCIAL WORK
Pt stable for discharge  Cm sent referral to [facility] for RN/PT/OT, [facility] is accepting  No further needs identified

## 2019-02-20 NOTE — PLAN OF CARE
Problem: PHYSICAL THERAPY ADULT  Goal: Performs mobility at highest level of function for planned discharge setting  See evaluation for individualized goals  Description  Treatment/Interventions: Functional transfer training, LE strengthening/ROM, Therapeutic exercise, Endurance training, Patient/family training, Bed mobility, Gait training, Spoke to nursing, OT  Equipment Recommended: Walker(pt has DME at home)       See flowsheet documentation for full assessment, interventions and recommendations  Note:   Prognosis: Good  Problem List: Decreased strength, Decreased endurance, Impaired balance, Decreased mobility, Decreased safety awareness, Obesity  Assessment: Pt  58 y  o female admitted for COPD with acute exacerbation ([facility] [address] ) w/ NSTEMI, accelarated HTN, acute diastolic CHF & CAD  S/p PCI/ALTHEA to LAD on 2/18/19  Pt referred to PT for mobility assessment & D/C planning w/ orders of activity as tolerated and up w/ assistance  PTA, pt reports being I w/o AD for household distances but admits to using [facility] PRN; uses electric scooter for community distances  On eval, pt demonstrate dec mobility, balance, endurance & amb  Pt require S for transfers & minAx1 for amb w/ RW + cues for techniques  Gait deviations as above but no gross LOB noted  Pt can be impulsive but can be redirected  (+) SOB during amb but relieved at rest  SpO2 98% at RA  No dizziness reported t/o session  Nsg staff most recent vital signs as follows: /83 (BP Location: Right arm)   Pulse 103   Temp 98 °F (36 7 °C) (Temporal)   Resp 18   Wt 129 kg (283 lb 15 2 oz)   SpO2 98%   BMI 59 35 kg/m²   At end of session, pt remain OOB in chair w/o issues, call bell & phone in reach  Fall precautions reinforced w/ good understanding  Pt functioning minimally below baseline hence will continue skilled PT to improve function & safety  At this time,  will anticipate good progress in PT for safe D/C to home   Will recommend HHPT & family support at D/C  Pt will need to achieve PT goals prior to D/C for safe return to home  CM to follow  Nsg staff to continue to mobilized pt (OOB in chair for all meals & ambulate in room/unit) as tolerated to prevent further decline in function  Nsg notified  Barriers to Discharge: Decreased caregiver support  Barriers to Discharge Comments: pt home alone  Recommendation: Home PT, Home with family support          See flowsheet documentation for full assessment

## 2019-02-20 NOTE — PLAN OF CARE
Problem: Potential for Falls  Goal: Patient will remain free of falls  Description  INTERVENTIONS:  - Assess patient frequently for physical needs  -  Identify cognitive and physical deficits and behaviors that affect risk of falls    -  [place] fall precautions as indicated by assessment   - Educate patient/family on patient safety including physical limitations  - Instruct patient to call for assistance with activity based on assessment  - Modify environment to reduce risk of injury  - Consider OT/PT consult to assist with strengthening/mobility  Outcome: Progressing     Problem: PAIN - ADULT  Goal: Verbalizes/displays adequate comfort level or baseline comfort level  Description  Interventions:  - Encourage patient to monitor pain and request assistance  - Assess pain using appropriate pain scale  - Administer analgesics based on type and severity of pain and evaluate response  - Implement non-pharmacological measures as appropriate and evaluate response  - Consider cultural and social influences on pain and pain management  - Notify physician/advanced practitioner if interventions unsuccessful or patient reports new pain  Outcome: Progressing     Problem: INFECTION - ADULT  Goal: Absence or prevention of progression during hospitalization  Description  INTERVENTIONS:  - Assess and monitor for signs and symptoms of infection  - Monitor lab/diagnostic results  - Monitor all insertion sites, i e  indwelling lines, tubes, and drains  - Monitor endotracheal (as able) and nasal secretions for changes in amount and color  - [place] appropriate cooling/warming therapies per order  - Administer medications as ordered  - Instruct and encourage patient and family to use good hand hygiene technique  - Identify and instruct in appropriate isolation precautions for identified infection/condition  Outcome: Progressing     Problem: Knowledge Deficit  Goal: Patient/family/caregiver demonstrates understanding of disease process, treatment plan, medications, and discharge instructions  Description  Complete learning assessment and assess knowledge base    Interventions:  - Provide teaching at level of understanding  - Provide teaching via preferred learning methods  Outcome: Progressing     Problem: RESPIRATORY - ADULT  Goal: Achieves optimal ventilation and oxygenation  Description  INTERVENTIONS:  - Assess for changes in respiratory status  - Assess for changes in mentation and behavior  - Position to facilitate oxygenation and minimize respiratory effort  - Oxygen administration by appropriate delivery method based on oxygen saturation (per order) or ABGs  - Initiate smoking cessation education as indicated  - Encourage broncho-pulmonary hygiene including cough, deep breathe, Incentive Spirometry  - Assess the need for suctioning and aspirate as needed  - Assess and instruct to report SOB or any respiratory difficulty  - Respiratory Therapy support as indicated  Outcome: Progressing     Problem: METABOLIC, FLUID AND ELECTROLYTES - ADULT  Goal: Glucose maintained within target range  Description  INTERVENTIONS:  - Monitor Blood Glucose as ordered  - Assess for signs and symptoms of hyperglycemia and hypoglycemia  - Administer ordered medications to maintain glucose within target range  - Assess nutritional intake and initiate nutrition service referral as needed  Outcome: Progressing     Problem: Prexisting or High Potential for Compromised Skin Integrity  Goal: Skin integrity is maintained or improved  Description  INTERVENTIONS:  - Identify patients at risk for skin breakdown  - Assess and monitor skin integrity  - Assess and monitor nutrition and hydration status  - Monitor labs (i e  albumin)  - Assess for incontinence   - Turn and reposition patient  - Assist with mobility/ambulation  - Relieve pressure over bony prominences  - Avoid friction and shearing  - Provide appropriate hygiene as needed including keeping skin clean and dry  - Evaluate need for skin moisturizer/barrier cream  - Collaborate with interdisciplinary team (i e  Nutrition, Rehabilitation, etc )   - Patient/family teaching  Outcome: Progressing     Problem: DISCHARGE PLANNING - CARE MANAGEMENT  Goal: Discharge to post-acute care or home with appropriate resources  Description  INTERVENTIONS:  - Conduct assessment to determine patient/family and health care team treatment goals, and need for post-acute services based on payer coverage, community resources, and patient preferences, and barriers to discharge  - Address psychosocial, clinical, and financial barriers to discharge as identified in assessment in conjunction with the patient/family and health care team  - Arrange appropriate level of post-acute services according to patient?s   needs and preference and payer coverage in collaboration with the physician and health care team  - Communicate with and update the patient/family, physician, and health care team regarding progress on the discharge plan  - Arrange appropriate transportation to post-acute venues  Outcome: Progressing

## 2019-02-20 NOTE — OCCUPATIONAL THERAPY NOTE
[address] Evaluation     Patient Name: Freddy Hawk  [id number] Date: 2/20/2019  Problem List  Patient Active Problem List   Diagnosis    Influenza    Asthma exacerbation    Morbid obesity ([place] [address] )    GERD (gastroesophageal reflux disease)    Borderline diabetes    Hypertension    Hyperlipidemia    Abnormal x-ray    Allergic rhinitis    Colon polyp    [facility]    H/O: hysterectomy    Hip osteoarthritis    History of total knee replacement, bilateral    History of deep venous thrombosis    Obstructive sleep apnea of adult    Class 3 severe obesity due to excess calories in adult [facility])    Epigastric pain    COPD with acute exacerbation (HCC)    Lip abrasion    Non-ST elevation myocardial infarction (NSTEMI) ([place] [address] )    Acute diastolic CHF (congestive heart failure) ([facility])    Type 2 diabetes mellitus without complication, with long-term current use of insulin ([place] [address] )     Past Medical History  Past Medical History:   Diagnosis Date    Diabetes mellitus ([place] [address] )     GERD (gastroesophageal reflux disease)     Hyperlipidemia     Hypertension     Seasonal allergies     Sleep apnea      Past Surgical History  Past Surgical History:   Procedure Laterality Date    BILATERAL KNEE ARTHROSCOPY      HYSTERECTOMY               02/20/19 1146   Note Type   Note type Eval/Treat   Restrictions/Precautions   Weight Bearing Precautions Per Order No   Other Precautions Fall Risk;Multiple lines;Telemetry   Pain Assessment   Pain Assessment 0-10   Pain Score No Pain   Home Living   Type of Home Apartment  ([facility])   Home Layout One level;Elevator   Bathroom Shower/Tub Tub/shower unit   Bathroom Toilet Standard   Bathroom Equipment Shower chair;Grab bars in shower;Commode   [address] [facility] Walker;Cane;Electric scooter;Sock aid   Additional Comments daughter takes her to appointments and comes and grocery shops once a month Prior Function   Level of [place] Independent with ADLs and functional mobility   Lives With Alone   Receives Help From Family; [address] in the last 6 months 0   Vocational Retired   Comments pt independent w/ ADLs and IADLs; reports no use of DME at times and electric scooter for longer distances in complex   Lifestyle   Autonomy per pt independent w/ ADLs, independent w/ functional transfers and mobility w/ RW or electric scooter, independent w/ light IADLs, daughter transports   Reciprocal Relationships daughter and friends   Service to Others on disability   Intrinsic Gratification watching tv, read   ADL   Where Assessed Chair   Eating Assistance 5  Supervision/Setup   Grooming Assistance 5  Supervision/Setup   UB Bathing Assistance 5  Supervision/Setup   LB Bathing Assistance 4  Minimal Assistance   [address]  [address]  [address] [facility] [place]   Additional Comments pt seated in bedside chair pre/post session   Transfers   Sit to [address]   Additional items Increased time required;Verbal cues   Stand to Sit 5  Supervision   Additional items Increased time required;Verbal cues   Additional Comments VCs for hand placement and positioning   Functional Mobility   Functional Mobility 4  Minimal assistance   Additional Comments assist x1 w/ increased time to complete, slightly implusive   Additional items Rolling walker  (bariatric)   Balance   Static Sitting Good   Dynamic Sitting Fair +   Static Standing Fair   Dynamic Standing Fair -   Ambulatory Fair -   Activity Tolerance   Activity Tolerance Patient limited by fatigue   Nurse Made Aware approriate to see per RNKye Assessment   RUE Assessment WFL  (limited elevation, distal 3+/5)   LUE Assessment   LUE Assessment WFL  (ited elevation, distal 3+/5)   Hand Function   Gross Motor Coordination Functional   Fine Motor Coordination Functional   Sensation   Light Touch No apparent deficits   Proprioception   Proprioception No apparent deficits   Vision-Basic Assessment   Current Vision No visual deficits   Vision - Complex Assessment   Ocular Range of Motion WFL   Acuity Able to read clock/calendar on wall without difficulty   Perception   Inattention/Neglect Appears intact   Cognition   Overall Cognitive Status [facility]   Arousal/Participation Alert; Cooperative   Attention Within functional limits   Orientation Level Oriented X4   Memory Within functional limits   Following Commands Follows one step commands without difficulty   Comments cues for safety, engages in appropriate conversations   Assessment   Limitation Decreased ADL status; Decreased cognition;Decreased UE strength;Decreased Safe judgement during ADL;Decreased endurance;Decreased self-care trans;Decreased high-level ADLs   Prognosis Good   Assessment Pt is a 58 y o  female seen for OT evaluation s/p admit to [facility] on 2/12/2019 w/ SOB; COPD with acute exacerbation ([facility] )  Comorbidities affecting pt's functional performance at time of assessment include: NSTEMI, HTN, obesity, CHF &CAD, pt w/ cardiac cath during hospitalization  Personal factors affecting pt at time of IE include: lives alone  Prior to admission, pt was living alone in apartment and reports independent w/ ADLs, independent w/ functional transfers and mobility w/ no AD vs  RW vs  Electric scooter (pending on fatigue level), independent w/ light IADLs, daughter assists w/ transportation and grocery shopping    Upon evaluation: Pt requires supervision sit<>Stand w/ VCs for hand placement, MIN assist functional mobility w/ RW w/ cues for safety, MIN assist LB ADLs (educated on benefit LHAE for EC), setup UB ADLs  2* the following deficits impacting occupational performance: decreased strength and endurance, impaired balance, impaired functional reach dyspnea on exertion (98% on room air w/ activity), impaired safety awareness, decreased activity tolerance  Pt educated and provided a handout on energy conservation education of pacing self, planning ahead and completing tasks seated, pt receptive  Pt to benefit from continued skilled OT tx while in the hospital to address deficits as defined above and maximize level of functional independence w ADL's and functional mobility  Occupational Performance areas to address include: grooming, bathing/shower, toilet hygiene, dressing, functional mobility, clothing management, cleaning and meal prep, energy conservation education  From OT standpoint, recommendation at time of d/c would be home w/ HOME OT and family support  Goals   Patient Goals go home today   LTG Time Frame 7-10   Long Term Goal please see below goals   Plan   Treatment Interventions ADL retraining;Functional transfer training;UE strengthening/ROM; Cognitive reorientation; Endurance training;Patient/family training;Equipment evaluation/education; Compensatory technique education; Activityengagement; Energy conservation   Goal Expiration Date 03/02/19   OT Frequency 2-3x/wk   Recommendation   OT Discharge Recommendation Home OT   OT - OK to Discharge Yes  (when medically stable)   Barthel Index   Feeding 10   Bathing 0   Grooming Score 5   Dressing Score 5   Bladder Score 5   Bowels Score 10   Toilet Use Score 5   Transfers (Bed/Chair) Score 10   Mobility (Level Surface) Score 0   Stairs Score 0   Barthel Index Score 50   Modified Gray Scale   Modified Joann Scale 3     Occupational Therapy Goals to be met in 7-10 days:  1) Pt will improve activity tolerance to G for min 30 min txment sessions to enhance ADLs  2) Pt will complete ADLs/self care w/ mod I   3) Pt will complete toileting w/ mod I w/ G hygiene/thoroughness using DME PRN  4) Pt will improve functional transfers on/off all surfaces using DME PRN w/ G balance/safety including toileting w/ mod I  5) Pt will improve fx'l mobility during I/ADl/leisure tasks using DME PRN w/ g balance/safety w/ mod I  6) Pt will engage in ongoing cognitive assessment w/ G participation to A w/ safe d/c planning/recommendations  7) Pt will demonstrate G carryover of pt/caregiver education and training as appropriate w/ mod I  w/ G tolerance  8) Pt will engage in depression screen/leisure interest checklist w/ G participation to monitor s/s depression and ID 3 positive coping strategies to A w/ emotional regulation and management  9) Pt will demonstrate 100% carryover of E C  techniques w/ mod I t/o fx'l I/ADL/leisure tasks w/o cues s/p skilled education  10) Pt will demonstrate 100% carryover of LHAE for LB ADLs/self care and leisure s/p skilled education w/ mod I and G participation  11) Pt will demonstrate improved b/l UE strength by 1 MMT grade to enhance ADLS and functional transfers       Documentation completed by: Hayley Ross MS, OTR/L

## 2019-02-20 NOTE — NURSING NOTE
Pt left hospital without AVS or stent paperwork  Spoke with pt on the phone to go over d/c instructions and answer questions  Pt requested that paperwork be mailed to her rather than coming by the hospital to pick them up  Encouraged pt to keep all appointments and take medications as prescribed  Pt acknowledged instructions and verbalized understanding of new medication orders and changes  IV and telemetry monitor removed prior to d/c  Paperwork given to unit clerk to be mailed to patient

## 2019-02-20 NOTE — PLAN OF CARE
Problem: OCCUPATIONAL THERAPY ADULT  Goal: Performs self-care activities at highest level of function for planned discharge setting  See evaluation for individualized goals  Description  Treatment Interventions: ADL retraining, Functional transfer training, UE strengthening/ROM, Cognitive reorientation, Endurance training, Patient/family training, Equipment evaluation/education, Compensatory technique education, Activityengagement, Energy conservation          See flowsheet documentation for full assessment, interventions and recommendations  Note:   Limitation: Decreased ADL status, Decreased cognition, Decreased UE strength, Decreased Safe judgement during ADL, Decreased endurance, Decreased self-care trans, Decreased high-level ADLs  Prognosis: Good  Assessment: Pt is a 58 y o  female seen for OT evaluation s/p admit to [facility] on 2/12/2019 w/ SOB; COPD with acute exacerbation ([facility] [address] )  Comorbidities affecting pt's functional performance at time of assessment include: NSTEMI, HTN, obesity, CHF &CAD, pt w/ cardiac cath during hospitalization  Personal factors affecting pt at time of IE include: lives alone  Prior to admission, pt was living alone in apartment and reports independent w/ ADLs, independent w/ functional transfers and mobility w/ no AD vs  RW vs  Electric scooter (pending on fatigue level), independent w/ light IADLs, daughter assists w/ transportation and grocery shopping  Upon evaluation: Pt requires supervision sit<>Stand w/ VCs for hand placement, MIN assist functional mobility w/ RW w/ cues for safety, MIN assist LB ADLs (educated on benefit LHAE for EC), setup UB ADLs  2* the following deficits impacting occupational performance: decreased strength and endurance, impaired balance, impaired functional reach dyspnea on exertion (98% on room air w/ activity), impaired safety awareness, decreased activity tolerance   Pt educated and provided a handout on energy conservation education of pacing self, planning ahead and completing tasks seated, pt receptive  Pt to benefit from continued skilled OT tx while in the hospital to address deficits as defined above and maximize level of functional independence w ADL's and functional mobility  Occupational Performance areas to address include: grooming, bathing/shower, toilet hygiene, dressing, functional mobility, clothing management, cleaning and meal prep, energy conservation education  From OT standpoint, recommendation at time of d/c would be home w/ HOME OT and family support        OT Discharge Recommendation: Home OT  OT - OK to Discharge: Yes(when medically stable)

## 2019-02-20 NOTE — PHYSICAL THERAPY NOTE
COPD with acute exacerbation   Non-ST elevation myocardial infarction (NSTEMI)  Acc htn  Acute diastolic CHF  CAD s/p PCI/ALTHEA to LAD on 2/18/19    cane, RW, shower chair and electric scooter    Abn labs; AAT; up w/ (A)

## 2019-02-20 NOTE — ASSESSMENT & PLAN NOTE
Patient has been seen and evaluated by Cardiology  Due to elevated troponin most recent troponin of 3 55 on 02/14, patient will be taken for cardiac catheterization  Patient has risk factors for coronary artery disease-and since patient will require hip surgery intervention, she will need some form of noninvasive testing as nuclear stress testing, coronary CTA, and stress echocardiogram will be significantly limited in this morbidly obese patient  Cardiac catheterization showed-Left main: Normal   LAD: The vessel was normal sized  There was an 90% stenosis in mid vessel  Circumflex: The vessel was normal sized and dominant, giving rise to an OM branch, a posterolateral branch and a small PDA  No significant lesions were seen  RCA: The vessel was normal sized and non-dominant  No lesions were seen  Drug-eluting stent was placed in the 90% lesion in the mid LAD  Continue dual anti-platelet therapy with aspirin and Plavix  Continue high-dose statin and low-dose torsemide  Avoid beta-blockers due to recent acute COPD exacerbation  Continue amlodipine for blood pressure control  Patient is hemodynamically stable for discharge to home with home health [facility] today

## 2019-02-22 ENCOUNTER — OFFICE VISIT (OUTPATIENT)
Dept: FAMILY MEDICINE CLINIC | Facility: CLINIC | Age: 64
End: 2019-02-22

## 2019-02-22 VITALS
RESPIRATION RATE: 22 BRPM | SYSTOLIC BLOOD PRESSURE: 132 MMHG | HEART RATE: 109 BPM | BODY MASS INDEX: 60.61 KG/M2 | TEMPERATURE: 97.1 F | DIASTOLIC BLOOD PRESSURE: 82 MMHG | OXYGEN SATURATION: 95 % | WEIGHT: 290 LBS

## 2019-02-22 DIAGNOSIS — R53.83 FATIGUE, UNSPECIFIED TYPE: ICD-10-CM

## 2019-02-22 DIAGNOSIS — J42 CHRONIC BRONCHITIS, UNSPECIFIED CHRONIC BRONCHITIS TYPE (HCC): ICD-10-CM

## 2019-02-22 DIAGNOSIS — R05.9 COUGH IN ADULT PATIENT: ICD-10-CM

## 2019-02-22 DIAGNOSIS — J44.1 COPD WITH ACUTE EXACERBATION (HCC): ICD-10-CM

## 2019-02-22 DIAGNOSIS — Z09 FOLLOW UP: ICD-10-CM

## 2019-02-22 DIAGNOSIS — M79.10 MYALGIA: Primary | ICD-10-CM

## 2019-02-22 DIAGNOSIS — Z12.11 SCREEN FOR COLON CANCER: ICD-10-CM

## 2019-02-22 DIAGNOSIS — Z95.5 STATUS POST INSERTION OF DRUG-ELUTING STENT INTO LEFT ANTERIOR DESCENDING (LAD) ARTERY FOR CORONARY ARTERY DISEASE: ICD-10-CM

## 2019-02-22 DIAGNOSIS — Z12.39 SCREENING BREAST EXAMINATION: ICD-10-CM

## 2019-02-22 PROBLEM — I21.4 NON-ST ELEVATION MYOCARDIAL INFARCTION (NSTEMI) (HCC): Status: RESOLVED | Noted: 2019-02-18 | Resolved: 2019-02-22

## 2019-02-22 PROCEDURE — 99213 OFFICE O/P EST LOW 20 MIN: CPT | Performed by: FAMILY MEDICINE

## 2019-02-22 PROCEDURE — 1111F DSCHRG MED/CURRENT MED MERGE: CPT | Performed by: FAMILY MEDICINE

## 2019-02-22 RX ORDER — ACETAMINOPHEN 325 MG/1
650 TABLET ORAL EVERY 6 HOURS PRN
Qty: 30 TABLET | Refills: 0 | Status: SHIPPED | OUTPATIENT
Start: 2019-02-22 | End: 2019-05-24 | Stop reason: SDUPTHER

## 2019-02-22 NOTE — ASSESSMENT & PLAN NOTE
Patient complains of cramps in her legs after she was started on "new medications"  Discussed with the patient that high doses of stating may cause myalgia as a side effect  Benefits of using the high dose statins discussed with the patient  Ordered CK to rule out rhabdomyolisis, patient states she doesn't want to do that today as it is "not that bad", she said she would go on Monday  Tylenol PRN ordered for myalgia  Instructed patient to go to ED if worsening symptoms

## 2019-02-22 NOTE — ASSESSMENT & PLAN NOTE
Patient underwent the cardiac catheterization on 2/18/2019 due to NonSTEMI, LAD blockage of 90%, discharged home on dual antiplatelet therapy including Plavix and Aspirin and high dose statins  Denies any symptoms at present  Instructed to continue medications as prescribed, discussed the importance of adherence and discussed the risk of medication noncompliance including stroke and MI  Follow up with cardiologist, she has a scheduled appointment in 3 weeks

## 2019-02-22 NOTE — ASSESSMENT & PLAN NOTE
Patient was recently hospitalized due to COPD exacerbation  She reports improvement but continues to have productive cough  Robitussin ordered

## 2019-02-22 NOTE — PROGRESS NOTES
Assessment/Plan:    Status post insertion of drug-eluting stent into left anterior descending (LAD) artery for coronary artery disease  Patient underwent the cardiac catheterization on 2/18/2019 due to NonSTEMI, LAD blockage of 90%, discharged home on dual antiplatelet therapy including Plavix and Aspirin and high dose statins  Denies any symptoms at present  Instructed to continue medications as prescribed, discussed the importance of adherence and discussed the risk of medication noncompliance including stroke and MI  Follow up with cardiologist, she has a scheduled appointment in 3 weeks    Myalgia  Patient complains of cramps in her legs after she was started on "new medications"  Discussed with the patient that high doses of stating may cause myalgia as a side effect  Benefits of using the high dose statins discussed with the patient  Ordered CK to rule out rhabdomyolisis, patient states she doesn't want to do that today as it is "not that bad", she said she would go on Monday  Tylenol PRN ordered for myalgia  Instructed patient to go to ED if worsening symptoms    [facility] )  Patient was recently hospitalized due to COPD exacerbation  She reports improvement but continues to have productive cough  Robitussin ordered       Diagnoses and all orders for this visit:    Myalgia  -     Creatine Kinase, Total; Future    Screening breast examination    Screen for colon cancer    Follow up  -     acetaminophen (TYLENOL) 325 mg tablet; Take 2 tablets (650 mg total) by mouth every 6 (six) hours as needed for mild pain    Cough in adult patient  -     guaiFENesin (ROBITUSSIN) 100 MG/5ML oral liquid; Take 10 mL (200 mg total) by mouth 3 (three) times a day as needed for cough    Fatigue, unspecified type  -     TSH, 3rd generation with Free T4 reflex;  Future    COPD with acute exacerbation ([facility] )    Status post insertion of drug-eluting stent into left anterior descending (LAD) artery for coronary artery disease    Chronic bronchitis, unspecified chronic bronchitis type ([facility] [address] )    Other orders  -     Cancel: Ambulatory referral to Gastroenterology; Future  -     Cancel: TDAP VACCINE GREATER THAN OR EQUAL TO 6YO IM  -     Cancel: Mammo screening bilateral w cad; Future  -     Cancel: PREFERRED: influenza vaccine, [id number], quadrivalent, recombinant, PF, 0 5 mL, for patients 18 yr+ ([id number])          Subjective:      Patient ID: Veronika Gordillo is a 58 y o  female  Patient present to follow up on COPD  She was recently discharged from the hospital due to COPD exacerbation and was also found to have NSTEMI; she underwent cardiac catheterization with drug- eluting stent placed into the LAD  She was discharged on Plavix, Aspirin and high dose statins after the catheterization  Her COPD is improved, but she continues to have intermittent cough productive of white sputum  She states that after the discharge she developed muscle cramps that start about 3-4 hours after she takes her medications  Patient denies any other complains  The following portions of the patient's history were reviewed and updated as appropriate: allergies, current medications, past family history, past medical history, past social history, past surgical history and problem list     Review of Systems   Constitutional: Negative for chills, diaphoresis, fatigue and fever  HENT: Negative for congestion  Respiratory: Positive for cough  Negative for chest tightness, shortness of breath and wheezing  Cardiovascular: Negative for chest pain, palpitations and leg swelling  Gastrointestinal: Negative for abdominal distention, abdominal pain, blood in stool, constipation, diarrhea and nausea  Genitourinary: Negative for difficulty urinating and frequency  Musculoskeletal: Positive for myalgias  Negative for joint swelling and neck stiffness  Skin: Negative for color change, pallor and rash     Neurological: Negative for dizziness, syncope, numbness and headaches  Objective:      /82 (BP Location: Left arm, Patient Position: Sitting, Cuff Size: Large)   Pulse (!) 109   Temp (!) 97 1 °F (36 2 °C)   Resp 22   Wt 132 kg (290 lb)   SpO2 95%   BMI 60 61 kg/m²          Physical Exam   Constitutional: She is oriented to person, place, and time  She appears well-developed and well-nourished  No distress  HENT:   Head: Normocephalic and atraumatic  Eyes: Pupils are equal, round, and reactive to light  EOM are normal  No scleral icterus  Neck: Normal range of motion  Neck supple  Cardiovascular: Normal rate, regular rhythm and normal heart sounds  Exam reveals no gallop and no friction rub  No murmur heard  Pulmonary/Chest: Effort normal and breath sounds normal  No respiratory distress  She has no wheezes  She has no rales  She exhibits no tenderness  Abdominal: Soft  Bowel sounds are normal  She exhibits no distension  There is no tenderness  There is no rebound  Musculoskeletal: Normal range of motion  She exhibits no edema, tenderness or deformity  Lymphadenopathy:     She has no cervical adenopathy  Neurological: She is alert and oriented to person, place, and time  Skin: Skin is warm and dry  No rash noted  No erythema  No pallor  Psychiatric: She has a normal mood and affect

## 2019-02-28 ENCOUNTER — APPOINTMENT (OUTPATIENT)
Dept: LAB | Facility: HOSPITAL | Age: 64
End: 2019-02-28
Payer: COMMERCIAL

## 2019-02-28 DIAGNOSIS — R53.83 FATIGUE, UNSPECIFIED TYPE: ICD-10-CM

## 2019-02-28 DIAGNOSIS — M79.10 MYALGIA: ICD-10-CM

## 2019-02-28 LAB
CK SERPL-CCNC: 83 U/L (ref 30–135)
TSH SERPL DL<=0.05 MIU/L-ACNC: 2.3 UIU/ML (ref 0.47–4.68)

## 2019-02-28 PROCEDURE — 36415 COLL VENOUS BLD VENIPUNCTURE: CPT

## 2019-02-28 PROCEDURE — 84443 ASSAY THYROID STIM HORMONE: CPT

## 2019-02-28 PROCEDURE — 82550 ASSAY OF CK (CPK): CPT

## 2019-03-11 ENCOUNTER — OFFICE VISIT (OUTPATIENT)
Dept: PULMONOLOGY | Facility: CLINIC | Age: 64
End: 2019-03-11
Payer: COMMERCIAL

## 2019-03-11 VITALS
BODY MASS INDEX: 60.87 KG/M2 | SYSTOLIC BLOOD PRESSURE: 140 MMHG | HEART RATE: 121 BPM | HEIGHT: 58 IN | WEIGHT: 290 LBS | DIASTOLIC BLOOD PRESSURE: 86 MMHG | RESPIRATION RATE: 20 BRPM | TEMPERATURE: 98.5 F | OXYGEN SATURATION: 95 %

## 2019-03-11 DIAGNOSIS — E66.01 MORBID OBESITY (HCC): ICD-10-CM

## 2019-03-11 DIAGNOSIS — J98.4 RESTRICTIVE LUNG DISEASE: Primary | ICD-10-CM

## 2019-03-11 DIAGNOSIS — J41.0 SIMPLE CHRONIC BRONCHITIS (HCC): ICD-10-CM

## 2019-03-11 PROBLEM — J44.1 COPD WITH ACUTE EXACERBATION (HCC): Status: RESOLVED | Noted: 2019-01-29 | Resolved: 2019-03-11

## 2019-03-11 PROBLEM — J45.901 ASTHMA EXACERBATION: Status: RESOLVED | Noted: 2017-12-24 | Resolved: 2019-03-11

## 2019-03-11 PROBLEM — J11.1 INFLUENZA: Status: RESOLVED | Noted: 2017-12-24 | Resolved: 2019-03-11

## 2019-03-11 PROCEDURE — 99214 OFFICE O/P EST MOD 30 MIN: CPT | Performed by: INTERNAL MEDICINE

## 2019-03-11 NOTE — PROGRESS NOTES
Progress Note - Pulmonary   Ce Araya 58 y o  female MRN: [id number]   Encounter: [id number]      Assessment/Plan:  Pt is a 59 yo F w/pmhx sig for CAD s/p PCI, morbid obesity, restrictive candis disease who presents for follow up  Since last visit, the patient underwent PCI with stent placement  Her primary complaint at this time is related to frequent mucus production which she states has been present for about 2 months  I have encouraged the patient to use her albuterol nebulizer to help with expectoration  In addition she was provided with a flutter valve  She may use this multiple times per day  She may continue her cough medicine on an as-needed basis  The patient was counseled on the need for weight loss  Patient reports the Lipitor has decreased her affinity for sweets  She thinks this will help with her weight loss  In addition, she is working with physical therapy  She was encouraged to continue her exercise activity  The patient does not carry a diagnosis of Chronic Obstructive Pulmonary Disease  Review of her pulmonary function testing has moderate restriction with no evidence of obstruction  Patient may follow up in 6 months or sooner as necessary  Plan:  -  Flutter valve - multiple times per day  -  Hold dulera  -  Use nebulizer 2x/day  -  Encouraged weight loss    Subjective: The patient had a recent hospitalization where she had a stent placed for a 90% blockage  She is concerned about her bronchitis  She is unable to determine if the prednisone made difference  She notes that she has significant phlegm which has a scant amount but feels that it is thick  She denies fevers or chills  She denies a significant cough  She reports this has been present for about 2 months  She is using her albuterol inhaler and notes benefit when she uses it  She uses her dulera about 2-3x/week; in similar fashion      She notes occasional shortness of breath; reports it comes/goes  The patient reports her weight is unchanged  She reports that the lipitor has rid her of the nighttime snacking  She notes losing interest in eating sweets which she attributes  She is followed by a therapist which is helping her walk  Inhaler Regimen:  Albuterol HFA - 2-3x/week  Dulera - 2-3x/week  Albuterol nebulizer - no recent usage    Remainder of 12 point review of systems negative except as described in HPI  The following portions of the patient's history were reviewed and updated as appropriate: allergies, current medications, past family history, past medical history, past social history, past surgical history and problem list      Objective:   Vitals: Blood pressure 140/86, pulse (!) 121, temperature 98 5 °F (36 9 °C), temperature source Tympanic, resp  rate 20, height 4' 10" (1 473 m), weight 132 kg (290 lb), SpO2 95 % , RA, Body mass index is 60 61 kg/m²  Physical Exam  Gen: Awake, alert, oriented x 3, no acute distress  HEENT: Mucous membranes moist, no oral lesions, no thrush  NECK: No accessory muscle use, JVP not elevated  Cardiac: Regular, single S1, single S2, no murmurs, no rubs, no gallops  Lungs: slight rhonchi  Abdomen: normoactive bowel sounds, soft nontender, nondistended, no rebound or rigidity, no guarding; obese  Extremities: no cyanosis, no clubbing, no edema  MSK:  Strength equal in all extremities  Derm:  No rashes/lesions noted  Neuro:  Appropriate mood/affect    Labs: I have personally reviewed pertinent lab results  Lab Results   Component Value Date    WBC 15 14 (H) 02/19/2019    WBC 9 6 05/07/2018    HGB 12 9 02/19/2019    HGB 13 2 05/07/2018     02/19/2019     05/07/2018     Lab Results   Component Value Date    CREATININE 1 14 02/19/2019      Imaging and other studies: I have personally reviewed pertinent reports  and I have personally reviewed pertinent films in PACS  2/12/2019:  No acute intrathoracic process        Pulmonary Function Testing: I have personally reviewed pertinent reports  and I have personally reviewed pertinent films in PACS  9/18/2018: Moderate restriction; severe diffusion defect       Tavares Loyd

## 2019-03-11 NOTE — PATIENT INSTRUCTIONS
Please use the albuterol nebulizer twice daily  Please use the flutter valve 3-4 times per day  You may stop the dulera  Good luck with the weight loss

## 2019-03-20 ENCOUNTER — OFFICE VISIT (OUTPATIENT)
Dept: CARDIOLOGY CLINIC | Facility: CLINIC | Age: 64
End: 2019-03-20
Payer: COMMERCIAL

## 2019-03-20 VITALS
HEART RATE: 107 BPM | WEIGHT: 293 LBS | DIASTOLIC BLOOD PRESSURE: 80 MMHG | SYSTOLIC BLOOD PRESSURE: 110 MMHG | BODY MASS INDEX: 61.5 KG/M2 | HEIGHT: 58 IN | OXYGEN SATURATION: 97 %

## 2019-03-20 DIAGNOSIS — E78.5 DYSLIPIDEMIA: Primary | ICD-10-CM

## 2019-03-20 PROCEDURE — 99214 OFFICE O/P EST MOD 30 MIN: CPT | Performed by: INTERNAL MEDICINE

## 2019-03-20 NOTE — PROGRESS NOTES
Cardiology Follow Up        Ce Araya      1955      [id number]      Discussion/Summary:    1  CAD status post PCI/ALTHEA (2 75 x 20 mm Synergy MR) 2/18/19, setting of stable ischemic heart disease  2  Benign essential hypertension  3  Dyslipidemia  4  Morbid obesity  5  COPD  6  Osteoarthritis    · Continue dual anti-platelet treatment, high-dose statin  No symptoms of angina  Mild myalgias reported, and she refused CPK testing by her PCP  Patient to call if any worsening adverse drug reactions  She does admit to some constipation after starting her cardiac medications in the hospital  · Blood pressure well controlled, continue amlodipine  · Will place order to recheck lipid panel with other health maintenance blood work when she checks these with PCP    Follow-up in 6 months, patient encouraged to call if any changes        Interval History: This is a 57 yo female w/ a h/o morbid obesity, dyslipidemia, HTN, and COPD  She was admitted 02/2018 with COPD exacerbation  Troponin level was noted to be up to 4 with no symptoms of chest discomfort or ischemic ECG changes  Coronary angiography 02/18/2019 revealed 90% mid LAD stenosis and she underwent PCI/ALTHEA with a 2 75 x 20 mm Synergy MR ALTHEA  She had no residual obstructive disease  She presents today for follow-up  She has been compliant with her dual anti-platelet treatment and high-dose statin  She denies exertional chest pain, shortness of breath, lower extremity edema  She states her bronchitis is progressively getting better since her hospitalization  She has mild myalgia which are controlled           Vitals:  Vitals:    03/20/19 1349   BP: 110/80   Pulse: (!) 107   SpO2: 97%   Weight: 135 kg (296 lb 9 6 oz)   Height: 4' 10" (1 473 m)         Past Medical History:   Diagnosis Date    COPD with acute exacerbation ([address] ) 1/29/2019    Diabetes mellitus (HCC)     GERD (gastroesophageal reflux disease)     Hyperlipidemia     Hypertension     Seasonal allergies     Sleep apnea      Social History     Socioeconomic History    Marital status: Single     Spouse name: Not on file    Number of children: Not on file    Years of education: Not on file    Highest education level: Not on file   Occupational History    Not on file   Social Needs    Financial resource strain: Not on file    Food insecurity:     Worry: Not on file     Inability: Not on file    Transportation needs:     Medical: Not on file     Non-medical: Not on file   Tobacco Use    Smoking status: Former Smoker    Smokeless tobacco: Never Used    Tobacco comment: childhood smoke exposure   Substance and Sexual Activity    Alcohol use: No    Drug use: No    Sexual activity: Not on file   Lifestyle    Physical activity:     Days per week: Not on file     Minutes per session: Not on file    Stress: Not on file   Relationships    Social connections:     Talks on phone: Not on file     Gets together: Not on file     Attends Holiness service: Not on file     Active member of club or organization: Not on file     Attends meetings of clubs or organizations: Not on file     Relationship status: Not on file    Intimate partner violence:     Fear of current or ex partner: Not on file     Emotionally abused: Not on file     Physically abused: Not on file     Forced sexual activity: Not on file   Other Topics Concern    Not on file   Social History Narrative    WORK:    -  Homemaker        HOBBIES:    -  Denies exposure        PETS:    -  Previous cat; no birds        TRAVEL:    -  No recent travel        EXPOSURES:    -  Denies mold, down pillows, hot tubs      Family History   Problem Relation Age of Onset    Coronary artery disease Father      Past Surgical History:   Procedure Laterality Date    BILATERAL KNEE ARTHROSCOPY      HYSTERECTOMY         Current Outpatient Medications:     ACCU-CHEK FASTCLIX LANCETS MISC, , Disp: , Rfl:   ACCU-CHEK GUIDE test strip, , Disp: , Rfl:     acetaminophen (TYLENOL) 325 mg tablet, Take 2 tablets (650 mg total) by mouth every 6 (six) hours as needed for mild pain, Disp: 30 tablet, Rfl: 0    albuterol (5 mg/mL) 0 5 % nebulizer solution, Take 0 5 mL by nebulization every 6 (six) hours as needed for wheezing or shortness of breath, Disp: 20 mL, Rfl: 0    albuterol (VENTOLIN HFA) 90 mcg/act inhaler, inhale 2 puff by inhalation route  every 4 - 6 hours as needed, Disp: , Rfl:     amLODIPine (NORVASC) 5 mg tablet, Take 1 tablet (5 mg total) by mouth daily, Disp: 30 tablet, Rfl: 3    aspirin (ECOTRIN LOW STRENGTH) 81 mg EC tablet, , Disp: , Rfl:     atorvastatin (LIPITOR) 80 mg tablet, Take 1 tablet (80 mg total) by mouth daily with dinner, Disp: 30 tablet, Rfl: 3    Blood Glucose Monitoring Suppl (ACCU-CHEK GUIDE) w/Device KIT, , Disp: , Rfl:     clopidogrel (PLAVIX) 75 mg tablet, Take 1 tablet (75 mg total) by mouth daily, Disp: 30 tablet, Rfl: 3    guaiFENesin (ROBITUSSIN) 100 MG/5ML oral liquid, Take 10 mL (200 mg total) by mouth 3 (three) times a day as needed for cough, Disp: 120 mL, Rfl: 0    ketoconazole (NIZORAL) 2 % cream, Apply topically daily, Disp: 15 g, Rfl: 0    mometasone-formoterol (DULERA) 100-5 MCG/ACT inhaler, Every 12 hours, Disp: , Rfl:     montelukast (SINGULAIR) 10 mg tablet, Take 1 tablet (10 mg total) by mouth daily, Disp: 90 tablet, Rfl: 0    nitroglycerin (NITROSTAT) 0 4 mg SL tablet, Place 1 tablet (0 4 mg total) under the tongue every 5 (five) minutes as needed for chest pain, Disp: 60 tablet, Rfl: 0    pantoprazole (PROTONIX) 40 mg tablet, Take 1 tablet (40 mg total) by mouth daily, Disp: 90 tablet, Rfl: 1    torsemide (DEMADEX) 20 mg tablet, Take 1 tablet (20 mg total) by mouth daily, Disp: 30 tablet, Rfl: 3        Review of Systems:  Review of Systems   Constitutional: Negative for activity change, fever and unexpected weight change     HENT: Negative for facial swelling, nosebleeds and voice change  Respiratory: Positive for cough and shortness of breath  Negative for chest tightness and wheezing  Cardiovascular: Negative for chest pain, palpitations and leg swelling  Gastrointestinal: Negative for abdominal distention  Genitourinary: Negative for hematuria  Musculoskeletal: Negative for arthralgias  Skin: Negative for color change, pallor, rash and wound  Neurological: Negative for dizziness, seizures and syncope  Psychiatric/Behavioral: Negative for agitation  Physical Exam:  Physical Exam   Constitutional: She is oriented to person, place, and time  She appears well-developed and well-nourished  HENT:   Head: Normocephalic and atraumatic  Eyes: EOM are normal    Neck: Normal range of motion  Neck supple  Cardiovascular: Normal rate, regular rhythm, normal heart sounds and intact distal pulses  Pulmonary/Chest: Effort normal and breath sounds normal    Abdominal: Soft  Musculoskeletal: Normal range of motion  Neurological: She is alert and oriented to person, place, and time  Skin: Skin is warm and dry  Psychiatric: She has a normal mood and affect  Her behavior is normal  Judgment and thought content normal    Vitals reviewed  This note was completed in part utilizing [employer] Direct Software  Grammatical errors, random word insertions, spelling mistakes, and incomplete sentences can be an occasional consequence of this system secondary to software limitations, ambient noise, and hardware issues  If you have any questions or concerns about the content, text, or information contained within the body of this dictation, please contact the provider for clarification

## 2019-03-20 NOTE — LETTER
March 20, 2019     Ade Rider MD  [address] [place] [address]    Patient: Caitlyn Alvarado   YOB: 1955   Date of Visit: 3/20/2019       Dear Dr Tina Vasquez: Thank you for referring Caitlyn Alvarado to me for evaluation  Below are my notes for this consultation  If you have questions, please do not hesitate to call me  I look forward to following your patient along with you  Sincerely,        Jace Marrufo, DO        CC: No Recipients  Jace Marrufo, DO  3/20/2019  2:24 PM  Sign at close encounter                                             Cardiology Follow Up        Ce Araya      1955      [id number]      Discussion/Summary:    1  CAD status post PCI/ALTHEA (2 75 x 20 mm Synergy MR) 2/18/19, setting of stable ischemic heart disease  2  Benign essential hypertension  3  Dyslipidemia  4  Morbid obesity  5  COPD  6  Osteoarthritis    · Continue dual anti-platelet treatment, high-dose statin  No symptoms of angina  Mild myalgias reported, and she refused CPK testing by her PCP  Patient to call if any worsening adverse drug reactions  She does admit to some constipation after starting her cardiac medications in the hospital  · Blood pressure well controlled, continue amlodipine  · Will place order to recheck lipid panel with other health maintenance blood work when she checks these with PCP    Follow-up in 6 months, patient encouraged to call if any changes        Interval History: This is a 57 yo female w/ a h/o morbid obesity, dyslipidemia, HTN, and COPD  She was admitted 02/2018 with COPD exacerbation  Troponin level was noted to be up to 4 with no symptoms of chest discomfort or ischemic ECG changes  Coronary angiography 02/18/2019 revealed 90% mid LAD stenosis and she underwent PCI/ALTHEA with a 2 75 x 20 mm Synergy MR ALTHEA  She had no residual obstructive disease  She presents today for follow-up    She has been compliant with her dual anti-platelet treatment and high-dose statin  She denies exertional chest pain, shortness of breath, lower extremity edema  She states her bronchitis is progressively getting better since her hospitalization  She has mild myalgia which are controlled           Vitals:  Vitals:    03/20/19 1349   BP: 110/80   Pulse: (!) 107   SpO2: 97%   Weight: 135 kg (296 lb 9 6 oz)   Height: 4' 10" (1 473 m)         Past Medical History:   Diagnosis Date    [facility] ) 1/29/2019    Diabetes mellitus (HCC)     GERD (gastroesophageal reflux disease)     Hyperlipidemia     Hypertension     Seasonal allergies     Sleep apnea      Social History     Socioeconomic History    Marital status: Single     Spouse name: Not on file    Number of children: Not on file    Years of education: Not on file    Highest education level: Not on file   Occupational History    Not on file   Social Needs    Financial resource strain: Not on file    Food insecurity:     Worry: Not on file     Inability: Not on file    Transportation needs:     Medical: Not on file     Non-medical: Not on file   Tobacco Use    Smoking status: Former Smoker    Smokeless tobacco: Never Used    Tobacco comment: childhood smoke exposure   Substance and Sexual Activity    Alcohol use: No    Drug use: No    Sexual activity: Not on file   Lifestyle    Physical activity:     Days per week: Not on file     Minutes per session: Not on file    Stress: Not on file   Relationships    Social connections:     Talks on phone: Not on file     Gets together: Not on file     Attends Latter day service: Not on file     Active member of club or organization: Not on file     Attends meetings of clubs or organizations: Not on file     Relationship status: Not on file    Intimate partner violence:     Fear of current or ex partner: Not on file     Emotionally abused: Not on file     Physically abused: Not on file     Forced sexual activity: Not on file   Other Topics Concern  Not on file   Social History Narrative    WORK:    -  Homemaker        HOBBIES:    -  Denies exposure        PETS:    -  Previous cat; no birds        TRAVEL:    -  No recent travel        EXPOSURES:    -  Denies mold, down pillows, hot tubs      Family History   Problem Relation Age of Onset    Coronary artery disease Father      Past Surgical History:   Procedure Laterality Date    BILATERAL KNEE ARTHROSCOPY      HYSTERECTOMY         Current Outpatient Medications:     ACCU-CHEK FASTCLIX LANCETS MISC, , Disp: , Rfl:     ACCU-CHEK GUIDE test strip, , Disp: , Rfl:     acetaminophen (TYLENOL) 325 mg tablet, Take 2 tablets (650 mg total) by mouth every 6 (six) hours as needed for mild pain, Disp: 30 tablet, Rfl: 0    albuterol (5 mg/mL) 0 5 % nebulizer solution, Take 0 5 mL by nebulization every 6 (six) hours as needed for wheezing or shortness of breath, Disp: 20 mL, Rfl: 0    albuterol (VENTOLIN HFA) 90 mcg/act inhaler, inhale 2 puff by inhalation route  every 4 - 6 hours as needed, Disp: , Rfl:     amLODIPine (NORVASC) 5 mg tablet, Take 1 tablet (5 mg total) by mouth daily, Disp: 30 tablet, Rfl: 3    aspirin (ECOTRIN LOW STRENGTH) 81 mg EC tablet, , Disp: , Rfl:     atorvastatin (LIPITOR) 80 mg tablet, Take 1 tablet (80 mg total) by mouth daily with dinner, Disp: 30 tablet, Rfl: 3    Blood Glucose Monitoring Suppl (ACCU-CHEK GUIDE) w/Device KIT, , Disp: , Rfl:     clopidogrel (PLAVIX) 75 mg tablet, Take 1 tablet (75 mg total) by mouth daily, Disp: 30 tablet, Rfl: 3    guaiFENesin (ROBITUSSIN) 100 MG/5ML oral liquid, Take 10 mL (200 mg total) by mouth 3 (three) times a day as needed for cough, Disp: 120 mL, Rfl: 0    ketoconazole (NIZORAL) 2 % cream, Apply topically daily, Disp: 15 g, Rfl: 0    mometasone-formoterol (DULERA) 100-5 MCG/ACT inhaler, Every 12 hours, Disp: , Rfl:     montelukast (SINGULAIR) 10 mg tablet, Take 1 tablet (10 mg total) by mouth daily, Disp: 90 tablet, Rfl: 0   nitroglycerin (NITROSTAT) 0 4 mg SL tablet, Place 1 tablet (0 4 mg total) under the tongue every 5 (five) minutes as needed for chest pain, Disp: 60 tablet, Rfl: 0    pantoprazole (PROTONIX) 40 mg tablet, Take 1 tablet (40 mg total) by mouth daily, Disp: 90 tablet, Rfl: 1    torsemide (DEMADEX) 20 mg tablet, Take 1 tablet (20 mg total) by mouth daily, Disp: 30 tablet, Rfl: 3        Review of Systems:  Review of Systems   Constitutional: Negative for activity change, fever and unexpected weight change  HENT: Negative for facial swelling, nosebleeds and voice change  Respiratory: Positive for cough and shortness of breath  Negative for chest tightness and wheezing  Cardiovascular: Negative for chest pain, palpitations and leg swelling  Gastrointestinal: Negative for abdominal distention  Genitourinary: Negative for hematuria  Musculoskeletal: Negative for arthralgias  Skin: Negative for color change, pallor, rash and wound  Neurological: Negative for dizziness, seizures and syncope  Psychiatric/Behavioral: Negative for agitation  Physical Exam:  Physical Exam   Constitutional: She is oriented to person, place, and time  She appears well-developed and well-nourished  HENT:   Head: Normocephalic and atraumatic  Eyes: EOM are normal    Neck: Normal range of motion  Neck supple  Cardiovascular: Normal rate, regular rhythm, normal heart sounds and intact distal pulses  Pulmonary/Chest: Effort normal and breath sounds normal    Abdominal: Soft  Musculoskeletal: Normal range of motion  Neurological: She is alert and oriented to person, place, and time  Skin: Skin is warm and dry  Psychiatric: She has a normal mood and affect  Her behavior is normal  Judgment and thought content normal    Vitals reviewed  This note was completed in part utilizing [employer] Direct Software    Grammatical errors, random word insertions, spelling mistakes, and incomplete sentences can be an occasional consequence of this system secondary to software limitations, ambient noise, and hardware issues  If you have any questions or concerns about the content, text, or information contained within the body of this dictation, please contact the provider for clarification

## 2019-03-20 NOTE — LETTER
March 20, 2019     Rupa Chang MD  [address] [place] [address]  [address]    Patient: Clara Guzman   YOB: 1955   Date of Visit: 3/20/2019       Dear Dr Jeanne Almanzar Recipients: Thank you for referring Clara Guzman to me for evaluation  Below are my notes for this consultation  If you have questions, please do not hesitate to call me  I look forward to following your patient along with you  Sincerely,        Jace Del Valle DO        CC: No Recipients  Jace Del Valle DO  3/20/2019  2:24 PM  Sign at close encounter                                             Cardiology Follow Up        Ce Araya      1955      [id number]      Discussion/Summary:    1  CAD status post PCI/ALTHEA (2 75 x 20 mm Synergy MR) 2/18/19, setting of stable ischemic heart disease  2  Benign essential hypertension  3  Dyslipidemia  4  Morbid obesity  5  COPD  6  Osteoarthritis    · Continue dual anti-platelet treatment, high-dose statin  No symptoms of angina  Mild myalgias reported, and she refused CPK testing by her PCP  Patient to call if any worsening adverse drug reactions  She does admit to some constipation after starting her cardiac medications in the hospital  · Blood pressure well controlled, continue amlodipine  · Will place order to recheck lipid panel with other health maintenance blood work when she checks these with PCP    Follow-up in 6 months, patient encouraged to call if any changes        Interval History: This is a 59 yo female w/ a h/o morbid obesity, dyslipidemia, HTN, and COPD  She was admitted 02/2018 with COPD exacerbation  Troponin level was noted to be up to 4 with no symptoms of chest discomfort or ischemic ECG changes  Coronary angiography 02/18/2019 revealed 90% mid LAD stenosis and she underwent PCI/ALTHEA with a 2 75 x 20 mm Synergy MR ALTHEA  She had no residual obstructive disease  She presents today for follow-up    She has been compliant with her dual anti-platelet treatment and high-dose statin  She denies exertional chest pain, shortness of breath, lower extremity edema  She states her bronchitis is progressively getting better since her hospitalization  She has mild myalgia which are controlled           Vitals:  Vitals:    03/20/19 1349   BP: 110/80   Pulse: (!) 107   SpO2: 97%   Weight: 135 kg (296 lb 9 6 oz)   Height: 4' 10" (1 473 m)         Past Medical History:   Diagnosis Date    [facility]    Diabetes mellitus (HCC)     GERD (gastroesophageal reflux disease)     Hyperlipidemia     Hypertension     Seasonal allergies     Sleep apnea      Social History     Socioeconomic History    Marital status: Single     Spouse name: Not on file    Number of children: Not on file    Years of education: Not on file    Highest education level: Not on file   Occupational History    Not on file   Social Needs    Financial resource strain: Not on file    Food insecurity:     Worry: Not on file     Inability: Not on file    Transportation needs:     Medical: Not on file     Non-medical: Not on file   Tobacco Use    Smoking status: Former Smoker    Smokeless tobacco: Never Used    Tobacco comment: childhood smoke exposure   Substance and Sexual Activity    Alcohol use: No    Drug use: No    Sexual activity: Not on file   Lifestyle    Physical activity:     Days per week: Not on file     Minutes per session: Not on file    Stress: Not on file   Relationships    Social connections:     Talks on phone: Not on file     Gets together: Not on file     Attends Gnosticism service: Not on file     Active member of club or organization: Not on file     Attends meetings of clubs or organizations: Not on file     Relationship status: Not on file    Intimate partner violence:     Fear of current or ex partner: Not on file     Emotionally abused: Not on file     Physically abused: Not on file     Forced sexual activity: Not on file   Other Topics Concern    Not on file   Social History Narrative    WORK:    -  Homemaker        HOBBIES:    -  Denies exposure        PETS:    -  Previous cat; no birds        TRAVEL:    -  No recent travel        EXPOSURES:    -  Denies mold, down pillows, hot tubs      Family History   Problem Relation Age of Onset    Coronary artery disease Father      Past Surgical History:   Procedure Laterality Date    BILATERAL KNEE ARTHROSCOPY      HYSTERECTOMY         Current Outpatient Medications:     ACCU-CHEK FASTCLIX LANCETS MISC, , Disp: , Rfl:     ACCU-CHEK GUIDE test strip, , Disp: , Rfl:     acetaminophen (TYLENOL) 325 mg tablet, Take 2 tablets (650 mg total) by mouth every 6 (six) hours as needed for mild pain, Disp: 30 tablet, Rfl: 0    albuterol (5 mg/mL) 0 5 % nebulizer solution, Take 0 5 mL by nebulization every 6 (six) hours as needed for wheezing or shortness of breath, Disp: 20 mL, Rfl: 0    albuterol (VENTOLIN HFA) 90 mcg/act inhaler, inhale 2 puff by inhalation route  every 4 - 6 hours as needed, Disp: , Rfl:     amLODIPine (NORVASC) 5 mg tablet, Take 1 tablet (5 mg total) by mouth daily, Disp: 30 tablet, Rfl: 3    aspirin (ECOTRIN LOW STRENGTH) 81 mg EC tablet, , Disp: , Rfl:     atorvastatin (LIPITOR) 80 mg tablet, Take 1 tablet (80 mg total) by mouth daily with dinner, Disp: 30 tablet, Rfl: 3    Blood Glucose Monitoring Suppl (ACCU-CHEK GUIDE) w/Device KIT, , Disp: , Rfl:     clopidogrel (PLAVIX) 75 mg tablet, Take 1 tablet (75 mg total) by mouth daily, Disp: 30 tablet, Rfl: 3    guaiFENesin (ROBITUSSIN) 100 MG/5ML oral liquid, Take 10 mL (200 mg total) by mouth 3 (three) times a day as needed for cough, Disp: 120 mL, Rfl: 0    ketoconazole (NIZORAL) 2 % cream, Apply topically daily, Disp: 15 g, Rfl: 0    mometasone-formoterol (DULERA) 100-5 MCG/ACT inhaler, Every 12 hours, Disp: , Rfl:     montelukast (SINGULAIR) 10 mg tablet, Take 1 tablet (10 mg total) by mouth daily, Disp: 90 tablet, Rfl: 0    nitroglycerin (NITROSTAT) 0 4 mg SL tablet, Place 1 tablet (0 4 mg total) under the tongue every 5 (five) minutes as needed for chest pain, Disp: 60 tablet, Rfl: 0    pantoprazole (PROTONIX) 40 mg tablet, Take 1 tablet (40 mg total) by mouth daily, Disp: 90 tablet, Rfl: 1    torsemide (DEMADEX) 20 mg tablet, Take 1 tablet (20 mg total) by mouth daily, Disp: 30 tablet, Rfl: 3        Review of Systems:  Review of Systems   Constitutional: Negative for activity change, fever and unexpected weight change  HENT: Negative for facial swelling, nosebleeds and voice change  Respiratory: Positive for cough and shortness of breath  Negative for chest tightness and wheezing  Cardiovascular: Negative for chest pain, palpitations and leg swelling  Gastrointestinal: Negative for abdominal distention  Genitourinary: Negative for hematuria  Musculoskeletal: Negative for arthralgias  Skin: Negative for color change, pallor, rash and wound  Neurological: Negative for dizziness, seizures and syncope  Psychiatric/Behavioral: Negative for agitation  Physical Exam:  Physical Exam   Constitutional: She is oriented to person, place, and time  She appears well-developed and well-nourished  HENT:   Head: Normocephalic and atraumatic  Eyes: EOM are normal    Neck: Normal range of motion  Neck supple  Cardiovascular: Normal rate, regular rhythm, normal heart sounds and intact distal pulses  Pulmonary/Chest: Effort normal and breath sounds normal    Abdominal: Soft  Musculoskeletal: Normal range of motion  Neurological: She is alert and oriented to person, place, and time  Skin: Skin is warm and dry  Psychiatric: She has a normal mood and affect  Her behavior is normal  Judgment and thought content normal    Vitals reviewed  This note was completed in part utilizing [employer] Software    Grammatical errors, random word insertions, spelling mistakes, and incomplete sentences can be an occasional consequence of this system secondary to software limitations, ambient noise, and hardware issues  If you have any questions or concerns about the content, text, or information contained within the body of this dictation, please contact the provider for clarification

## 2019-03-21 DIAGNOSIS — J41.0 SIMPLE CHRONIC BRONCHITIS (HCC): Primary | ICD-10-CM

## 2019-03-21 DIAGNOSIS — I50.31 ACUTE DIASTOLIC CHF (CONGESTIVE HEART FAILURE) (HCC): ICD-10-CM

## 2019-03-21 DIAGNOSIS — T78.40XD ALLERGIC STATE, SUBSEQUENT ENCOUNTER: ICD-10-CM

## 2019-03-21 RX ORDER — MONTELUKAST SODIUM 10 MG/1
10 TABLET ORAL DAILY
Qty: 90 TABLET | Refills: 1 | Status: SHIPPED | OUTPATIENT
Start: 2019-03-21 | End: 2019-05-24 | Stop reason: SDUPTHER

## 2019-03-21 RX ORDER — ASPIRIN 81 MG/1
81 TABLET ORAL DAILY
Qty: 90 TABLET | Refills: 2 | Status: SHIPPED | OUTPATIENT
Start: 2019-03-21 | End: 2020-02-27 | Stop reason: SDUPTHER

## 2019-05-03 DIAGNOSIS — Z09 FOLLOW UP: ICD-10-CM

## 2019-05-03 RX ORDER — PANTOPRAZOLE SODIUM 40 MG/1
40 TABLET, DELAYED RELEASE ORAL DAILY
Qty: 90 TABLET | Refills: 3 | Status: SHIPPED | OUTPATIENT
Start: 2019-05-03 | End: 2020-06-12 | Stop reason: SDUPTHER

## 2019-05-24 ENCOUNTER — OFFICE VISIT (OUTPATIENT)
Dept: FAMILY MEDICINE CLINIC | Facility: CLINIC | Age: 64
End: 2019-05-24

## 2019-05-24 ENCOUNTER — TELEPHONE (OUTPATIENT)
Dept: FAMILY MEDICINE CLINIC | Facility: CLINIC | Age: 64
End: 2019-05-24

## 2019-05-24 VITALS
DIASTOLIC BLOOD PRESSURE: 78 MMHG | BODY MASS INDEX: 60.2 KG/M2 | HEIGHT: 58 IN | WEIGHT: 286.8 LBS | RESPIRATION RATE: 16 BRPM | SYSTOLIC BLOOD PRESSURE: 128 MMHG

## 2019-05-24 DIAGNOSIS — J98.4 RESTRICTIVE LUNG DISEASE: ICD-10-CM

## 2019-05-24 DIAGNOSIS — Z12.11 SCREENING FOR COLON CANCER: ICD-10-CM

## 2019-05-24 DIAGNOSIS — Z09 FOLLOW UP: ICD-10-CM

## 2019-05-24 DIAGNOSIS — E11.9 TYPE 2 DIABETES MELLITUS WITHOUT COMPLICATION, WITH LONG-TERM CURRENT USE OF INSULIN (HCC): Primary | ICD-10-CM

## 2019-05-24 DIAGNOSIS — T78.40XD ALLERGIC STATE, SUBSEQUENT ENCOUNTER: ICD-10-CM

## 2019-05-24 DIAGNOSIS — J98.4 RESTRICTIVE LUNG DISEASE: Primary | ICD-10-CM

## 2019-05-24 DIAGNOSIS — R05.9 COUGH IN ADULT PATIENT: ICD-10-CM

## 2019-05-24 DIAGNOSIS — Z79.4 TYPE 2 DIABETES MELLITUS WITHOUT COMPLICATION, WITH LONG-TERM CURRENT USE OF INSULIN (HCC): Primary | ICD-10-CM

## 2019-05-24 DIAGNOSIS — M79.10 MYALGIA: ICD-10-CM

## 2019-05-24 PROCEDURE — 99213 OFFICE O/P EST LOW 20 MIN: CPT | Performed by: FAMILY MEDICINE

## 2019-05-24 PROCEDURE — 82570 ASSAY OF URINE CREATININE: CPT | Performed by: FAMILY MEDICINE

## 2019-05-24 PROCEDURE — 82043 UR ALBUMIN QUANTITATIVE: CPT | Performed by: FAMILY MEDICINE

## 2019-05-24 RX ORDER — FLUTICASONE FUROATE AND VILANTEROL 100; 25 UG/1; UG/1
1 POWDER RESPIRATORY (INHALATION) DAILY
Qty: 1 INHALER | Refills: 2 | Status: SHIPPED | OUTPATIENT
Start: 2019-05-24 | End: 2020-10-02

## 2019-05-24 RX ORDER — ACETAMINOPHEN 325 MG/1
650 TABLET ORAL EVERY 6 HOURS PRN
Qty: 30 TABLET | Refills: 0 | Status: SHIPPED | OUTPATIENT
Start: 2019-05-24 | End: 2021-05-06

## 2019-05-24 RX ORDER — MULTIVITAMIN
1 TABLET ORAL DAILY
Qty: 30 TABLET | Refills: 2 | Status: SHIPPED | OUTPATIENT
Start: 2019-05-24 | End: 2021-03-24 | Stop reason: SDUPTHER

## 2019-05-24 RX ORDER — ALBUTEROL SULFATE 90 UG/1
2 AEROSOL, METERED RESPIRATORY (INHALATION) EVERY 4 HOURS PRN
Qty: 1 INHALER | Refills: 2 | Status: SHIPPED | OUTPATIENT
Start: 2019-05-24 | End: 2020-03-18

## 2019-05-24 RX ORDER — MONTELUKAST SODIUM 10 MG/1
10 TABLET ORAL DAILY
Qty: 90 TABLET | Refills: 1 | Status: SHIPPED | OUTPATIENT
Start: 2019-05-24 | End: 2020-02-27 | Stop reason: SDUPTHER

## 2019-05-25 LAB
CREAT UR-MCNC: 167 MG/DL
MICROALBUMIN UR-MCNC: 5.1 MG/L (ref 0–20)
MICROALBUMIN/CREAT 24H UR: 3 MG/G CREATININE (ref 0–30)

## 2019-07-09 ENCOUNTER — TELEPHONE (OUTPATIENT)
Dept: FAMILY MEDICINE CLINIC | Facility: CLINIC | Age: 64
End: 2019-07-09

## 2019-07-09 NOTE — TELEPHONE ENCOUNTER
Jesenia from [facility] contacted our office to request a script for wheelchair/scooter delivery to be fax over to;     Fax [phone]

## 2019-07-10 NOTE — TELEPHONE ENCOUNTER
Script has been faxed to Jesenia @ [employer] [facility]    Received transmittal response "ok"

## 2019-07-30 DIAGNOSIS — I50.31 ACUTE DIASTOLIC CHF (CONGESTIVE HEART FAILURE) (HCC): ICD-10-CM

## 2019-07-30 DIAGNOSIS — I21.4 NON-ST ELEVATION MYOCARDIAL INFARCTION (NSTEMI) (HCC): ICD-10-CM

## 2019-07-30 DIAGNOSIS — E78.2 MIXED HYPERLIPIDEMIA: ICD-10-CM

## 2019-07-30 RX ORDER — TORSEMIDE 20 MG/1
20 TABLET ORAL DAILY
Qty: 30 TABLET | Refills: 3 | Status: SHIPPED | OUTPATIENT
Start: 2019-07-30 | End: 2019-07-30 | Stop reason: SDUPTHER

## 2019-07-30 RX ORDER — ATORVASTATIN CALCIUM 80 MG/1
80 TABLET, FILM COATED ORAL
Qty: 30 TABLET | Refills: 3 | Status: SHIPPED | OUTPATIENT
Start: 2019-07-30 | End: 2019-09-10 | Stop reason: SDUPTHER

## 2019-07-30 RX ORDER — CLOPIDOGREL BISULFATE 75 MG/1
75 TABLET ORAL DAILY
Qty: 30 TABLET | Refills: 3 | Status: SHIPPED | OUTPATIENT
Start: 2019-07-30 | End: 2019-07-30 | Stop reason: SDUPTHER

## 2019-07-30 RX ORDER — TORSEMIDE 20 MG/1
20 TABLET ORAL DAILY
Qty: 30 TABLET | Refills: 3 | Status: SHIPPED | OUTPATIENT
Start: 2019-07-30 | End: 2019-09-10 | Stop reason: SDUPTHER

## 2019-07-30 RX ORDER — CLOPIDOGREL BISULFATE 75 MG/1
75 TABLET ORAL DAILY
Qty: 30 TABLET | Refills: 3 | Status: SHIPPED | OUTPATIENT
Start: 2019-07-30 | End: 2020-03-18 | Stop reason: SDUPTHER

## 2019-07-30 RX ORDER — ATORVASTATIN CALCIUM 80 MG/1
80 TABLET, FILM COATED ORAL
Qty: 30 TABLET | Refills: 3 | Status: SHIPPED | OUTPATIENT
Start: 2019-07-30 | End: 2019-07-30 | Stop reason: SDUPTHER

## 2019-08-22 ENCOUNTER — TELEPHONE (OUTPATIENT)
Dept: FAMILY MEDICINE CLINIC | Facility: CLINIC | Age: 64
End: 2019-08-22

## 2019-08-22 ENCOUNTER — OFFICE VISIT (OUTPATIENT)
Dept: FAMILY MEDICINE CLINIC | Facility: CLINIC | Age: 64
End: 2019-08-22

## 2019-08-22 VITALS
HEIGHT: 58 IN | RESPIRATION RATE: 18 BRPM | HEART RATE: 108 BPM | WEIGHT: 288 LBS | SYSTOLIC BLOOD PRESSURE: 124 MMHG | TEMPERATURE: 97.5 F | BODY MASS INDEX: 60.45 KG/M2 | DIASTOLIC BLOOD PRESSURE: 72 MMHG | OXYGEN SATURATION: 96 %

## 2019-08-22 DIAGNOSIS — K13.0 LIP LESION: ICD-10-CM

## 2019-08-22 DIAGNOSIS — Z79.4 TYPE 2 DIABETES MELLITUS WITHOUT COMPLICATION, WITH LONG-TERM CURRENT USE OF INSULIN (HCC): Primary | ICD-10-CM

## 2019-08-22 DIAGNOSIS — E11.9 TYPE 2 DIABETES MELLITUS WITHOUT COMPLICATION, WITH LONG-TERM CURRENT USE OF INSULIN (HCC): Primary | ICD-10-CM

## 2019-08-22 DIAGNOSIS — M25.551 PAIN OF RIGHT HIP JOINT: ICD-10-CM

## 2019-08-22 LAB — SL AMB POCT HEMOGLOBIN AIC: 6 (ref ?–6.5)

## 2019-08-22 PROCEDURE — 99213 OFFICE O/P EST LOW 20 MIN: CPT | Performed by: FAMILY MEDICINE

## 2019-08-22 PROCEDURE — 83036 HEMOGLOBIN GLYCOSYLATED A1C: CPT | Performed by: FAMILY MEDICINE

## 2019-08-22 NOTE — TELEPHONE ENCOUNTER
Pt's phone just rings and rings and no VM  Called mom and she will let her know to call us back  Ortho apt([phone]) is on 8/26/2019 at 2:15 pm at [address], [address], [place]    Derm [facility]([phone]) is on 9/24/2019 at 10:45 am at [address], [address], [place]
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
## 2019-08-22 NOTE — ASSESSMENT & PLAN NOTE
Lab Results   Component Value Date    HGBA1C 6 0 08/22/2019       No results for input(s): POCGLU in the last 72 hours      Blood Sugar Average: Last 72 hrs:  metformin was stopped while patient was in the hospital few months ago  She states that she is adherent to diabetic diet  HgA1C improved to 6, will hold off of diabetic medications at this time, continue diabetic diet only
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
## 2019-08-22 NOTE — ASSESSMENT & PLAN NOTE
Patient has been having lesion/abrasion on the lip (right side of the lower lip) for many years with intermittent worsening and crackling   patient wants to be seen by dermatologist as the lesion persist, referral placed  Instructed to keep in moist, applying vaseline

## 2019-08-26 ENCOUNTER — OFFICE VISIT (OUTPATIENT)
Dept: OBGYN CLINIC | Facility: MEDICAL CENTER | Age: 64
End: 2019-08-26
Payer: MEDICARE

## 2019-08-26 ENCOUNTER — APPOINTMENT (OUTPATIENT)
Dept: RADIOLOGY | Facility: MEDICAL CENTER | Age: 64
End: 2019-08-26
Payer: MEDICARE

## 2019-08-26 VITALS
BODY MASS INDEX: 60.91 KG/M2 | HEART RATE: 82 BPM | DIASTOLIC BLOOD PRESSURE: 84 MMHG | SYSTOLIC BLOOD PRESSURE: 132 MMHG | WEIGHT: 290.2 LBS | HEIGHT: 58 IN

## 2019-08-26 DIAGNOSIS — M54.50 LUMBAR PAIN: Primary | ICD-10-CM

## 2019-08-26 DIAGNOSIS — M54.50 LUMBAR PAIN: ICD-10-CM

## 2019-08-26 DIAGNOSIS — M43.16 SPONDYLOLISTHESIS OF LUMBAR REGION: ICD-10-CM

## 2019-08-26 DIAGNOSIS — M25.551 PAIN IN RIGHT HIP: ICD-10-CM

## 2019-08-26 DIAGNOSIS — M16.11 ARTHRITIS OF RIGHT HIP: ICD-10-CM

## 2019-08-26 DIAGNOSIS — E66.9 OBESITY (BMI 35.0-39.9 WITHOUT COMORBIDITY): ICD-10-CM

## 2019-08-26 PROCEDURE — 73502 X-RAY EXAM HIP UNI 2-3 VIEWS: CPT

## 2019-08-26 PROCEDURE — 99203 OFFICE O/P NEW LOW 30 MIN: CPT | Performed by: ORTHOPAEDIC SURGERY

## 2019-08-26 PROCEDURE — 72110 X-RAY EXAM L-2 SPINE 4/>VWS: CPT

## 2019-08-26 NOTE — PROGRESS NOTES
Assessment/Plan     1  Lumbar pain    2  Pain in right hip    3  Spondylolisthesis of lumbar region      Orders Placed This Encounter   Procedures    XR hip/pelv 2-3 vws right if performed    XR spine lumbar minimum 4 views non injury    Ambulatory referral to Pain Management    Ambulatory referral to Physical Therapy     · Referred patient to pain and spine Dr Felix Shelton for lumbar spondylolisthesis   · Physical therapy lumbar spine and right hip  · Refer patient to weight loss management bariatrics  for weight  loss   · Provided patient home exercises to do at home   · Continue taking Tylenol prn for pain  Patient can not take NSAIDs due to being  on Plavix  Return if symptoms worsen or fail to improve  I answered all of the patient's questions during the visit and provided education of the patient's condition during the visit  The patient verbalized understanding of the information given and agrees with the plan  This note was dictated using [employer] software  It may contain errors including improperly dictated words  Please contact physician directly for any questions  History of Present Illness   Chief complaint:   Chief Complaint   Patient presents with    Right Hip - Pain       HPI: Ricky Priest is a 58 y o  female that c/o right hip pain  Patient states she has been having right hip pain for many years and has been getting worse the last 5-7 years  Patient states she is having constant burning achy pain in the right groin region radiating to the anterior hip     Patient states she is also having burning pain in the right anterior thigh  She does feel right leg is weaker than the left  Denies any leg length discrepancy  Patient states she is having lower back pain for about a year  She notes occasional radiating pain down the right leg to the foot and numbness from the calf down to the foot  Pain is worse with weight-bearing and increased activity    Pain is better taking Tylenol, lying down, and resting  Patient denies having any physical therapy, surgeries, or injections on the right hip  Patient cannot take NSAIDs due to being on Plavix  Patient does have a history of a DVT right lower extremity  Patient also had bilateral [facility] arthro[facility] the past [facility]      ROS:    See HPI for musculoskeletal review     All other systems reviewed are negative     Historical Information   Past Medical History:   Diagnosis Date    [facility] exacerbation ([facility] 1/29/2019    Diabetes mellitus (HCC)     GERD (gastroesophageal reflux disease)     Hyperlipidemia     Hypertension     Seasonal allergies     Sleep apnea      Past Surgical History:   Procedure Laterality Date    BILATERAL KNEE ARTHROSCOPY      HYSTERECTOMY      TOTAL KNEE ARTHROPLASTY       Social History   Social History     Substance and Sexual Activity   Alcohol Use No     Social History     Substance and Sexual Activity   Drug Use No     Social History     Tobacco Use   Smoking Status Never Smoker   Smokeless Tobacco Never Used   Tobacco Comment    childhood smoke exposure     Family History:   Family History   Problem Relation Age of Onset    Coronary artery disease Father        Current Outpatient Medications on File Prior to Visit   Medication Sig Dispense Refill    ACCU-CHEK FASTCLIX LANCETS MISC       ACCU-CHEK GUIDE test strip       acetaminophen (TYLENOL) 325 mg tablet Take 2 tablets (650 mg total) by mouth every 6 (six) hours as needed for mild pain 30 tablet 0    albuterol (5 mg/mL) 0 5 % nebulizer solution Take 0 5 mL by nebulization every 6 (six) hours as needed for wheezing or shortness of breath 20 mL 0    albuterol (VENTOLIN HFA) 90 mcg/act inhaler Inhale 2 puffs every 4 (four) hours as needed for wheezing 1 Inhaler 2    amLODIPine (NORVASC) 5 mg tablet Take 1 tablet (5 mg total) by mouth daily 30 tablet 3    aspirin (ECOTRIN LOW STRENGTH) 81 mg EC tablet Take 1 tablet (81 mg total) by mouth daily 90 tablet 2    atorvastatin (LIPITOR) 80 mg tablet Take 1 tablet (80 mg total) by mouth daily with dinner 30 tablet 3    Blood Glucose Monitoring Suppl (ACCU-CHEK GUIDE) w/Device KIT       clopidogrel (PLAVIX) 75 mg tablet Take 1 tablet (75 mg total) by mouth daily 30 tablet 3    fluticasone-vilanterol (BREO ELLIPTA) 100-25 mcg/inh inhaler Inhale 1 puff daily Rinse mouth after use  1 Inhaler 2    guaiFENesin (ROBITUSSIN) 100 MG/5ML oral liquid Take 10 mL (200 mg total) by mouth 3 (three) times a day as needed for cough 120 mL 0    ketoconazole (NIZORAL) 2 % cream Apply topically daily 15 g 0    montelukast (SINGULAIR) 10 mg tablet Take 1 tablet (10 mg total) by mouth daily 90 tablet 1    Multiple Vitamin (MULTIVITAMIN) tablet Take 1 tablet by mouth daily 30 tablet 2    nitroglycerin (NITROSTAT) 0 4 mg SL tablet Place 1 tablet (0 4 mg total) under the tongue every 5 (five) minutes as needed for chest pain 60 tablet 0    pantoprazole (PROTONIX) 40 mg tablet Take 1 tablet (40 mg total) by mouth daily 90 tablet 3    torsemide (DEMADEX) 20 mg tablet Take 1 tablet (20 mg total) by mouth daily 30 tablet 3     No current facility-administered medications on file prior to visit  Allergies   Allergen Reactions    Januvia [Sitagliptin] Swelling    Clindamycin     Hydrocodone     Morphine Hives    Omeprazole     Penicillins     Percocet [Oxycodone-Acetaminophen]     Tolterodine     Ciprofloxacin Rash       Objective   Vitals: Blood pressure 132/84, pulse 82, height 4' 10" (1 473 m), weight 132 kg (290 lb 3 2 oz)  ,Body mass index is 60 65 kg/m²      PE:  AAOx 3  WDWN  Hearing intact, no drainage from eyes  Regular rate  no audible wheezing  no abdominal distension  LE compartments soft, skin intact    right hip:   No dislocation/deformity  Negative Straight leg raise   ROM: Mild limited ROM / Pain with flexion extension   No Bilateral TTP over SIJ  Limited exam due to patient's patient's habitus    Bilateral LE:  EHL/AT/GS/quads/hamstrings/iliopsoas 5/5, sensation grossly intact L4, L5, S1, palpable pedal pulse    Back:     Left No TTP over lumbar spinous processes, paraspinal musculature  SLR: Neg  Right TTP over lumbar spinous processes, paraspinal musculature  SLR: Neg       Imaging Studies: I have personally reviewed pertinent films in PACS  righthip:  Difficult to interpret due to limitations with exposure with body habitus,  Mild DJD   Lumbar spine: L4, L5 Grade 2 spondylolisthesis     Scribe Attestation    I,:   Kirk Slaughter am acting as a scribe while in the presence of the attending physician :        I,:   Anjana Garcia DO personally performed the services described in this documentation    as scribed in my presence :              Scribe Attestation    I,:   Julian Matos am acting as a scribe while in the presence of the attending physician :        I,:   Anjana Garcia DO personally performed the services described in this documentation    as scribed in my presence :

## 2019-09-10 ENCOUNTER — OFFICE VISIT (OUTPATIENT)
Dept: CARDIOLOGY CLINIC | Facility: CLINIC | Age: 64
End: 2019-09-10
Payer: MEDICARE

## 2019-09-10 VITALS
HEART RATE: 99 BPM | HEIGHT: 58 IN | BODY MASS INDEX: 60.45 KG/M2 | DIASTOLIC BLOOD PRESSURE: 70 MMHG | OXYGEN SATURATION: 97 % | WEIGHT: 288 LBS | SYSTOLIC BLOOD PRESSURE: 134 MMHG

## 2019-09-10 DIAGNOSIS — E78.5 DYSLIPIDEMIA: ICD-10-CM

## 2019-09-10 DIAGNOSIS — I21.4 NON-ST ELEVATION MYOCARDIAL INFARCTION (NSTEMI) (HCC): ICD-10-CM

## 2019-09-10 DIAGNOSIS — E78.2 MIXED HYPERLIPIDEMIA: ICD-10-CM

## 2019-09-10 DIAGNOSIS — I10 BENIGN ESSENTIAL HTN: ICD-10-CM

## 2019-09-10 DIAGNOSIS — I50.31 ACUTE DIASTOLIC CHF (CONGESTIVE HEART FAILURE) (HCC): ICD-10-CM

## 2019-09-10 DIAGNOSIS — I25.10 CORONARY ARTERY DISEASE INVOLVING NATIVE CORONARY ARTERY OF NATIVE HEART WITHOUT ANGINA PECTORIS: Primary | ICD-10-CM

## 2019-09-10 PROCEDURE — 99214 OFFICE O/P EST MOD 30 MIN: CPT | Performed by: INTERNAL MEDICINE

## 2019-09-10 RX ORDER — TORSEMIDE 20 MG/1
10 TABLET ORAL DAILY
Qty: 45 TABLET | Refills: 3 | Status: SHIPPED | OUTPATIENT
Start: 2019-09-10 | End: 2020-03-18 | Stop reason: SDUPTHER

## 2019-09-10 RX ORDER — ATORVASTATIN CALCIUM 40 MG/1
40 TABLET, FILM COATED ORAL
Qty: 90 TABLET | Refills: 3 | Status: SHIPPED | OUTPATIENT
Start: 2019-09-10 | End: 2020-03-18 | Stop reason: SDUPTHER

## 2019-09-10 NOTE — PROGRESS NOTES
Cardiology Follow Up        Ce Araya      1955      [id number]      Discussion/Summary:    1  CAD status post PCI/ALTHEA (2 75 x 20 mm Synergy MR) 2/18/19, setting of stable ischemic heart disease  2  Benign essential hypertension  3  Dyslipidemia  4  Morbid obesity  5  COPD  6  Osteoarthritis    · No symptoms of angina, continue dual anti-platelet treatment  · Blood pressure well controlled, continue amlodipine  · Patient requesting decreasing atorvastatin to 40 mg daily which she feels may help her sleep better  Patient given permission to do so  Routine lipid panel with other health maintenance blood work per PCP  · Encouraged heart healthy diet, exercise  · Continue low-dose torsemide 10 mg daily  Chronic but stable lower extremity edema noted with overall stable weight  · Have recommended decreasing caffeine intake as she does feel palpitations after drinking coffee and tea      Follow-up in 6 months, patient encouraged to call if palpitations persist despite decreasing caffeine intake          Interval History:        This is a 72 yo female w/ a h/o morbid obesity, dyslipidemia, HTN, and COPD  She was admitted 02/2018 with COPD exacerbation  Troponin level was noted to be up to 4 with no symptoms of chest discomfort or ischemic ECG changes  Coronary angiography 02/18/2019 revealed 90% mid LAD stenosis and she underwent PCI/ALTHEA with a 2 75 x 20 mm Synergy MR ALTHEA  She had no residual obstructive disease  She presents today for follow-up, stating that overall she feels very well  She admits to palpitations whenever she drinks coffee or tea in the morning hours, which can last up to a full day  It sometimes makes her feel lightheaded, but she denies presyncope or syncope  She denies worsening lower extremity edema, orthopnea, exertional chest pain, shortness of breath  She has decreased her torsemide to 10 mg daily on her own to avoid polyuria  Vitals:  Vitals:    09/10/19 1348   BP: 134/70   BP Location: Right arm   Patient Position: Sitting   Cuff Size: Large   Pulse: 99   SpO2: 97%   Weight: 131 kg (288 lb)   Height: 4' 10" (1 473 m)         Past Medical History:   Diagnosis Date    [facility] 1/29/2019    Diabetes mellitus (HCC)     GERD (gastroesophageal reflux disease)     Hyperlipidemia     Hypertension     Seasonal allergies     Sleep apnea      Social History     Socioeconomic History    Marital status: Single     Spouse name: Not on file    Number of children: Not on file    Years of education: Not on file    Highest education level: Not on file   Occupational History    Not on file   Social Needs    Financial resource strain: Not on file    Food insecurity:     Worry: Not on file     Inability: Not on file    Transportation needs:     Medical: Not on file     Non-medical: Not on file   Tobacco Use    Smoking status: Never Smoker    Smokeless tobacco: Never Used    Tobacco comment: childhood smoke exposure   Substance and Sexual Activity    Alcohol use: No    Drug use: No    Sexual activity: Not on file   Lifestyle    Physical activity:     Days per week: Not on file     Minutes per session: Not on file    Stress: Not on file   Relationships    Social connections:     Talks on phone: Not on file     Gets together: Not on file     Attends Mu-ism service: Not on file     Active member of club or organization: Not on file     Attends meetings of clubs or organizations: Not on file     Relationship status: Not on file    Intimate partner violence:     Fear of current or ex partner: Not on file     Emotionally abused: Not on file     Physically abused: Not on file     Forced sexual activity: Not on file   Other Topics Concern    Not on file   Social History Narrative    WORK:    -  Homemaker        HOBBIES:    -  Denies exposure        PETS:    -  Previous cat; no birds        TRAVEL:    -  No recent travel        EXPOSURES:    -  Denies mold, down pillows, hot tubs      Family History   Problem Relation Age of Onset    Coronary artery disease Father      Past Surgical History:   Procedure Laterality Date    BILATERAL KNEE ARTHROSCOPY      HYSTERECTOMY      TOTAL KNEE ARTHROPLASTY         Current Outpatient Medications:     ACCU-CHEK FASTCLIX LANCETS MISC, , Disp: , Rfl:     ACCU-CHEK GUIDE test strip, , Disp: , Rfl:     acetaminophen (TYLENOL) 325 mg tablet, Take 2 tablets (650 mg total) by mouth every 6 (six) hours as needed for mild pain, Disp: 30 tablet, Rfl: 0    albuterol (5 mg/mL) 0 5 % nebulizer solution, Take 0 5 mL by nebulization every 6 (six) hours as needed for wheezing or shortness of breath, Disp: 20 mL, Rfl: 0    albuterol (VENTOLIN HFA) 90 mcg/act inhaler, Inhale 2 puffs every 4 (four) hours as needed for wheezing, Disp: 1 Inhaler, Rfl: 2    amLODIPine (NORVASC) 5 mg tablet, Take 1 tablet (5 mg total) by mouth daily, Disp: 30 tablet, Rfl: 3    aspirin (ECOTRIN LOW STRENGTH) 81 mg EC tablet, Take 1 tablet (81 mg total) by mouth daily, Disp: 90 tablet, Rfl: 2    atorvastatin (LIPITOR) 40 mg tablet, Take 1 tablet (40 mg total) by mouth daily with dinner, Disp: 90 tablet, Rfl: 3    Blood Glucose Monitoring Suppl (ACCU-CHEK GUIDE) w/Device KIT, , Disp: , Rfl:     clopidogrel (PLAVIX) 75 mg tablet, Take 1 tablet (75 mg total) by mouth daily, Disp: 30 tablet, Rfl: 3    fluticasone-vilanterol (BREO ELLIPTA) 100-25 mcg/inh inhaler, Inhale 1 puff daily Rinse mouth after use , Disp: 1 Inhaler, Rfl: 2    guaiFENesin (ROBITUSSIN) 100 MG/5ML oral liquid, Take 10 mL (200 mg total) by mouth 3 (three) times a day as needed for cough, Disp: 120 mL, Rfl: 0    montelukast (SINGULAIR) 10 mg tablet, Take 1 tablet (10 mg total) by mouth daily, Disp: 90 tablet, Rfl: 1    Multiple Vitamin (MULTIVITAMIN) tablet, Take 1 tablet by mouth daily, Disp: 30 tablet, Rfl: 2    nitroglycerin (NITROSTAT) 0 4 mg SL tablet, Place 1 tablet (0 4 mg total) under the tongue every 5 (five) minutes as needed for chest pain, Disp: 60 tablet, Rfl: 0    pantoprazole (PROTONIX) 40 mg tablet, Take 1 tablet (40 mg total) by mouth daily, Disp: 90 tablet, Rfl: 3    torsemide (DEMADEX) 20 mg tablet, Take 0 5 tablets (10 mg total) by mouth daily, Disp: 45 tablet, Rfl: 3    ketoconazole (NIZORAL) 2 % cream, Apply topically daily (Patient not taking: Reported on 9/10/2019), Disp: 15 g, Rfl: 0        Review of Systems:  Review of Systems   Constitutional: Negative for activity change, fever and unexpected weight change  HENT: Negative for facial swelling, nosebleeds and voice change  Respiratory: Negative for chest tightness, shortness of breath and wheezing  Cardiovascular: Positive for palpitations  Negative for chest pain and leg swelling  Gastrointestinal: Negative for abdominal distention  Genitourinary: Negative for hematuria  Musculoskeletal: Negative for arthralgias  Skin: Negative for color change, pallor, rash and wound  Neurological: Negative for dizziness, seizures and syncope  Hematological: Does not bruise/bleed easily  Psychiatric/Behavioral: Negative for agitation  Physical Exam:  Physical Exam   Constitutional: She is oriented to person, place, and time  She appears well-developed and well-nourished  HENT:   Head: Normocephalic and atraumatic  Eyes: EOM are normal    Neck: Normal range of motion  Neck supple  Cardiovascular: Normal rate, regular rhythm and intact distal pulses  Distant HS   Pulmonary/Chest: Effort normal and breath sounds normal    Abdominal: Soft  Musculoskeletal: Normal range of motion  Neurological: She is alert and oriented to person, place, and time  Skin: Skin is warm and dry  Psychiatric: She has a normal mood and affect  Her behavior is normal  Judgment and thought content normal    Vitals reviewed        This note was completed in part utilizing M-Modal Fluency Direct Software  Grammatical errors, random word insertions, spelling mistakes, and incomplete sentences can be an occasional consequence of this system secondary to software limitations, ambient noise, and hardware issues  If you have any questions or concerns about the content, text, or information contained within the body of this dictation, please contact the provider for clarification

## 2019-09-20 ENCOUNTER — TELEPHONE (OUTPATIENT)
Dept: RADIOLOGY | Facility: MEDICAL CENTER | Age: 64
End: 2019-09-20

## 2019-09-20 ENCOUNTER — CONSULT (OUTPATIENT)
Dept: PAIN MEDICINE | Facility: CLINIC | Age: 64
End: 2019-09-20
Payer: MEDICARE

## 2019-09-20 VITALS
WEIGHT: 285 LBS | HEART RATE: 86 BPM | SYSTOLIC BLOOD PRESSURE: 147 MMHG | HEIGHT: 58 IN | DIASTOLIC BLOOD PRESSURE: 88 MMHG | BODY MASS INDEX: 59.82 KG/M2

## 2019-09-20 DIAGNOSIS — M54.50 CHRONIC LOW BACK PAIN WITHOUT SCIATICA, UNSPECIFIED BACK PAIN LATERALITY: ICD-10-CM

## 2019-09-20 DIAGNOSIS — G89.29 CHRONIC LOW BACK PAIN WITHOUT SCIATICA, UNSPECIFIED BACK PAIN LATERALITY: ICD-10-CM

## 2019-09-20 DIAGNOSIS — M47.816 LUMBAR SPONDYLOSIS: ICD-10-CM

## 2019-09-20 DIAGNOSIS — M16.11 OSTEOARTHRITIS OF RIGHT HIP, UNSPECIFIED OSTEOARTHRITIS TYPE: Primary | ICD-10-CM

## 2019-09-20 PROCEDURE — 99204 OFFICE O/P NEW MOD 45 MIN: CPT | Performed by: PHYSICAL MEDICINE & REHABILITATION

## 2019-09-20 RX ORDER — GABAPENTIN 300 MG/1
300 CAPSULE ORAL
Qty: 30 CAPSULE | Refills: 1 | Status: SHIPPED | OUTPATIENT
Start: 2019-09-20 | End: 2019-11-01 | Stop reason: SDUPTHER

## 2019-09-20 NOTE — PROGRESS NOTES
Assessment  1  Osteoarthritis of right hip, unspecified osteoarthritis type    2  Chronic low back pain without sciatica, unspecified back pain laterality    3  Lumbar spondylosis        Plan  Ms Siena Newman is a pleasant 50-year-old female with a significant past medical history of chronic low-back pain presents with worsening low back pain and right leg pain  She was seen by orthopedics whom ordered multiple x-rays, of which are corresponding with her clinical presentation of lumbar spondylosis and right hip osteoarthritis  At this time she states that the hip pain is worst in the low back pain so that will be our primary focus for now  I will  1  Plan for right hip steroid injection secondary to right hip osteoarthritis  2  Start the patient on aquatherapy 3 times per week for 4 weeks  3  Start the patient on gabapentin 300 mg at night with plans to increase the medication as patient tolerates  4  Patient is aware of the risks, benefits, procedure and would like to move forward with the right hip steroid injection  5  At a later date we may consider addressing the lumbar facet syndrome in consideration of medial branch blocks  This is also an issue for the patient but she feels the hip pain is more impacting on her daily activities     My impressions and treatment recommendations were discussed in detail with the patient who verbalized understanding and had no further questions  Discharge instructions were provided  I personally saw and examined the patient and I agree with the above discussed plan of care  Orders Placed This Encounter   Procedures    FL spine and pain procedure     Standing Status:   Future     Standing Expiration Date:   9/20/2023     Order Specific Question:   Reason for Exam:     Answer:   Right hip steroid injection     Order Specific Question:   Anticoagulant hold needed?      Answer:   No    Ambulatory referral to Physical Therapy     Standing Status:   Future     Standing Expiration Date:   9/20/2020     Referral Priority:   Routine     Referral Type:   Physical Therapy     Referral Reason:   Specialty Services Required     Requested Specialty:   Physical Therapy     Number of Visits Requested:   1     Expiration Date:   9/20/2020     New Medications Ordered This Visit   Medications    gabapentin (NEURONTIN) 300 mg capsule     Sig: Take 1 capsule (300 mg total) by mouth daily at bedtime     Dispense:  30 capsule     Refill:  1       History of Present Illness    Benja Vivar is a 71 y o  female who presents to [place] [address] [facility] with complaints of multiple years of low back and right leg pain  She reports a previous history of falls  Pain today is primarily in the low back and the right posterior leg that she reports be moderate, 7/10 and sometimes interfere with her daily activities  Pain is intermittent 30-60% of the time  She describes the pain as burning in her hip  Also complains of intermittent lower extremity weakness  She uses a scooter, walker and a single-point cane for ambulation  Pain is worse with standing, bending, sitting, walking and improves with lying down  She was recently seen by Orthopedics Dr Theodore Gamino whom ordered x-rays of the lumbar spine and bilateral hips with results below  Presents now for evaluation of persistent low back and right leg pain  I have personally reviewed and/or updated the patient's past medical history, past surgical history, family history, social history, current medications, allergies, and vital signs today  Review of Systems   Constitutional: Negative for appetite change  HENT: Negative for congestion and sore throat  Eyes: Negative for discharge and itching  Respiratory: Negative for apnea  Cardiovascular: Negative for chest pain  Gastrointestinal: Negative for abdominal pain and blood in stool  Endocrine: Negative for cold intolerance and polyuria     Genitourinary: Negative for difficulty urinating and dysuria  Musculoskeletal: Positive for back pain (Lower back) and joint swelling (Mostly R leg)  Skin: Positive for wound (R leg)  Negative for rash  Allergic/Immunologic: Negative for immunocompromised state  Neurological: Positive for weakness  Negative for dizziness  Hematological: Does not bruise/bleed easily  Psychiatric/Behavioral: Positive for sleep disturbance  Negative for confusion         Patient Active Problem List   Diagnosis    Morbid obesity ([facility] )    GERD (gastroesophageal reflux disease)    Borderline diabetes    Hypertension    Hyperlipidemia    Abnormal x-ray    Allergic rhinitis    Colon polyp    Restrictive lung disease    H/O: hysterectomy    Hip osteoarthritis    History of total knee replacement, bilateral    History of deep venous thrombosis    Obstructive sleep apnea of adult    Class 3 severe obesity due to excess calories in adult [facility])    Epigastric pain    Simple chronic bronchitis (HCC)    Lip abrasion    Acute diastolic CHF (congestive heart failure) (HCC)    Type 2 diabetes mellitus without complication, with long-term current use of insulin (HCC)    Myalgia    Status post insertion of drug-eluting stent into left anterior descending (LAD) artery for coronary artery disease    Lip lesion    Pain of right hip joint       Past Medical History:   Diagnosis Date    COPD with acute exacerbation ([facility] ) 1/29/2019    Diabetes mellitus (HCC)     GERD (gastroesophageal reflux disease)     Hyperlipidemia     Hypertension     Seasonal allergies     Sleep apnea        Past Surgical History:   Procedure Laterality Date    BILATERAL KNEE ARTHROSCOPY      HYSTERECTOMY      TOTAL KNEE ARTHROPLASTY         Family History   Problem Relation Age of Onset    Coronary artery disease Father        Social History     Occupational History    Not on file   Tobacco Use    Smoking status: Never Smoker    Smokeless tobacco: Never Used    Tobacco comment: childhood smoke exposure   Substance and Sexual Activity    Alcohol use: No    Drug use: No    Sexual activity: Not on file       Current Outpatient Medications on File Prior to Visit   Medication Sig    ACCU-CHEK FASTCLIX LANCETS MISC     ACCU-CHEK GUIDE test strip     acetaminophen (TYLENOL) 325 mg tablet Take 2 tablets (650 mg total) by mouth every 6 (six) hours as needed for mild pain    albuterol (5 mg/mL) 0 5 % nebulizer solution Take 0 5 mL by nebulization every 6 (six) hours as needed for wheezing or shortness of breath    albuterol (VENTOLIN HFA) 90 mcg/act inhaler Inhale 2 puffs every 4 (four) hours as needed for wheezing    amLODIPine (NORVASC) 5 mg tablet Take 1 tablet (5 mg total) by mouth daily    aspirin (ECOTRIN LOW STRENGTH) 81 mg EC tablet Take 1 tablet (81 mg total) by mouth daily    atorvastatin (LIPITOR) 40 mg tablet Take 1 tablet (40 mg total) by mouth daily with dinner    Blood Glucose Monitoring Suppl (ACCU-CHEK GUIDE) w/Device KIT     clopidogrel (PLAVIX) 75 mg tablet Take 1 tablet (75 mg total) by mouth daily    fluticasone-vilanterol (BREO ELLIPTA) 100-25 mcg/inh inhaler Inhale 1 puff daily Rinse mouth after use      guaiFENesin (ROBITUSSIN) 100 MG/5ML oral liquid Take 10 mL (200 mg total) by mouth 3 (three) times a day as needed for cough    montelukast (SINGULAIR) 10 mg tablet Take 1 tablet (10 mg total) by mouth daily    Multiple Vitamin (MULTIVITAMIN) tablet Take 1 tablet by mouth daily    nitroglycerin (NITROSTAT) 0 4 mg SL tablet Place 1 tablet (0 4 mg total) under the tongue every 5 (five) minutes as needed for chest pain    pantoprazole (PROTONIX) 40 mg tablet Take 1 tablet (40 mg total) by mouth daily    torsemide (DEMADEX) 20 mg tablet Take 0 5 tablets (10 mg total) by mouth daily    ketoconazole (NIZORAL) 2 % cream Apply topically daily (Patient not taking: Reported on 9/10/2019)     No current facility-administered medications on file prior to visit  Allergies   Allergen Reactions    Januvia [Sitagliptin] Swelling    Clindamycin     Hydrocodone     Morphine Hives    Omeprazole     Penicillins     Percocet [Oxycodone-Acetaminophen]     Tolterodine     Ciprofloxacin Rash       Physical Exam    /88   Pulse 86   Ht 4' 10" (1 473 m)   Wt 129 kg (285 lb)   BMI 59 57 kg/m²   Limited physical exam secondary to body habitus  General: Well-developed, obese individual in no acute distress  Mental: Appropriate mood and affect  Grossly oriented with coherent speech and thought processing  Neuro:  Cranial nerves: Cranial nerve function is grossly intact bilaterally  Strength: Bilateral lower extremity strength is normal and symmetric  No atrophy or tone abnormalities noted  Reflexes: Bilateral lower extremity muscle stretch reflexes are physiologic and symmetric  No ankle clonus is noted  Sensation: No loss of sensation is noted  SLR/Foraminal Compression Maneuvers: Straight leg raising in the sitting   position is negative  for radicular pain  Gait:  Gait/gross motor: Gait is antalgic currently using a rolling walker  Station is forward flexed posture  Musculoskeletal:  Palpation: Inspection and palpation of the spine and extremities demonstrates tenderness to palpation over the right groin and posterior glutes and low back lumbar paraspinals  Edematous bilateral lower extremities    Spine:  Limited active and passive range of motion in lumbar extension secondary to pain  Pain is most noticeable with axial loading in rotation worse on the right than the left of lumbar spine  Skin: Skin inspection grossly negative for erythema, breakdown, or concerning lesions in affected area  Lymph: No lymphadenopathy is appreciated in the involved extremity  Vessels: No lower extremity edema  Lungs: Breathing is comfortable and regular  No dyspnea noted during examination  Eyes: Visual field grossly intact to confrontation   No redness appreciated  ENT: No craniofacial deformities or asymmetry  No neck masses appreciated  Imaging    LUMBAR SPINE     INDICATION:   M54 5: Low back pain      COMPARISON:  None     VIEWS:  XR SPINE LUMBAR MINIMUM 4 VIEWS NON INJURY        FINDINGS:     Evaluation is limited by soft tissue attenuation and motion artifact      There is no evidence of acute fracture or destructive osseous lesion      There is grade 1 anterolisthesis of L4 on L5      Moderate endplate and facet joint degenerative changes throughout the lumbar spine      The pedicles appear intact      Soft tissues are unremarkable      IMPRESSION:     Limited evaluation secondary to motion and soft tissue attenuation artifact    No acute osseous abnormality        Degenerative changes as described

## 2019-09-20 NOTE — TELEPHONE ENCOUNTER
Patient seen by Dr Tanvi Gómez this morning at the [facility] office location  To be scheduled for injection  No answer at # in chart; no voicemail  Will try again later to coordinate date/time       Right hip injection

## 2019-09-20 NOTE — PATIENT INSTRUCTIONS
Osteoarthritis   WHAT YOU NEED TO KNOW:   Osteoarthritis (OA) occurs when cartilage (tissue that cushions a joint) wears away slowly and causes the bones to rub together  OA is a long-term condition that often affects the hands, neck, lower back, knees, and hips  OA is also called arthrosis or degenerative joint disease  DISCHARGE INSTRUCTIONS:   Return to the emergency department if:   · You have severe pain  · You cannot move your joint  Contact your healthcare provider if:   · You have a fever  · Your joint is red and tender  · You have questions or concerns about your condition or care  Medicines:   · Acetaminophen  is used to decrease pain  It is available without a doctor's order  Ask how much to take and how often to take it  Follow directions  Acetaminophen can cause liver damage if not taken correctly  · NSAIDs , such as ibuprofen, help decrease swelling, pain, and fever  This medicine is available with or without a doctor's order  NSAIDs can cause stomach bleeding or kidney problems in certain people  If you take blood thinner medicine, always ask your healthcare provider if NSAIDs are safe for you  Always read the medicine label and follow directions  · Prescription pain medicine  may be given to decrease severe pain if other medicines do not work  Take the medicine as directed  Do not wait until the pain is severe before you take your medicine  · Take your medicine as directed  Contact your healthcare provider if you think your medicine is not helping or if you have side effects  Tell him or her if you are allergic to any medicine  Keep a list of the medicines, vitamins, and herbs you take  Include the amounts, and when and why you take them  Bring the list or the pill bottles to follow-up visits  Carry your medicine list with you in case of an emergency  Follow up with your healthcare provider as directed:  Write down your questions so you remember to ask them during your visits  Go to physical therapy as directed:  A physical therapist teaches you exercises to help improve movement and strength, and to decrease pain in your joints  The exercises also help lower your risk for loss of function  Manage your OA:   · Stay active  Physical activity may reduce your pain and improve your ability to do daily activities  Avoid activities that cause pain  Ask your healthcare provider what type of exercise would be best for you  · Maintain a healthy weight  This helps decrease the strain on the joints in your back, hips, knees, ankles, and feet  Ask your healthcare provider how much you should weigh  Ask him to help you create a weight loss plan if you are overweight  · Use heat or ice  on your joints as directed  Heat and ice help decrease pain, swelling, and muscle spasms  Use a heating pad on a low setting or take a warm bath  Use an ice pack, or put crushed ice in a plastic bag  Cover it with a towel  · Massage  the muscles around the joint to relieve pain and stiffness  · Use a cane, crutches, or a walker  to protect and relieve pressure on your ankle, knee, and hip joints  You may also be prescribed shoe inserts to decrease pressure in your joints  · Wear flat or low-heeled shoes  This will help decrease pain and reduce pressure on your ankle, knee, and hip joints  © 2017 [employer]  Information is for End User's use only and may not be sold, redistributed or otherwise used for commercial purposes  All illustrations and images included in CareNotes® are the copyrighted property of [employer]  or [employer]  The above information is an  only  It is not intended as medical advice for individual conditions or treatments  Talk to your doctor, nurse or pharmacist before following any medical regimen to see if it is safe and effective for you

## 2019-10-01 NOTE — TELEPHONE ENCOUNTER
Called and spoke with patient  She states that last week while she was sitting she felt that the hip "went back in place" and "pulled in on it's own" so she has been having less pain  Wished to hold on scheduling the hip injection at this time  She also feels like the gabapentin is working  She is unsure and "scared" of starting aquatherapy as she and the flu are "like bosom (buddies)" as she says as she gets the flu and bronchitis frequently from December thru February  Scheduled f/u with you 10/17 to revisit and evaluate

## 2019-10-22 PROBLEM — E66.9 DIABETES MELLITUS TYPE 2 IN OBESE (HCC): Status: ACTIVE | Noted: 2019-02-18

## 2019-10-22 PROBLEM — M54.50 CHRONIC LOW BACK PAIN WITHOUT SCIATICA: Status: ACTIVE | Noted: 2019-10-22

## 2019-10-22 PROBLEM — I25.10 CORONARY ARTERY DISEASE INVOLVING NATIVE CORONARY ARTERY OF NATIVE HEART: Status: ACTIVE | Noted: 2019-10-22

## 2019-10-22 PROBLEM — E11.69 DIABETES MELLITUS TYPE 2 IN OBESE (HCC): Status: ACTIVE | Noted: 2019-02-18

## 2019-10-22 PROBLEM — G89.29 CHRONIC LOW BACK PAIN WITHOUT SCIATICA: Status: ACTIVE | Noted: 2019-10-22

## 2019-10-22 NOTE — ASSESSMENT & PLAN NOTE
-Discussed options of [facility]-Intensive Lifestyle Intervention Program, Very Low Calorie Diet-VLCD, Conservative Program, Bryan-En-Y Gastric Bypass and Vertical Sleeve Gastrectomy and the role of weight loss medications   -Initial weight loss goal of 5-10% weight loss for improved health  -Screening labs: TSH and BMP reviewed from 02/2019  A1c reviewed from 08/2019  eGFR 58  Recommend 50-65 grams of protein per day, but eGFR only mildly decreased so she likely will tolerate higher protein levels  Will recheck eGFR  Consider CMP  Recommend routine LP testing with PCP  -Not interested in bariatric surgery   -Not a candidate for phentermine-  CAD s/p PCI 2/18/19  -Of note has full dentures, but does not use them  -On gabapentin (weight herbie) for pain- reviewed this side effect with the pt  -Patient is interested in pursuing Conservative Program   -Dietary recall reviewed suggestions made  Sample menu reviewed  Recommend measuring portions   Daily food logs provided  -Eats out of loneliness- [facility] referral, which she states she would consider, but not at this time due to financial restraints  -Can consider referral to [facility] for additional edu    Goals: improve comorbid conditions, 200 lbs

## 2019-10-22 NOTE — ASSESSMENT & PLAN NOTE
HTN/CAD:  S/p PCI 2/2019  -on amlodipine, torsemide, and clopidogrel  -Continue management with cardiology -- Dr Danna Reyes

## 2019-10-22 NOTE — ASSESSMENT & PLAN NOTE
Lab Results   Component Value Date    HGBA1C 6 0 08/22/2019     -Not currently on medications  -Can consider metformin or Saxenda  -may improve with weight loss and dietary changes

## 2019-10-22 NOTE — PROGRESS NOTES
Assessment/Plan: Morbid obesity with BMI of 60 0-69 9, adult ([address] )  -Discussed options of [facility]-Intensive Lifestyle Intervention Program, Very Low Calorie Diet-VLCD, Conservative Program, Bryan-En-Y Gastric Bypass and Vertical Sleeve Gastrectomy and the role of weight loss medications   -Initial weight loss goal of 5-10% weight loss for improved health  -Screening labs: TSH and BMP reviewed from 02/2019  A1c reviewed from 08/2019  eGFR 58  Recommend 50-65 grams of protein per day, but eGFR only mildly decreased so she likely will tolerate higher protein levels  Will recheck eGFR  Consider CMP  Recommend routine LP testing with PCP  -Not interested in bariatric surgery   -Not a candidate for phentermine-  CAD s/p PCI 2/18/19  -Of note has full dentures, but does not use them  -On gabapentin (weight herbie) for pain- reviewed this side effect with the pt  -Patient is interested in pursuing Conservative Program   -Dietary recall reviewed suggestions made  Sample menu reviewed  Recommend measuring portions   Daily food logs provided  -Eats out of loneliness- [facility] referral, which she states she would consider, but not at this time due to financial restraints  -Can consider referral to [facility] for additional edu    Goals: improve comorbid conditions, 200 lbs    Hypertension  HTN/CAD:  S/p PCI 2/2019  -on amlodipine, torsemide, and clopidogrel  -Continue management with cardiology -- Dr Radha Perry    Diabetes mellitus type 2 in obese [facility])    Lab Results   Component Value Date    HGBA1C 6 0 08/22/2019     -Not currently on medications  -Can consider metformin or Saxenda  -may improve with weight loss and dietary changes    GERD (gastroesophageal reflux disease)  -on pantoprazole  -may improve with weight loss and dietary changes    Chronic low back pain without sciatica  -seeing pain management  -recently started on gabapentin (weight herbie)    Obstructive sleep apnea of adult  Hx of YENNI: not using CPAP as she states "she didn't like it " reviewed risks of undiagnosed/untreated sleep apnea  Follow up in approximately 6 weeks with Non-Surgical Physician/Advanced Practitioner  Colonoscopy- 03/21/2012, states she is due  Reviewed notes under media- 5 year recall recommended  She was agreeable to referral     Goals:  Food log (ie ) www myfitnesspal com,sparkpeople  com,loseit com,calorieking  com,etc  baritastic  No sugary beverages  At least 64oz of water daily (4 bottles)  Increase physical activity by 10 minutes daily  Gradually increase physical activity to a goal of 5 days per week for 30 minutes of MODERATE intensity PLUS 2 days per week of FULL BODY resistance training  Try to increase steps as tolerated  5-10 servings of fruits and vegetables per day and 25-35 grams of dietary fiber per day, gradually increasing  8601-1872 calories per day   Recommend 50-65 grams of protein per day     Diagnoses and all orders for this visit:    Essential hypertension  -     Comprehensive metabolic panel; Future    Morbid obesity with BMI of 60 0-69 9, adult ([facility] [address] )  -     Ambulatory referral to Weight Management  -     Comprehensive metabolic panel; Future    Coronary artery disease involving native coronary artery of native heart, angina presence unspecified  -     Comprehensive metabolic panel; Future    Diabetes mellitus type 2 in obese [facility])  -     Comprehensive metabolic panel; Future    Gastroesophageal reflux disease, esophagitis presence not specified  -     Comprehensive metabolic panel; Future    Chronic low back pain without sciatica, unspecified back pain laterality  -     Comprehensive metabolic panel; Future    Screening for colon cancer  -     Ambulatory referral to Gastroenterology; Future  -     Comprehensive metabolic panel; Future    Obstructive sleep apnea of adult  -     Comprehensive metabolic panel;  Future        Subjective:   Chief Complaint   Patient presents with    Consult     mwm consult     Patient ID: Asif Islas  is a 71 y o  female with excess weight/obesity here to pursue weight management  Past Medical History:   Diagnosis Date    COPD with acute exacerbation ([facility] [address] ) 1/29/2019    Diabetes mellitus ([facility] [address] )     Full dentures     GERD (gastroesophageal reflux disease)     Hyperlipidemia     Hypertension     Seasonal allergies     Sleep apnea      HPI:  Obesity/Excess Weight:  Severity: Very Severe  Onset: since 1972    Modifiers: [place] weight loss  Contributing factors: sweets, Poor Food Choices and Medications  multiple surgeries, eats out of loneliness  Associated symptoms: comorbid conditions and body image issues     Goals: improve comorbid conditions, 200 lbs  STOPBANG: hx of YENNI, not using CPAP as she states "she didn't like it "     Hydration: 2 5 bottles of water,  Large mug of coffee (believes at least 2 cups) with cream + sugar, 1 bottle of sweet tea per day  Alcohol: denies    B: skips  10-11 am: 3 pieces of toast (tries for rye or wheat) + margarine + 3 eggs  + coffee  3p: bowl cereal (special K) with 2 % milk (states she gets diarrhea from skim to 1 % milk)  5-6p: protein (estimates 5 oz) + vegetable + rice (>1 cup) + gravy    Tries to limit candy bars to 1/2 and tries not to drink them every day    The following portions of the patient's history were reviewed and updated as appropriate: allergies, current medications, past family history, past medical history, past social history, past surgical history and problem list     Review of Systems   Constitutional: Negative for chills and fever  HENT: Negative for sore throat  Respiratory: Positive for shortness of breath (per pt with over exertion, but sx not worsened  Recommend f/u with cardiology/ER/911 evaluation with CP, new, worsening, or persisting sx  States she has been able to tolerate more walking since she is actually feeling better  )  Negative for cough  Cardiovascular: Negative for chest pain and palpitations  Gastrointestinal: Positive for constipation  Genitourinary: Negative for dysuria  Musculoskeletal: Positive for arthralgias  Skin: Negative for rash  Neurological: Negative for headaches  Psychiatric/Behavioral:        No concerns with mood     Objective:    /76 (BP Location: Left arm, Patient Position: Sitting, Cuff Size: Large)   Pulse 98   Temp 98 4 °F (36 9 °C) (Tympanic)   Resp 18   Ht 4' 9 5" (1 461 m)   Wt 130 kg (287 lb 8 oz)   BMI 61 14 kg/m²     Physical Exam   Nursing note and vitals reviewed  Constitutional   General appearance: Abnormal   well developed and morbidly obese  Eyes No conjunctival pallor  Ears, Nose, Mouth, and Throat Oral mucosa moist    Pulmonary   Respiratory effort: No increased work of breathing or signs of respiratory distress  Auscultation of lungs: Clear to auscultation, equal breath sounds bilaterally, no wheezes, no rales, no rhonci  Cardiovascular   Auscultation of heart: Normal rate and rhythm, normal S1 and S2, with faint systolic murmurs  Examination of extremities for edema and/or varicosities: Normal   Trace-1+ edema  Abdomen   Abdomen: Abnormal   The abdomen was obese  Bowel sounds were normal  The abdomen was soft and nontender     Musculoskeletal   Gait and station: Normal     Psychiatric   Orientation to person, place and time: Normal     Affect: appropriate

## 2019-10-24 ENCOUNTER — CONSULT (OUTPATIENT)
Dept: BARIATRICS | Facility: CLINIC | Age: 64
End: 2019-10-24
Payer: MEDICARE

## 2019-10-24 VITALS
BODY MASS INDEX: 60.35 KG/M2 | DIASTOLIC BLOOD PRESSURE: 76 MMHG | SYSTOLIC BLOOD PRESSURE: 120 MMHG | WEIGHT: 287.5 LBS | TEMPERATURE: 98.4 F | HEIGHT: 58 IN | HEART RATE: 98 BPM | RESPIRATION RATE: 18 BRPM

## 2019-10-24 DIAGNOSIS — G89.29 CHRONIC LOW BACK PAIN WITHOUT SCIATICA, UNSPECIFIED BACK PAIN LATERALITY: ICD-10-CM

## 2019-10-24 DIAGNOSIS — Z12.11 SCREENING FOR COLON CANCER: ICD-10-CM

## 2019-10-24 DIAGNOSIS — G47.33 OBSTRUCTIVE SLEEP APNEA OF ADULT: ICD-10-CM

## 2019-10-24 DIAGNOSIS — K21.9 GASTROESOPHAGEAL REFLUX DISEASE, ESOPHAGITIS PRESENCE NOT SPECIFIED: ICD-10-CM

## 2019-10-24 DIAGNOSIS — E66.01 MORBID OBESITY WITH BMI OF 60.0-69.9, ADULT (HCC): ICD-10-CM

## 2019-10-24 DIAGNOSIS — M54.50 CHRONIC LOW BACK PAIN WITHOUT SCIATICA, UNSPECIFIED BACK PAIN LATERALITY: ICD-10-CM

## 2019-10-24 DIAGNOSIS — E11.69 DIABETES MELLITUS TYPE 2 IN OBESE (HCC): ICD-10-CM

## 2019-10-24 DIAGNOSIS — I25.10 CORONARY ARTERY DISEASE INVOLVING NATIVE CORONARY ARTERY OF NATIVE HEART, ANGINA PRESENCE UNSPECIFIED: ICD-10-CM

## 2019-10-24 DIAGNOSIS — I10 ESSENTIAL HYPERTENSION: Primary | ICD-10-CM

## 2019-10-24 DIAGNOSIS — E66.9 DIABETES MELLITUS TYPE 2 IN OBESE (HCC): ICD-10-CM

## 2019-10-24 PROCEDURE — 99204 OFFICE O/P NEW MOD 45 MIN: CPT | Performed by: PHYSICIAN ASSISTANT

## 2019-10-24 NOTE — PATIENT INSTRUCTIONS
Goals: Food log (ie ) www myfitnesspal com,sparkpeople  com,loseit com,calorieking  com,etc  baritastic  No sugary beverages  At least 64oz of water daily (4 bottles)  Increase physical activity by 10 minutes daily  Gradually increase physical activity to a goal of 5 days per week for 30 minutes of MODERATE intensity PLUS 2 days per week of FULL BODY resistance training   Try to increase steps as tolerated  5-10 servings of fruits and vegetables per day and 25-35 grams of dietary fiber per day, gradually increasing  6581-0193 calories per day   Recommend 50-65 grams of protein per day

## 2019-10-24 NOTE — ASSESSMENT & PLAN NOTE
Hx of YENNI: not using CPAP as she states "she didn't like it " reviewed risks of undiagnosed/untreated sleep apnea

## 2019-11-01 ENCOUNTER — OFFICE VISIT (OUTPATIENT)
Dept: PAIN MEDICINE | Facility: CLINIC | Age: 64
End: 2019-11-01
Payer: MEDICARE

## 2019-11-01 VITALS
DIASTOLIC BLOOD PRESSURE: 84 MMHG | SYSTOLIC BLOOD PRESSURE: 126 MMHG | HEIGHT: 57 IN | WEIGHT: 288 LBS | HEART RATE: 89 BPM | BODY MASS INDEX: 62.13 KG/M2

## 2019-11-01 DIAGNOSIS — M47.816 LUMBAR SPONDYLOSIS: ICD-10-CM

## 2019-11-01 DIAGNOSIS — G89.29 CHRONIC LOW BACK PAIN WITHOUT SCIATICA, UNSPECIFIED BACK PAIN LATERALITY: ICD-10-CM

## 2019-11-01 DIAGNOSIS — M54.50 CHRONIC LOW BACK PAIN WITHOUT SCIATICA, UNSPECIFIED BACK PAIN LATERALITY: ICD-10-CM

## 2019-11-01 DIAGNOSIS — M16.11 OSTEOARTHRITIS OF RIGHT HIP, UNSPECIFIED OSTEOARTHRITIS TYPE: ICD-10-CM

## 2019-11-01 PROCEDURE — 99213 OFFICE O/P EST LOW 20 MIN: CPT | Performed by: PHYSICAL MEDICINE & REHABILITATION

## 2019-11-01 RX ORDER — GABAPENTIN 300 MG/1
300 CAPSULE ORAL
Qty: 30 CAPSULE | Refills: 1 | Status: SHIPPED | OUTPATIENT
Start: 2019-11-01 | End: 2020-06-25 | Stop reason: SDUPTHER

## 2019-11-01 NOTE — PROGRESS NOTES
Assessment  1  Osteoarthritis of right hip, unspecified osteoarthritis type    2  Chronic low back pain without sciatica, unspecified back pain laterality    3  Lumbar spondylosis        Plan  Ms Katey Bennett is a pleasant 69-year-old female who was seen follow-up with previous plan to provide her with a right hip steroid injection  The injection was not done as the patient's pain improved  Today she just presents as follow-up regarding the right hip pain  She is doing extremely well with current regimen of gabapentin 300 mg at night and plans to start physical therapy soon  Her pain is well controlled and does not wish to increase the medication or 2 the injection at this time  At this time we will continue gabapentin 300 mg at night, advised and encouraged to start physical therapy and participate in her own home exercise regimen of, also educated on weight loss  Advised to follow up in 8 weeks or sooner if needed      My impressions and treatment recommendations were discussed in detail with the patient who verbalized understanding and had no further questions  Discharge instructions were provided  I personally saw and examined the patient and I agree with the above discussed plan of care  No orders of the defined types were placed in this encounter  New Medications Ordered This Visit   Medications    gabapentin (NEURONTIN) 300 mg capsule     Sig: Take 1 capsule (300 mg total) by mouth daily at bedtime     Dispense:  30 capsule     Refill:  1       History of Present Illness    Shira Yip is a 71 y o  female presents as follow-up regarding right hip pain  She reports the pain is well controlled with current gabapentin 300 mg at night  She has not been able start aqua therapy or physical therapy due to financial and timing  She is also getting significant relief and her pain is well controlled  The previous hip injection was not done secondary to her improvement in pain    Presents today for follow-up evaluation  I have personally reviewed and/or updated the patient's past medical history, past surgical history, family history, social history, current medications, allergies, and vital signs today  Review of Systems   Constitutional: Negative  HENT: Negative  Eyes: Negative  Respiratory: Negative  Cardiovascular: Negative  Gastrointestinal: Negative  Endocrine: Negative  Genitourinary: Negative  Musculoskeletal: Positive for arthralgias  Skin: Negative  Allergic/Immunologic: Negative  Neurological: Negative  Hematological: Negative  Psychiatric/Behavioral: Negative          Patient Active Problem List   Diagnosis    Morbid obesity with BMI of 60 0-69 9, adult ([place] [address] )    GERD (gastroesophageal reflux disease)    Borderline diabetes    Hypertension    Hyperlipidemia    Abnormal x-ray    Allergic rhinitis    Colon polyp    Restrictive lung disease    H/O: hysterectomy    Hip osteoarthritis    History of total knee replacement, bilateral    History of deep venous thrombosis    Obstructive sleep apnea of adult    Class 3 severe obesity due to excess calories in adult [facility])    Epigastric pain    Simple chronic bronchitis (HCC)    Lip abrasion    Acute diastolic CHF (congestive heart failure) ([place] [address] )    Diabetes mellitus type 2 in obese (HCC)    Myalgia    Status post insertion of drug-eluting stent into left anterior descending (LAD) artery for coronary artery disease    Lip lesion    Pain of right hip joint    Coronary artery disease involving native coronary artery of native heart    Chronic low back pain without sciatica       Past Medical History:   Diagnosis Date    COPD with acute exacerbation ([place] [address] ) 1/29/2019    Diabetes mellitus ([place] [address] )     Full dentures     GERD (gastroesophageal reflux disease)     Hyperlipidemia     Hypertension     Seasonal allergies     Sleep apnea        Past Surgical History:   Procedure Laterality Date    BILATERAL KNEE ARTHROSCOPY      CATARACT EXTRACTION Right     DENTAL SURGERY      HYSTERECTOMY      TOTAL KNEE ARTHROPLASTY Bilateral     TUBAL LIGATION         Family History   Problem Relation Age of Onset    Coronary artery disease Father        Social History     Occupational History    Not on file   Tobacco Use    Smoking status: Never Smoker    Smokeless tobacco: Never Used    Tobacco comment: childhood smoke exposure   Substance and Sexual Activity    Alcohol use: No    Drug use: No    Sexual activity: Not on file       Current Outpatient Medications on File Prior to Visit   Medication Sig    ACCU-CHEK FASTCLIX LANCETS MISC     ACCU-CHEK GUIDE test strip     acetaminophen (TYLENOL) 325 mg tablet Take 2 tablets (650 mg total) by mouth every 6 (six) hours as needed for mild pain    albuterol (5 mg/mL) 0 5 % nebulizer solution Take 0 5 mL by nebulization every 6 (six) hours as needed for wheezing or shortness of breath    albuterol (VENTOLIN HFA) 90 mcg/act inhaler Inhale 2 puffs every 4 (four) hours as needed for wheezing    amLODIPine (NORVASC) 5 mg tablet Take 1 tablet (5 mg total) by mouth daily    aspirin (ECOTRIN LOW STRENGTH) 81 mg EC tablet Take 1 tablet (81 mg total) by mouth daily    atorvastatin (LIPITOR) 40 mg tablet Take 1 tablet (40 mg total) by mouth daily with dinner    Blood Glucose Monitoring Suppl (ACCU-CHEK GUIDE) w/Device KIT     clopidogrel (PLAVIX) 75 mg tablet Take 1 tablet (75 mg total) by mouth daily    fluticasone-vilanterol (BREO ELLIPTA) 100-25 mcg/inh inhaler Inhale 1 puff daily Rinse mouth after use      guaiFENesin (ROBITUSSIN) 100 MG/5ML oral liquid Take 10 mL (200 mg total) by mouth 3 (three) times a day as needed for cough    ketoconazole (NIZORAL) 2 % cream Apply topically daily    montelukast (SINGULAIR) 10 mg tablet Take 1 tablet (10 mg total) by mouth daily    Multiple Vitamin (MULTIVITAMIN) tablet Take 1 tablet by mouth daily    nitroglycerin (NITROSTAT) 0 4 mg SL tablet Place 1 tablet (0 4 mg total) under the tongue every 5 (five) minutes as needed for chest pain    pantoprazole (PROTONIX) 40 mg tablet Take 1 tablet (40 mg total) by mouth daily    torsemide (DEMADEX) 20 mg tablet Take 0 5 tablets (10 mg total) by mouth daily    [DISCONTINUED] gabapentin (NEURONTIN) 300 mg capsule Take 1 capsule (300 mg total) by mouth daily at bedtime     No current facility-administered medications on file prior to visit  Allergies   Allergen Reactions    Januvia [Sitagliptin] Swelling    Clindamycin     Hydrocodone     Morphine Hives    Omeprazole     Penicillins     Percocet [Oxycodone-Acetaminophen]     Tolterodine     Ciprofloxacin Rash       Physical Exam    /84   Pulse 89   Ht 4' 9" (1 448 m)   Wt 131 kg (288 lb)   BMI 62 32 kg/m²     Constitutional: normal, well developed, well nourished, alert, in no distress and non-toxic and no overt pain behavior  and obese  Eyes: anicteric  HEENT: grossly intact  Neck: supple, symmetric, trachea midline and no masses   Pulmonary:even and unlabored  Cardiovascular:No edema or pitting edema present  Skin:Normal without rashes or lesions and well hydrated  Psychiatric:Mood and affect appropriate  Neurologic:Cranial Nerves II-XII grossly intact  Musculoskeletal:normal and antalgic, tenderness to palpation over right greater trochanter and anterior groin    Much improved from previous evaluation    Imaging

## 2019-11-04 DIAGNOSIS — I10 ESSENTIAL HYPERTENSION: ICD-10-CM

## 2019-11-05 RX ORDER — AMLODIPINE BESYLATE 5 MG/1
TABLET ORAL
Qty: 90 TABLET | Refills: 3 | Status: SHIPPED | OUTPATIENT
Start: 2019-11-05 | End: 2019-12-28 | Stop reason: HOSPADM

## 2019-11-25 ENCOUNTER — APPOINTMENT (OUTPATIENT)
Dept: LAB | Facility: CLINIC | Age: 64
End: 2019-11-25
Payer: MEDICARE

## 2019-11-25 ENCOUNTER — OFFICE VISIT (OUTPATIENT)
Dept: FAMILY MEDICINE CLINIC | Facility: CLINIC | Age: 64
End: 2019-11-25

## 2019-11-25 VITALS
HEART RATE: 89 BPM | DIASTOLIC BLOOD PRESSURE: 80 MMHG | HEIGHT: 57 IN | OXYGEN SATURATION: 97 % | TEMPERATURE: 98.1 F | RESPIRATION RATE: 18 BRPM | WEIGHT: 288 LBS | SYSTOLIC BLOOD PRESSURE: 138 MMHG | BODY MASS INDEX: 62.13 KG/M2

## 2019-11-25 DIAGNOSIS — G47.33 OBSTRUCTIVE SLEEP APNEA OF ADULT: ICD-10-CM

## 2019-11-25 DIAGNOSIS — E11.69 DIABETES MELLITUS TYPE 2 IN OBESE (HCC): Primary | ICD-10-CM

## 2019-11-25 DIAGNOSIS — E78.5 DYSLIPIDEMIA: ICD-10-CM

## 2019-11-25 DIAGNOSIS — M54.50 CHRONIC LOW BACK PAIN WITHOUT SCIATICA, UNSPECIFIED BACK PAIN LATERALITY: ICD-10-CM

## 2019-11-25 DIAGNOSIS — M25.551 PAIN OF RIGHT HIP JOINT: ICD-10-CM

## 2019-11-25 DIAGNOSIS — Z12.11 SCREENING FOR COLON CANCER: ICD-10-CM

## 2019-11-25 DIAGNOSIS — R05.9 COUGH IN ADULT PATIENT: ICD-10-CM

## 2019-11-25 DIAGNOSIS — J98.4 RESTRICTIVE LUNG DISEASE: ICD-10-CM

## 2019-11-25 DIAGNOSIS — L29.9 EAR ITCHING: ICD-10-CM

## 2019-11-25 DIAGNOSIS — E66.01 MORBID OBESITY WITH BMI OF 60.0-69.9, ADULT (HCC): ICD-10-CM

## 2019-11-25 DIAGNOSIS — I25.10 CORONARY ARTERY DISEASE INVOLVING NATIVE CORONARY ARTERY OF NATIVE HEART, ANGINA PRESENCE UNSPECIFIED: ICD-10-CM

## 2019-11-25 DIAGNOSIS — K21.9 GASTROESOPHAGEAL REFLUX DISEASE, ESOPHAGITIS PRESENCE NOT SPECIFIED: ICD-10-CM

## 2019-11-25 DIAGNOSIS — E78.2 MIXED HYPERLIPIDEMIA: ICD-10-CM

## 2019-11-25 DIAGNOSIS — E66.9 DIABETES MELLITUS TYPE 2 IN OBESE (HCC): ICD-10-CM

## 2019-11-25 DIAGNOSIS — E11.69 DIABETES MELLITUS TYPE 2 IN OBESE (HCC): ICD-10-CM

## 2019-11-25 DIAGNOSIS — E66.9 DIABETES MELLITUS TYPE 2 IN OBESE (HCC): Primary | ICD-10-CM

## 2019-11-25 DIAGNOSIS — G89.29 CHRONIC LOW BACK PAIN WITHOUT SCIATICA, UNSPECIFIED BACK PAIN LATERALITY: ICD-10-CM

## 2019-11-25 DIAGNOSIS — I10 ESSENTIAL HYPERTENSION: ICD-10-CM

## 2019-11-25 LAB
ALBUMIN SERPL BCP-MCNC: 4 G/DL (ref 3.5–5)
ALP SERPL-CCNC: 106 U/L (ref 46–116)
ALT SERPL W P-5'-P-CCNC: 18 U/L (ref 12–78)
ANION GAP SERPL CALCULATED.3IONS-SCNC: 4 MMOL/L (ref 4–13)
AST SERPL W P-5'-P-CCNC: 8 U/L (ref 5–45)
BASOPHILS # BLD AUTO: 0.04 THOUSANDS/ΜL (ref 0–0.1)
BASOPHILS NFR BLD AUTO: 0 % (ref 0–1)
BILIRUB SERPL-MCNC: 0.28 MG/DL (ref 0.2–1)
BUN SERPL-MCNC: 16 MG/DL (ref 5–25)
CALCIUM SERPL-MCNC: 9.1 MG/DL (ref 8.3–10.1)
CHLORIDE SERPL-SCNC: 108 MMOL/L (ref 100–108)
CHOLEST SERPL-MCNC: 227 MG/DL (ref 50–200)
CO2 SERPL-SCNC: 32 MMOL/L (ref 21–32)
CREAT SERPL-MCNC: 1.14 MG/DL (ref 0.6–1.3)
EOSINOPHIL # BLD AUTO: 0.28 THOUSAND/ΜL (ref 0–0.61)
EOSINOPHIL NFR BLD AUTO: 3 % (ref 0–6)
ERYTHROCYTE [DISTWIDTH] IN BLOOD BY AUTOMATED COUNT: 14.5 % (ref 11.6–15.1)
GFR SERPL CREATININE-BSD FRML MDRD: 58 ML/MIN/1.73SQ M
GLUCOSE P FAST SERPL-MCNC: 86 MG/DL (ref 65–99)
HCT VFR BLD AUTO: 42.1 % (ref 34.8–46.1)
HDLC SERPL-MCNC: 54 MG/DL
HGB BLD-MCNC: 13.4 G/DL (ref 11.5–15.4)
IMM GRANULOCYTES # BLD AUTO: 0.03 THOUSAND/UL (ref 0–0.2)
IMM GRANULOCYTES NFR BLD AUTO: 0 % (ref 0–2)
LDLC SERPL CALC-MCNC: 140 MG/DL (ref 0–100)
LYMPHOCYTES # BLD AUTO: 3.6 THOUSANDS/ΜL (ref 0.6–4.47)
LYMPHOCYTES NFR BLD AUTO: 37 % (ref 14–44)
MCH RBC QN AUTO: 28.7 PG (ref 26.8–34.3)
MCHC RBC AUTO-ENTMCNC: 31.8 G/DL (ref 31.4–37.4)
MCV RBC AUTO: 90 FL (ref 82–98)
MONOCYTES # BLD AUTO: 0.7 THOUSAND/ΜL (ref 0.17–1.22)
MONOCYTES NFR BLD AUTO: 7 % (ref 4–12)
NEUTROPHILS # BLD AUTO: 5.17 THOUSANDS/ΜL (ref 1.85–7.62)
NEUTS SEG NFR BLD AUTO: 53 % (ref 43–75)
NRBC BLD AUTO-RTO: 0 /100 WBCS
PLATELET # BLD AUTO: 277 THOUSANDS/UL (ref 149–390)
PMV BLD AUTO: 10.2 FL (ref 8.9–12.7)
POTASSIUM SERPL-SCNC: 4 MMOL/L (ref 3.5–5.3)
PROT SERPL-MCNC: 7.4 G/DL (ref 6.4–8.2)
RBC # BLD AUTO: 4.67 MILLION/UL (ref 3.81–5.12)
SL AMB POCT HEMOGLOBIN AIC: 5.8 (ref ?–6.5)
SODIUM SERPL-SCNC: 144 MMOL/L (ref 136–145)
TRIGL SERPL-MCNC: 165 MG/DL
WBC # BLD AUTO: 9.82 THOUSAND/UL (ref 4.31–10.16)

## 2019-11-25 PROCEDURE — 99213 OFFICE O/P EST LOW 20 MIN: CPT | Performed by: FAMILY MEDICINE

## 2019-11-25 PROCEDURE — 85025 COMPLETE CBC W/AUTO DIFF WBC: CPT

## 2019-11-25 PROCEDURE — 80053 COMPREHEN METABOLIC PANEL: CPT

## 2019-11-25 PROCEDURE — 80061 LIPID PANEL: CPT

## 2019-11-25 PROCEDURE — 83036 HEMOGLOBIN GLYCOSYLATED A1C: CPT | Performed by: FAMILY MEDICINE

## 2019-11-25 PROCEDURE — 36415 COLL VENOUS BLD VENIPUNCTURE: CPT

## 2019-11-25 NOTE — ASSESSMENT & PLAN NOTE
Complains of itchiness and intermittent clear drainage of the left ear, most likely otitis externa bacterial vs candidal  Will order Cortisporin  Follow up if no improvement or worsening of the symptoms

## 2019-11-25 NOTE — PROGRESS NOTES
Assessment/Plan:    Diabetes mellitus type 2 in obese [facility])    Lab Results   Component Value Date    HGBA1C 5 8 11/25/2019      Patient currently is not on any medications  Continue diabetic diet    Ear itching  Complains of itchiness and intermittent clear drainage of the left ear, most likely otitis externa bacterial vs candidal  Will order Cortisporin  Follow up if no improvement or worsening of the symptoms    Pain of right hip joint  Xray of the hips revealing progressive mild-to-moderate degenerative arthritis both hips  She currently follows with orthopedic surgery and pain management    Restrictive lung disease  Currently controlled with Breo, will continue       Diagnoses and all orders for this visit:    Diabetes mellitus type 2 in obese ([facility] [address] )  -     Comprehensive metabolic panel; Future  -     CBC and differential; Future  -     POCT hemoglobin A1c    Mixed hyperlipidemia  -     Lipid Panel with Direct LDL reflex; Future    Cough in adult patient  -     guaiFENesin (ROBITUSSIN) 100 MG/5ML oral liquid; Take 10 mL (200 mg total) by mouth 3 (three) times a day as needed for cough    Ear itching  -     neomycin-polymyxin-hydrocortisone (CORTISPORIN) otic solution; Administer 3 drops into the left ear every 6 (six) hours    Pain of right hip joint    Restrictive lung disease          Subjective:      Patient ID: Griselda Triplett is a 71 y o  female  Patient presents to follow up on chronic condition including COPD, Diabetes, osteoarthritis  She has been compliant with medications and reports no new complians except itchinnes in her left ear for few weeks  The following portions of the patient's history were reviewed and updated as appropriate: allergies, current medications, past family history, past medical history, past social history, past surgical history and problem list     Review of Systems   Constitutional: Negative for chills, diaphoresis, fatigue and fever     HENT: Positive for ear pain (itchiness)  Negative for congestion, ear discharge, mouth sores, rhinorrhea, sore throat and trouble swallowing  Eyes: Negative for photophobia, pain and discharge  Respiratory: Negative for cough, chest tightness, shortness of breath and wheezing  Cardiovascular: Negative for chest pain, palpitations and leg swelling  Gastrointestinal: Negative for abdominal distention, abdominal pain, blood in stool, constipation, diarrhea and nausea  Genitourinary: Negative for difficulty urinating and frequency  Musculoskeletal: Positive for arthralgias  Negative for joint swelling and neck stiffness  Skin: Negative for color change, pallor and rash  Neurological: Negative for dizziness, syncope, numbness and headaches  Objective:      /80 (BP Location: Left arm, Patient Position: Sitting, Cuff Size: Thigh)   Pulse 89   Temp 98 1 °F (36 7 °C) (Temporal)   Resp 18   Ht 4' 9" (1 448 m)   Wt 131 kg (288 lb)   SpO2 97%   Breastfeeding? No   BMI 62 32 kg/m²          Physical Exam   Constitutional: She is oriented to person, place, and time  She appears well-developed and well-nourished  No distress  HENT:   Head: Normocephalic and atraumatic  Left Ear: Tympanic membrane normal  No lacerations  There is tenderness  No drainage or swelling  No foreign bodies  Skin erythema in the left ear   Eyes: Pupils are equal, round, and reactive to light  EOM are normal  No scleral icterus  Neck: Normal range of motion  Neck supple  Cardiovascular: Normal rate, regular rhythm and normal heart sounds  Exam reveals no gallop and no friction rub  No murmur heard  Pulmonary/Chest: Effort normal and breath sounds normal  No respiratory distress  She has no wheezes  She has no rales  She exhibits no tenderness  Abdominal: Soft  Bowel sounds are normal  She exhibits no distension  There is no tenderness  There is no rebound  Musculoskeletal: Normal range of motion   She exhibits no edema, tenderness or deformity  Lymphadenopathy:     She has no cervical adenopathy  Neurological: She is alert and oriented to person, place, and time  Skin: Skin is warm and dry  No rash noted  No erythema  No pallor  Psychiatric: She has a normal mood and affect

## 2019-11-25 NOTE — ASSESSMENT & PLAN NOTE
Xray of the hips revealing progressive mild-to-moderate degenerative arthritis both hips  She currently follows with orthopedic surgery and pain management

## 2019-11-25 NOTE — ASSESSMENT & PLAN NOTE
Lab Results   Component Value Date    HGBA1C 5 8 11/25/2019      Patient currently is not on any medications  Continue diabetic diet

## 2019-12-26 ENCOUNTER — HOSPITAL ENCOUNTER (INPATIENT)
Facility: HOSPITAL | Age: 64
LOS: 2 days | Discharge: HOME | DRG: 155 | End: 2019-12-28
Attending: EMERGENCY MEDICINE | Admitting: EMERGENCY MEDICINE
Payer: MEDICARE

## 2019-12-26 ENCOUNTER — APPOINTMENT (EMERGENCY)
Dept: CT IMAGING | Facility: HOSPITAL | Age: 64
DRG: 155 | End: 2019-12-26
Payer: MEDICARE

## 2019-12-26 DIAGNOSIS — H60.22 ACUTE MALIGNANT OTITIS EXTERNA OF LEFT EAR: Primary | ICD-10-CM

## 2019-12-26 DIAGNOSIS — H60.312 ACUTE DIFFUSE OTITIS EXTERNA OF LEFT EAR: ICD-10-CM

## 2019-12-26 DIAGNOSIS — H60.22: ICD-10-CM

## 2019-12-26 PROBLEM — I10 ESSENTIAL HYPERTENSION: Chronic | Status: ACTIVE | Noted: 2017-12-24

## 2019-12-26 PROBLEM — H60.502 ACUTE OTITIS EXTERNA OF LEFT EAR: Status: ACTIVE | Noted: 2019-12-26

## 2019-12-26 PROBLEM — I50.32 CHRONIC DIASTOLIC CONGESTIVE HEART FAILURE (HCC): Chronic | Status: ACTIVE | Noted: 2019-12-26

## 2019-12-26 LAB
ANION GAP SERPL CALCULATED.3IONS-SCNC: 7 MMOL/L (ref 4–13)
BASOPHILS # BLD AUTO: 0.03 THOUSANDS/ΜL (ref 0–0.1)
BASOPHILS NFR BLD AUTO: 0 % (ref 0–1)
BUN SERPL-MCNC: 15 MG/DL (ref 5–25)
CALCIUM SERPL-MCNC: 8.9 MG/DL (ref 8.3–10.1)
CHLORIDE SERPL-SCNC: 105 MMOL/L (ref 100–108)
CO2 SERPL-SCNC: 32 MMOL/L (ref 21–32)
CREAT SERPL-MCNC: 1.03 MG/DL (ref 0.6–1.3)
EOSINOPHIL # BLD AUTO: 0.38 THOUSAND/ΜL (ref 0–0.61)
EOSINOPHIL NFR BLD AUTO: 5 % (ref 0–6)
ERYTHROCYTE [DISTWIDTH] IN BLOOD BY AUTOMATED COUNT: 14.4 % (ref 11.6–15.1)
GFR SERPL CREATININE-BSD FRML MDRD: 66 ML/MIN/1.73SQ M
GLUCOSE SERPL-MCNC: 116 MG/DL (ref 65–140)
GLUCOSE SERPL-MCNC: 127 MG/DL (ref 65–140)
GLUCOSE SERPL-MCNC: 94 MG/DL (ref 65–140)
HCT VFR BLD AUTO: 41.1 % (ref 34.8–46.1)
HGB BLD-MCNC: 13.3 G/DL (ref 11.5–15.4)
IMM GRANULOCYTES # BLD AUTO: 0.01 THOUSAND/UL (ref 0–0.2)
IMM GRANULOCYTES NFR BLD AUTO: 0 % (ref 0–2)
LYMPHOCYTES # BLD AUTO: 3.19 THOUSANDS/ΜL (ref 0.6–4.47)
LYMPHOCYTES NFR BLD AUTO: 41 % (ref 14–44)
MCH RBC QN AUTO: 29.3 PG (ref 26.8–34.3)
MCHC RBC AUTO-ENTMCNC: 32.4 G/DL (ref 31.4–37.4)
MCV RBC AUTO: 91 FL (ref 82–98)
MONOCYTES # BLD AUTO: 0.59 THOUSAND/ΜL (ref 0.17–1.22)
MONOCYTES NFR BLD AUTO: 8 % (ref 4–12)
NEUTROPHILS # BLD AUTO: 3.65 THOUSANDS/ΜL (ref 1.85–7.62)
NEUTS SEG NFR BLD AUTO: 46 % (ref 43–75)
NRBC BLD AUTO-RTO: 0 /100 WBCS
PLATELET # BLD AUTO: 268 THOUSANDS/UL (ref 149–390)
PMV BLD AUTO: 9.8 FL (ref 8.9–12.7)
POTASSIUM SERPL-SCNC: 4.2 MMOL/L (ref 3.5–5.3)
RBC # BLD AUTO: 4.54 MILLION/UL (ref 3.81–5.12)
SODIUM SERPL-SCNC: 144 MMOL/L (ref 136–145)
WBC # BLD AUTO: 7.85 THOUSAND/UL (ref 4.31–10.16)

## 2019-12-26 PROCEDURE — 36415 COLL VENOUS BLD VENIPUNCTURE: CPT | Performed by: EMERGENCY MEDICINE

## 2019-12-26 PROCEDURE — 85025 COMPLETE CBC W/AUTO DIFF WBC: CPT | Performed by: EMERGENCY MEDICINE

## 2019-12-26 PROCEDURE — 96365 THER/PROPH/DIAG IV INF INIT: CPT

## 2019-12-26 PROCEDURE — 70487 CT MAXILLOFACIAL W/DYE: CPT

## 2019-12-26 PROCEDURE — 99284 EMERGENCY DEPT VISIT MOD MDM: CPT

## 2019-12-26 PROCEDURE — 99285 EMERGENCY DEPT VISIT HI MDM: CPT | Performed by: EMERGENCY MEDICINE

## 2019-12-26 PROCEDURE — 82948 REAGENT STRIP/BLOOD GLUCOSE: CPT

## 2019-12-26 PROCEDURE — 99223 1ST HOSP IP/OBS HIGH 75: CPT | Performed by: PHYSICIAN ASSISTANT

## 2019-12-26 PROCEDURE — 80048 BASIC METABOLIC PNL TOTAL CA: CPT | Performed by: EMERGENCY MEDICINE

## 2019-12-26 RX ORDER — ASPIRIN 81 MG/1
81 TABLET ORAL DAILY
Status: DISCONTINUED | OUTPATIENT
Start: 2019-12-27 | End: 2019-12-28 | Stop reason: HOSPADM

## 2019-12-26 RX ORDER — PANTOPRAZOLE SODIUM 40 MG/1
40 TABLET, DELAYED RELEASE ORAL
Status: DISCONTINUED | OUTPATIENT
Start: 2019-12-27 | End: 2019-12-28 | Stop reason: HOSPADM

## 2019-12-26 RX ORDER — GABAPENTIN 300 MG/1
300 CAPSULE ORAL
Status: DISCONTINUED | OUTPATIENT
Start: 2019-12-26 | End: 2019-12-28 | Stop reason: HOSPADM

## 2019-12-26 RX ORDER — TORSEMIDE 10 MG/1
10 TABLET ORAL DAILY
Status: DISCONTINUED | OUTPATIENT
Start: 2019-12-27 | End: 2019-12-28 | Stop reason: HOSPADM

## 2019-12-26 RX ORDER — CLOPIDOGREL BISULFATE 75 MG/1
75 TABLET ORAL DAILY
Status: DISCONTINUED | OUTPATIENT
Start: 2019-12-27 | End: 2019-12-28 | Stop reason: HOSPADM

## 2019-12-26 RX ORDER — MONTELUKAST SODIUM 10 MG/1
10 TABLET ORAL DAILY
Status: DISCONTINUED | OUTPATIENT
Start: 2019-12-27 | End: 2019-12-28 | Stop reason: HOSPADM

## 2019-12-26 RX ORDER — NEOMYCIN SULFATE, POLYMYXIN B SULFATE AND HYDROCORTISONE 10; 3.5; 1 MG/ML; MG/ML; [USP'U]/ML
4 SUSPENSION/ DROPS AURICULAR (OTIC) EVERY 6 HOURS SCHEDULED
Status: DISCONTINUED | OUTPATIENT
Start: 2019-12-26 | End: 2019-12-28 | Stop reason: HOSPADM

## 2019-12-26 RX ORDER — AMLODIPINE BESYLATE 5 MG/1
5 TABLET ORAL DAILY
Status: DISCONTINUED | OUTPATIENT
Start: 2019-12-27 | End: 2019-12-27

## 2019-12-26 RX ORDER — ALBUTEROL SULFATE 90 UG/1
2 AEROSOL, METERED RESPIRATORY (INHALATION) EVERY 4 HOURS PRN
Status: DISCONTINUED | OUTPATIENT
Start: 2019-12-26 | End: 2019-12-28 | Stop reason: HOSPADM

## 2019-12-26 RX ORDER — ATORVASTATIN CALCIUM 40 MG/1
40 TABLET, FILM COATED ORAL
Status: DISCONTINUED | OUTPATIENT
Start: 2019-12-26 | End: 2019-12-28 | Stop reason: HOSPADM

## 2019-12-26 RX ORDER — ONDANSETRON 2 MG/ML
4 INJECTION INTRAMUSCULAR; INTRAVENOUS EVERY 6 HOURS PRN
Status: DISCONTINUED | OUTPATIENT
Start: 2019-12-26 | End: 2019-12-28 | Stop reason: HOSPADM

## 2019-12-26 RX ORDER — FLUTICASONE FUROATE AND VILANTEROL 100; 25 UG/1; UG/1
1 POWDER RESPIRATORY (INHALATION) DAILY
Status: DISCONTINUED | OUTPATIENT
Start: 2019-12-27 | End: 2019-12-28 | Stop reason: HOSPADM

## 2019-12-26 RX ORDER — ACETAMINOPHEN 325 MG/1
650 TABLET ORAL EVERY 6 HOURS PRN
Status: DISCONTINUED | OUTPATIENT
Start: 2019-12-26 | End: 2019-12-28 | Stop reason: HOSPADM

## 2019-12-26 RX ADMIN — CEFEPIME HYDROCHLORIDE 2000 MG: 2 INJECTION, POWDER, FOR SOLUTION INTRAVENOUS at 22:22

## 2019-12-26 RX ADMIN — NEOMYCIN SULFATE, POLYMYXIN B SULFATE AND HYDROCORTISONE 4 DROP: 10; 3.5; 1 SUSPENSION/ DROPS AURICULAR (OTIC) at 23:11

## 2019-12-26 RX ADMIN — NEOMYCIN SULFATE, POLYMYXIN B SULFATE AND HYDROCORTISONE 4 DROP: 10; 3.5; 1 SUSPENSION/ DROPS AURICULAR (OTIC) at 17:14

## 2019-12-26 RX ADMIN — ATORVASTATIN CALCIUM 40 MG: 40 TABLET, FILM COATED ORAL at 17:14

## 2019-12-26 RX ADMIN — GABAPENTIN 300 MG: 300 CAPSULE ORAL at 22:22

## 2019-12-26 RX ADMIN — NEOMYCIN SULFATE, POLYMYXIN B SULFATE AND HYDROCORTISONE 4 DROP: 10; 3.5; 1 SUSPENSION/ DROPS AURICULAR (OTIC) at 13:42

## 2019-12-26 RX ADMIN — CEFEPIME HYDROCHLORIDE 2000 MG: 2 INJECTION, POWDER, FOR SOLUTION INTRAVENOUS at 11:14

## 2019-12-26 RX ADMIN — ENOXAPARIN SODIUM 60 MG: 60 INJECTION SUBCUTANEOUS at 17:13

## 2019-12-26 RX ADMIN — IOHEXOL 85 ML: 350 INJECTION, SOLUTION INTRAVENOUS at 11:34

## 2019-12-26 NOTE — ASSESSMENT & PLAN NOTE
 Blood pressure is reviewed and acceptable   o Last recorded pressure: 132/57   Continue amlodipine, torsemide   Monitor blood pressure

## 2019-12-26 NOTE — ASSESSMENT & PLAN NOTE
Lab Results   Component Value Date    HGBA1C 5 8 11/25/2019       No results for input(s): POCGLU in the last 72 hours  Blood Sugar Average: Last 72 hrs:  · Well controlled, A1c was 5 8 on 11/25/2019  · Low carb diet  · Insulin sliding scale with Accu-Cheks q i d  Jimenez Brady

## 2019-12-26 NOTE — PLAN OF CARE
Problem: Potential for Falls  Goal: Patient will remain free of falls  Description  INTERVENTIONS:  - Assess patient frequently for physical needs  -  Identify cognitive and physical deficits and behaviors that affect risk of falls  -  [place] fall precautions as indicated by assessment   - Educate patient/family on patient safety including physical limitations  - Instruct patient to call for assistance with activity based on assessment  - Modify environment to reduce risk of injury  - Consider OT/PT consult to assist with strengthening/mobility  Outcome: Progressing     Problem: Prexisting or High Potential for Compromised Skin Integrity  Goal: Skin integrity is maintained or improved  Description  INTERVENTIONS:  - Identify patients at risk for skin breakdown  - Assess and monitor skin integrity  - Assess and monitor nutrition and hydration status  - Monitor labs   - Assess for incontinence   - Turn and reposition patient  - Assist with mobility/ambulation  - Relieve pressure over bony prominences  - Avoid friction and shearing  - Provide appropriate hygiene as needed including keeping skin clean and dry  - Evaluate need for skin moisturizer/barrier cream  - Collaborate with interdisciplinary team   - Patient/family teaching  - Consider wound care consult   Outcome: Progressing     Problem: Nutrition/Hydration-ADULT  Goal: Nutrient/Hydration intake appropriate for improving, restoring or maintaining nutritional needs  Description  Monitor and assess patient's nutrition/hydration status for malnutrition  Collaborate with interdisciplinary team and initiate plan and interventions as ordered  Monitor patient's weight and dietary intake as ordered or per policy  Utilize nutrition screening tool and intervene as necessary  Determine patient's food preferences and provide high-protein, high-caloric foods as appropriate       INTERVENTIONS:  - Monitor oral intake, urinary output, labs, and treatment plans  - Assess nutrition and hydration status and recommend course of action  - Evaluate amount of meals eaten  - Assist patient with eating if necessary   - Allow adequate time for meals  - Recommend/ encourage appropriate diets, oral nutritional supplements, and vitamin/mineral supplements  - Order, calculate, and assess calorie counts as needed  - Recommend, monitor, and adjust tube feedings and TPN/PPN based on assessed needs  - Assess need for intravenous fluids  - Provide specific nutrition/hydration education as appropriate  - Include patient/family/caregiver in decisions related to nutrition  Outcome: Progressing     Problem: PAIN - ADULT  Goal: Verbalizes/displays adequate comfort level or baseline comfort level  Description  Interventions:  - Encourage patient to monitor pain and request assistance  - Assess pain using appropriate pain scale  - Administer analgesics based on type and severity of pain and evaluate response  - Implement non-pharmacological measures as appropriate and evaluate response  - Consider cultural and social influences on pain and pain management  - Notify physician/advanced practitioner if interventions unsuccessful or patient reports new pain  Outcome: Progressing     Problem: INFECTION - ADULT  Goal: Absence or prevention of progression during hospitalization  Description  INTERVENTIONS:  - Assess and monitor for signs and symptoms of infection  - Monitor lab/diagnostic results  - Monitor all insertion sites, i e  indwelling lines, tubes, and drains  - Monitor endotracheal if appropriate and nasal secretions for changes in amount and color  - [place] appropriate cooling/warming therapies per order  - Administer medications as ordered  - Instruct and encourage patient and family to use good hand hygiene technique  - Identify and instruct in appropriate isolation precautions for identified infection/condition  Outcome: Progressing     Problem: SAFETY ADULT  Goal: Maintain or return to baseline ADL function  Description  INTERVENTIONS:  -  Assess patient's ability to carry out ADLs; assess patient's baseline for ADL function and identify physical deficits which impact ability to perform ADLs (bathing, care of mouth/teeth, toileting, grooming, dressing, etc )  - Assess/evaluate cause of self-care deficits   - Assess range of motion  - Assess patient's mobility; develop plan if impaired  - Assess patient's need for assistive devices and provide as appropriate  - Encourage maximum independence but intervene and supervise when necessary  - Involve family in performance of ADLs  - Assess for home care needs following discharge   - Consider OT consult to assist with ADL evaluation and planning for discharge  - Provide patient education as appropriate  Outcome: Progressing  Goal: Maintain or return mobility status to optimal level  Description  INTERVENTIONS:  - Assess patient's baseline mobility status (ambulation, transfers, stairs, etc )    - Identify cognitive and physical deficits and behaviors that affect mobility  - Identify mobility aids required to assist with transfers and/or ambulation (gait belt, sit-to-stand, lift, walker, cane, etc )  - [place] fall precautions as indicated by assessment  - Record patient progress and toleration of activity level on Mobility SBAR; progress patient to next Phase/Stage  - Instruct patient to call for assistance with activity based on assessment  - Consider rehabilitation consult to assist with strengthening/weightbearing, etc   Outcome: Progressing     Problem: DISCHARGE PLANNING  Goal: Discharge to home or other facility with appropriate resources  Description  INTERVENTIONS:  - Identify barriers to discharge w/patient and caregiver  - Arrange for needed discharge resources and transportation as appropriate  - Identify discharge learning needs (meds, wound care, etc )  - Arrange for interpretive services to assist at discharge as needed  - Refer to Case Management Department for coordinating discharge planning if the patient needs post-hospital services based on physician/advanced practitioner order or complex needs related to functional status, cognitive ability, or social support system  Outcome: Progressing     Problem: Knowledge Deficit  Goal: Patient/family/caregiver demonstrates understanding of disease process, treatment plan, medications, and discharge instructions  Description  Complete learning assessment and assess knowledge base    Interventions:  - Provide teaching at level of understanding  - Provide teaching via preferred learning methods  Outcome: Progressing     Problem: METABOLIC, FLUID AND ELECTROLYTES - ADULT  Goal: Glucose maintained within target range  Description  INTERVENTIONS:  - Monitor Blood Glucose as ordered  - Assess for signs and symptoms of hyperglycemia and hypoglycemia  - Administer ordered medications to maintain glucose within target range  - Assess nutritional intake and initiate nutrition service referral as needed  Outcome: Progressing     Problem: SKIN/TISSUE INTEGRITY - ADULT  Goal: Skin integrity remains intact  Description  INTERVENTIONS  - Identify patients at risk for skin breakdown  - Assess and monitor skin integrity  - Assess and monitor nutrition and hydration status  - Monitor labs (i e  albumin)  - Assess for incontinence   - Turn and reposition patient  - Assist with mobility/ambulation  - Relieve pressure over bony prominences  - Avoid friction and shearing  - Provide appropriate hygiene as needed including keeping skin clean and dry  - Evaluate need for skin moisturizer/barrier cream  - Collaborate with interdisciplinary team (i e  Nutrition, Rehabilitation, etc )   - Patient/family teaching  Outcome: Progressing  Goal: Incision(s), wounds(s) or drain site(s) healing without S/S of infection  Description  INTERVENTIONS  - Assess and document risk factors for skin impairment   - Assess and document dressing, incision, wound bed, drain sites and surrounding tissue  - Consider nutrition services referral as needed  - Oral mucous membranes remain intact  - Provide patient/ family education  Outcome: Progressing     Problem: HEMATOLOGIC - ADULT  Goal: Maintains hematologic stability  Description  INTERVENTIONS  - Assess for signs and symptoms of bleeding or hemorrhage  - Monitor labs  - Administer supportive blood products/factors as ordered and appropriate  Outcome: Progressing     Problem: MUSCULOSKELETAL - ADULT  Goal: Maintain or return mobility to safest level of function  Description  INTERVENTIONS:  - Assess patient's ability to carry out ADLs; assess patient's baseline for ADL function and identify physical deficits which impact ability to perform ADLs (bathing, care of mouth/teeth, toileting, grooming, dressing, etc )  - Assess/evaluate cause of self-care deficits   - Assess range of motion  - Assess patient's mobility  - Assess patient's need for assistive devices and provide as appropriate  - Encourage maximum independence but intervene and supervise when necessary  - Involve family in performance of ADLs  - Assess for home care needs following discharge   - Consider OT consult to assist with ADL evaluation and planning for discharge  - Provide patient education as appropriate  Outcome: Progressing

## 2019-12-26 NOTE — ED PROVIDER NOTES
Emergency Department Note- Delphine Delay 71 y o  female MRN: [id number]    Unit/Bed#: ED 25 Encounter: [id number]        History of Present Illness     Patient is a 40-year-old female, diabetic, presents today cause the last month and a half or so she has noticed some swelling around her left ear with some draining and itchiness  Was seen on November 25th at the primary care physician's office, diagnosed with otitis externa, which per the note was felt to be either fungal or bacterial   She was started on Cortisporin ear drops which she says she used occasionally at 1st but about 2 weeks ago began using the more regularly as prescribed  She says since that time she has noticed the swelling around the ear getting worse, she has noticed over the last couple days some swelling in the left cheek  Denies any headache, denies fevers, denies chills, denies numbness or tingling  No history of trauma  Does feel like her left ear is somewhat full  She sought medical care today because her symptoms were not getting better  Denies any cough, denies shortness of breath or chest pain    Patient says she has allergies to numerous medications, specifically she cannot remember what the reaction to ciprofloxacin was, nor can she remember the reaction to penicillin      REVIEW OF SYSTEMS     Constitutional:  No recent weight  gains or losses   Eyes:  No visual changes   ENT:  As per HPI   Cardiac: No chest pain or palpitations   Respiratory:  No cough or shortness of breath   Abdominal:  No nausea or vomiting   Urinary: No dysuria or hematuria   Hematologic: No easy bruising or bleeding   Skin: No rash   Musculoskeletal: No aches or pains   Neurologic: No weakness or sensory changes   Psychiatric: No mood changes      Historical Information   Past Medical History:   Diagnosis Date    COPD with acute exacerbation ([facility] ) 1/29/2019    Diabetes mellitus ([facility] )     Full dentures     GERD (gastroesophageal reflux disease)     Hyperlipidemia     Hypertension     Seasonal allergies     Sleep apnea      Past Surgical History:   Procedure Laterality Date    BILATERAL KNEE ARTHROSCOPY      CATARACT EXTRACTION Right     DENTAL SURGERY      HYSTERECTOMY      TOTAL KNEE ARTHROPLASTY Bilateral     TUBAL LIGATION       Social History   Social History     Substance and Sexual Activity   Alcohol Use No     Social History     Substance and Sexual Activity   Drug Use No     Social History     Tobacco Use   Smoking Status Never Smoker   Smokeless Tobacco Never Used   Tobacco Comment    childhood smoke exposure     Family History:   Family History   Problem Relation Age of Onset    Coronary artery disease Father        MEDICATIONS:  No current facility-administered medications on file prior to encounter  Current Outpatient Medications on File Prior to Encounter   Medication Sig Dispense Refill    ACCU-CHEK FASTCLIX LANCETS MISC       ACCU-CHEK GUIDE test strip       acetaminophen (TYLENOL) 325 mg tablet Take 2 tablets (650 mg total) by mouth every 6 (six) hours as needed for mild pain 30 tablet 0    albuterol (5 mg/mL) 0 5 % nebulizer solution Take 0 5 mL by nebulization every 6 (six) hours as needed for wheezing or shortness of breath 20 mL 0    albuterol (VENTOLIN HFA) 90 mcg/act inhaler Inhale 2 puffs every 4 (four) hours as needed for wheezing 1 Inhaler 2    amLODIPine (NORVASC) 5 mg tablet ONE TABLET BY MOUTH DAILY 90 tablet 3    aspirin (ECOTRIN LOW STRENGTH) 81 mg EC tablet Take 1 tablet (81 mg total) by mouth daily 90 tablet 2    atorvastatin (LIPITOR) 40 mg tablet Take 1 tablet (40 mg total) by mouth daily with dinner 90 tablet 3    Blood Glucose Monitoring Suppl (ACCU-CHEK GUIDE) w/Device KIT       clopidogrel (PLAVIX) 75 mg tablet Take 1 tablet (75 mg total) by mouth daily 30 tablet 3    fluticasone-vilanterol (BREO ELLIPTA) 100-25 mcg/inh inhaler Inhale 1 puff daily Rinse mouth after use   1 Inhaler 2    gabapentin (NEURONTIN) 300 mg capsule Take 1 capsule (300 mg total) by mouth daily at bedtime 30 capsule 1    guaiFENesin (ROBITUSSIN) 100 MG/5ML oral liquid Take 10 mL (200 mg total) by mouth 3 (three) times a day as needed for cough 120 mL 2    ketoconazole (NIZORAL) 2 % cream Apply topically daily 15 g 0    montelukast (SINGULAIR) 10 mg tablet Take 1 tablet (10 mg total) by mouth daily 90 tablet 1    Multiple Vitamin (MULTIVITAMIN) tablet Take 1 tablet by mouth daily 30 tablet 2    neomycin-polymyxin-hydrocortisone (CORTISPORIN) otic solution Administer 3 drops into the left ear every 6 (six) hours 10 mL 0    nitroglycerin (NITROSTAT) 0 4 mg SL tablet Place 1 tablet (0 4 mg total) under the tongue every 5 (five) minutes as needed for chest pain 60 tablet 0    pantoprazole (PROTONIX) 40 mg tablet Take 1 tablet (40 mg total) by mouth daily 90 tablet 3    torsemide (DEMADEX) 20 mg tablet Take 0 5 tablets (10 mg total) by mouth daily 45 tablet 3     ALLERGIES:  Allergies   Allergen Reactions    Januvia [Sitagliptin] Swelling    Clindamycin     Hydrocodone     Morphine Hives    Omeprazole     Penicillins     Percocet [Oxycodone-Acetaminophen]     Tolterodine     Ciprofloxacin Rash       Vitals: Blood pressure 156/100, pulse (!) 107, temperature 98 4 °F (36 9 °C), temperature source Temporal, resp  rate 18, SpO2 97 %, not currently breastfeeding  PHYSICAL EXAM    General:  Patient is well-appearing  Head:  Atraumatic  Eyes:  Conjunctiva pink  ENT:  She does have slight swelling on the left side of the face over the cheek  The left external ear is swollen, greatest around the auricle  There is no mastoid swelling or tenderness  The canal itself is very erythematous, there is some swelling and debris in the canal which obstructs visualization of the tympanic membrane  The right external ear is unremarkable, no debris or drainage in the canal, TM is gray, cone of light is well visualized    No mastoid tenderness  She has no trismus, no voice change, no swelling the posterior pharynx, no swelling the floor the mouth, no uvular deviation  Neck:  Supple, no lymphadenopathy  Cardiac:  S1-S2, without murmurs  Lungs:  Clear to auscultation bilaterally  Abdomen:  Soft, nontender, normal bowel sounds, no CVA tenderness, no tympany, no rigidity, no guarding  Extremities:  Normal range of motion  Neurologic:  Awake, fluent speech, normal comprehension  AAOx3  Skin:  Pink warm and dry  Psychiatric:  Alert, pleasant, cooperative            Labs Reviewed   BASIC METABOLIC PANEL       Result Value Ref Range Status    Sodium 144  136 - 145 mmol/L Final    Potassium 4 2  3 5 - 5 3 mmol/L Final    Comment: Slightly Hemolyzed; Results May be Affected    Chloride 105  100 - 108 mmol/L Final    CO2 32  21 - 32 mmol/L Final    ANION GAP 7  4 - 13 mmol/L Final    BUN 15  5 - 25 mg/dL Final    Creatinine 1 03  0 60 - 1 30 mg/dL Final    Comment: Standardized to IDMS reference method    Glucose 116  65 - 140 mg/dL Final    Comment:   If the patient is fasting, the ADA then defines impaired fasting glucose as > 100 mg/dL and diabetes as > or equal to 123 mg/dL  Specimen collection should occur prior to Sulfasalazine administration due to the potential for falsely depressed results  Specimen collection should occur prior to Sulfapyridine administration due to the potential for falsely elevated results      Calcium 8 9  8 3 - 10 1 mg/dL Final    eGFR 66  ml/min/1 73sq m Final    Narrative:     [place] guidelines for Chronic Kidney Disease (CKD):     Stage 1 with normal or high GFR (GFR > 90 mL/min/1 73 square meters)    Stage 2 Mild CKD (GFR = 60-89 mL/min/1 73 square meters)    Stage 3A Moderate CKD (GFR = 45-59 mL/min/1 73 square meters)    Stage 3B Moderate CKD (GFR = 30-44 mL/min/1 73 square meters)    Stage 4 Severe CKD (GFR = 15-29 mL/min/1 73 square meters)    Stage 5 End Stage CKD (GFR <15 mL/min/1 73 square meters)  Note: GFR calculation is accurate only with a steady state creatinine   CBC AND DIFFERENTIAL    WBC 7 85  4 31 - 10 16 Thousand/uL Final    RBC 4 54  3 81 - 5 12 Million/uL Final    Hemoglobin 13 3  11 5 - 15 4 g/dL Final    Hematocrit 41 1  34 8 - 46 1 % Final    MCV 91  82 - 98 fL Final    MCH 29 3  26 8 - 34 3 pg Final    MCHC 32 4  31 4 - 37 4 g/dL Final    RDW 14 4  11 6 - 15 1 % Final    MPV 9 8  8 9 - 12 7 fL Final    Platelets 180  576 - 390 Thousands/uL Final    nRBC 0  /100 WBCs Final    Neutrophils Relative 46  43 - 75 % Final    Immat GRANS % 0  0 - 2 % Final    Lymphocytes Relative 41  14 - 44 % Final    Monocytes Relative 8  4 - 12 % Final    Eosinophils Relative 5  0 - 6 % Final    Basophils Relative 0  0 - 1 % Final    Neutrophils Absolute 3 65  1 85 - 7 62 Thousands/µL Final    Immature Grans Absolute 0 01  0 00 - 0 20 Thousand/uL Final    Lymphocytes Absolute 3 19  0 60 - 4 47 Thousands/µL Final    Monocytes Absolute 0 59  0 17 - 1 22 Thousand/µL Final    Eosinophils Absolute 0 38  0 00 - 0 61 Thousand/µL Final    Basophils Absolute 0 03  0 00 - 0 10 Thousands/µL Final       Medications   cefepime (MAXIPIME) 2 g/50 mL dextrose IVPB (0 mg Intravenous Stopped 12/26/19 1213)   iohexol (OMNIPAQUE) 350 MG/ML injection (MULTI-DOSE) 85 mL (85 mL Intravenous Given 12/26/19 1134)       CT facial bones with contrast   Final Result      Skin thickening in the region of the left external auditory canal with the some narrowing of the left external auditory canal suggest otitis externa      No erosive changes in the mastoids and no opacification of the mastoid air cells  No fluid collection in the  space  No CT findings to support malignant otitis externa      The study was marked in EPIC for significant notification            Workstation performed: [id number]             Vitals:    12/26/19 1039   BP: 156/100   TempSrc: Temporal   Pulse: (!) 107   Resp: 18   Patient Position - Orthostatic VS: Sitting   Temp: 98 4 °F (36 9 °C)       ED Course as of Dec 26 1248   u Dec 26, 2019   1100 Patient has malignant otitis externa on examination  Ordered cefepime given her quinolone allergy  Asked the ED pharmacist to research into her medical record to see if she had been able to tolerate quinolones in the past      1123 Pharmacist noted that 2 years ago the patient had a failed oral treatment with ciprofloxacin, requiring IV treatment with ciprofloxacin  Then 1 year later, the allergy to ciprofloxacin as a rash appeared in the chart, no indication in the medical record that the patient had any problems tolerating ciprofloxacin  1239 On reassessment there was no change from the above physical exam findings  Assessment/Plan     ED Medical Decision Making:        ED Disposition     ED Disposition Condition Date/Time Comment    Admit Stable u Dec 26, 2019 12:47 PM Case was discussed with Bryn Lo and the patient's admission status was agreed to be Admission Status: inpatient status to the service of Dr Montse Arredondo           Follow-up Information    None         New Prescriptions    No medications on file            Taylor Luevano DO  12/26/19 1246

## 2019-12-26 NOTE — ASSESSMENT & PLAN NOTE
· Patient with a clear otitis externa on physical exam   Saw her outpatient family doctor for same  · Failed outpatient therapy of Corticosporin, questionable compliance  · CT scan: "Skin thickening in the region of the left external auditory canal with the some narrowing of the left external auditory canal suggest otitis externa  No erosive changes in the mastoids and no opacification of the mastoid air cells   No fluid collection in the  space  No CT findings to support malignant otitis externa"  · IV cefepime 2 g q 12 hours  · Continue with Corticosporin 4 times a day  · Allergy to Fluoroquinolones  · If no improvement consider ENT consultation

## 2019-12-26 NOTE — ASSESSMENT & PLAN NOTE
Lab Results   Component Value Date    HGBA1C 5 8 11/25/2019       No results for input(s): POCGLU in the last 72 hours  Blood Sugar Average: Last 72 hrs:  · Well controlled, A1c was 5 8 on 11/25/2019  · Low carb diet  · Insulin sliding scale with Accu-Cheks q i d  Demetrius Abbott

## 2019-12-26 NOTE — H&P
H&P- Ce Araya 1955, 71 y o  female MRN: [id number]  Unit/Bed#: [facility] 2 -01 Encounter: [id number]  Primary Care Provider: Theodore Rosenbaum MD   Date and time admitted to hospital: 12/26/2019 10:40 AM    * Acute otitis externa of left ear  Assessment & Plan  · Patient with a clear otitis externa on physical exam   Saw her outpatient family doctor for same  · Failed outpatient therapy of Corticosporin, questionable compliance  · CT scan: "Skin thickening in the region of the left external auditory canal with the some narrowing of the left external auditory canal suggest otitis externa  No erosive changes in the mastoids and no opacification of the mastoid air cells   No fluid collection in the  space  No CT findings to support malignant otitis externa"  · IV cefepime 2 g q 12 hours  · Continue with Corticosporin 4 times a day  · Allergy to Fluoroquinolones  · If no improvement consider ENT consultation  Chronic diastolic congestive heart failure (HCC)  Assessment & Plan  Wt Readings from Last 3 Encounters:   12/26/19 131 kg (288 lb 12 8 oz)   11/25/19 131 kg (288 lb)   11/01/19 131 kg (288 lb)     · Appearing compensated at this time  · Continue torsemide  · Monitor daily weights  Coronary artery disease involving native coronary artery of native heart  Assessment & Plan  · No chest pain at this time  · Continue aspirin, Plavix, statin  Diabetes mellitus type 2 in obese [facility])  Assessment & Plan  Lab Results   Component Value Date    HGBA1C 5 8 11/25/2019       No results for input(s): POCGLU in the last 72 hours  Blood Sugar Average: Last 72 hrs:  · Well controlled, A1c was 5 8 on 11/25/2019  · Low carb diet  · Insulin sliding scale with Accu-Cheks q i d       Obstructive sleep apnea of adult  Assessment & Plan  · Refusing CPAP  Restrictive lung disease  Assessment & Plan  · No evidence of acute exacerbation  · Continue with Singulair, Breo    · Albuterol p  r  n     Essential hypertension  Assessment & Plan   Blood pressure is reviewed and acceptable   o Last recorded pressure: 132/57   Continue amlodipine, torsemide   Monitor blood pressure  GERD (gastroesophageal reflux disease)  Assessment & Plan  · Continue PPI  Morbid obesity with BMI of 60 0-69 9, adult (HCC)  Assessment & Plan  · Body mass index is 62 5 kg/m²  · Outpatient dietary appointment      VTE Prophylaxis: Enoxaparin (Lovenox)  / sequential compression device   Code Status: FULL CODE   POLST: POLST form is not discussed and not completed at this time  Discussion with family: patient at bedside     Anticipated Length of Stay:  Patient will be admitted on an Inpatient basis with an anticipated length of stay of greater than 2 midnights  Justification for Hospital Stay: AOE, failure of outpatient therapy    Total Time for Visit, including Counseling / Coordination of Care: 1 hour  Greater than 50% of this total time spent on direct patient counseling and coordination of care  Chief Complaint:   Left ear pain/itching    History of Present Illness:    Socrates Seymour is a 71 y o  female with a past medical history significant for morbid obesity , hypertension , hyperlipidemia , CHF, type 2 diabetes who presents with worsening of left ear swelling, drainage, pain over the past approximately 1 month  She saw her family doctor for the same in the end of November, was given Corticosporin drops to take 4 times per day  Patient reports she has not been compliant with these drops, not even using them every day  She reports worsening in the swelling, as well as drainage of her left ear  She is also starting to have some pain in her face, as well as some left-sided cheek swelling  No reported fevers or chills, headaches, lightheadedness, dizziness, shortness of breath, wheezing, abdominal pain, nausea, vomiting, change in her stool, lower extremity edema    Patient reports her diabetes is well controlled  She is only maintained on diet control alone  Noncompliant with CPAP at night  Reports allergy to fluoroquinolones  Reports her pain is controlled at this time  No other complaints  Review of Systems:    Review of Systems   Constitutional: Negative for activity change, appetite change, chills, fatigue and fever  HENT: Positive for ear discharge and ear pain  Negative for congestion, rhinorrhea, sinus pressure and sore throat  Eyes: Negative for photophobia, pain and visual disturbance  Respiratory: Negative for cough, shortness of breath and wheezing  Cardiovascular: Negative for chest pain, palpitations and leg swelling  Gastrointestinal: Negative for abdominal distention, abdominal pain, constipation, diarrhea, nausea and vomiting  Endocrine: Negative for cold intolerance, heat intolerance, polydipsia and polyuria  Genitourinary: Negative for difficulty urinating, dysuria, flank pain, frequency and hematuria  Musculoskeletal: Negative for arthralgias, back pain and joint swelling  Skin: Negative for color change, pallor and rash  Allergic/Immunologic: Negative  Neurological: Negative for dizziness, syncope, weakness, light-headedness and headaches  Hematological: Negative  Psychiatric/Behavioral: Negative  Past Medical and Surgical History:     Past Medical History:   Diagnosis Date    COPD with acute exacerbation ([facility] ) 1/29/2019    Diabetes mellitus ([facility] )     Full dentures     GERD (gastroesophageal reflux disease)     Hyperlipidemia     Hypertension     Seasonal allergies     Sleep apnea        Past Surgical History:   Procedure Laterality Date    BILATERAL KNEE ARTHROSCOPY      CATARACT EXTRACTION Right     DENTAL SURGERY      HYSTERECTOMY      TOTAL KNEE ARTHROPLASTY Bilateral     TUBAL LIGATION         Meds/Allergies:    Prior to Admission medications    Medication Sig Start Date End Date Taking?  Authorizing Provider   ACCU-CHEK FASTCLIX LANCETS MISC  6/21/18   Historical Provider, MD   ACCU-CHEK GUIDE test strip  6/21/18   Historical Provider, MD   acetaminophen (TYLENOL) 325 mg tablet Take 2 tablets (650 mg total) by mouth every 6 (six) hours as needed for mild pain 5/24/19   Lisa Duarte MD   albuterol (5 mg/mL) 0 5 % nebulizer solution Take 0 5 mL by nebulization every 6 (six) hours as needed for wheezing or shortness of breath 12/27/17   Edy Garner,    albuterol (VENTOLIN HFA) 90 mcg/act inhaler Inhale 2 puffs every 4 (four) hours as needed for wheezing 5/24/19   Lisa Duarte MD   amLODIPine (NORVASC) 5 mg tablet ONE TABLET BY MOUTH DAILY 11/5/19   Jace Mack DO   aspirin (ECOTRIN LOW STRENGTH) 81 mg EC tablet Take 1 tablet (81 mg total) by mouth daily 3/21/19   Lisa Duarte MD   atorvastatin (LIPITOR) 40 mg tablet Take 1 tablet (40 mg total) by mouth daily with dinner 9/10/19   Jace Cabrera DO   Blood Glucose Monitoring Suppl (ACCU-CHEK GUIDE) w/Device KIT  6/21/18   Historical Provider, MD   clopidogrel (PLAVIX) 75 mg tablet Take 1 tablet (75 mg total) by mouth daily 7/30/19   Lisa Duarte MD   fluticasone-vilanterol (BREO ELLIPTA) 100-25 mcg/inh inhaler Inhale 1 puff daily Rinse mouth after use  5/24/19   Lisa Duarte MD   gabapentin (NEURONTIN) 300 mg capsule Take 1 capsule (300 mg total) by mouth daily at bedtime 11/1/19   Katie Zavala DO   guaiFENesin (ROBITUSSIN) 100 MG/5ML oral liquid Take 10 mL (200 mg total) by mouth 3 (three) times a day as needed for cough 11/25/19   Lisa Duarte MD   ketoconazole (NIZORAL) 2 % cream Apply topically daily 1/29/19   Mauro Sandoval MD   montelukast (SINGULAIR) 10 mg tablet Take 1 tablet (10 mg total) by mouth daily 5/24/19   Lisa Duarte MD   Multiple Vitamin (MULTIVITAMIN) tablet Take 1 tablet by mouth daily 5/24/19   Lisa Duarte MD   neomycin-polymyxin-hydrocortisone (CORTISPORIN) otic solution Administer 3 drops into the left ear every 6 (six) hours 11/25/19   Joie MD Mariana   nitroglycerin (NITROSTAT) 0 4 mg SL tablet Place 1 tablet (0 4 mg total) under the tongue every 5 (five) minutes as needed for chest pain 2/20/19   Anthony Burgos MD   pantoprazole (PROTONIX) 40 mg tablet Take 1 tablet (40 mg total) by mouth daily 5/3/19   Sabine Neal MD   torsemide (DEMADEX) 20 mg tablet Take 0 5 tablets (10 mg total) by mouth daily 9/10/19   Jace Cabrera DO     I have reviewed home medications with patient personally  Allergies: Allergies   Allergen Reactions    Januvia [Sitagliptin] Swelling    Clindamycin     Hydrocodone     Morphine Hives    Omeprazole     Penicillins     Percocet [Oxycodone-Acetaminophen]     Tolterodine     Ciprofloxacin Rash       Social History:     Marital Status: Single   Occupation: non-contributory  Patient Pre-hospital Living Situation: home  Patient Pre-hospital Level of Mobility: full  Patient Pre-hospital Diet Restrictions: low carb, low sodium  Substance Use History:   Social History     Substance and Sexual Activity   Alcohol Use No     Social History     Tobacco Use   Smoking Status Never Smoker   Smokeless Tobacco Never Used   Tobacco Comment    childhood smoke exposure     Social History     Substance and Sexual Activity   Drug Use No       Family History:    Family History   Problem Relation Age of Onset    Coronary artery disease Father        Physical Exam:     Vitals:   Blood Pressure: 119/83 (12/26/19 1325)  Pulse: 95 (12/26/19 1325)  Temperature: 98 4 °F (36 9 °C) (12/26/19 1039)  Temp Source: Temporal (12/26/19 1039)  Respirations: 22 (12/26/19 1325)  Weight - Scale: 131 kg (288 lb 12 8 oz) (12/26/19 1309)  SpO2: 99 % (12/26/19 1325)    Physical Exam   Constitutional: She is oriented to person, place, and time  She appears well-developed and well-nourished  No distress  HENT:   Head: Normocephalic and atraumatic  Right Ear: External ear normal    Left Ear: There is drainage, swelling and tenderness     Mouth/Throat: Oropharynx is clear and moist    Eyes: Pupils are equal, round, and reactive to light  EOM are normal  No scleral icterus  Neck: Neck supple  Cardiovascular: Normal rate, regular rhythm and normal heart sounds  No murmur heard  Pulmonary/Chest: Effort normal and breath sounds normal  No respiratory distress  She has no wheezes  She has no rales  Abdominal: Soft  Bowel sounds are normal  She exhibits no distension  There is no tenderness  There is no rebound and no guarding  Morbidly obese   Musculoskeletal: She exhibits no deformity  Neurological: She is alert and oriented to person, place, and time  Skin: Skin is warm and dry  Capillary refill takes less than 2 seconds  No rash noted  She is not diaphoretic  No erythema  No pallor  Psychiatric: She has a normal mood and affect  Nursing note and vitals reviewed  Additional Data:     Lab Results: I have personally reviewed pertinent reports  Results from last 7 days   Lab Units 12/26/19  1100   WBC Thousand/uL 7 85   HEMOGLOBIN g/dL 13 3   HEMATOCRIT % 41 1   PLATELETS Thousands/uL 268   NEUTROS PCT % 46   LYMPHS PCT % 41   MONOS PCT % 8   EOS PCT % 5     Results from last 7 days   Lab Units 12/26/19  1100   SODIUM mmol/L 144   POTASSIUM mmol/L 4 2   CHLORIDE mmol/L 105   CO2 mmol/L 32   BUN mg/dL 15   CREATININE mg/dL 1 03   ANION GAP mmol/L 7   CALCIUM mg/dL 8 9   GLUCOSE RANDOM mg/dL 116                       Imaging: I have personally reviewed pertinent reports  CT facial bones with contrast   Final Result by Charli Vicente MD (12/26 4239)      Skin thickening in the region of the left external auditory canal with the some narrowing of the left external auditory canal suggest otitis externa      No erosive changes in the mastoids and no opacification of the mastoid air cells  No fluid collection in the  space       No CT findings to support malignant otitis externa      The study was marked in EPIC for significant notification  Workstation performed: [id number]             EKG, Pathology, and Other Studies Reviewed on Admission:   · EKG: Prior pertinent studies and records reviewed in [facility] / Exam18 Records Reviewed: Yes     ** Please Note: This note has been constructed using a voice recognition system   **

## 2019-12-26 NOTE — ASSESSMENT & PLAN NOTE
Wt Readings from Last 3 Encounters:   11/25/19 131 kg (288 lb)   11/01/19 131 kg (288 lb)   10/24/19 130 kg (287 lb 8 oz)     · Appearing compensated at this time  · Continue torsemide  · Monitor daily weights

## 2019-12-26 NOTE — ASSESSMENT & PLAN NOTE
Wt Readings from Last 3 Encounters:   12/26/19 131 kg (288 lb 12 8 oz)   11/25/19 131 kg (288 lb)   11/01/19 131 kg (288 lb)     · Appearing compensated at this time  · Continue torsemide  · Monitor daily weights

## 2019-12-27 LAB
ANION GAP SERPL CALCULATED.3IONS-SCNC: 6 MMOL/L (ref 4–13)
BASOPHILS # BLD AUTO: 0.03 THOUSANDS/ΜL (ref 0–0.1)
BASOPHILS NFR BLD AUTO: 0 % (ref 0–1)
BUN SERPL-MCNC: 19 MG/DL (ref 5–25)
CALCIUM SERPL-MCNC: 9.1 MG/DL (ref 8.3–10.1)
CHLORIDE SERPL-SCNC: 107 MMOL/L (ref 100–108)
CO2 SERPL-SCNC: 32 MMOL/L (ref 21–32)
CREAT SERPL-MCNC: 1.07 MG/DL (ref 0.6–1.3)
EOSINOPHIL # BLD AUTO: 0.36 THOUSAND/ΜL (ref 0–0.61)
EOSINOPHIL NFR BLD AUTO: 4 % (ref 0–6)
ERYTHROCYTE [DISTWIDTH] IN BLOOD BY AUTOMATED COUNT: 14.6 % (ref 11.6–15.1)
GFR SERPL CREATININE-BSD FRML MDRD: 63 ML/MIN/1.73SQ M
GLUCOSE SERPL-MCNC: 102 MG/DL (ref 65–140)
GLUCOSE SERPL-MCNC: 103 MG/DL (ref 65–140)
GLUCOSE SERPL-MCNC: 116 MG/DL (ref 65–140)
GLUCOSE SERPL-MCNC: 88 MG/DL (ref 65–140)
GLUCOSE SERPL-MCNC: 96 MG/DL (ref 65–140)
HCT VFR BLD AUTO: 38.6 % (ref 34.8–46.1)
HGB BLD-MCNC: 12.5 G/DL (ref 11.5–15.4)
IMM GRANULOCYTES # BLD AUTO: 0.05 THOUSAND/UL (ref 0–0.2)
IMM GRANULOCYTES NFR BLD AUTO: 1 % (ref 0–2)
LYMPHOCYTES # BLD AUTO: 3.54 THOUSANDS/ΜL (ref 0.6–4.47)
LYMPHOCYTES NFR BLD AUTO: 44 % (ref 14–44)
MCH RBC QN AUTO: 29.6 PG (ref 26.8–34.3)
MCHC RBC AUTO-ENTMCNC: 32.4 G/DL (ref 31.4–37.4)
MCV RBC AUTO: 91 FL (ref 82–98)
MONOCYTES # BLD AUTO: 0.55 THOUSAND/ΜL (ref 0.17–1.22)
MONOCYTES NFR BLD AUTO: 7 % (ref 4–12)
NEUTROPHILS # BLD AUTO: 3.59 THOUSANDS/ΜL (ref 1.85–7.62)
NEUTS SEG NFR BLD AUTO: 44 % (ref 43–75)
NRBC BLD AUTO-RTO: 0 /100 WBCS
PLATELET # BLD AUTO: 265 THOUSANDS/UL (ref 149–390)
PMV BLD AUTO: 10.2 FL (ref 8.9–12.7)
POTASSIUM SERPL-SCNC: 4.3 MMOL/L (ref 3.5–5.3)
RBC # BLD AUTO: 4.23 MILLION/UL (ref 3.81–5.12)
SODIUM SERPL-SCNC: 145 MMOL/L (ref 136–145)
WBC # BLD AUTO: 8.12 THOUSAND/UL (ref 4.31–10.16)

## 2019-12-27 PROCEDURE — 80048 BASIC METABOLIC PNL TOTAL CA: CPT | Performed by: PHYSICIAN ASSISTANT

## 2019-12-27 PROCEDURE — 85025 COMPLETE CBC W/AUTO DIFF WBC: CPT | Performed by: PHYSICIAN ASSISTANT

## 2019-12-27 PROCEDURE — 99232 SBSQ HOSP IP/OBS MODERATE 35: CPT | Performed by: INTERNAL MEDICINE

## 2019-12-27 PROCEDURE — 82948 REAGENT STRIP/BLOOD GLUCOSE: CPT

## 2019-12-27 RX ADMIN — TORSEMIDE 10 MG: 10 TABLET ORAL at 08:37

## 2019-12-27 RX ADMIN — ENOXAPARIN SODIUM 60 MG: 60 INJECTION SUBCUTANEOUS at 08:37

## 2019-12-27 RX ADMIN — ACETAMINOPHEN 650 MG: 325 TABLET ORAL at 08:39

## 2019-12-27 RX ADMIN — PANTOPRAZOLE SODIUM 40 MG: 40 TABLET, DELAYED RELEASE ORAL at 05:35

## 2019-12-27 RX ADMIN — GABAPENTIN 300 MG: 300 CAPSULE ORAL at 22:09

## 2019-12-27 RX ADMIN — ENOXAPARIN SODIUM 60 MG: 60 INJECTION SUBCUTANEOUS at 17:28

## 2019-12-27 RX ADMIN — ATORVASTATIN CALCIUM 40 MG: 40 TABLET, FILM COATED ORAL at 17:28

## 2019-12-27 RX ADMIN — NEOMYCIN SULFATE, POLYMYXIN B SULFATE AND HYDROCORTISONE 4 DROP: 10; 3.5; 1 SUSPENSION/ DROPS AURICULAR (OTIC) at 17:28

## 2019-12-27 RX ADMIN — MONTELUKAST SODIUM 10 MG: 10 TABLET, COATED ORAL at 08:37

## 2019-12-27 RX ADMIN — AMLODIPINE BESYLATE 5 MG: 5 TABLET ORAL at 08:37

## 2019-12-27 RX ADMIN — CEFEPIME HYDROCHLORIDE 2000 MG: 2 INJECTION, POWDER, FOR SOLUTION INTRAVENOUS at 16:34

## 2019-12-27 RX ADMIN — CLOPIDOGREL BISULFATE 75 MG: 75 TABLET ORAL at 08:37

## 2019-12-27 RX ADMIN — ASPIRIN 81 MG: 81 TABLET, COATED ORAL at 08:37

## 2019-12-27 RX ADMIN — NEOMYCIN SULFATE, POLYMYXIN B SULFATE AND HYDROCORTISONE 4 DROP: 10; 3.5; 1 SUSPENSION/ DROPS AURICULAR (OTIC) at 05:35

## 2019-12-27 NOTE — SOCIAL WORK
CM met with patient at bedside to address discharge needs  Dtr, Claudia Godinez, present in the room; CM asked permission to speak in front dtr, which was granted       Patient [place] alone in a senior high rise one block from the hospital; no AUDREY, and there is an elevator to her floor  Patient claims difficulty with steps  Patient is independent with ADLs and functional mobility  Food shopping is done with dtr once a month as she lives in [place] & meal prep is done by patient  Patient has multiple DMEs: cane, RW, shower chair and electric scooter  She also owns a CPAP machine which she uses on occasion  Patient is able to ambulate without assistance from a seated or laying position  Hx of VNA & STR reported  Patient is on SSD  PCP identified Dr Bekah Chris   No POA identified but dtr, Tammie Quiros, is permitted to be patient's healthcare representative, spokesperson for the family and designated caregiver if/when needed  Patient uses [facility] for Rx needs as they deliver; patient made aware of [facility] in house  Patient does not drive; reports she uses public transportation to transport home at discharge  However, pt lives at [address] [place] [address]; she has her electric scooter in her room and will transport herself home as long as it is not raining and it is light outside  Is this not a readmission within the last 30 days      CM will remain available and follow as needed  CM also encouraged patient to follow up with all/any recommended appointments after discharge  Patient advised of importance for patient and family to participate in managing patients medical well being      ESTELA Ferrera  12/27/2019  0093

## 2019-12-27 NOTE — PROGRESS NOTES
Progress Note - Ce Araya 71 y o  female MRN: [id number]    Unit/Bed#: [facility] 2 -01 Encounter: [id number]    Assessment/Plan:    Acute otitis externa  left ear, failed outpatient treatment, pain and symptoms for approximately 3 weeks continue Cortisporin and consult Ear Nose and Throat    Chronic diastolic HF  appears compensated continue diuretic    Morbid obesity   would benefit from weight    Diabetes   good control blood glucose last 24 hours ranged 94 to 127 continue ADA diet and sliding scale    Hypertension   control with Norvasc and Demadex    GERD    continue PPI for acid control    Dyslipidemia   continue statin for LDL control    Subjective:   Still with severe pain left ear into jaw, chronic short of breath denies chest pain no nausea vomiting diarrhea no fevers chills appetite good    Objective:     Vitals: Blood pressure 111/62, pulse 86, temperature 98 2 °F (36 8 °C), resp  rate 17, weight 132 kg (291 lb 3 6 oz), SpO2 97 %, not currently breastfeeding  ,Body mass index is 63 02 kg/m²        Results from last 7 days   Lab Units 12/27/19  0548   WBC Thousand/uL 8 12   HEMOGLOBIN g/dL 12 5   HEMATOCRIT % 38 6   PLATELETS Thousands/uL 265     Results from last 7 days   Lab Units 12/27/19  0548   POTASSIUM mmol/L 4 3   CHLORIDE mmol/L 107   CO2 mmol/L 32   BUN mg/dL 19   CREATININE mg/dL 1 07   CALCIUM mg/dL 9 1       Scheduled Meds:    Current Facility-Administered Medications:  acetaminophen 650 mg Oral Q6H PRN Nico Segovia PA-C    albuterol 2 puff Inhalation Q4H PRN Nico Segovia PA-C    amLODIPine 5 mg Oral Daily Nico Segovia PA-C    aspirin 81 mg Oral Daily Nico Segovia PA-C    atorvastatin 40 mg Oral Daily With Mohamud PA-C    cefepime 2,000 mg Intravenous Q12H Nico Segovia PA-C Last Rate: 2,000 mg (12/26/19 2222)   clopidogrel 75 mg Oral Daily Nico Segovia PA-C    enoxaparin 60 mg Subcutaneous BID Nico Segovia PA-C    fluticasone-vilanterol 1 puff Inhalation Daily Asuncion Goltz, PA-C    gabapentin 300 mg Oral HS Asuncion Goltz, PA-C    insulin lispro 2-12 Units Subcutaneous TID [facility] Asuncion Goltz, PA-C    insulin lispro 2-12 Units Subcutaneous HS Asuncion Goltz, PA-C    montelukast 10 mg Oral Daily Asuncion Goltz, PA-C    neomycin-polymyxin-hydrocortisone 4 drop Left Ear Q6H [address] Asuncion Goltz, PA-C    ondansetron 4 mg Intravenous Q6H PRN Asuncion Goltz, PA-C    pantoprazole 40 mg Oral Early Morning Asuncion Goltz, PA-C    torsemide 10 mg Oral Daily Asuncion Goltz, PA-C        Continuous Infusions:     Physical exam:  General appearance:  Alert no distress interaction appropriate  Head/Eyes:  Nonicteric PERRL EOMI  Neck:  Supple  Lungs:  Decreased BS bilateral no wheezing rhonchi or rales  Heart: normal S1 S2 regular  Abdomen:  Obese nontender with bowel sounds  Extremities:  Chronic edema with skin changes/erosions  Skin: no rash    Invasive Devices     Peripheral Intravenous Line            Peripheral IV 12/26/19 Left Arm 1 day                  VTE Pharmacologic Prophylaxis:  Lovenox      Counseling / Coordination of Care  Total floor / unit time spent today 30  minutes  Greater than 50% of total time was spent with the patient and / or family counseling and / or coordination of care    A description of the counseling / coordination of care:  Discussed with daughter

## 2019-12-27 NOTE — PLAN OF CARE
Problem: Potential for Falls  Goal: Patient will remain free of falls  Description  INTERVENTIONS:  - Assess patient frequently for physical needs  -  Identify cognitive and physical deficits and behaviors that affect risk of falls  -  [place] fall precautions as indicated by assessment   - Educate patient/family on patient safety including physical limitations  - Instruct patient to call for assistance with activity based on assessment  - Modify environment to reduce risk of injury  - Consider OT/PT consult to assist with strengthening/mobility  Outcome: Progressing     Problem: Nutrition/Hydration-ADULT  Goal: Nutrient/Hydration intake appropriate for improving, restoring or maintaining nutritional needs  Description  Monitor and assess patient's nutrition/hydration status for malnutrition  Collaborate with interdisciplinary team and initiate plan and interventions as ordered  Monitor patient's weight and dietary intake as ordered or per policy  Utilize nutrition screening tool and intervene as necessary  Determine patient's food preferences and provide high-protein, high-caloric foods as appropriate       INTERVENTIONS:  - Monitor oral intake, urinary output, labs, and treatment plans  - Assess nutrition and hydration status and recommend course of action  - Evaluate amount of meals eaten  - Assist patient with eating if necessary   - Allow adequate time for meals  - Recommend/ encourage appropriate diets, oral nutritional supplements, and vitamin/mineral supplements  - Order, calculate, and assess calorie counts as needed  - Recommend, monitor, and adjust tube feedings and TPN/PPN based on assessed needs  - Assess need for intravenous fluids  - Provide specific nutrition/hydration education as appropriate  - Include patient/family/caregiver in decisions related to nutrition  Outcome: Progressing     Problem: PAIN - ADULT  Goal: Verbalizes/displays adequate comfort level or baseline comfort level  Description  Interventions:  - Encourage patient to monitor pain and request assistance  - Assess pain using appropriate pain scale  - Administer analgesics based on type and severity of pain and evaluate response  - Implement non-pharmacological measures as appropriate and evaluate response  - Consider cultural and social influences on pain and pain management  - Notify physician/advanced practitioner if interventions unsuccessful or patient reports new pain  Outcome: Progressing     Problem: INFECTION - ADULT  Goal: Absence or prevention of progression during hospitalization  Description  INTERVENTIONS:  - Assess and monitor for signs and symptoms of infection  - Monitor lab/diagnostic results  - Monitor all insertion sites, i e  indwelling lines, tubes, and drains  - Monitor endotracheal if appropriate and nasal secretions for changes in amount and color  - [place] appropriate cooling/warming therapies per order  - Administer medications as ordered  - Instruct and encourage patient and family to use good hand hygiene technique  - Identify and instruct in appropriate isolation precautions for identified infection/condition  Outcome: Progressing     Problem: SAFETY ADULT  Goal: Maintain or return to baseline ADL function  Description  INTERVENTIONS:  -  Assess patient's ability to carry out ADLs; assess patient's baseline for ADL function and identify physical deficits which impact ability to perform ADLs (bathing, care of mouth/teeth, toileting, grooming, dressing, etc )  - Assess/evaluate cause of self-care deficits   - Assess range of motion  - Assess patient's mobility; develop plan if impaired  - Assess patient's need for assistive devices and provide as appropriate  - Encourage maximum independence but intervene and supervise when necessary  - Involve family in performance of ADLs  - Assess for home care needs following discharge   - Consider OT consult to assist with ADL evaluation and planning for discharge  - Provide patient education as appropriate  Outcome: Progressing  Goal: Maintain or return mobility status to optimal level  Description  INTERVENTIONS:  - Assess patient's baseline mobility status (ambulation, transfers, stairs, etc )    - Identify cognitive and physical deficits and behaviors that affect mobility  - Identify mobility aids required to assist with transfers and/or ambulation (gait belt, sit-to-stand, lift, walker, cane, etc )  - [place] fall precautions as indicated by assessment  - Record patient progress and toleration of activity level on Mobility SBAR; progress patient to next Phase/Stage  - Instruct patient to call for assistance with activity based on assessment  - Consider rehabilitation consult to assist with strengthening/weightbearing, etc   Outcome: Progressing     Problem: DISCHARGE PLANNING  Goal: Discharge to home or other facility with appropriate resources  Description  INTERVENTIONS:  - Identify barriers to discharge w/patient and caregiver  - Arrange for needed discharge resources and transportation as appropriate  - Identify discharge learning needs (meds, wound care, etc )  - Arrange for interpretive services to assist at discharge as needed  - Refer to Case Management Department for coordinating discharge planning if the patient needs post-hospital services based on physician/advanced practitioner order or complex needs related to functional status, cognitive ability, or social support system  Outcome: Progressing     Problem: Knowledge Deficit  Goal: Patient/family/caregiver demonstrates understanding of disease process, treatment plan, medications, and discharge instructions  Description  Complete learning assessment and assess knowledge base    Interventions:  - Provide teaching at level of understanding  - Provide teaching via preferred learning methods  Outcome: Progressing     Problem: METABOLIC, FLUID AND ELECTROLYTES - ADULT  Goal: Glucose maintained within target range  Description  INTERVENTIONS:  - Monitor Blood Glucose as ordered  - Assess for signs and symptoms of hyperglycemia and hypoglycemia  - Administer ordered medications to maintain glucose within target range  - Assess nutritional intake and initiate nutrition service referral as needed  Outcome: Progressing     Problem: HEMATOLOGIC - ADULT  Goal: Maintains hematologic stability  Description  INTERVENTIONS  - Assess for signs and symptoms of bleeding or hemorrhage  - Monitor labs  - Administer supportive blood products/factors as ordered and appropriate  Outcome: Progressing     Problem: MUSCULOSKELETAL - ADULT  Goal: Maintain or return mobility to safest level of function  Description  INTERVENTIONS:  - Assess patient's ability to carry out ADLs; assess patient's baseline for ADL function and identify physical deficits which impact ability to perform ADLs (bathing, care of mouth/teeth, toileting, grooming, dressing, etc )  - Assess/evaluate cause of self-care deficits   - Assess range of motion  - Assess patient's mobility  - Assess patient's need for assistive devices and provide as appropriate  - Encourage maximum independence but intervene and supervise when necessary  - Involve family in performance of ADLs  - Assess for home care needs following discharge   - Consider OT consult to assist with ADL evaluation and planning for discharge  - Provide patient education as appropriate  Outcome: Progressing

## 2019-12-27 NOTE — CONSULTS
Consultation - ENT   La Escamilla 71 y o  female MRN: [id number]  Unit/Bed#: [place] [address] [id number] Encounter: [id number]      Assessment/Plan     Assessment:  1  Left otitis externa  2  Left facial cellulitis secondary to otitis externa  3  No evidence of malignant otitis externa    Plan:  CT scan of the facial bones was completed yesterday  This was reviewed personally and there is no evidence of any middle ear or mastoid pathology  On those scans the lateral canal wall seems to be partially closed secondary to edema  On examination today there is no significant edema of the external canal and there is nothing to culture within the canal itself  There is some dry skin and some oozing of the area externally in the area of the tragus and the lobule  Due to her underlying rash which form secondary to quinolones we are unable to change her drops to either ofloxacin or Ciprodex  I would continue with her current drops as well as for continued IV antibiotics as she seems to be improving on this medication  She may be able to be changed to an oral form of the medication by tomorrow and discharge  I will be glad to see her in the office for follow-up to suction the surface of the left tympanic membrane and make sure there is no other pathology at the time  I have also suggested that she use a topical antibiotic ointment on the external ear to help with the superficial excoriation  History of Present Illness   Physician Requesting Consult: Loc Serrato DO  Reason for Consult / Principal Problem:  Malignant otitis externa  HPI: La Escamilla is a 71y o  year old female who presents with a past medical history significant for morbid obesity , hypertension , hyperlipidemia , CHF, type 2 diabetes who presents with worsening of left ear swelling, drainage, pain over the past approximately month  She saw her family doctor for the same at the end of November, was given Corticosporin drops to take 4 times per day  Patient reports she has not been compliant with these drops, not even using them every day  She reports worsening of the swelling, as well as drainage of her left ear  She is also starting to have some pain in her face, as well as some left-sided cheek swelling  No reported fevers or chills, headaches, lightheadedness, dizziness, shortness of breath, wheezing, abdominal pain, nausea, vomiting, change in her stool, lower extremity edema  Patient reports her diabetes is well controlled  She is only maintained on diet control alone  Noncompliant with C-PAP at night  Reports allergy to fluoroquinolones  Reports her pain is controlled at this time  No other complaints  Inpatient consult to ENT  Consult performed by: Jaison Pulido DO  Consult ordered by: Balbir Major DO          Review of Systems   Constitutional: Negative for activity change, appetite change, fatigue, fever and unexpected weight change  HENT: Positive for ear discharge (left sided), ear pain (left sided) and hearing loss (left sided)  Negative for congestion, nosebleeds, postnasal drip, rhinorrhea, sinus pressure, sinus pain, sneezing, sore throat, tinnitus, trouble swallowing and voice change  Eyes: Negative  Negative for photophobia, pain, itching and visual disturbance  Respiratory: Negative  Negative for cough, chest tightness and shortness of breath  Cardiovascular: Negative  Negative for chest pain  Gastrointestinal: Negative  Negative for abdominal pain  Endocrine: Negative  Musculoskeletal: Negative  Negative for gait problem, neck pain and neck stiffness  Skin: Negative  Allergic/Immunologic: Negative  Negative for environmental allergies  Neurological: Negative  Negative for dizziness, speech difficulty, light-headedness and headaches  Hematological: Negative  Negative for adenopathy  Psychiatric/Behavioral: Negative  Negative for sleep disturbance  The patient is not nervous/anxious  Historical Information   Past Medical History:   Diagnosis Date    COPD [facility] exacerbation [facility] [address] ) 1/29/2019    Diabetes mellitus ([facility] )     Full dentures     GERD (gastroesophageal reflux disease)     Hyperlipidemia     Hypertension     Seasonal allergies     Sleep apnea      Past Surgical History:   Procedure Laterality Date    BILATERAL KNEE ARTHROSCOPY      CATARACT EXTRACTION Right     DENTAL SURGERY      HYSTERECTOMY      TOTAL KNEE ARTHROPLASTY Bilateral     TUBAL LIGATION       Social History   Social History     Substance and Sexual Activity   Alcohol Use No     Social History     Substance and Sexual Activity   Drug Use No     Social History     Tobacco Use   Smoking Status Never Smoker   Smokeless Tobacco Never Used   Tobacco Comment    childhood smoke exposure     Family History:   Family History   Problem Relation Age of Onset    Coronary artery disease Father        Meds/Allergies   current meds:   Current Facility-Administered Medications   Medication Dose Route Frequency    acetaminophen (TYLENOL) tablet 650 mg  650 mg Oral Q6H PRN    albuterol (PROVENTIL HFA,VENTOLIN HFA) inhaler 2 puff  2 puff Inhalation Q4H PRN    aspirin (ECOTRIN LOW STRENGTH) EC tablet 81 mg  81 mg Oral Daily    atorvastatin (LIPITOR) tablet 40 mg  40 mg Oral Daily With Dinner    clopidogrel (PLAVIX) tablet 75 mg  75 mg Oral Daily    enoxaparin (LOVENOX) subcutaneous injection 60 mg  60 mg Subcutaneous BID    fluticasone-vilanterol (BREO ELLIPTA) 100-25 mcg/inh inhaler 1 puff  1 puff Inhalation Daily    gabapentin (NEURONTIN) capsule 300 mg  300 mg Oral HS    insulin lispro (HumaLOG) 100 units/mL subcutaneous injection 2-12 Units  2-12 Units Subcutaneous TID AC    insulin lispro (HumaLOG) 100 units/mL subcutaneous injection 2-12 Units  2-12 Units Subcutaneous HS    montelukast (SINGULAIR) tablet 10 mg  10 mg Oral Daily    neomycin-polymyxin-hydrocortisone (CORTISPORIN) 0 35%-10,000 units/mL-1% otic suspension 4 drop  4 drop Left Ear Q6H [facility]    ondansetron (ZOFRAN) injection 4 mg  4 mg Intravenous Q6H PRN    pantoprazole (PROTONIX) EC tablet 40 mg  40 mg Oral Early Morning    torsemide (DEMADEX) tablet 10 mg  10 mg Oral Daily    and PTA meds:   Prior to Admission Medications   Prescriptions Last Dose Informant Patient Reported? Taking?    ACCU-CHEK FASTCLIX LANCETS MISC  Self Yes No   ACCU-CHEK GUIDE test strip  Self Yes No   Blood Glucose Monitoring Suppl (ACCU-CHEK GUIDE) w/Device KIT  Self Yes No   Multiple Vitamin (MULTIVITAMIN) tablet  Self No No   Sig: Take 1 tablet by mouth daily   acetaminophen (TYLENOL) 325 mg tablet  Self No No   Sig: Take 2 tablets (650 mg total) by mouth every 6 (six) hours as needed for mild pain   albuterol (5 mg/mL) 0 5 % nebulizer solution  Self No No   Sig: Take 0 5 mL by nebulization every 6 (six) hours as needed for wheezing or shortness of breath   albuterol (VENTOLIN HFA) 90 mcg/act inhaler  Self No No   Sig: Inhale 2 puffs every 4 (four) hours as needed for wheezing   amLODIPine (NORVASC) 5 mg tablet   No No   Sig: ONE TABLET BY MOUTH DAILY   aspirin (ECOTRIN LOW STRENGTH) 81 mg EC tablet  Self No No   Sig: Take 1 tablet (81 mg total) by mouth daily   atorvastatin (LIPITOR) 40 mg tablet   No No   Sig: Take 1 tablet (40 mg total) by mouth daily with dinner   clopidogrel (PLAVIX) 75 mg tablet  Self No No   Sig: Take 1 tablet (75 mg total) by mouth daily   fluticasone-vilanterol (BREO ELLIPTA) 100-25 mcg/inh inhaler  Self No No   Sig: Inhale 1 puff daily Rinse mouth after use    gabapentin (NEURONTIN) 300 mg capsule   No No   Sig: Take 1 capsule (300 mg total) by mouth daily at bedtime   guaiFENesin (ROBITUSSIN) 100 MG/5ML oral liquid   No No   Sig: Take 10 mL (200 mg total) by mouth 3 (three) times a day as needed for cough   ketoconazole (NIZORAL) 2 % cream  Self No No   Sig: Apply topically daily   montelukast (SINGULAIR) 10 mg tablet  Self No No   Sig: Take 1 tablet (10 mg total) by mouth daily   neomycin-polymyxin-hydrocortisone (CORTISPORIN) otic solution   No No   Sig: Administer 3 drops into the left ear every 6 (six) hours   nitroglycerin (NITROSTAT) 0 4 mg SL tablet  Self No No   Sig: Place 1 tablet (0 4 mg total) under the tongue every 5 (five) minutes as needed for chest pain   pantoprazole (PROTONIX) 40 mg tablet  Self No No   Sig: Take 1 tablet (40 mg total) by mouth daily   torsemide (DEMADEX) 20 mg tablet   No No   Sig: Take 0 5 tablets (10 mg total) by mouth daily      Facility-Administered Medications: None       Allergies   Allergen Reactions    Januvia [Sitagliptin] Swelling    Clindamycin     Hydrocodone     Morphine Hives    Omeprazole     Penicillins     Percocet [Oxycodone-Acetaminophen]     Tolterodine     Ciprofloxacin Rash       Objective       Intake/Output Summary (Last 24 hours) at 12/27/2019 1248  Last data filed at 12/27/2019 0845  Gross per 24 hour   Intake 240 ml   Output    Net 240 ml       Invasive Devices     Peripheral Intravenous Line            Peripheral IV 12/26/19 Left Arm 1 day                Physical Exam   Constitutional: She appears well-developed and well-nourished  No distress  HENT:   Head: Normocephalic and atraumatic  Right Ear: Tympanic membrane, external ear and ear canal normal    Left Ear: There is drainage and swelling  No mastoid tenderness  Nose: Nose normal  No mucosal edema, nasal deformity, septal deviation or nasal septal hematoma  Mouth/Throat: Uvula is midline and mucous membranes are normal  No oral lesions  No trismus in the jaw  No uvula swelling  Left external ear with a very slight excoriation in oozing of clear discharge from the skin in the area of the tragus and the lobule  No edema is noted  Left tympanic membrane unable to be visualized - there is minimal debris on the surface of the membrane  Nicole Chambers is only minimally edematous at the lateral margin without any difficulty fitting a large aural speculum into the site  No otorrhea noted    Patient is edentulous  She has extremely large tongue  Uvula and most of soft palate is visible and appears to be normal   Entire oropharynx is difficult to visualize secondary to large tongue and size of oropharynx  Tonsils are not visualized  Posterior pharynx is not visualized  Neck: Trachea normal, normal range of motion, full passive range of motion without pain and phonation normal  Neck supple  No tracheal tenderness present  No neck rigidity  No tracheal deviation present  No thyroid mass and no thyromegaly present  Tenderness in the left preauricular area without evidence of significant cellulitis or warmth   Skin: She is not diaphoretic  Lab Results:   CBC:   Lab Results   Component Value Date    WBC 8 12 12/27/2019    HGB 12 5 12/27/2019    HCT 38 6 12/27/2019    MCV 91 12/27/2019     12/27/2019    MCH 29 6 12/27/2019    MCHC 32 4 12/27/2019    RDW 14 6 12/27/2019    MPV 10 2 12/27/2019    NRBC 0 12/27/2019   , CMP:   Lab Results   Component Value Date    SODIUM 145 12/27/2019    K 4 3 12/27/2019     12/27/2019    CO2 32 12/27/2019    BUN 19 12/27/2019    CREATININE 1 07 12/27/2019    CALCIUM 9 1 12/27/2019    EGFR 63 12/27/2019   , Coags: No results found for: PT, PTT, INR  Imaging Studies: I have personally reviewed pertinent reports  EKG, Pathology, and Other Studies: I have personally reviewed pertinent films in PACS    Procedure: Ct Facial Bones With Contrast    Result Date: 12/26/2019  Narrative: CT FACIAL SOFT TISSUES WITH INTRAVENOUS CONTRAST INDICATION:   L ear swelling, malignant otitis externa  COMPARISON: September 26, 2017 TECHNIQUE:  Axial images through the facial soft tissues were performed  Reformatted images were created in multiple planes  Radiation dose length product (DLP) for this visit:  368 mGy-cm     This examination, like all CT scans performed in the [facility], was performed utilizing techniques to minimize radiation dose exposure, including the use of iterative reconstruction and automated exposure control  IV Contrast:  85 mL of iohexol (OMNIPAQUE) IMAGE QUALITY:  Diagnostic  FINDINGS: SOFT TISSUES: No discrete soft tissue mass  No signs of soft tissue infection  No abscess formation  No hematoma  Small jugular chain lymph nodes are noted which do not meet the criteria for pathologic enlargement on the bases of size FACIAL BONES: There is no facial bone fracture identified  No discrete lytic or blastic lesion  No destructive lesions  ORBITS: Normal globes  Preseptal and retrobulbar soft tissues are unremarkable  Ear; there is skin thickening noted within the left external auditory canal with soft tissue swelling  There is mild narrowing of the external auditory canal The visualized middle ear ossicles, appear unremarkable There is no opacification of mastoid air cells The  space musculature, appear unremarkable There is no fluid collection seen     Impression: Skin thickening in the region of the left external auditory canal with the some narrowing of the left external auditory canal suggest otitis externa No erosive changes in the mastoids and no opacification of the mastoid air cells  No fluid collection in the  space  No CT findings to support malignant otitis externa The study was marked in EPIC for significant notification   Workstation performed: [id number]   Code Status: Level 1 - Full Code  Advance Directive and Living Will:      Power of :    POLST:      None

## 2019-12-27 NOTE — PLAN OF CARE
Problem: Potential for Falls  Goal: Patient will remain free of falls  Description  INTERVENTIONS:  - Assess patient frequently for physical needs  -  Identify cognitive and physical deficits and behaviors that affect risk of falls  -  [place] fall precautions as indicated by assessment   - Educate patient/family on patient safety including physical limitations  - Instruct patient to call for assistance with activity based on assessment  - Modify environment to reduce risk of injury  - Consider OT/PT consult to assist with strengthening/mobility  Outcome: Progressing     Problem: Prexisting or High Potential for Compromised Skin Integrity  Goal: Skin integrity is maintained or improved  Description  INTERVENTIONS:  - Identify patients at risk for skin breakdown  - Assess and monitor skin integrity  - Assess and monitor nutrition and hydration status  - Monitor labs   - Assess for incontinence   - Turn and reposition patient  - Assist with mobility/ambulation  - Relieve pressure over bony prominences  - Avoid friction and shearing  - Provide appropriate hygiene as needed including keeping skin clean and dry  - Evaluate need for skin moisturizer/barrier cream  - Collaborate with interdisciplinary team   - Patient/family teaching  - Consider wound care consult   Outcome: Progressing     Problem: Nutrition/Hydration-ADULT  Goal: Nutrient/Hydration intake appropriate for improving, restoring or maintaining nutritional needs  Description  Monitor and assess patient's nutrition/hydration status for malnutrition  Collaborate with interdisciplinary team and initiate plan and interventions as ordered  Monitor patient's weight and dietary intake as ordered or per policy  Utilize nutrition screening tool and intervene as necessary  Determine patient's food preferences and provide high-protein, high-caloric foods as appropriate       INTERVENTIONS:  - Monitor oral intake, urinary output, labs, and treatment plans  - Assess nutrition and hydration status and recommend course of action  - Evaluate amount of meals eaten  - Assist patient with eating if necessary   - Allow adequate time for meals  - Recommend/ encourage appropriate diets, oral nutritional supplements, and vitamin/mineral supplements  - Order, calculate, and assess calorie counts as needed  - Recommend, monitor, and adjust tube feedings and TPN/PPN based on assessed needs  - Assess need for intravenous fluids  - Provide specific nutrition/hydration education as appropriate  - Include patient/family/caregiver in decisions related to nutrition  Outcome: Progressing     Problem: PAIN - ADULT  Goal: Verbalizes/displays adequate comfort level or baseline comfort level  Description  Interventions:  - Encourage patient to monitor pain and request assistance  - Assess pain using appropriate pain scale  - Administer analgesics based on type and severity of pain and evaluate response  - Implement non-pharmacological measures as appropriate and evaluate response  - Consider cultural and social influences on pain and pain management  - Notify physician/advanced practitioner if interventions unsuccessful or patient reports new pain  Outcome: Progressing     Problem: INFECTION - ADULT  Goal: Absence or prevention of progression during hospitalization  Description  INTERVENTIONS:  - Assess and monitor for signs and symptoms of infection  - Monitor lab/diagnostic results  - Monitor all insertion sites, i e  indwelling lines, tubes, and drains  - Monitor endotracheal if appropriate and nasal secretions for changes in amount and color  - [place] appropriate cooling/warming therapies per order  - Administer medications as ordered  - Instruct and encourage patient and family to use good hand hygiene technique  - Identify and instruct in appropriate isolation precautions for identified infection/condition  Outcome: Progressing     Problem: SAFETY ADULT  Goal: Maintain or return to baseline ADL function  Description  INTERVENTIONS:  -  Assess patient's ability to carry out ADLs; assess patient's baseline for ADL function and identify physical deficits which impact ability to perform ADLs (bathing, care of mouth/teeth, toileting, grooming, dressing, etc )  - Assess/evaluate cause of self-care deficits   - Assess range of motion  - Assess patient's mobility; develop plan if impaired  - Assess patient's need for assistive devices and provide as appropriate  - Encourage maximum independence but intervene and supervise when necessary  - Involve family in performance of ADLs  - Assess for home care needs following discharge   - Consider OT consult to assist with ADL evaluation and planning for discharge  - Provide patient education as appropriate  Outcome: Progressing  Goal: Maintain or return mobility status to optimal level  Description  INTERVENTIONS:  - Assess patient's baseline mobility status (ambulation, transfers, stairs, etc )    - Identify cognitive and physical deficits and behaviors that affect mobility  - Identify mobility aids required to assist with transfers and/or ambulation (gait belt, sit-to-stand, lift, walker, cane, etc )  - [place] fall precautions as indicated by assessment  - Record patient progress and toleration of activity level on Mobility SBAR; progress patient to next Phase/Stage  - Instruct patient to call for assistance with activity based on assessment  - Consider rehabilitation consult to assist with strengthening/weightbearing, etc   Outcome: Progressing     Problem: DISCHARGE PLANNING  Goal: Discharge to home or other facility with appropriate resources  Description  INTERVENTIONS:  - Identify barriers to discharge w/patient and caregiver  - Arrange for needed discharge resources and transportation as appropriate  - Identify discharge learning needs (meds, wound care, etc )  - Arrange for interpretive services to assist at discharge as needed  - Refer to Case Management Department for coordinating discharge planning if the patient needs post-hospital services based on physician/advanced practitioner order or complex needs related to functional status, cognitive ability, or social support system  Outcome: Progressing     Problem: Knowledge Deficit  Goal: Patient/family/caregiver demonstrates understanding of disease process, treatment plan, medications, and discharge instructions  Description  Complete learning assessment and assess knowledge base    Interventions:  - Provide teaching at level of understanding  - Provide teaching via preferred learning methods  Outcome: Progressing     Problem: METABOLIC, FLUID AND ELECTROLYTES - ADULT  Goal: Glucose maintained within target range  Description  INTERVENTIONS:  - Monitor Blood Glucose as ordered  - Assess for signs and symptoms of hyperglycemia and hypoglycemia  - Administer ordered medications to maintain glucose within target range  - Assess nutritional intake and initiate nutrition service referral as needed  Outcome: Progressing     Problem: SKIN/TISSUE INTEGRITY - ADULT  Goal: Skin integrity remains intact  Description  INTERVENTIONS  - Identify patients at risk for skin breakdown  - Assess and monitor skin integrity  - Assess and monitor nutrition and hydration status  - Monitor labs (i e  albumin)  - Assess for incontinence   - Turn and reposition patient  - Assist with mobility/ambulation  - Relieve pressure over bony prominences  - Avoid friction and shearing  - Provide appropriate hygiene as needed including keeping skin clean and dry  - Evaluate need for skin moisturizer/barrier cream  - Collaborate with interdisciplinary team (i e  Nutrition, Rehabilitation, etc )   - Patient/family teaching  Outcome: Progressing  Goal: Incision(s), wounds(s) or drain site(s) healing without S/S of infection  Description  INTERVENTIONS  - Assess and document risk factors for skin impairment   - Assess and document dressing, incision, wound bed, drain sites and surrounding tissue  - Consider nutrition services referral as needed  - Oral mucous membranes remain intact  - Provide patient/ family education  Outcome: Progressing     Problem: HEMATOLOGIC - ADULT  Goal: Maintains hematologic stability  Description  INTERVENTIONS  - Assess for signs and symptoms of bleeding or hemorrhage  - Monitor labs  - Administer supportive blood products/factors as ordered and appropriate  Outcome: Progressing     Problem: MUSCULOSKELETAL - ADULT  Goal: Maintain or return mobility to safest level of function  Description  INTERVENTIONS:  - Assess patient's ability to carry out ADLs; assess patient's baseline for ADL function and identify physical deficits which impact ability to perform ADLs (bathing, care of mouth/teeth, toileting, grooming, dressing, etc )  - Assess/evaluate cause of self-care deficits   - Assess range of motion  - Assess patient's mobility  - Assess patient's need for assistive devices and provide as appropriate  - Encourage maximum independence but intervene and supervise when necessary  - Involve family in performance of ADLs  - Assess for home care needs following discharge   - Consider OT consult to assist with ADL evaluation and planning for discharge  - Provide patient education as appropriate  Outcome: Progressing

## 2019-12-27 NOTE — PROGRESS NOTES
Pt resting comfortably, no signs of distress  Currently denies pain, call bell within reach, will continue to monitor

## 2019-12-27 NOTE — UTILIZATION REVIEW
Initial Clinical Review    Admission: Date/Time/Statement: Inpatient Admission Orders (From admission, onward)     Ordered        12/26/19 1248  Inpatient Admission  Once                   Orders Placed This Encounter   Procedures    Inpatient Admission     Standing Status:   Standing     Number of Occurrences:   1     Order Specific Question:   Admitting Physician     Answer:   Vicki Ku     Order Specific Question:   Level of Care     Answer:   Med Surg [16]     Order Specific Question:   Estimated length of stay     Answer:   More than 2 Midnights     Order Specific Question:   Certification     Answer:   I certify that inpatient services are medically necessary for this patient for a duration of greater than two midnights  See H&P and MD Progress Notes for additional information about the patient's course of treatment  ED Arrival Information     Expected Arrival Acuity Means of Arrival Escorted By Service Admission Type    - 12/26/2019 10:26 Urgent Walk-In Self General Medicine Urgent    Arrival Complaint    ear infection        Chief Complaint   Patient presents with    Earache     pt c/o left earache ongoing for about a month pt was seen and given ear drops but now ear/face is swollen and drainage noted to left ear denies fevers at home      Assessment/Plan:   71 y o  female with a PMH  significant for morbid obesity , hypertension , hyperlipidemia , CHF, type 2 diabetes presents to ED from home with worsening of left ear swelling, drainage, pain over the past approximately 1 month  She saw PCP for same in the end of November, was given Corticosporin drops to take 4 times per day  States she used them occasionally    Reports worsening in the swelling, as well as drainage of her left ear  Also starting to have some pain in her face, as well as some left-sided cheek swelling  Patient reports her diabetes is well controlled - maintained on diet alone  Noncompliant with CPAP at night  Reports allergy to fluoroquinolones  Admitted to IP with Acute Otitis Externa Left Ear - Failed outpatient therapy of Corticosporin, questionable compliance  IV Abxs, Continue with Corticosporin 4 times a day  If no improvement consider ENT consultation    12/27: Still with severe pain left ear into jaw, chronic short of breath   Per ENT:CT scan of the facial bones was completed yesterday  This was reviewed personally and there is no evidence of any middle ear or mastoid pathology  On those scans the lateral canal wall seems to be partially closed secondary to edema  On examination today there is no significant edema of the external canal and there is nothing to culture within the canal itself  There is some dry skin and some oozing of the area externally in the area of the tragus and the lobule   Continue with her current drops as well as for continued IV antibiotics as she seems to be improving on this medication      ED Triage Vitals [12/26/19 1039]   Temperature Pulse Respirations Blood Pressure SpO2   98 4 °F (36 9 °C) (!) 107 18 156/100 97 %      Temp Source Heart Rate Source Patient Position - Orthostatic VS BP Location FiO2 (%)   Temporal Monitor Sitting Right arm --      Pain Score       7        Wt Readings from Last 1 Encounters:   12/27/19 132 kg (291 lb 3 6 oz)     Additional Vital Signs:   12/27/19 15:38:42  98 6 °F (37 °C) 86 20 113/65 96 % None (Room air) Sitting   12/27/19 07:15:37  98 2 °F (36 8 °C) 86 17 111/62 97 %     12/26/19 22:54:21  98 4 °F (36 9 °C) 89 20 114/60 97 %     12/26/19 20:34:35  98 3 °F (36 8 °C) 90   97 %     12/26/19 1900      99 % None (Room air)    12/26/19 1325   95 22 119/83 99 %     12/26/19 1253   95 16 132/52 91 % None (Room air) Lying       Pertinent Labs/Diagnostic Test Results:   Results from last 7 days   Lab Units 12/27/19  0548 12/26/19  1100   WBC Thousand/uL 8 12 7 85   HEMOGLOBIN g/dL 12 5 13 3   HEMATOCRIT % 38 6 41 1   PLATELETS Thousands/uL 265 268   NEUTROS ABS Thousands/µL 3 59 3 65     Results from last 7 days   Lab Units 12/27/19  0548 12/26/19  1100   SODIUM mmol/L 145 144   POTASSIUM mmol/L 4 3 4 2   CHLORIDE mmol/L 107 105   CO2 mmol/L 32 32   ANION GAP mmol/L 6 7   BUN mg/dL 19 15   CREATININE mg/dL 1 07 1 03   EGFR ml/min/1 73sq m 63 66   CALCIUM mg/dL 9 1 8 9     Results from last 7 days   Lab Units 12/27/19  1612 12/27/19  1130 12/27/19  0718 12/26/19  2114 12/26/19  1603   POC GLUCOSE mg/dl 88 103 96 94 127     Results from last 7 days   Lab Units 12/27/19  0548 12/26/19  1100   GLUCOSE RANDOM mg/dL 102 116     12/26 CT Facial Bones: Skin thickening in the region of the left external auditory canal with the some narrowing of the left external auditory canal suggest otitis externa  No erosive changes in the mastoids and no opacification of the mastoid air cells   No fluid collection in the  space   No CT findings to support malignant otitis externa    ED Treatment:   Medication Administration from 12/26/2019 1026 to 12/26/2019 1317       Date/Time Order Dose Route Action     12/26/2019 1114 cefepime (MAXIPIME) 2 g/50 mL dextrose IVPB 2,000 mg Intravenous New Bag        Past Medical History:   Diagnosis Date    COPD with acute exacerbation ([facility] ) 1/29/2019    Diabetes mellitus ([facility] )     Full dentures     GERD (gastroesophageal reflux disease)     Hyperlipidemia     Hypertension     Seasonal allergies     Sleep apnea      Present on Admission:   Acute otitis externa of left ear   Essential hypertension   Diabetes mellitus type 2 in obese ([facility] )   Coronary artery disease involving native coronary artery of native heart   GERD (gastroesophageal reflux disease)   Chronic diastolic congestive heart failure (HCC)   Restrictive lung disease   Obstructive sleep apnea of adult      Admitting Diagnosis: Earache [H92 09]  Malignant otitis externa of left ear [H60 22]  Acute malignant otitis externa of left ear [H60 22]     Age/Sex: 71 y o  female  Admission Orders:  Scheduled Medications:  Medications:  aspirin 81 mg Oral Daily   atorvastatin 40 mg Oral Daily With Dinner   cefepime 2,000 mg Intravenous Q12H   clopidogrel 75 mg Oral Daily   enoxaparin 60 mg Subcutaneous BID   fluticasone-vilanterol 1 puff Inhalation Daily   gabapentin 300 mg Oral HS   insulin lispro 2-12 Units Subcutaneous TID AC   insulin lispro 2-12 Units Subcutaneous HS   montelukast 10 mg Oral Daily   neomycin-polymyxin-hydrocortisone 4 drop Left Ear Q6H [place]   pantoprazole 40 mg Oral Early Morning   torsemide 10 mg Oral Daily     Continuous IV Infusions:  PRN Meds:  acetaminophen 650 mg Oral Q6H PRN x 1 12/27   albuterol 2 puff Inhalation Q4H PRN   ondansetron 4 mg Intravenous Q6H PRN       IP CONSULT TO ENT    Network Utilization Review Department  [email or web address]  ATTENTION: Please call with any questions or concerns to [phone] and carefully listen to the prompts so that you are directed to the right person  All voicemails are confidential   Ashlee Parker all requests for admission clinical reviews, approved or denied determinations and any other requests to dedicated fax number below belonging to the campus where the patient is receiving treatment   List of dedicated fax numbers for the Facilities:  [address] DENIALS (Administrative/Medical Necessity) [phone]   [address] (Maternity/NICU/Pediatrics) [phone]   Mei Medeiros [phone]   Jamey Wiseman [phone]   Kamran Marino [phone]   Jada Cortez [phone]   [address] [phone]   [facility]  [phone]   [address] [facility]  [address] [facility] [address] [facility] [phone]

## 2019-12-27 NOTE — PLAN OF CARE
Problem: Potential for Falls  Goal: Patient will remain free of falls  Description  INTERVENTIONS:  - Assess patient frequently for physical needs  -  Identify cognitive and physical deficits and behaviors that affect risk of falls  -  [place] fall precautions as indicated by assessment   - Educate patient/family on patient safety including physical limitations  - Instruct patient to call for assistance with activity based on assessment  - Modify environment to reduce risk of injury  - Consider OT/PT consult to assist with strengthening/mobility  12/26/2019 1921 by Chanell Moore RN  Outcome: Progressing  12/26/2019 1921 by Chanell Moore RN  Outcome: Progressing     Problem: Prexisting or High Potential for Compromised Skin Integrity  Goal: Skin integrity is maintained or improved  Description  INTERVENTIONS:  - Identify patients at risk for skin breakdown  - Assess and monitor skin integrity  - Assess and monitor nutrition and hydration status  - Monitor labs   - Assess for incontinence   - Turn and reposition patient  - Assist with mobility/ambulation  - Relieve pressure over bony prominences  - Avoid friction and shearing  - Provide appropriate hygiene as needed including keeping skin clean and dry  - Evaluate need for skin moisturizer/barrier cream  - Collaborate with interdisciplinary team   - Patient/family teaching  - Consider wound care consult   12/26/2019 1921 by Chanell Moore RN  Outcome: Progressing  12/26/2019 1921 by Chanell Moore RN  Outcome: Progressing     Problem: Nutrition/Hydration-ADULT  Goal: Nutrient/Hydration intake appropriate for improving, restoring or maintaining nutritional needs  Description  Monitor and assess patient's nutrition/hydration status for malnutrition  Collaborate with interdisciplinary team and initiate plan and interventions as ordered  Monitor patient's weight and dietary intake as ordered or per policy   Utilize nutrition screening tool and intervene as necessary  Determine patient's food preferences and provide high-protein, high-caloric foods as appropriate       INTERVENTIONS:  - Monitor oral intake, urinary output, labs, and treatment plans  - Assess nutrition and hydration status and recommend course of action  - Evaluate amount of meals eaten  - Assist patient with eating if necessary   - Allow adequate time for meals  - Recommend/ encourage appropriate diets, oral nutritional supplements, and vitamin/mineral supplements  - Order, calculate, and assess calorie counts as needed  - Recommend, monitor, and adjust tube feedings and TPN/PPN based on assessed needs  - Assess need for intravenous fluids  - Provide specific nutrition/hydration education as appropriate  - Include patient/family/caregiver in decisions related to nutrition  12/26/2019 1921 by Amena Jimenez RN  Outcome: Progressing  12/26/2019 1921 by Amena Jimenez RN  Outcome: Progressing     Problem: PAIN - ADULT  Goal: Verbalizes/displays adequate comfort level or baseline comfort level  Description  Interventions:  - Encourage patient to monitor pain and request assistance  - Assess pain using appropriate pain scale  - Administer analgesics based on type and severity of pain and evaluate response  - Implement non-pharmacological measures as appropriate and evaluate response  - Consider cultural and social influences on pain and pain management  - Notify physician/advanced practitioner if interventions unsuccessful or patient reports new pain  12/26/2019 1921 by Amena Jimenez RN  Outcome: Progressing  12/26/2019 1921 by Amena Jimenez RN  Outcome: Progressing     Problem: INFECTION - ADULT  Goal: Absence or prevention of progression during hospitalization  Description  INTERVENTIONS:  - Assess and monitor for signs and symptoms of infection  - Monitor lab/diagnostic results  - Monitor all insertion sites, i e  indwelling lines, tubes, and drains  - Monitor endotracheal if appropriate and nasal secretions for changes in amount and color  - [place] appropriate cooling/warming therapies per order  - Administer medications as ordered  - Instruct and encourage patient and family to use good hand hygiene technique  - Identify and instruct in appropriate isolation precautions for identified infection/condition  12/26/2019 1921 by Velvet Baird RN  Outcome: Progressing  12/26/2019 1921 by Velvet Baird RN  Outcome: Progressing     Problem: SAFETY ADULT  Goal: Maintain or return to baseline ADL function  Description  INTERVENTIONS:  -  Assess patient's ability to carry out ADLs; assess patient's baseline for ADL function and identify physical deficits which impact ability to perform ADLs (bathing, care of mouth/teeth, toileting, grooming, dressing, etc )  - Assess/evaluate cause of self-care deficits   - Assess range of motion  - Assess patient's mobility; develop plan if impaired  - Assess patient's need for assistive devices and provide as appropriate  - Encourage maximum independence but intervene and supervise when necessary  - Involve family in performance of ADLs  - Assess for home care needs following discharge   - Consider OT consult to assist with ADL evaluation and planning for discharge  - Provide patient education as appropriate  12/26/2019 1921 by Velvet Baird RN  Outcome: Progressing  12/26/2019 1921 by Velvet Baird RN  Outcome: Progressing  Goal: Maintain or return mobility status to optimal level  Description  INTERVENTIONS:  - Assess patient's baseline mobility status (ambulation, transfers, stairs, etc )    - Identify cognitive and physical deficits and behaviors that affect mobility  - Identify mobility aids required to assist with transfers and/or ambulation (gait belt, sit-to-stand, lift, walker, cane, etc )  - [place] fall precautions as indicated by assessment  - Record patient progress and toleration of activity level on Mobility SBAR; progress patient to next Phase/Stage  - Instruct patient to call for assistance with activity based on assessment  - Consider rehabilitation consult to assist with strengthening/weightbearing, etc   12/26/2019 1921 by Juliane Wayne RN  Outcome: Progressing  12/26/2019 1921 by Juliane Wayne RN  Outcome: Progressing     Problem: DISCHARGE PLANNING  Goal: Discharge to home or other facility with appropriate resources  Description  INTERVENTIONS:  - Identify barriers to discharge w/patient and caregiver  - Arrange for needed discharge resources and transportation as appropriate  - Identify discharge learning needs (meds, wound care, etc )  - Arrange for interpretive services to assist at discharge as needed  - Refer to Case Management Department for coordinating discharge planning if the patient needs post-hospital services based on physician/advanced practitioner order or complex needs related to functional status, cognitive ability, or social support system  12/26/2019 1921 by Juliane Wayne RN  Outcome: Progressing  12/26/2019 1921 by Juliane Wayne RN  Outcome: Progressing     Problem: Knowledge Deficit  Goal: Patient/family/caregiver demonstrates understanding of disease process, treatment plan, medications, and discharge instructions  Description  Complete learning assessment and assess knowledge base    Interventions:  - Provide teaching at level of understanding  - Provide teaching via preferred learning methods  12/26/2019 1921 by Juliane Wayne RN  Outcome: Progressing  12/26/2019 1921 by Juliane Wayne RN  Outcome: Progressing     Problem: METABOLIC, FLUID AND ELECTROLYTES - ADULT  Goal: Glucose maintained within target range  Description  INTERVENTIONS:  - Monitor Blood Glucose as ordered  - Assess for signs and symptoms of hyperglycemia and hypoglycemia  - Administer ordered medications to maintain glucose within target range  - Assess nutritional intake and initiate nutrition service referral as needed  12/26/2019 1921 by Juliane Wayne RN  Outcome: Progressing  12/26/2019 1921 by David Perez RN  Outcome: Progressing     Problem: SKIN/TISSUE INTEGRITY - ADULT  Goal: Skin integrity remains intact  Description  INTERVENTIONS  - Identify patients at risk for skin breakdown  - Assess and monitor skin integrity  - Assess and monitor nutrition and hydration status  - Monitor labs (i e  albumin)  - Assess for incontinence   - Turn and reposition patient  - Assist with mobility/ambulation  - Relieve pressure over bony prominences  - Avoid friction and shearing  - Provide appropriate hygiene as needed including keeping skin clean and dry  - Evaluate need for skin moisturizer/barrier cream  - Collaborate with interdisciplinary team (i e  Nutrition, Rehabilitation, etc )   - Patient/family teaching  12/26/2019 1921 by David Perez RN  Outcome: Progressing  12/26/2019 1921 by David Perez RN  Outcome: Progressing  Goal: Incision(s), wounds(s) or drain site(s) healing without S/S of infection  Description  INTERVENTIONS  - Assess and document risk factors for skin impairment   - Assess and document dressing, incision, wound bed, drain sites and surrounding tissue  - Consider nutrition services referral as needed  - Oral mucous membranes remain intact  - Provide patient/ family education  12/26/2019 1921 by David Perez RN  Outcome: Progressing  12/26/2019 1921 by David Perez RN  Outcome: Progressing     Problem: HEMATOLOGIC - ADULT  Goal: Maintains hematologic stability  Description  INTERVENTIONS  - Assess for signs and symptoms of bleeding or hemorrhage  - Monitor labs  - Administer supportive blood products/factors as ordered and appropriate  12/26/2019 1921 by David Perez RN  Outcome: Progressing  12/26/2019 1921 by David Perez RN  Outcome: Progressing     Problem: MUSCULOSKELETAL - ADULT  Goal: Maintain or return mobility to safest level of function  Description  INTERVENTIONS:  - Assess patient's ability to carry out ADLs; assess patient's baseline for ADL function and identify physical deficits which impact ability to perform ADLs (bathing, care of mouth/teeth, toileting, grooming, dressing, etc )  - Assess/evaluate cause of self-care deficits   - Assess range of motion  - Assess patient's mobility  - Assess patient's need for assistive devices and provide as appropriate  - Encourage maximum independence but intervene and supervise when necessary  - Involve family in performance of ADLs  - Assess for home care needs following discharge   - Consider OT consult to assist with ADL evaluation and planning for discharge  - Provide patient education as appropriate  12/26/2019 1921 by Mayra Portillo, RN  Outcome: Progressing  12/26/2019 1921 by Mayra Portillo, RN  Outcome: Progressing

## 2019-12-28 VITALS
SYSTOLIC BLOOD PRESSURE: 103 MMHG | TEMPERATURE: 98 F | WEIGHT: 291.23 LBS | BODY MASS INDEX: 63.02 KG/M2 | DIASTOLIC BLOOD PRESSURE: 71 MMHG | HEART RATE: 86 BPM | RESPIRATION RATE: 16 BRPM | OXYGEN SATURATION: 98 %

## 2019-12-28 LAB
GLUCOSE SERPL-MCNC: 110 MG/DL (ref 65–140)
GLUCOSE SERPL-MCNC: 93 MG/DL (ref 65–140)

## 2019-12-28 PROCEDURE — 99239 HOSP IP/OBS DSCHRG MGMT >30: CPT | Performed by: INTERNAL MEDICINE

## 2019-12-28 PROCEDURE — 82948 REAGENT STRIP/BLOOD GLUCOSE: CPT

## 2019-12-28 RX ORDER — CEFDINIR 300 MG/1
300 CAPSULE ORAL EVERY 12 HOURS SCHEDULED
Status: DISCONTINUED | OUTPATIENT
Start: 2019-12-28 | End: 2019-12-28 | Stop reason: HOSPADM

## 2019-12-28 RX ORDER — CEFDINIR 300 MG/1
300 CAPSULE ORAL EVERY 12 HOURS SCHEDULED
Qty: 14 CAPSULE | Refills: 0 | Status: SHIPPED | OUTPATIENT
Start: 2019-12-28 | End: 2020-01-04

## 2019-12-28 RX ORDER — NEOMYCIN SULFATE, POLYMYXIN B SULFATE AND HYDROCORTISONE 10; 3.5; 1 MG/ML; MG/ML; [USP'U]/ML
4 SUSPENSION/ DROPS AURICULAR (OTIC) EVERY 6 HOURS SCHEDULED
Qty: 10 ML | Refills: 0 | Status: SHIPPED | OUTPATIENT
Start: 2019-12-28 | End: 2020-10-02

## 2019-12-28 RX ADMIN — ACETAMINOPHEN 650 MG: 325 TABLET ORAL at 05:22

## 2019-12-28 RX ADMIN — NEOMYCIN SULFATE, POLYMYXIN B SULFATE AND HYDROCORTISONE 4 DROP: 10; 3.5; 1 SUSPENSION/ DROPS AURICULAR (OTIC) at 00:18

## 2019-12-28 RX ADMIN — CLOPIDOGREL BISULFATE 75 MG: 75 TABLET ORAL at 09:36

## 2019-12-28 RX ADMIN — NEOMYCIN SULFATE, POLYMYXIN B SULFATE AND HYDROCORTISONE 4 DROP: 10; 3.5; 1 SUSPENSION/ DROPS AURICULAR (OTIC) at 11:41

## 2019-12-28 RX ADMIN — PANTOPRAZOLE SODIUM 40 MG: 40 TABLET, DELAYED RELEASE ORAL at 05:21

## 2019-12-28 RX ADMIN — CEFDINIR 300 MG: 300 CAPSULE ORAL at 12:02

## 2019-12-28 RX ADMIN — ENOXAPARIN SODIUM 60 MG: 60 INJECTION SUBCUTANEOUS at 09:36

## 2019-12-28 RX ADMIN — CEFEPIME HYDROCHLORIDE 2000 MG: 2 INJECTION, POWDER, FOR SOLUTION INTRAVENOUS at 03:05

## 2019-12-28 RX ADMIN — MONTELUKAST SODIUM 10 MG: 10 TABLET, COATED ORAL at 09:36

## 2019-12-28 RX ADMIN — NEOMYCIN SULFATE, POLYMYXIN B SULFATE AND HYDROCORTISONE 4 DROP: 10; 3.5; 1 SUSPENSION/ DROPS AURICULAR (OTIC) at 05:22

## 2019-12-28 RX ADMIN — ASPIRIN 81 MG: 81 TABLET, COATED ORAL at 09:36

## 2019-12-28 NOTE — PLAN OF CARE
Problem: Potential for Falls  Goal: Patient will remain free of falls  Description  INTERVENTIONS:  - Assess patient frequently for physical needs  -  Identify cognitive and physical deficits and behaviors that affect risk of falls  -  [place] fall precautions as indicated by assessment   - Educate patient/family on patient safety including physical limitations  - Instruct patient to call for assistance with activity based on assessment  - Modify environment to reduce risk of injury  - Consider OT/PT consult to assist with strengthening/mobility  Outcome: Progressing     Problem: Nutrition/Hydration-ADULT  Goal: Nutrient/Hydration intake appropriate for improving, restoring or maintaining nutritional needs  Description  Monitor and assess patient's nutrition/hydration status for malnutrition  Collaborate with interdisciplinary team and initiate plan and interventions as ordered  Monitor patient's weight and dietary intake as ordered or per policy  Utilize nutrition screening tool and intervene as necessary  Determine patient's food preferences and provide high-protein, high-caloric foods as appropriate       INTERVENTIONS:  - Monitor oral intake, urinary output, labs, and treatment plans  - Assess nutrition and hydration status and recommend course of action  - Evaluate amount of meals eaten  - Assist patient with eating if necessary   - Allow adequate time for meals  - Recommend/ encourage appropriate diets, oral nutritional supplements, and vitamin/mineral supplements  - Order, calculate, and assess calorie counts as needed  - Recommend, monitor, and adjust tube feedings and TPN/PPN based on assessed needs  - Assess need for intravenous fluids  - Provide specific nutrition/hydration education as appropriate  - Include patient/family/caregiver in decisions related to nutrition  Outcome: Progressing     Problem: PAIN - ADULT  Goal: Verbalizes/displays adequate comfort level or baseline comfort level  Description  Interventions:  - Encourage patient to monitor pain and request assistance  - Assess pain using appropriate pain scale  - Administer analgesics based on type and severity of pain and evaluate response  - Implement non-pharmacological measures as appropriate and evaluate response  - Consider cultural and social influences on pain and pain management  - Notify physician/advanced practitioner if interventions unsuccessful or patient reports new pain  Outcome: Progressing     Problem: INFECTION - ADULT  Goal: Absence or prevention of progression during hospitalization  Description  INTERVENTIONS:  - Assess and monitor for signs and symptoms of infection  - Monitor lab/diagnostic results  - Monitor all insertion sites, i e  indwelling lines, tubes, and drains  - Monitor endotracheal if appropriate and nasal secretions for changes in amount and color  - [place] appropriate cooling/warming therapies per order  - Administer medications as ordered  - Instruct and encourage patient and family to use good hand hygiene technique  - Identify and instruct in appropriate isolation precautions for identified infection/condition  Outcome: Progressing     Problem: SAFETY ADULT  Goal: Maintain or return to baseline ADL function  Description  INTERVENTIONS:  -  Assess patient's ability to carry out ADLs; assess patient's baseline for ADL function and identify physical deficits which impact ability to perform ADLs (bathing, care of mouth/teeth, toileting, grooming, dressing, etc )  - Assess/evaluate cause of self-care deficits   - Assess range of motion  - Assess patient's mobility; develop plan if impaired  - Assess patient's need for assistive devices and provide as appropriate  - Encourage maximum independence but intervene and supervise when necessary  - Involve family in performance of ADLs  - Assess for home care needs following discharge   - Consider OT consult to assist with ADL evaluation and planning for discharge  - Provide patient education as appropriate  Outcome: Progressing  Goal: Maintain or return mobility status to optimal level  Description  INTERVENTIONS:  - Assess patient's baseline mobility status (ambulation, transfers, stairs, etc )    - Identify cognitive and physical deficits and behaviors that affect mobility  - Identify mobility aids required to assist with transfers and/or ambulation (gait belt, sit-to-stand, lift, walker, cane, etc )  - [place] fall precautions as indicated by assessment  - Record patient progress and toleration of activity level on Mobility SBAR; progress patient to next Phase/Stage  - Instruct patient to call for assistance with activity based on assessment  - Consider rehabilitation consult to assist with strengthening/weightbearing, etc   Outcome: Progressing     Problem: DISCHARGE PLANNING  Goal: Discharge to home or other facility with appropriate resources  Description  INTERVENTIONS:  - Identify barriers to discharge w/patient and caregiver  - Arrange for needed discharge resources and transportation as appropriate  - Identify discharge learning needs (meds, wound care, etc )  - Arrange for interpretive services to assist at discharge as needed  - Refer to Case Management Department for coordinating discharge planning if the patient needs post-hospital services based on physician/advanced practitioner order or complex needs related to functional status, cognitive ability, or social support system  Outcome: Progressing     Problem: Knowledge Deficit  Goal: Patient/family/caregiver demonstrates understanding of disease process, treatment plan, medications, and discharge instructions  Description  Complete learning assessment and assess knowledge base    Interventions:  - Provide teaching at level of understanding  - Provide teaching via preferred learning methods  Outcome: Progressing     Problem: METABOLIC, FLUID AND ELECTROLYTES - ADULT  Goal: Glucose maintained within target range  Description  INTERVENTIONS:  - Monitor Blood Glucose as ordered  - Assess for signs and symptoms of hyperglycemia and hypoglycemia  - Administer ordered medications to maintain glucose within target range  - Assess nutritional intake and initiate nutrition service referral as needed  Outcome: Progressing     Problem: HEMATOLOGIC - ADULT  Goal: Maintains hematologic stability  Description  INTERVENTIONS  - Assess for signs and symptoms of bleeding or hemorrhage  - Monitor labs  - Administer supportive blood products/factors as ordered and appropriate  Outcome: Progressing     Problem: MUSCULOSKELETAL - ADULT  Goal: Maintain or return mobility to safest level of function  Description  INTERVENTIONS:  - Assess patient's ability to carry out ADLs; assess patient's baseline for ADL function and identify physical deficits which impact ability to perform ADLs (bathing, care of mouth/teeth, toileting, grooming, dressing, etc )  - Assess/evaluate cause of self-care deficits   - Assess range of motion  - Assess patient's mobility  - Assess patient's need for assistive devices and provide as appropriate  - Encourage maximum independence but intervene and supervise when necessary  - Involve family in performance of ADLs  - Assess for home care needs following discharge   - Consider OT consult to assist with ADL evaluation and planning for discharge  - Provide patient education as appropriate  Outcome: Progressing

## 2019-12-28 NOTE — NURSING NOTE
Iv discontinued  Rx sent to [facility] pharmacy for patient   Patient educated on new medications  Reinforced importance of following up with family medicine  Patient with no questions or concerns at time of discharge

## 2019-12-28 NOTE — DISCHARGE SUMMARY
Discharge Summary - Medical Ce Araya 71 y o  female MRN: [id number]    [facility] Room / Bed: [address]/[facility] 2 M* Encounter: [id number]    BRIEF OVERVIEW    Admitting Provider: Nam Riley [id number], DO  Discharge Provider: Maite Orellana DO  Admission Date: 12/26/2019     Discharge Date: No discharge date for patient encounter    Primary Care Physician at Discharge: Eddie Keller [phone]    Primary Discharge Diagnosis  Left otitis externa  Left facial cellulitis    Other Problems Addressed  Patient Active Problem List    Diagnosis Date Noted    Acute otitis externa of left ear 12/26/2019    Chronic diastolic congestive heart failure ([facility] [address] ) 12/26/2019    Ear itching 11/25/2019    Coronary artery disease involving native coronary artery of native heart 10/22/2019    Chronic low back pain without sciatica 10/22/2019    Lip lesion 08/22/2019    Pain of right hip joint 08/22/2019    Myalgia 02/22/2019    Status post insertion of drug-eluting stent into left anterior descending (LAD) artery for coronary artery disease 02/22/2019    Acute diastolic CHF (congestive heart failure) ([facility] [address] ) 02/18/2019    Diabetes mellitus type 2 in obese ([facility] [address] ) 02/18/2019    Simple chronic bronchitis ([facility] [address] ) 01/29/2019    Lip abrasion 01/29/2019    Epigastric pain 11/28/2018    Class 3 severe obesity due to excess calories in adult [facility]) 09/14/2018    Abnormal x-ray 08/28/2018    H/O: hysterectomy 04/27/2018    Hip osteoarthritis 04/27/2018    History of total knee replacement, bilateral 04/27/2018    History of deep venous thrombosis 04/27/2018    Morbid obesity with BMI of 60 0-69 9, adult ([facility] [address] ) 12/24/2017    GERD (gastroesophageal reflux disease) 12/24/2017    Borderline diabetes 12/24/2017    Essential hypertension 12/24/2017    Hyperlipidemia 12/24/2017    Allergic rhinitis 12/15/2016    Restrictive lung disease 12/15/2016    Obstructive sleep apnea of adult 06/25/2014    Colon polyp 05/29/2012       Consulting Providers   ENT    Discharge Disposition: home    Allergies  Allergies   Allergen Reactions    Januvia [Sitagliptin] Swelling    Clindamycin     Hydrocodone     Morphine Hives    Omeprazole     Penicillins     Percocet [Oxycodone-Acetaminophen]     Tolterodine     Ciprofloxacin Rash     Diet restrictions:  Low-salt diabetic  Activity restrictions:  Electric scooter  Discharge Condition:  Gary Kenning  Dr Ever Jack in 1 week  Follow up with consulting providers  Dr Dalia Asif ENT in 1 week     [address] [place] [address]    Presenting Problem/History of Present Illness  Acute otitis externa of left ear  Hospital Course  39-year-old female presents with pain and redness of left ear    Left otitis externa/left facial cellulitis  patient was seen by ear nose and throat recommendation is Cortisporin otic drops with antibiotics for 1 week and re-evaluate in office  Patient initially received cefepime with improvement prior to discharge and transitioned to POLLACK RAMA  CT of the ear skin thickening within the left external auditory canal with soft tissue swelling no erosive changes in the mastoids no opacification of mastoid air cells no fluid collection    Hypertension     pressure is low during admission, Norvasc was discontinued continue pressure control with Demadex  Blood pressure prior to discharge 103/71  Discharge  Statement   I spent 35 minutes discharging the patient  This time was spent on the day of discharge  I had direct contact with the patient on the day of discharge  Additional documentation is required if more than 30 minutes were spent on discharge  Explained discharge and follow-up    Discharge instructions/Information to patient and family:   See after visit summary for information provided to patient and family

## 2019-12-28 NOTE — PROGRESS NOTES
Progress Note - eC Araya 71 y o  female MRN: [id number]    Unit/Bed#: [facility] 68 2 -01 Encounter: [id number]    Assessment/Plan:    Otitis externa   reviewed Ear Nose and Throat consult, continue Cortisporin and antibiotics follow-up in the office    Chronic diastolic HF  appears compensated continue current medication    Morbid obesity   would greatly benefit from weight loss    Diabetes   good control a m  Glucose 93 continue ADA diet    Hypertension   control with Demadex     Dyslipidemia   continue statin for LDL control     GERD    continue PPI for acid control    Subjective:   Left ear less pain, feels much better denies chest pain shortness of breath nausea vomiting diarrhea no fevers chills appetite good today    Objective:     Vitals: Blood pressure 103/71, pulse 86, temperature 98 °F (36 7 °C), resp  rate 16, weight 132 kg (291 lb 3 6 oz), SpO2 98 %, not currently breastfeeding  ,Body mass index is 63 02 kg/m²        Results from last 7 days   Lab Units 12/27/19  0548   WBC Thousand/uL 8 12   HEMOGLOBIN g/dL 12 5   HEMATOCRIT % 38 6   PLATELETS Thousands/uL 265     Results from last 7 days   Lab Units 12/27/19  0548   POTASSIUM mmol/L 4 3   CHLORIDE mmol/L 107   CO2 mmol/L 32   BUN mg/dL 19   CREATININE mg/dL 1 07   CALCIUM mg/dL 9 1       Scheduled Meds:    Current Facility-Administered Medications:  acetaminophen 650 mg Oral Q6H PRN Waynetta Body, PA-C   albuterol 2 puff Inhalation Q4H PRN Waynetta Body, PA-C   aspirin 81 mg Oral Daily Waynetta Body, PA-C   atorvastatin 40 mg Oral Daily With Ag and HERMINIA Goyal   cefdinir 300 mg Oral Q12H [facility], DO   clopidogrel 75 mg Oral Daily Waynetta Body, PA-C   enoxaparin 60 mg Subcutaneous BID Waynetta Body, PA-C   fluticasone-vilanterol 1 puff Inhalation Daily Waynetta Body, PA-C   gabapentin 300 mg Oral HS Waynetta Body, PA-C   insulin lispro 2-12 Units Subcutaneous TID AC Tenzin Salazar PA-C   insulin lispro 2-12 Units Subcutaneous HS Chin Belt, PA-C   montelukast 10 mg Oral Daily Chin Belt, PA-C   neomycin-polymyxin-hydrocortisone 4 drop Left Ear Q6H [facility] Chin Belt, PA-C   ondansetron 4 mg Intravenous Q6H PRN Chin Belt, PA-C   pantoprazole 40 mg Oral Early Morning Chin Belt, PA-C   torsemide 10 mg Oral Daily Chin Belt, PA-C       Continuous Infusions:         Physical exam:  General appearance:  Alert no distress interaction appropriate  Head/Eyes:  Nonicteric PERRL EOMI  Neck:  Supple  Lungs:  Decreased BS bilateral no wheezing rhonchi or rales  Heart: normal S1 S2 regular  Abdomen:  Obese nontender with bowel sounds  Extremities:  Chronic edema  Skin: no rash    Invasive Devices     Peripheral Intravenous Line            Peripheral IV 12/26/19 Left Arm 2 days                Counseling / Coordination of Care  Total floor / unit time spent today 30  minutes  Greater than 50% of total time was spent with the patient and / or family counseling and / or coordination of care    A description of the counseling / coordination of care:

## 2019-12-28 NOTE — PLAN OF CARE
Problem: Potential for Falls  Goal: Patient will remain free of falls  Description  INTERVENTIONS:  - Assess patient frequently for physical needs  -  Identify cognitive and physical deficits and behaviors that affect risk of falls  -  [place] fall precautions as indicated by assessment   - Educate patient/family on patient safety including physical limitations  - Instruct patient to call for assistance with activity based on assessment  - Modify environment to reduce risk of injury  - Consider OT/PT consult to assist with strengthening/mobility  Outcome: Adequate for Discharge     Problem: Nutrition/Hydration-ADULT  Goal: Nutrient/Hydration intake appropriate for improving, restoring or maintaining nutritional needs  Description  Monitor and assess patient's nutrition/hydration status for malnutrition  Collaborate with interdisciplinary team and initiate plan and interventions as ordered  Monitor patient's weight and dietary intake as ordered or per policy  Utilize nutrition screening tool and intervene as necessary  Determine patient's food preferences and provide high-protein, high-caloric foods as appropriate       INTERVENTIONS:  - Monitor oral intake, urinary output, labs, and treatment plans  - Assess nutrition and hydration status and recommend course of action  - Evaluate amount of meals eaten  - Assist patient with eating if necessary   - Allow adequate time for meals  - Recommend/ encourage appropriate diets, oral nutritional supplements, and vitamin/mineral supplements  - Order, calculate, and assess calorie counts as needed  - Recommend, monitor, and adjust tube feedings and TPN/PPN based on assessed needs  - Assess need for intravenous fluids  - Provide specific nutrition/hydration education as appropriate  - Include patient/family/caregiver in decisions related to nutrition  Outcome: Adequate for Discharge     Problem: PAIN - ADULT  Goal: Verbalizes/displays adequate comfort level or baseline comfort level  Description  Interventions:  - Encourage patient to monitor pain and request assistance  - Assess pain using appropriate pain scale  - Administer analgesics based on type and severity of pain and evaluate response  - Implement non-pharmacological measures as appropriate and evaluate response  - Consider cultural and social influences on pain and pain management  - Notify physician/advanced practitioner if interventions unsuccessful or patient reports new pain  Outcome: Adequate for Discharge     Problem: INFECTION - ADULT  Goal: Absence or prevention of progression during hospitalization  Description  INTERVENTIONS:  - Assess and monitor for signs and symptoms of infection  - Monitor lab/diagnostic results  - Monitor all insertion sites, i e  indwelling lines, tubes, and drains  - Monitor endotracheal if appropriate and nasal secretions for changes in amount and color  - [place] appropriate cooling/warming therapies per order  - Administer medications as ordered  - Instruct and encourage patient and family to use good hand hygiene technique  - Identify and instruct in appropriate isolation precautions for identified infection/condition  Outcome: Adequate for Discharge     Problem: SAFETY ADULT  Goal: Maintain or return to baseline ADL function  Description  INTERVENTIONS:  -  Assess patient's ability to carry out ADLs; assess patient's baseline for ADL function and identify physical deficits which impact ability to perform ADLs (bathing, care of mouth/teeth, toileting, grooming, dressing, etc )  - Assess/evaluate cause of self-care deficits   - Assess range of motion  - Assess patient's mobility; develop plan if impaired  - Assess patient's need for assistive devices and provide as appropriate  - Encourage maximum independence but intervene and supervise when necessary  - Involve family in performance of ADLs  - Assess for home care needs following discharge   - Consider OT consult to assist with ADL evaluation and planning for discharge  - Provide patient education as appropriate  Outcome: Adequate for Discharge  Goal: Maintain or return mobility status to optimal level  Description  INTERVENTIONS:  - Assess patient's baseline mobility status (ambulation, transfers, stairs, etc )    - Identify cognitive and physical deficits and behaviors that affect mobility  - Identify mobility aids required to assist with transfers and/or ambulation (gait belt, sit-to-stand, lift, walker, cane, etc )  - [place] fall precautions as indicated by assessment  - Record patient progress and toleration of activity level on Mobility SBAR; progress patient to next Phase/Stage  - Instruct patient to call for assistance with activity based on assessment  - Consider rehabilitation consult to assist with strengthening/weightbearing, etc   Outcome: Adequate for Discharge     Problem: DISCHARGE PLANNING  Goal: Discharge to home or other facility with appropriate resources  Description  INTERVENTIONS:  - Identify barriers to discharge w/patient and caregiver  - Arrange for needed discharge resources and transportation as appropriate  - Identify discharge learning needs (meds, wound care, etc )  - Arrange for interpretive services to assist at discharge as needed  - Refer to Case Management Department for coordinating discharge planning if the patient needs post-hospital services based on physician/advanced practitioner order or complex needs related to functional status, cognitive ability, or social support system  Outcome: Adequate for Discharge     Problem: Knowledge Deficit  Goal: Patient/family/caregiver demonstrates understanding of disease process, treatment plan, medications, and discharge instructions  Description  Complete learning assessment and assess knowledge base    Interventions:  - Provide teaching at level of understanding  - Provide teaching via preferred learning methods  Outcome: Adequate for Discharge     Problem: METABOLIC, FLUID AND ELECTROLYTES - ADULT  Goal: Glucose maintained within target range  Description  INTERVENTIONS:  - Monitor Blood Glucose as ordered  - Assess for signs and symptoms of hyperglycemia and hypoglycemia  - Administer ordered medications to maintain glucose within target range  - Assess nutritional intake and initiate nutrition service referral as needed  Outcome: Adequate for Discharge     Problem: HEMATOLOGIC - ADULT  Goal: Maintains hematologic stability  Description  INTERVENTIONS  - Assess for signs and symptoms of bleeding or hemorrhage  - Monitor labs  - Administer supportive blood products/factors as ordered and appropriate  Outcome: Adequate for Discharge     Problem: MUSCULOSKELETAL - ADULT  Goal: Maintain or return mobility to safest level of function  Description  INTERVENTIONS:  - Assess patient's ability to carry out ADLs; assess patient's baseline for ADL function and identify physical deficits which impact ability to perform ADLs (bathing, care of mouth/teeth, toileting, grooming, dressing, etc )  - Assess/evaluate cause of self-care deficits   - Assess range of motion  - Assess patient's mobility  - Assess patient's need for assistive devices and provide as appropriate  - Encourage maximum independence but intervene and supervise when necessary  - Involve family in performance of ADLs  - Assess for home care needs following discharge   - Consider OT consult to assist with ADL evaluation and planning for discharge  - Provide patient education as appropriate  Outcome: Adequate for Discharge

## 2019-12-30 ENCOUNTER — TRANSITIONAL CARE MANAGEMENT (OUTPATIENT)
Dept: FAMILY MEDICINE CLINIC | Facility: CLINIC | Age: 64
End: 2019-12-30

## 2020-01-16 ENCOUNTER — OFFICE VISIT (OUTPATIENT)
Dept: FAMILY MEDICINE CLINIC | Facility: CLINIC | Age: 65
End: 2020-01-16

## 2020-01-16 VITALS
OXYGEN SATURATION: 98 % | DIASTOLIC BLOOD PRESSURE: 106 MMHG | BODY MASS INDEX: 62 KG/M2 | HEART RATE: 80 BPM | SYSTOLIC BLOOD PRESSURE: 166 MMHG | TEMPERATURE: 97.7 F | RESPIRATION RATE: 20 BRPM | HEIGHT: 57 IN | WEIGHT: 287.4 LBS

## 2020-01-16 DIAGNOSIS — Z23 NEED FOR VACCINATION: ICD-10-CM

## 2020-01-16 DIAGNOSIS — H60.312 ACUTE DIFFUSE OTITIS EXTERNA OF LEFT EAR: Primary | ICD-10-CM

## 2020-01-16 DIAGNOSIS — Z78.9 MEDICALLY COMPLEX PATIENT: ICD-10-CM

## 2020-01-16 DIAGNOSIS — J41.0 SIMPLE CHRONIC BRONCHITIS (HCC): ICD-10-CM

## 2020-01-16 DIAGNOSIS — I10 ESSENTIAL HYPERTENSION: Chronic | ICD-10-CM

## 2020-01-16 PROBLEM — Z09 HOSPITAL DISCHARGE FOLLOW-UP: Status: ACTIVE | Noted: 2020-01-16

## 2020-01-16 PROCEDURE — 90670 PCV13 VACCINE IM: CPT | Performed by: FAMILY MEDICINE

## 2020-01-16 PROCEDURE — G0009 ADMIN PNEUMOCOCCAL VACCINE: HCPCS | Performed by: FAMILY MEDICINE

## 2020-01-16 PROCEDURE — 99213 OFFICE O/P EST LOW 20 MIN: CPT | Performed by: FAMILY MEDICINE

## 2020-01-16 RX ORDER — FLUOCINONIDE 0.5 MG/G
OINTMENT TOPICAL
COMMUNITY
Start: 2019-11-04 | End: 2021-04-03

## 2020-01-16 RX ORDER — AMLODIPINE BESYLATE 5 MG/1
5 TABLET ORAL DAILY
Qty: 30 TABLET | Refills: 3 | Status: SHIPPED | OUTPATIENT
Start: 2020-01-16 | End: 2020-03-18 | Stop reason: SDUPTHER

## 2020-01-16 RX ORDER — CLOTRIMAZOLE AND BETAMETHASONE DIPROPIONATE 10; .64 MG/G; MG/G
CREAM TOPICAL
COMMUNITY
Start: 2019-11-07 | End: 2021-03-24

## 2020-01-16 NOTE — ASSESSMENT & PLAN NOTE
Patient has been hospitalized due to externa otitis media from 12/26 to 12/28  She initially presented to our office with complaint of a itchiness and ear pain and was started on Cortisporin, but she admits to not being compliant ;and then she presented few weeks later to emergency department where she was diagnosed with cellulitis and otitis media externa  Patient was discharged home on Cefdinir and Cortisporin  She admits again to not been compliant she stated that she just finished Cefdinir 2 days ago   She is not taking Cortisporin regularly as well  Patient was seen at ENT office, reinforced compliance  She reports some improvement in her symptoms at this point  Instructed patient to continue Cortisporin every 6 hours  Instructed not to use qtips at all ( she continued to use Q-tips to clean her ears)  Follow up in 3 weeks

## 2020-01-16 NOTE — ASSESSMENT & PLAN NOTE
Patient was supposed to be on Breo and Singulair, but she has been taking only Singulair for the last couple months  She has been having intermittent shortness of breath, that is worse with exertion  Instructed to resume her Breo, continue Singulair and take Albuterol PRN  Patient agreed with plan

## 2020-01-16 NOTE — ASSESSMENT & PLAN NOTE
Patient has been on amlodipine and torsemide, but Amlodipine was discontinued while she was in the hospital  Her blood pressure is high today,initially it was , but improved to 166 on recheck; she denies headache or chest pain  Instructed to resume Amlodipine and continue Torsemide  Follow up in 3 weeks

## 2020-01-16 NOTE — ASSESSMENT & PLAN NOTE
Patient has been hospitalized due to externa otitis media from 12/26 to 12/28  She initially presented to our office with complaint of a itchiness and ear pain and was started on Cortisporin, but she admits to not being compliant ;and then she presented few weeks later to emergency department where she was diagnosed with cellulitis and otitis media externa  Patient was discharged home on Cefdinir and Cortisporin  She admits again to not been compliant she stated that she just finished Cefdinir 2 days ago   She is not taking Cortisporin regularly as well  Patient was seen at ENT office, reinforced compliance  She reports some improvement in her symptoms at this point  Instructed patient to continue Cortisporin every 6 hours  Instructed not to use qtips at all ( she continued to use Q-tips to clean her ears)  Referral to  for medically complex patient placed  Follow up in 3 weeks

## 2020-01-16 NOTE — PROGRESS NOTES
Assessment/Plan:    Simple chronic bronchitis ([address] )  Patient was supposed to be on Breo and Singulair, but she has been taking only Singulair for the last couple months  She has been having intermittent shortness of breath, that is worse with exertion  Instructed to resume her Breo, continue Singulair and take Albuterol PRN  Patient agreed with plan    Essential hypertension  Patient has been on amlodipine and torsemide, but Amlodipine was discontinued while she was in the hospital  Her blood pressure is high today,initially it was , but improved to 166 on recheck; she denies headache or chest pain  Instructed to resume Amlodipine and continue Torsemide  Follow up in 3 weeks    Acute otitis externa of left ear  Patient has been hospitalized due to externa otitis media from 12/26 to 12/28  She initially presented to our office with complaint of a itchiness and ear pain and was started on Cortisporin, but she admits to not being compliant ;and then she presented few weeks later to emergency department where she was diagnosed with cellulitis and otitis media externa  Patient was discharged home on Cefdinir and Cortisporin  She admits again to not been compliant she stated that she just finished Cefdinir 2 days ago  She is not taking Cortisporin regularly as well  Patient was seen at ENT office, reinforced compliance  She reports some improvement in her symptoms at this point  Instructed patient to continue Cortisporin every 6 hours  Instructed not to use qtips at all ( she continued to use Q-tips to clean her ears)  Referral to  for medically complex patient placed  Follow up in 3 weeks        Diagnoses and all orders for this visit:    Acute diffuse otitis externa of left ear    Need for vaccination  -     PNEUMOCOCCAL CONJUGATE VACCINE 13-VALENT GREATER THAN 6 MONTHS    Essential hypertension  -     amLODIPine (NORVASC) 5 mg tablet;  Take 1 tablet (5 mg total) by mouth daily    Medically complex patient  -     Ambulatory referral to social work care management program; Future    Simple chronic bronchitis ([facility] [address] )    Other orders  -     fluocinonide (LIDEX) 0 05 % ointment  -     clotrimazole-betamethasone (LOTRISONE) 1-0 05 % cream          Subjective:      Patient ID: Philipp Mann is a 71 y o  female  Patient presented as hospital follow-up that she had to reschedule for today  She was admitted in the hospital from December 26 to December 28 due to diffuse external otitis media and left side facial cellulitis  She initially was started on cefepime that was consistent to JAKI ONTIVEROS with instruction to complete 7 day of antibiotic  Patient admits to not being compliant with medication regimen, states she just finished antibiotics 2 days ago  She noticed mild improvement in her symptoms, but still complains of itchiness, pain in the ear and clear drainage of her left ear  She denies any other complains at present  The following portions of the patient's history were reviewed and updated as appropriate: allergies, current medications, past family history, past medical history, past social history, past surgical history and problem list     Review of Systems   Constitutional: Negative for chills, diaphoresis, fatigue and fever  HENT: Positive for ear discharge and ear pain (itchiness)  Negative for congestion, mouth sores, rhinorrhea, sore throat and trouble swallowing  Eyes: Negative for photophobia, pain and discharge  Respiratory: Negative for cough, chest tightness, shortness of breath and wheezing  Cardiovascular: Negative for chest pain, palpitations and leg swelling  Gastrointestinal: Negative for abdominal distention, abdominal pain, blood in stool, constipation, diarrhea and nausea  Genitourinary: Negative for difficulty urinating and frequency  Musculoskeletal: Negative for arthralgias, joint swelling and neck stiffness  Skin: Negative for color change, pallor and rash  Neurological: Negative for dizziness, syncope, numbness and headaches  Objective:      BP (!) 166/106 (BP Location: Left arm, Patient Position: Sitting, Cuff Size: Large)   Pulse 80   Temp 97 7 °F (36 5 °C) (Tympanic)   Resp 20   Ht 4' 9" (1 448 m)   Wt 130 kg (287 lb 6 4 oz)   SpO2 98%   Breastfeeding? No   BMI 62 19 kg/m²          Physical Exam   Constitutional: She is oriented to person, place, and time  She appears well-developed and well-nourished  No distress  HENT:   Head: Normocephalic and atraumatic  Left Ear: Tympanic membrane normal  No lacerations  There is tenderness  No drainage or swelling  No foreign bodies  Skin erythema, scarring noted in the left ear, membrane is intact  Mild swelling of tragus and lobula noted, no redness   Neck: Normal range of motion  Cardiovascular: Normal rate, regular rhythm and normal heart sounds  Exam reveals no gallop and no friction rub  No murmur heard  Pulmonary/Chest: Effort normal and breath sounds normal  No respiratory distress  She has no wheezes  She has no rales  She exhibits no tenderness  Abdominal: Soft  Bowel sounds are normal  She exhibits no distension  There is no tenderness  There is no rebound  Musculoskeletal: Normal range of motion  She exhibits no edema, tenderness or deformity  Lymphadenopathy:     She has no cervical adenopathy  Neurological: She is alert and oriented to person, place, and time  Skin: Skin is warm and dry  No rash noted  No erythema  No pallor  Psychiatric: She has a normal mood and affect

## 2020-01-24 DIAGNOSIS — Z71.89 COMPLEX CARE COORDINATION: Primary | ICD-10-CM

## 2020-01-25 ENCOUNTER — HOSPITAL ENCOUNTER (EMERGENCY)
Facility: HOSPITAL | Age: 65
Discharge: HOME | End: 2020-01-25
Attending: EMERGENCY MEDICINE
Payer: MEDICARE

## 2020-01-25 VITALS
WEIGHT: 288.8 LBS | TEMPERATURE: 97.1 F | DIASTOLIC BLOOD PRESSURE: 74 MMHG | OXYGEN SATURATION: 96 % | RESPIRATION RATE: 16 BRPM | BODY MASS INDEX: 62.5 KG/M2 | HEART RATE: 112 BPM | SYSTOLIC BLOOD PRESSURE: 142 MMHG

## 2020-01-25 DIAGNOSIS — H92.12 DISCHARGE OF LEFT EAR PRESENT: ICD-10-CM

## 2020-01-25 DIAGNOSIS — H92.02 LEFT EAR PAIN: Primary | ICD-10-CM

## 2020-01-25 PROCEDURE — 99283 EMERGENCY DEPT VISIT LOW MDM: CPT | Performed by: EMERGENCY MEDICINE

## 2020-01-25 PROCEDURE — 99282 EMERGENCY DEPT VISIT SF MDM: CPT

## 2020-01-25 NOTE — ED PROVIDER NOTES
History  Chief Complaint   Patient presents with   Kevin Wan     reports left ear infection that wont clear up  Finished course of antibiotics from treatment 1 month ago  Reports pain and ear drainage, reports dizziness as well as SOB  30-year-old female presents emergency department with recurrent left ear pain and discharge which she states will not clear up period she has recently hospitalized for a cellulitis vs   Malignant otitis externa of the left ear then was followed up outpatient with ENT and then had a family practice office  She has been using antibiotic ear drops intermittently during this time frame  She states that as of today she still has some discomfort and mild discharge  She is well-appearing at this time no acute distress  Denies any other complaints at this time  Allergies reviewed          Prior to Admission Medications   Prescriptions Last Dose Informant Patient Reported? Taking?    ACCU-CHEK FASTCLIX LANCETS MISC Not Taking at Unknown time Self Yes No   ACCU-CHEK GUIDE test strip Not Taking at Unknown time Self Yes No   Blood Glucose Monitoring Suppl (ACCU-CHEK GUIDE) w/Device KIT  Self Yes No   EPINEPHrine (EPIPEN) 0 3 mg/0 3 mL SOAJ   No No   Sig: Inject 0 3 mL (0 3 mg total) into a muscle once for 1 dose Use for upper airway obstruction   Multiple Vitamin (MULTIVITAMIN) tablet  Self No No   Sig: Take 1 tablet by mouth daily   acetaminophen (TYLENOL) 325 mg tablet  Self No No   Sig: Take 2 tablets (650 mg total) by mouth every 6 (six) hours as needed for mild pain   albuterol (5 mg/mL) 0 5 % nebulizer solution  Self No No   Sig: Take 0 5 mL by nebulization every 6 (six) hours as needed for wheezing or shortness of breath   albuterol (VENTOLIN HFA) 90 mcg/act inhaler  Self No No   Sig: Inhale 2 puffs every 4 (four) hours as needed for wheezing   amLODIPine (NORVASC) 5 mg tablet 1/24/2020 at Unknown time  No Yes   Sig: Take 1 tablet (5 mg total) by mouth daily   aspirin (ECOTRIN LOW STRENGTH) 81 mg EC tablet 1/24/2020 at Unknown time Self No Yes   Sig: Take 1 tablet (81 mg total) by mouth daily   atorvastatin (LIPITOR) 40 mg tablet 1/24/2020 at Unknown time Self No Yes   Sig: Take 1 tablet (40 mg total) by mouth daily with dinner   clopidogrel (PLAVIX) 75 mg tablet 1/24/2020 at Unknown time Self No Yes   Sig: Take 1 tablet (75 mg total) by mouth daily   clotrimazole-betamethasone (LOTRISONE) 1-0 05 % cream   Yes No   fluocinonide (LIDEX) 0 05 % ointment   Yes No   fluticasone-vilanterol (BREO ELLIPTA) 100-25 mcg/inh inhaler  Self No No   Sig: Inhale 1 puff daily Rinse mouth after use    gabapentin (NEURONTIN) 300 mg capsule 1/24/2020 at Unknown time Self No Yes   Sig: Take 1 capsule (300 mg total) by mouth daily at bedtime   guaiFENesin (ROBITUSSIN) 100 MG/5ML oral liquid 1/24/2020 at Unknown time Self No Yes   Sig: Take 10 mL (200 mg total) by mouth 3 (three) times a day as needed for cough   ketoconazole (NIZORAL) 2 % cream 1/24/2020 at Unknown time Self No Yes   Sig: Apply topically daily   montelukast (SINGULAIR) 10 mg tablet 1/24/2020 at Unknown time Self No Yes   Sig: Take 1 tablet (10 mg total) by mouth daily   neomycin-polymyxin-hydrocortisone (CORTISPORIN) 0 35%-10,000 units/mL-1% otic suspension  Self No No   Sig: Administer 4 drops into the left ear every 6 (six) hours   nitroglycerin (NITROSTAT) 0 4 mg SL tablet Not Taking at Unknown time Self No No   Sig: Place 1 tablet (0 4 mg total) under the tongue every 5 (five) minutes as needed for chest pain   Patient not taking: Reported on 1/25/2020   pantoprazole (PROTONIX) 40 mg tablet 1/24/2020 at Unknown time Self No Yes   Sig: Take 1 tablet (40 mg total) by mouth daily   torsemide (DEMADEX) 20 mg tablet 1/24/2020 at Unknown time Self No Yes   Sig: Take 0 5 tablets (10 mg total) by mouth daily      Facility-Administered Medications: None       Past Medical History:   Diagnosis Date    COPD with acute exacerbation ([address] ) 1/29/2019  Diabetes mellitus ([address] )     Full dentures     GERD (gastroesophageal reflux disease)     Hyperlipidemia     Hypertension     Seasonal allergies     Sleep apnea        Past Surgical History:   Procedure Laterality Date    BILATERAL KNEE ARTHROSCOPY      CATARACT EXTRACTION Right     DENTAL SURGERY      HYSTERECTOMY      TOTAL KNEE ARTHROPLASTY Bilateral     TUBAL LIGATION         Family History   Problem Relation Age of Onset    Coronary artery disease Father      I have reviewed and agree with the history as documented  Social History     Tobacco Use    Smoking status: Never Smoker    Smokeless tobacco: Never Used    Tobacco comment: childhood smoke exposure   Substance Use Topics    Alcohol use: No    Drug use: No        Review of Systems   HENT: Positive for ear discharge and ear pain  All other systems reviewed and are negative  Physical Exam  Physical Exam   Constitutional: She is oriented to person, place, and time  She appears well-developed and well-nourished  No distress  HENT:   Head: Normocephalic and atraumatic  Right Ear: Hearing, tympanic membrane, external ear and ear canal normal    Left Ear: Hearing, tympanic membrane and external ear normal  There is tenderness  Nose: Nose normal    Mouth/Throat: Oropharynx is clear and moist    Tenderness and very small amount of fluid visualized in the left ear canal   Possible discharge  Ear canals pain  There is no erythema or swelling  Minimal amount of debris in the ear canal    Eyes: Pupils are equal, round, and reactive to light  Conjunctivae and EOM are normal    Neck: Normal range of motion  Cardiovascular: Normal rate, regular rhythm, normal heart sounds and intact distal pulses  Exam reveals no gallop and no friction rub  No murmur heard  Pulmonary/Chest: Effort normal and breath sounds normal  No stridor  No respiratory distress  She has no wheezes  She has no rales  Abdominal: Soft   Bowel sounds are normal  She exhibits no distension  There is no tenderness  There is no guarding  Musculoskeletal: Normal range of motion  She exhibits no tenderness  Neurological: She is alert and oriented to person, place, and time  Skin: Skin is warm  Capillary refill takes less than 2 seconds  She is not diaphoretic  Nursing note and vitals reviewed  Vital Signs  ED Triage Vitals [01/25/20 1240]   Temperature Pulse Respirations Blood Pressure SpO2   (!) 97 1 °F (36 2 °C) (!) 112 16 142/74 96 %      Temp Source Heart Rate Source Patient Position - Orthostatic VS BP Location FiO2 (%)   Temporal Monitor Sitting Right arm --      Pain Score       8           Vitals:    01/25/20 1240   BP: 142/74   Pulse: (!) 112   Patient Position - Orthostatic VS: Sitting         Visual Acuity      ED Medications  Medications - No data to display    Diagnostic Studies  Results Reviewed     None                 No orders to display              Procedures  Procedures         ED Course  ED Course as of Jan 25 1546   Sat Jan 25, 2020   1337 I spoke with Dr Rosey Morton regarding this patient  He requests follow-up in his office  He advises to not start the patient on any new medications at this time  MDM  Number of Diagnoses or Management Options  Discharge of left ear present: minor  Left ear pain: minor  Diagnosis management comments: Case was discussed withDr Rosey Morton who previously saw the patient in his office  He states to hold off on any ear drops at this time and that he will follow-up with the patient in his office early this coming week  This information was relayed to the patient  Patient educated regarding their diagnosis and given return and follow-up instructions  Patient is understanding and in agreement with the treatment plan  There are no questions at the time of discharge      Risk of Complications, Morbidity, and/or Mortality  Presenting problems: low  Diagnostic procedures: low  Management options: low    Patient Progress  Patient progress: stable        Disposition  Final diagnoses:   Left ear pain   Discharge of left ear present     Time reflects when diagnosis was documented in both MDM as applicable and the Disposition within this note     Time User Action Codes Description Comment    1/25/2020  1:45 PM Rubye Hamming Add [H92 02] Left ear pain     1/25/2020  1:46 PM Rubye Hamming Add [H92 12] Discharge of left ear present       ED Disposition     ED Disposition Condition Date/Time Comment    Discharge Stable Sat Jan 25, 2020  1:45 PM Ce Araya discharge to home/self care              Follow-up Information     Follow up With Specialties Details Why DO [facility]   [address]  [address] [place] [address] [place]  [phone]            Discharge Medication List as of 1/25/2020  1:46 PM      CONTINUE these medications which have NOT CHANGED    Details   amLODIPine (NORVASC) 5 mg tablet Take 1 tablet (5 mg total) by mouth daily, Starting Thu 1/16/2020, Normal      aspirin (ECOTRIN LOW STRENGTH) 81 mg EC tablet Take 1 tablet (81 mg total) by mouth daily, Starting Thu 3/21/2019, Normal      atorvastatin (LIPITOR) 40 mg tablet Take 1 tablet (40 mg total) by mouth daily with dinner, Starting Tue 9/10/2019, Normal      clopidogrel (PLAVIX) 75 mg tablet Take 1 tablet (75 mg total) by mouth daily, Starting Tue 7/30/2019, Normal      gabapentin (NEURONTIN) 300 mg capsule Take 1 capsule (300 mg total) by mouth daily at bedtime, Starting Fri 11/1/2019, Normal      guaiFENesin (ROBITUSSIN) 100 MG/5ML oral liquid Take 10 mL (200 mg total) by mouth 3 (three) times a day as needed for cough, Starting Mon 11/25/2019, Normal      ketoconazole (NIZORAL) 2 % cream Apply topically daily, Starting Tue 1/29/2019, Normal      montelukast (SINGULAIR) 10 mg tablet Take 1 tablet (10 mg total) by mouth daily, Starting Fri 5/24/2019, Normal      pantoprazole (PROTONIX) 40 mg tablet Take 1 tablet (40 mg total) by mouth daily, Starting Fri 5/3/2019, Normal      torsemide (DEMADEX) 20 mg tablet Take 0 5 tablets (10 mg total) by mouth daily, Starting Tue 9/10/2019, Normal      ACCU-CHEK FASTCLIX LANCETS MISC Starting Thu 6/21/2018, Historical Med      ACCU-CHEK GUIDE test strip Historical Med      acetaminophen (TYLENOL) 325 mg tablet Take 2 tablets (650 mg total) by mouth every 6 (six) hours as needed for mild pain, Starting Fri 5/24/2019, Normal      albuterol (5 mg/mL) 0 5 % nebulizer solution Take 0 5 mL by nebulization every 6 (six) hours as needed for wheezing or shortness of breath, Starting Wed 12/27/2017, Print      albuterol (VENTOLIN HFA) 90 mcg/act inhaler Inhale 2 puffs every 4 (four) hours as needed for wheezing, Starting Fri 5/24/2019, Normal      Blood Glucose Monitoring Suppl (ACCU-CHEK GUIDE) w/Device KIT Starting Thu 6/21/2018, Historical Med      clotrimazole-betamethasone (LOTRISONE) 1-0 05 % cream Starting Thu 11/7/2019, Historical Med      EPINEPHrine (EPIPEN) 0 3 mg/0 3 mL SOAJ Inject 0 3 mL (0 3 mg total) into a muscle once for 1 dose Use for upper airway obstruction, Starting Thu 1/2/2020, Normal      fluocinonide (LIDEX) 0 05 % ointment Starting Mon 11/4/2019, Historical Med      fluticasone-vilanterol (BREO ELLIPTA) 100-25 mcg/inh inhaler Inhale 1 puff daily Rinse mouth after use , Starting Fri 5/24/2019, Normal      Multiple Vitamin (MULTIVITAMIN) tablet Take 1 tablet by mouth daily, Starting Fri 5/24/2019, Normal      neomycin-polymyxin-hydrocortisone (CORTISPORIN) 0 35%-10,000 units/mL-1% otic suspension Administer 4 drops into the left ear every 6 (six) hours, Starting Sat 12/28/2019, Print      nitroglycerin (NITROSTAT) 0 4 mg SL tablet Place 1 tablet (0 4 mg total) under the tongue every 5 (five) minutes as needed for chest pain, Starting Wed 2/20/2019, Print           No discharge procedures on file      ED Provider  Electronically Signed by           Danette Lares PA-C  01/25/20 1549

## 2020-02-03 ENCOUNTER — PATIENT OUTREACH (OUTPATIENT)
Dept: FAMILY MEDICINE CLINIC | Facility: CLINIC | Age: 65
End: 2020-02-03

## 2020-02-03 NOTE — PROGRESS NOTES
Called patient for the first time as RN CM  I explained my role to the patient and at this time she does not want my services  She states she takes her medications and has a calendar to write down all her appointments  However, she did tell me she is in need of help to fix her walker  She notes the gliders are in need of repair  She states she already has a scooter and does not want a new walker as she does not like throwing things away  I offered to contact [employer] [facility] to see if they offer repairs  They stated they do replace gliders at a cost of about $15   I informed the patient of this, she took their phone number and was happy for the information  Although she does not want my services, she did note all she wanted was a   I informed patient a referral was placed for Social Work and they will be contacting her directly  She stated all she needed was her walker repaired  The patient wrote down my contact information in case she needs my help in the future  Chart reviewed

## 2020-02-06 NOTE — UTILIZATION REVIEW
URGENT/EMERGENT  INPATIENT/SPU AUTHORIZATION REQUEST    Date: 02/06/20            # Pages in this Request:     x New Request   Additional Information for PA#:     Office Contact Name:  Norris Dalton Title: Utilization Review, Registed Nurse     Phone: [phone]  Ext  Availability (Date/Time): Wednesday - Friday 8 am- 4 pm    x Inpatient Review  SPU Review       X Current        Late Pick-up   · How your facility was first notified of the Late Pick-up:   · When your facility was first notified of the Late Pick-up (date): MEDICARE PART B ONLY(VERIFIED IN NOVITASPHERE -ONLY MEDICARE PART B)       RECIPIENT INFORMATION    Recipient YL#:[id number]  Recipient Name: Jesús Rust    YOB: 1955  71 y o  Recipient Alias:   Gender:   Male X Female Medicaid Eligibility ([address] [place] [address]): INSURANCE INFORMATION    (All other private or governmental health insurance benefits must be utilized prior to billing the [place] Program)    Was this admission the result of an MVA, other accident, assault, injury, fall, gunshot, bite etc ? Yes x No                   If yes, provide a brief description of the incident  Does the recipient have other insurance coverage? Yes x No        Insurance Company Name/Policy #      Did that insurance pay on this claim? Yes  No        Did that insurance deny this claim? Yes  No    If yes, reason for denial:      Does the recipient have Medicare?  x Yes  No      MEDICARE PART B ONLY  Did Medicare exhaust prior to this admission? Yes  No            Did Medicare partially pay this claim? Yes  No        Did that insurance deny this claim? Yes  No    If yes, reason for denial:          Was the recipient a prisoner at the time of admission?    Yes X No            PROVIDER INFORMATION    Hospital Name: [facility] ([address] Provider ID#: [id number]    [address] [place] [address] Physician Name: [facility]   [facility] Provider ID#: [id number]        YNJOYKWTS INFORMATION    Type of Admission: (please choose one)    X ED      Direct    If yes, from where? Transfer    If yes, transferring hospital (inpatient, rehab, or psych) Provider Name/Provider ID#: Admission Floor or Unit Type: MED-SURG    Dates/Times:        ED Date/Time: 12/26/2019 10:26        Observation Date/Time:         Admission Date/Time: 12/26/19 1248        Discharge or Transfer Date/Time: 12/28/2019  1224        DIAGNOSIS/PROCEDURE CODES    Primary Diagnosis Code/Primary Diagnosis Code description:   Earache [H92 09]  Acute malignant otitis externa of left ear [H60 22]   Cellulitis of face L03 211  Congestive diastolic heart failure W15 27  (MAY RE-ORDER DX CODES)    Additional Diagnosis Code(s) and Description(s)-(up to three additional codes):    Procedure Code (one) and description: NONE        CLINICAL INFORMATION - PRIOR ADMISSION ONLY    Is there a prior admission with a discharge date within 30 days of the date of this admission? X No (Proceed to the next section - "Clinical Information - General Review Checklist:)      Yes (Provide the following information)     Prior admission dates:    MA Prior Authorization Number:        Review Outcome:     Diagnosis Code(s)/Description:    Procedure Code/Description:    Findings:    Treatment:    Condition on Discharge:   Vitals:    Labs:   Imaging:   Medications: Follow-up Instructions:    Disposition:        CLINICAL INFORMATION - GENERAL REVIEW CHECKLIST    EMERGENCY DEPARTMENT: (Proceed to "ADMISSION" if Direct Admission)    Presenting Signs/Symptoms:    Earache       pt c/o left earache ongoing for about a month pt was seen and given ear drops but now ear/face is swollen and drainage noted to left ear denies fevers at home       Assessment/Plan:   71 y  o  female with a PMH  significant for morbid obesity , hypertension , hyperlipidemia , CHF, type 2 diabetes presents to ED from home with worsening of left ear swelling, drainage, pain over the past approximately 1 month   She saw PCP for same in the end of November, was given Corticosporin drops to take 4 times per day  States she used them occasionally    Reports worsening in the swelling, as well as drainage of her left ear  Also starting to have some pain in her face, as well as some left-sided cheek swelling    Patient reports her diabetes is well controlled - maintained on diet alone   Noncompliant with CPAP at night   Reports allergy to fluoroquinolones  Admitted to  with Acute Otitis Externa Left Ear - Failed outpatient therapy of Corticosporin, questionable compliance  IV Abxs, Continue with Corticosporin 4 times a day  If no improvement consider ENT consultation     12/27: Still with severe pain left ear into jaw, chronic short of breath   Per ENT:CT scan of the facial bones was completed yesterday   This was reviewed personally and there is no evidence of any middle ear or mastoid pathology   On those scans the lateral canal wall seems to be partially closed secondary to edema   On examination today there is no significant edema of the external canal and there is nothing to culture within the canal itself   There is some dry skin and some oozing of the area externally in the area of the tragus and the lobule  Continue with her current drops as well as for continued IV antibiotics as she seems to be improving on this medication            Medication/treatment prior to arrival in the ED:     Past Medical History:    Active Ambulatory Problems     Diagnosis Date Noted    Morbid obesity with BMI of 60 0-69 9, adult ([facility] ) 12/24/2017    GERD (gastroesophageal reflux disease) 12/24/2017    Borderline diabetes 12/24/2017    Essential hypertension 12/24/2017    Hyperlipidemia 12/24/2017    Abnormal x-ray 08/28/2018    Allergic rhinitis 12/15/2016    Colon polyp 05/29/2012    Restrictive lung disease 12/15/2016    H/O: hysterectomy 04/27/2018    Hip osteoarthritis 04/27/2018    History of total knee replacement, bilateral 04/27/2018    History of deep venous thrombosis 04/27/2018    Obstructive sleep apnea of adult 06/25/2014    Class 3 severe obesity due to excess calories in adult [facility]) 09/14/2018    Epigastric pain 11/28/2018    Simple chronic bronchitis ([facility] ) 01/29/2019    Lip abrasion 01/29/2019    [facility] diastolic [facility]congest[facility] failure[facility] ) 02/18/2019    Diabetes mellitus type 2 in obese ([facility] ) 02/18/2019    Myalgia 02/22/2019    Status post insertion of drug-eluting stent into left anterior descending (LAD) artery for coronary artery disease 02/22/2019    Lip lesion 08/22/2019    Pain of right hip joint 08/22/2019    Coronary artery disease involving native coronary artery of native heart 10/22/2019    Chronic low back pain without sciatica 10/22/2019    Ear itching 11/25/2019   [facility] discharge follow-up 01/16/2020       Past Medical History:   Diagnosis Date    COPD with acute exacerbation ([facility] ) 1/29/2019    Diabetes mellitus (HCC)     Full dentures     Hypertension     Seasonal allergies     Sleep apnea        Clinical Exam:     Initial Vital Signs: (Temp, Pulse, Resp, and BP)   ED Triage Vitals [12/26/19 1039]   Temperature Pulse Respirations Blood Pressure SpO2   98 4 °F (36 9 °C) (!) 107 18 156/100 97 %      Temp Source Heart Rate Source Patient Position - Orthostatic VS BP Location FiO2 (%)   Temporal Monitor Sitting Right arm --      Pain Score       7           Pertinent Repeat Vital Signs: (include times they were obtained)  12/27/19 15:38:42   98 6 °F (37 °C) 86 20 113/65 96 % None (Room air) Sitting   12/27/19 07:15:37   98 2 °F (36 8 °C) 86 17 111/62 97 %     12/26/19 22:54:21   98 4 °F (36 9 °C) 89 20 114/60 97 %     12/26/19 20:34:35   98 3 °F (36 8 °C) 90   97 %     12/26/19 1900       99 % None (Room air)    12/26/19 1325    95 22 119/83 99 %     12/26/19 1253    95 16 132/52 91 % None (Room air) Lying       Pertinent Sustained Findings: (include times they were obtained)    Weight in Kilograms:  12/27/19 132 kg (291 lb 3 6 oz)         Pertinent Labs (results):  Lab Units 12/27/19  0548 12/26/19  1100   WBC Thousand/uL 8 12 7 85   HEMOGLOBIN g/dL 12 5 13 3   HEMATOCRIT % 38 6 41 1   PLATELETS Thousands/uL 265 268   NEUTROS ABS Thousands/µL 3 59 3 65            Results from last 7 days   Lab Units 12/27/19  0548 12/26/19  1100   SODIUM mmol/L 145 144   POTASSIUM mmol/L 4 3 4 2   CHLORIDE mmol/L 107 105   CO2 mmol/L 32 32   ANION GAP mmol/L 6 7   BUN mg/dL 19 15   CREATININE mg/dL 1 07 1 03   EGFR ml/min/1 73sq m 63 66   CALCIUM mg/dL 9 1 8 9               Results from last 7 days   Lab Units 12/27/19  1612 12/27/19  1130 12/27/19  0718 12/26/19  2114 12/26/19  1603   POC GLUCOSE mg/dl 88 103 96 94 127            Results from last 7 days   Lab Units 12/27/19  0548 12/26/19  1100   GLUCOSE RANDOM mg/dL 102 116        Radiology (results):  12/26 CT Facial Bones: Skin thickening in the region of the left external auditory canal with the some narrowing of the left external auditory canal suggest otitis externa  No erosive changes in the mastoids and no opacification of the mastoid air cells   No fluid collection in the  space   No CT findings to support malignant otitis externa    EKG (results):      Other tests (results):    Tests pending final results:    Treatment in the ED:   Medication Administration from 12/26/2019 1026 to 12/26/2019 1317       Date/Time Order Dose Route Action Action by Comments                12/26/2019 1114 cefepime (MAXIPIME) 2 g/50 mL dextrose IVPB 2,000 mg Intravenous [place] Paredes RN                       Meds: Name, dose, route, time, number of doses given        Nebulizer treatments: Type, total number given      IVs: Type, rate, total amt  given          Other treatments:       Change in condition while in the ED:       Response to ED Treatment:           OBSERVATION: (Proceed to "ADMISSION" if Direct Admission)    Orders written during the observation period  Meds Name, dose, route, time, how may doses given:  PRN Meds Name, dose, route, time, how many doses given within the first 24 hrs :  IVs Type, rate, and total amt  ordered/given:  Labs, imaging, other:  Consults and findings:    Test Results during the observation period  Pertinent Lab tests (dates/results):  Culture results (blood, urine, spinal, wound, respiratory, etc ):  Imaging tests (dates/results):  EKG (dates/results):   Other test (dates/results):  Tests pending (dates/results):    Surgical or Invasive Procedures during the observation period  Name of surgery/procedure:  Date & Time:  Patient Response:  Post-operative orders:  Operative Report/Findings:    Response to Treatment, Major Change in Condition, Major Charge in Treatment during the observation period          ADMISSION:    DIRECT Admissions Only:    · Presenting Signs/Symptoms:   ·   · Medication/treatment prior to arrival:  ·   · Past Medical History:  ·   · Clinical Exam on admission:  ·   · Vital Signs on admission: (Temp, Pulse, Resp, and BP)  ·   · Weight in kilograms:     ALL Admissions:    Admission Orders and Other Orders written within the first 24 hrs after admission  Daily weights  oob  Seq comp device  Cons carb diet  IP CONSULT TO ENT    Meds Name, dose, route, time, how may doses given:  aspirin 81 mg Oral Daily   atorvastatin 40 mg Oral Daily With Dinner   cefepime 2,000 mg Intravenous Q12H   clopidogrel 75 mg Oral Daily   enoxaparin 60 mg Subcutaneous BID   fluticasone-vilanterol 1 puff Inhalation Daily   gabapentin 300 mg Oral HS   insulin lispro 2-12 Units Subcutaneous TID AC   insulin lispro 2-12 Units Subcutaneous HS   montelukast 10 mg Oral Daily   neomycin-polymyxin-hydrocortisone 4 drop Left Ear Q6H [address]   pantoprazole 40 mg Oral Early Morning   torsemide 10 mg Oral Daily        PRN Meds Name, dose, route, time, how many doses given within the first 24 hrs :  acetaminophen 650 mg Oral Q6H PRN x 1 12/27   albuterol 2 puff Inhalation Q4H PRN   ondansetron 4 mg Intravenous Q6H PRN         IVs Type, rate, and total amt  ordered/given:  Labs, imaging, other:      Consults and findings:     * Acute otitis externa of left ear  Assessment & Plan  · Patient with a clear otitis externa on physical exam   Saw her outpatient family doctor for same  ? Failed outpatient therapy of Corticosporin, questionable compliance  · CT scan: "Skin thickening in the region of the left external auditory canal with the some narrowing of the left external auditory canal suggest otitis externa  No erosive changes in the mastoids and no opacification of the mastoid air cells   No fluid collection in the  space  No CT findings to support malignant otitis externa"  · IV cefepime 2 g q 12 hours  · Continue with Corticosporin 4 times a day  ? Allergy to Fluoroquinolones  · If no improvement consider ENT consultation      Chronic diastolic congestive heart failure ([address] )  Assessment & Plan      Wt Readings from Last 3 Encounters:   12/26/19 131 kg (288 lb 12 8 oz)   11/25/19 131 kg (288 lb)   11/01/19 131 kg (288 lb)      · Appearing compensated at this time  · Continue torsemide  · Monitor daily weights            Coronary artery disease involving native coronary artery of native heart  Assessment & Plan  · No chest pain at this time  · Continue aspirin, Plavix, statin      Diabetes mellitus type 2 in obese [facility])  Assessment & Plan        Lab Results   Component Value Date     HGBA1C 5 8 11/25/2019         No results for input(s): POCGLU in the last 72 hours      Blood Sugar Average: Last 72 hrs:  · Well controlled, A1c was 5 8 on 11/25/2019  · Low carb diet  · Insulin sliding scale with Accu-Cheks q i d        Obstructive sleep apnea of adult  Assessment & Plan  · Refusing CPAP      Restrictive lung disease  Assessment & Plan  · No evidence of acute exacerbation  · Continue with KadeulairCathleen  · Albuterol p r n        Essential hypertension  Assessment & Plan  · Blood pressure is reviewed and acceptable  ? Last recorded pressure: 132/57  · Continue amlodipine, torsemide  · Monitor blood pressure      GERD (gastroesophageal reflux disease)  Assessment & Plan  · Continue PPI      Morbid obesity with BMI of 60 0-69 9, adult ([facility])  Assessment & Plan  · Body mass index is 62 5 kg/m²  · Outpatient dietary appointment    Anticipated Length of Stay:  Patient will be admitted on an Inpatient basis with an anticipated length of stay of greater than 2 midnights  Justification for Hospital Stay: AOE, failure of outpatient therapy     Consultation - ENT           Assessment:  1  Left otitis externa  2  Left facial cellulitis secondary to otitis externa  3  No evidence of malignant otitis externa     Plan:  CT scan of the facial bones was completed yesterday  This was reviewed personally and there is no evidence of any middle ear or mastoid pathology  On those scans the lateral canal wall seems to be partially closed secondary to edema  On examination today there is no significant edema of the external canal and there is nothing to culture within the canal itself  There is some dry skin and some oozing of the area externally in the area of the tragus and the lobule  Due to her underlying rash which form secondary to quinolones we are unable to change her drops to either ofloxacin or Ciprodex  I would continue with her current drops as well as for continued IV antibiotics as she seems to be improving on this medication  She may be able to be changed to an oral form of the medication by tomorrow and discharge  I will be glad to see her in the office for follow-up to suction the surface of the left tympanic membrane and make sure there is no other pathology at the time    I have also suggested that she use a topical antibiotic ointment on the external ear to help with the superficial excoriation  Test Results after admission  Pertinent Lab tests (dates/results):  Culture results (blood, urine, spinal, wound, respiratory, etc ):  Imaging tests (dates/results):  EKG (dates/results): Other test (dates/results):  Tests pending (dates/results):    Surgical or Invasive Procedures  Name of surgery/procedure:  Date & Time:  Patient Response:  Post-operative orders:  Operative Report/Findings:    Response to Treatment, Major Change in Condition, Major Charge in Treatment anytime during admission    Disposition on Discharge  Home, Rehab, SNF, LTC, Shelter, etc : Home/Self Care    Cease to Breathe (CTB)  If a patient expires during an admission, in addition to the above information, please include:    Summary/timeline of the patient's decline in condition:    Medications and treatment:    Patient response to treatment:    Date and time patient ceased to breathe:        Is there a Readmission that follows this admission? Yes x No    If yes, reason for denial:          InterQual Review    InterQual Criteria Met:  Yes x No  N/A        Please include the InterQual Review, InterQual year/version used, and the criteria selected:   Patient: Denton Thompson  Review Number: [id number]  Review Status: In Primary  Criteria Status: Not Met     Condition Specific: Yes        OUTCOMES  Outcome Type: Primary           REVIEW DETAILS     Product: [employer] Adult  Subset: Infection: Skin      (Symptom or finding within 24h)         (Excludes PO medications unless noted)          Select Day, One:              [ ] Episode Day 1, One:                  [ ] ACUTE, >= One:                      [ ] Cellulitis and, Both:                          [X] Anti-infective     Version: [employer]® 2019 1  [employer]® and [employer]ellar[employer]®  © 2019 [employer] and/or one of its [place]  All Rights Reserved  CPT only © 2018 American [employer] Association  All Rights Reserved          PLEASE SUBMIT THE COMPLETED FORM TO THE DEPARTMENT OF HUMAN SERVICES - DIVISION OF CLINICAL  REVIEW VIA FAX [phone] or VIA E-MAIL TO [email or web address]    Signature: Mis Deshpande Date:  02/06/20    Confidentiality Notice: The documents accompanying this telecopy may contain confidential information belonging to the sender  The information is intended only for the use of the individual named above  If you are not the intended recipient, you are hereby notified  That any disclosure, copying, distribution or taking of any telecopy is strictly prohibited

## 2020-02-25 ENCOUNTER — TELEPHONE (OUTPATIENT)
Dept: FAMILY MEDICINE CLINIC | Facility: CLINIC | Age: 65
End: 2020-02-25

## 2020-02-25 NOTE — TELEPHONE ENCOUNTER
SIGNATURES NEEDED FOR [employer] FORM RECEIVED VIA FAX  WILL BE PLACED IN YOUR BIN AT ASSIGNED DELIVERY TIMES

## 2020-02-27 ENCOUNTER — OFFICE VISIT (OUTPATIENT)
Dept: FAMILY MEDICINE CLINIC | Facility: CLINIC | Age: 65
End: 2020-02-27

## 2020-02-27 VITALS
WEIGHT: 291.2 LBS | HEART RATE: 100 BPM | BODY MASS INDEX: 62.83 KG/M2 | SYSTOLIC BLOOD PRESSURE: 150 MMHG | HEIGHT: 57 IN | RESPIRATION RATE: 16 BRPM | OXYGEN SATURATION: 98 % | DIASTOLIC BLOOD PRESSURE: 108 MMHG | TEMPERATURE: 97.3 F

## 2020-02-27 DIAGNOSIS — T78.40XD ALLERGIC STATE, SUBSEQUENT ENCOUNTER: ICD-10-CM

## 2020-02-27 DIAGNOSIS — H60.312 ACUTE DIFFUSE OTITIS EXTERNA OF LEFT EAR: ICD-10-CM

## 2020-02-27 DIAGNOSIS — J06.9 VIRAL UPPER RESPIRATORY TRACT INFECTION: Primary | ICD-10-CM

## 2020-02-27 DIAGNOSIS — Z23 NEED FOR VACCINATION: ICD-10-CM

## 2020-02-27 DIAGNOSIS — I50.31 ACUTE DIASTOLIC CHF (CONGESTIVE HEART FAILURE) (HCC): ICD-10-CM

## 2020-02-27 PROCEDURE — 3008F BODY MASS INDEX DOCD: CPT | Performed by: INTERNAL MEDICINE

## 2020-02-27 PROCEDURE — 3077F SYST BP >= 140 MM HG: CPT | Performed by: INTERNAL MEDICINE

## 2020-02-27 PROCEDURE — 99213 OFFICE O/P EST LOW 20 MIN: CPT | Performed by: INTERNAL MEDICINE

## 2020-02-27 PROCEDURE — 1036F TOBACCO NON-USER: CPT | Performed by: INTERNAL MEDICINE

## 2020-02-27 PROCEDURE — 3080F DIAST BP >= 90 MM HG: CPT | Performed by: INTERNAL MEDICINE

## 2020-02-27 PROCEDURE — 4040F PNEUMOC VAC/ADMIN/RCVD: CPT | Performed by: INTERNAL MEDICINE

## 2020-02-27 RX ORDER — ASPIRIN 81 MG/1
81 TABLET ORAL DAILY
Qty: 90 TABLET | Refills: 2 | Status: SHIPPED | OUTPATIENT
Start: 2020-02-27 | End: 2020-03-18 | Stop reason: SDUPTHER

## 2020-02-27 RX ORDER — MONTELUKAST SODIUM 10 MG/1
10 TABLET ORAL DAILY
Qty: 90 TABLET | Refills: 1 | Status: SHIPPED | OUTPATIENT
Start: 2020-02-27 | End: 2020-06-12 | Stop reason: SDUPTHER

## 2020-02-27 NOTE — ASSESSMENT & PLAN NOTE
Patient has been having cough and nasal congestion for the last 2-3 days, denies fever, chest tightness, sore throat or other complains  Most likely viral etiology, patient does have a history of COPD controlled with Breo and Singulair, but clinically does not seem to be in exacerbation  Robitussin for cough  Advised patient to call or come to the clinic if worsening symptoms or new symptoms develop

## 2020-02-27 NOTE — PROGRESS NOTES
BMI Counseling: Body mass index is 63 02 kg/m²  The BMI is above normal  Nutrition recommendations include encouraging healthy choices of fruits and vegetables, decreasing fast food intake, consuming healthier snacks and limiting drinks that contain sugar  No pharmacotherapy was ordered  Assessment/Plan:    Viral upper respiratory tract infection  Patient has been having cough and nasal congestion for the last 2-3 days, denies fever, chest tightness, sore throat or other complains  Most likely viral etiology, patient does have a history of COPD controlled with Breo and Singulair, but clinically does not seem to be in exacerbation  Robitussin for cough  Advised patient to call or come to the clinic if worsening symptoms or new symptoms develop    Acute otitis externa of left ear  Improving, continue Cortisporin drops       Diagnoses and all orders for this visit:    Viral upper respiratory tract infection  -     guaiFENesin (ROBITUSSIN) 100 MG/5ML oral liquid; Take 10 mL (200 mg total) by mouth 3 (three) times a day as needed for cough    Need for vaccination  -     influenza vaccine, 3203-1417, high-dose, PF 0 5 mL (FLUZONE HIGH-DOSE)    Acute diastolic CHF (congestive heart failure) (HCC)  -     aspirin (ECOTRIN LOW STRENGTH) 81 mg EC tablet; Take 1 tablet (81 mg total) by mouth daily    Allergic state, subsequent encounter  -     montelukast (SINGULAIR) 10 mg tablet; Take 1 tablet (10 mg total) by mouth daily    Acute diffuse otitis externa of left ear          Subjective:      Patient ID: Delphine Pickering is a 71 y o  female  Patient presents to follow-up on her COPD  She admits to taking Breo intermittently, not daily  She started to have cough, nasal congestion and headache about 3 days ago, denies shortness of breath, denies chest tightness or pain, fever        The following portions of the patient's history were reviewed and updated as appropriate: allergies, current medications, past family history, past medical history, past social history, past surgical history and problem list     Review of Systems   Constitutional: Negative for chills, diaphoresis, fatigue and fever  HENT: Positive for congestion and ear pain (itchiness improving)  Negative for ear discharge, mouth sores, rhinorrhea, sore throat and trouble swallowing  Eyes: Negative for photophobia, pain and discharge  Respiratory: Positive for cough  Negative for chest tightness, shortness of breath and wheezing  Cardiovascular: Negative for chest pain, palpitations and leg swelling  Gastrointestinal: Negative for abdominal distention, abdominal pain, blood in stool, constipation, diarrhea and nausea  Genitourinary: Negative for difficulty urinating and frequency  Musculoskeletal: Negative for arthralgias, joint swelling and neck stiffness  Skin: Negative for color change, pallor and rash  Neurological: Negative for dizziness, syncope, numbness and headaches  Objective:      BP (!) 150/108 (BP Location: Right arm, Patient Position: Sitting, Cuff Size: Thigh)   Pulse 100   Temp (!) 97 3 °F (36 3 °C) (Tympanic)   Resp 16   Ht 4' 9" (1 448 m)   Wt 132 kg (291 lb 3 2 oz)   LMP  (LMP Unknown)   SpO2 98%   Breastfeeding No Comment: LMP around the 1990's  BMI 63 02 kg/m²          Physical Exam   Constitutional: She is oriented to person, place, and time  She appears well-developed and well-nourished  No distress  HENT:   Head: Normocephalic and atraumatic  Left Ear: Tympanic membrane normal  No lacerations  No drainage, swelling or tenderness  No foreign bodies  Scarring and mild erythema noted in the left ear canal, but improving, membrane is intact     Neck: Normal range of motion  Cardiovascular: Normal rate, regular rhythm and normal heart sounds  Exam reveals no gallop and no friction rub  No murmur heard  Pulmonary/Chest: Effort normal and breath sounds normal  No accessory muscle usage  No tachypnea   No respiratory distress  She has no wheezes  She has no rales  She exhibits no tenderness  Abdominal: Soft  Bowel sounds are normal  She exhibits no distension  There is no tenderness  There is no rebound  Musculoskeletal: Normal range of motion  She exhibits no edema, tenderness or deformity  Lymphadenopathy:     She has no cervical adenopathy  Neurological: She is alert and oriented to person, place, and time  Skin: Skin is warm and dry  No rash noted  No erythema  No pallor  Psychiatric: She has a normal mood and affect

## 2020-03-18 ENCOUNTER — OFFICE VISIT (OUTPATIENT)
Dept: CARDIOLOGY CLINIC | Facility: CLINIC | Age: 65
End: 2020-03-18
Payer: MEDICARE

## 2020-03-18 VITALS
HEART RATE: 92 BPM | BODY MASS INDEX: 64.97 KG/M2 | WEIGHT: 288.8 LBS | DIASTOLIC BLOOD PRESSURE: 68 MMHG | HEIGHT: 56 IN | RESPIRATION RATE: 18 BRPM | SYSTOLIC BLOOD PRESSURE: 146 MMHG

## 2020-03-18 DIAGNOSIS — I10 ESSENTIAL HYPERTENSION: Chronic | ICD-10-CM

## 2020-03-18 DIAGNOSIS — E78.2 MIXED HYPERLIPIDEMIA: ICD-10-CM

## 2020-03-18 DIAGNOSIS — I21.4 NON-ST ELEVATION MYOCARDIAL INFARCTION (NSTEMI) (HCC): ICD-10-CM

## 2020-03-18 DIAGNOSIS — I50.31 ACUTE DIASTOLIC CHF (CONGESTIVE HEART FAILURE) (HCC): ICD-10-CM

## 2020-03-18 PROCEDURE — 4040F PNEUMOC VAC/ADMIN/RCVD: CPT | Performed by: INTERNAL MEDICINE

## 2020-03-18 PROCEDURE — 99214 OFFICE O/P EST MOD 30 MIN: CPT | Performed by: INTERNAL MEDICINE

## 2020-03-18 PROCEDURE — 3077F SYST BP >= 140 MM HG: CPT | Performed by: INTERNAL MEDICINE

## 2020-03-18 PROCEDURE — 3078F DIAST BP <80 MM HG: CPT | Performed by: INTERNAL MEDICINE

## 2020-03-18 PROCEDURE — 3008F BODY MASS INDEX DOCD: CPT | Performed by: INTERNAL MEDICINE

## 2020-03-18 PROCEDURE — 4010F ACE/ARB THERAPY RXD/TAKEN: CPT | Performed by: INTERNAL MEDICINE

## 2020-03-18 PROCEDURE — 1036F TOBACCO NON-USER: CPT | Performed by: INTERNAL MEDICINE

## 2020-03-18 RX ORDER — ATORVASTATIN CALCIUM 40 MG/1
40 TABLET, FILM COATED ORAL
Qty: 90 TABLET | Refills: 4 | Status: SHIPPED | OUTPATIENT
Start: 2020-03-18 | End: 2020-08-25 | Stop reason: SDUPTHER

## 2020-03-18 RX ORDER — TORSEMIDE 20 MG/1
10 TABLET ORAL DAILY
Qty: 45 TABLET | Refills: 4 | Status: SHIPPED | OUTPATIENT
Start: 2020-03-18 | End: 2020-10-02

## 2020-03-18 RX ORDER — AMLODIPINE BESYLATE 5 MG/1
5 TABLET ORAL DAILY
Qty: 90 TABLET | Refills: 4 | Status: SHIPPED | OUTPATIENT
Start: 2020-03-18 | End: 2020-06-12 | Stop reason: SDUPTHER

## 2020-03-18 RX ORDER — CLOPIDOGREL BISULFATE 75 MG/1
75 TABLET ORAL DAILY
Qty: 90 TABLET | Refills: 3 | Status: SHIPPED | OUTPATIENT
Start: 2020-03-18 | End: 2020-10-02

## 2020-03-18 RX ORDER — ASPIRIN 81 MG/1
81 TABLET ORAL DAILY
Qty: 90 TABLET | Refills: 2 | Status: SHIPPED | OUTPATIENT
Start: 2020-03-18 | End: 2020-06-12 | Stop reason: SDUPTHER

## 2020-03-18 NOTE — PROGRESS NOTES
Cardiology Follow Up        Ce Araya      1955      [id number]      Assessment/Plan:    1  CAD status post PCI/ALTHEA (2 75 x 20 mm Synergy MR) 2/18/19, setting of stable ischemic heart disease  2  Benign essential hypertension  3  Dyslipidemia  4  Morbid obesity  5  COPD  6  Osteoarthritis    · No symptoms of angina, continue dual anti-platelet therapy for now  · Blood pressure elevated, but patient admits she is not taking her medications regularly  Encouraged compliance with medications on a regular basis  · Lipid levels elevated 11/2019  Patient admits she has not been taking atorvastatin regularly  Encouraged compliance  · Euvolemic by examination, continue low-dose torsemide 10 mg daily  · Will refill cardiac medications per patient request      Follow-up in 4 months      Interval History: This is a 72 yo female w/ a h/o morbid obesity, dyslipidemia, HTN, and COPD   She was admitted 02/2018 with COPD exacerbation   Troponin level was noted to be up to 4 with no symptoms of chest discomfort or ischemic ECG changes   Coronary angiography 02/18/2019 revealed 90% mid LAD stenosis and she underwent PCI/ALTHEA with a 2 75 x 20 mm Synergy MR ALTHEA    She had no residual obstructive disease  She presents today for follow-up  She had an upper respiratory infection in late February which has improved, but still going on to some extent  She denies chest pain, shortness of breath, lower extremity edema, orthopnea  She admits she is not taking her cardiac medications regularly           Vitals:  Vitals:    03/18/20 0840   BP: 146/68   Pulse: 92   Resp: 18   Weight: 131 kg (288 lb 12 8 oz)   Height: 4' 8" (1 422 m)         Past Medical History:   Diagnosis Date    COPD with acute exacerbation ([facility] [address] ) 1/29/2019    Diabetes mellitus ([facility] [address] )     Full dentures     GERD (gastroesophageal reflux disease)     Hyperlipidemia     Hypertension     Seasonal allergies     Sleep apnea      Social History     Socioeconomic History    Marital status: Single     Spouse name: Not on file    Number of children: Not on file    Years of education: Not on file    Highest education level: Not on file   Occupational History    Not on file   Social Needs    Financial resource strain: Not on file    Food insecurity:     Worry: Not on file     Inability: Not on file    Transportation needs:     Medical: Not on file     Non-medical: Not on file   Tobacco Use    Smoking status: Never Smoker    Smokeless tobacco: Never Used    Tobacco comment: childhood smoke exposure   Substance and Sexual Activity    Alcohol use: Never     Frequency: Never    Drug use: Never    Sexual activity: Not on file   Lifestyle    Physical activity:     Days per week: Not on file     Minutes per session: Not on file    Stress: Not on file   Relationships    Social connections:     Talks on phone: Not on file     Gets together: Not on file     Attends Taoist service: Not on file     Active member of club or organization: Not on file     Attends meetings of clubs or organizations: Not on file     Relationship status: Not on file    Intimate partner violence:     Fear of current or ex partner: Not on file     Emotionally abused: Not on file     Physically abused: Not on file     Forced sexual activity: Not on file   Other Topics Concern    Not on file   Social History Narrative    CONSUMES [address]:    -  Homemaker        HOBBIES:    -  Denies exposure        PETS:    -  Previous cat; no birds        TRAVEL:    -  No recent travel        EXPOSURES:    -  Denies mold, down pillows, hot tubs      Family History   Problem Relation Age of Onset    Coronary artery disease Father      Past Surgical History:   Procedure Laterality Date    BILATERAL KNEE ARTHROSCOPY      CATARACT EXTRACTION Right     DENTAL SURGERY      HYSTERECTOMY      TOTAL KNEE ARTHROPLASTY Bilateral     TUBAL LIGATION         Current Outpatient Medications:     ACCU-CHEK FASTCLIX LANCETS MISC, , Disp: , Rfl:     ACCU-CHEK GUIDE test strip, , Disp: , Rfl:     acetaminophen (TYLENOL) 325 mg tablet, Take 2 tablets (650 mg total) by mouth every 6 (six) hours as needed for mild pain, Disp: 30 tablet, Rfl: 0    amLODIPine (NORVASC) 5 mg tablet, Take 1 tablet (5 mg total) by mouth daily, Disp: 30 tablet, Rfl: 3    aspirin (ECOTRIN LOW STRENGTH) 81 mg EC tablet, Take 1 tablet (81 mg total) by mouth daily, Disp: 90 tablet, Rfl: 2    atorvastatin (LIPITOR) 40 mg tablet, Take 1 tablet (40 mg total) by mouth daily with dinner, Disp: 90 tablet, Rfl: 3    Blood Glucose Monitoring Suppl (ACCU-CHEK GUIDE) w/Device KIT, , Disp: , Rfl:     clopidogrel (PLAVIX) 75 mg tablet, Take 1 tablet (75 mg total) by mouth daily, Disp: 30 tablet, Rfl: 3    clotrimazole-betamethasone (LOTRISONE) 1-0 05 % cream, , Disp: , Rfl:     fluocinonide (LIDEX) 0 05 % ointment, , Disp: , Rfl:     gabapentin (NEURONTIN) 300 mg capsule, Take 1 capsule (300 mg total) by mouth daily at bedtime, Disp: 30 capsule, Rfl: 1    guaiFENesin (ROBITUSSIN) 100 MG/5ML oral liquid, Take 10 mL (200 mg total) by mouth 3 (three) times a day as needed for cough, Disp: 120 mL, Rfl: 2    ketoconazole (NIZORAL) 2 % cream, Apply topically daily, Disp: 15 g, Rfl: 0    montelukast (SINGULAIR) 10 mg tablet, Take 1 tablet (10 mg total) by mouth daily, Disp: 90 tablet, Rfl: 1    Multiple Vitamin (MULTIVITAMIN) tablet, Take 1 tablet by mouth daily, Disp: 30 tablet, Rfl: 2    mupirocin (BACTROBAN) 2 % ointment, Apply TID to left external ear, Disp: 22 g, Rfl: 1    neomycin-polymyxin-hydrocortisone (CORTISPORIN) 0 35%-10,000 units/mL-1% otic suspension, Administer 4 drops into the left ear every 6 (six) hours, Disp: 10 mL, Rfl: 0    nitroglycerin (NITROSTAT) 0 4 mg SL tablet, Place 1 tablet (0 4 mg total) under the tongue every 5 (five) minutes as needed for chest pain, Disp: 60 tablet, Rfl: 0    pantoprazole (PROTONIX) 40 mg tablet, Take 1 tablet (40 mg total) by mouth daily, Disp: 90 tablet, Rfl: 3    torsemide (DEMADEX) 20 mg tablet, Take 0 5 tablets (10 mg total) by mouth daily, Disp: 45 tablet, Rfl: 3    EPINEPHrine (EPIPEN) 0 3 mg/0 3 mL SOAJ, Inject 0 3 mL (0 3 mg total) into a muscle once for 1 dose Use for upper airway obstruction, Disp: 0 6 mL, Rfl: 0    fluticasone-vilanterol (BREO ELLIPTA) 100-25 mcg/inh inhaler, Inhale 1 puff daily Rinse mouth after use  (Patient not taking: Reported on 3/18/2020), Disp: 1 Inhaler, Rfl: 2        Review of Systems:  Review of Systems   Constitutional: Negative for activity change, fever and unexpected weight change  HENT: Negative for facial swelling, nosebleeds and voice change  Respiratory: Positive for cough  Negative for chest tightness, shortness of breath and wheezing  Cardiovascular: Negative for chest pain, palpitations and leg swelling  Gastrointestinal: Negative for abdominal distention  Genitourinary: Negative for hematuria  Musculoskeletal: Negative for arthralgias  Skin: Negative for color change, pallor, rash and wound  Neurological: Negative for dizziness, seizures and syncope  Psychiatric/Behavioral: Negative for agitation  Physical Exam:  Physical Exam   Constitutional: She is oriented to person, place, and time  She appears well-developed and well-nourished  HENT:   Head: Normocephalic and atraumatic  Eyes: EOM are normal    Neck: Normal range of motion  Neck supple  Cardiovascular: Normal rate, regular rhythm, normal heart sounds and intact distal pulses  Pulmonary/Chest: Effort normal and breath sounds normal    Abdominal: Soft  Musculoskeletal: Normal range of motion  Neurological: She is alert and oriented to person, place, and time  Skin: Skin is warm and dry  Psychiatric: She has a normal mood and affect   Her behavior is normal  Judgment and thought content normal  Vitals reviewed  This note was completed in part utilizing M-Modal Fluency Direct Software  Grammatical errors, random word insertions, spelling mistakes, and incomplete sentences can be an occasional consequence of this system secondary to software limitations, ambient noise, and hardware issues  If you have any questions or concerns about the content, text, or information contained within the body of this dictation, please contact the provider for clarification

## 2020-05-18 DIAGNOSIS — M16.11 OSTEOARTHRITIS OF RIGHT HIP, UNSPECIFIED OSTEOARTHRITIS TYPE: ICD-10-CM

## 2020-05-18 DIAGNOSIS — G89.29 CHRONIC LOW BACK PAIN WITHOUT SCIATICA, UNSPECIFIED BACK PAIN LATERALITY: ICD-10-CM

## 2020-05-18 DIAGNOSIS — M47.816 LUMBAR SPONDYLOSIS: ICD-10-CM

## 2020-05-18 DIAGNOSIS — M54.50 CHRONIC LOW BACK PAIN WITHOUT SCIATICA, UNSPECIFIED BACK PAIN LATERALITY: ICD-10-CM

## 2020-05-18 RX ORDER — GABAPENTIN 300 MG/1
CAPSULE ORAL
Qty: 30 CAPSULE | Refills: 0 | OUTPATIENT
Start: 2020-05-18

## 2020-06-12 ENCOUNTER — TELEMEDICINE (OUTPATIENT)
Dept: PAIN MEDICINE | Facility: MEDICAL CENTER | Age: 65
End: 2020-06-12
Payer: MEDICARE

## 2020-06-12 VITALS — RESPIRATION RATE: 18 BRPM | HEIGHT: 57 IN | BODY MASS INDEX: 62.13 KG/M2 | WEIGHT: 288 LBS

## 2020-06-12 DIAGNOSIS — I50.31 ACUTE DIASTOLIC CHF (CONGESTIVE HEART FAILURE) (HCC): ICD-10-CM

## 2020-06-12 DIAGNOSIS — M16.11 PRIMARY OSTEOARTHRITIS OF RIGHT HIP: Primary | ICD-10-CM

## 2020-06-12 DIAGNOSIS — I10 ESSENTIAL HYPERTENSION: Chronic | ICD-10-CM

## 2020-06-12 DIAGNOSIS — Z09 FOLLOW UP: ICD-10-CM

## 2020-06-12 DIAGNOSIS — M54.50 CHRONIC BILATERAL LOW BACK PAIN WITHOUT SCIATICA: ICD-10-CM

## 2020-06-12 DIAGNOSIS — T78.40XD ALLERGIC STATE, SUBSEQUENT ENCOUNTER: ICD-10-CM

## 2020-06-12 DIAGNOSIS — G89.29 CHRONIC BILATERAL LOW BACK PAIN WITHOUT SCIATICA: ICD-10-CM

## 2020-06-12 PROCEDURE — 99443 PR PHYS/QHP TELEPHONE EVALUATION 21-30 MIN: CPT | Performed by: PHYSICAL MEDICINE & REHABILITATION

## 2020-06-12 RX ORDER — ASPIRIN 81 MG/1
81 TABLET ORAL DAILY
Qty: 90 TABLET | Refills: 2 | Status: SHIPPED | OUTPATIENT
Start: 2020-06-12 | End: 2021-03-24 | Stop reason: SDUPTHER

## 2020-06-12 RX ORDER — PANTOPRAZOLE SODIUM 40 MG/1
40 TABLET, DELAYED RELEASE ORAL DAILY
Qty: 90 TABLET | Refills: 3 | Status: SHIPPED | OUTPATIENT
Start: 2020-06-12 | End: 2021-03-24 | Stop reason: SDUPTHER

## 2020-06-12 RX ORDER — AMLODIPINE BESYLATE 5 MG/1
5 TABLET ORAL DAILY
Qty: 90 TABLET | Refills: 4 | Status: SHIPPED | OUTPATIENT
Start: 2020-06-12 | End: 2021-03-24 | Stop reason: SDUPTHER

## 2020-06-12 RX ORDER — MONTELUKAST SODIUM 10 MG/1
10 TABLET ORAL DAILY
Qty: 90 TABLET | Refills: 1 | Status: SHIPPED | OUTPATIENT
Start: 2020-06-12 | End: 2020-10-02

## 2020-06-18 ENCOUNTER — TELEPHONE (OUTPATIENT)
Dept: PAIN MEDICINE | Facility: CLINIC | Age: 65
End: 2020-06-18

## 2020-06-25 DIAGNOSIS — M16.11 OSTEOARTHRITIS OF RIGHT HIP, UNSPECIFIED OSTEOARTHRITIS TYPE: ICD-10-CM

## 2020-06-25 DIAGNOSIS — M47.816 LUMBAR SPONDYLOSIS: ICD-10-CM

## 2020-06-25 DIAGNOSIS — G89.29 CHRONIC LOW BACK PAIN WITHOUT SCIATICA, UNSPECIFIED BACK PAIN LATERALITY: ICD-10-CM

## 2020-06-25 DIAGNOSIS — M54.50 CHRONIC LOW BACK PAIN WITHOUT SCIATICA, UNSPECIFIED BACK PAIN LATERALITY: ICD-10-CM

## 2020-06-25 RX ORDER — GABAPENTIN 300 MG/1
300 CAPSULE ORAL
Qty: 30 CAPSULE | Refills: 2 | Status: SHIPPED | OUTPATIENT
Start: 2020-06-25 | End: 2021-03-24 | Stop reason: SDUPTHER

## 2020-07-31 ENCOUNTER — TELEPHONE (OUTPATIENT)
Dept: FAMILY MEDICINE CLINIC | Facility: CLINIC | Age: 65
End: 2020-07-31

## 2020-07-31 NOTE — TELEPHONE ENCOUNTER
PT CONTACTED OFFICE AND LEFT VOICE MESSAGE LOOKING TO SCHEDULE A PODIATRY APPT, PT WAS CONTACTING WRONG OFFICE  PT STATED IF CURRENT PODIATRY OFFICE DOES NOT ANSWER AND HELP HER WITH AN APPT SHE WILL BE CONTACTING OUR OFFICE AND GET SET UP WITH PODIATRY APPT HERE INSTEAD

## 2020-08-14 ENCOUNTER — PATIENT OUTREACH (OUTPATIENT)
Dept: FAMILY MEDICINE CLINIC | Facility: CLINIC | Age: 65
End: 2020-08-14

## 2020-08-25 ENCOUNTER — OFFICE VISIT (OUTPATIENT)
Dept: CARDIOLOGY CLINIC | Facility: CLINIC | Age: 65
End: 2020-08-25
Payer: MEDICARE

## 2020-08-25 VITALS
BODY MASS INDEX: 61.89 KG/M2 | TEMPERATURE: 98 F | WEIGHT: 286 LBS | DIASTOLIC BLOOD PRESSURE: 80 MMHG | SYSTOLIC BLOOD PRESSURE: 140 MMHG

## 2020-08-25 DIAGNOSIS — E78.2 MIXED HYPERLIPIDEMIA: ICD-10-CM

## 2020-08-25 DIAGNOSIS — I21.4 NON-ST ELEVATION MYOCARDIAL INFARCTION (NSTEMI) (HCC): ICD-10-CM

## 2020-08-25 PROCEDURE — 1036F TOBACCO NON-USER: CPT | Performed by: INTERNAL MEDICINE

## 2020-08-25 PROCEDURE — 3077F SYST BP >= 140 MM HG: CPT | Performed by: INTERNAL MEDICINE

## 2020-08-25 PROCEDURE — 4040F PNEUMOC VAC/ADMIN/RCVD: CPT | Performed by: INTERNAL MEDICINE

## 2020-08-25 PROCEDURE — 3079F DIAST BP 80-89 MM HG: CPT | Performed by: INTERNAL MEDICINE

## 2020-08-25 PROCEDURE — 99214 OFFICE O/P EST MOD 30 MIN: CPT | Performed by: INTERNAL MEDICINE

## 2020-08-25 RX ORDER — ATORVASTATIN CALCIUM 40 MG/1
40 TABLET, FILM COATED ORAL
Qty: 90 TABLET | Refills: 4 | Status: SHIPPED | OUTPATIENT
Start: 2020-08-25 | End: 2021-03-24 | Stop reason: SDUPTHER

## 2020-09-18 ENCOUNTER — HOSPITAL ENCOUNTER (EMERGENCY)
Facility: HOSPITAL | Age: 65
Discharge: HOME | End: 2020-09-18
Attending: EMERGENCY MEDICINE | Admitting: EMERGENCY MEDICINE
Payer: MEDICARE

## 2020-09-18 ENCOUNTER — APPOINTMENT (EMERGENCY)
Dept: CT IMAGING | Facility: HOSPITAL | Age: 65
End: 2020-09-18
Payer: MEDICARE

## 2020-09-18 ENCOUNTER — APPOINTMENT (EMERGENCY)
Dept: NON INVASIVE DIAGNOSTICS | Facility: HOSPITAL | Age: 65
End: 2020-09-18
Payer: MEDICARE

## 2020-09-18 ENCOUNTER — APPOINTMENT (EMERGENCY)
Dept: RADIOLOGY | Facility: HOSPITAL | Age: 65
End: 2020-09-18
Payer: MEDICARE

## 2020-09-18 VITALS
TEMPERATURE: 97.6 F | WEIGHT: 292.33 LBS | RESPIRATION RATE: 20 BRPM | DIASTOLIC BLOOD PRESSURE: 86 MMHG | BODY MASS INDEX: 63.26 KG/M2 | HEART RATE: 88 BPM | SYSTOLIC BLOOD PRESSURE: 151 MMHG | OXYGEN SATURATION: 97 %

## 2020-09-18 DIAGNOSIS — L03.115 CELLULITIS OF RIGHT LOWER LEG: Primary | ICD-10-CM

## 2020-09-18 DIAGNOSIS — T14.8XXD DELAYED WOUND HEALING: ICD-10-CM

## 2020-09-18 LAB
ANION GAP SERPL CALCULATED.3IONS-SCNC: 4 MMOL/L (ref 4–13)
ATRIAL RATE: 89 BPM
BASOPHILS # BLD AUTO: 0.04 THOUSANDS/ΜL (ref 0–0.1)
BASOPHILS NFR BLD AUTO: 0 % (ref 0–1)
BUN SERPL-MCNC: 14 MG/DL (ref 5–25)
CALCIUM SERPL-MCNC: 9 MG/DL (ref 8.3–10.1)
CHLORIDE SERPL-SCNC: 103 MMOL/L (ref 100–108)
CO2 SERPL-SCNC: 32 MMOL/L (ref 21–32)
CREAT SERPL-MCNC: 1.18 MG/DL (ref 0.6–1.3)
D DIMER PPP FEU-MCNC: 1.14 UG/ML FEU
EOSINOPHIL # BLD AUTO: 0.39 THOUSAND/ΜL (ref 0–0.61)
EOSINOPHIL NFR BLD AUTO: 4 % (ref 0–6)
ERYTHROCYTE [DISTWIDTH] IN BLOOD BY AUTOMATED COUNT: 14.5 % (ref 11.6–15.1)
GFR SERPL CREATININE-BSD FRML MDRD: 56 ML/MIN/1.73SQ M
GLUCOSE SERPL-MCNC: 118 MG/DL (ref 65–140)
HCT VFR BLD AUTO: 41 % (ref 34.8–46.1)
HGB BLD-MCNC: 13.3 G/DL (ref 11.5–15.4)
IMM GRANULOCYTES # BLD AUTO: 0.02 THOUSAND/UL (ref 0–0.2)
IMM GRANULOCYTES NFR BLD AUTO: 0 % (ref 0–2)
LYMPHOCYTES # BLD AUTO: 4.12 THOUSANDS/ΜL (ref 0.6–4.47)
LYMPHOCYTES NFR BLD AUTO: 45 % (ref 14–44)
MCH RBC QN AUTO: 29.4 PG (ref 26.8–34.3)
MCHC RBC AUTO-ENTMCNC: 32.4 G/DL (ref 31.4–37.4)
MCV RBC AUTO: 91 FL (ref 82–98)
MONOCYTES # BLD AUTO: 0.73 THOUSAND/ΜL (ref 0.17–1.22)
MONOCYTES NFR BLD AUTO: 8 % (ref 4–12)
NEUTROPHILS # BLD AUTO: 4.06 THOUSANDS/ΜL (ref 1.85–7.62)
NEUTS SEG NFR BLD AUTO: 43 % (ref 43–75)
NRBC BLD AUTO-RTO: 0 /100 WBCS
NT-PROBNP SERPL-MCNC: 126 PG/ML
P AXIS: 81 DEGREES
PLATELET # BLD AUTO: 263 THOUSANDS/UL (ref 149–390)
PMV BLD AUTO: 10 FL (ref 8.9–12.7)
POTASSIUM SERPL-SCNC: 4.6 MMOL/L (ref 3.5–5.3)
PR INTERVAL: 164 MS
QRS AXIS: 50 DEGREES
QRSD INTERVAL: 84 MS
QT INTERVAL: 356 MS
QTC INTERVAL: 433 MS
RBC # BLD AUTO: 4.53 MILLION/UL (ref 3.81–5.12)
SODIUM SERPL-SCNC: 139 MMOL/L (ref 136–145)
T WAVE AXIS: 65 DEGREES
TROPONIN I SERPL-MCNC: <0.02 NG/ML
VENTRICULAR RATE: 89 BPM
WBC # BLD AUTO: 9.36 THOUSAND/UL (ref 4.31–10.16)

## 2020-09-18 PROCEDURE — 93005 ELECTROCARDIOGRAM TRACING: CPT

## 2020-09-18 PROCEDURE — 73590 X-RAY EXAM OF LOWER LEG: CPT

## 2020-09-18 PROCEDURE — 99285 EMERGENCY DEPT VISIT HI MDM: CPT | Performed by: EMERGENCY MEDICINE

## 2020-09-18 PROCEDURE — 85025 COMPLETE CBC W/AUTO DIFF WBC: CPT | Performed by: EMERGENCY MEDICINE

## 2020-09-18 PROCEDURE — G1004 CDSM NDSC: HCPCS

## 2020-09-18 PROCEDURE — 71046 X-RAY EXAM CHEST 2 VIEWS: CPT

## 2020-09-18 PROCEDURE — 93010 ELECTROCARDIOGRAM REPORT: CPT

## 2020-09-18 PROCEDURE — 99284 EMERGENCY DEPT VISIT MOD MDM: CPT

## 2020-09-18 PROCEDURE — 36415 COLL VENOUS BLD VENIPUNCTURE: CPT | Performed by: EMERGENCY MEDICINE

## 2020-09-18 PROCEDURE — 85379 FIBRIN DEGRADATION QUANT: CPT | Performed by: EMERGENCY MEDICINE

## 2020-09-18 PROCEDURE — 84484 ASSAY OF TROPONIN QUANT: CPT | Performed by: EMERGENCY MEDICINE

## 2020-09-18 PROCEDURE — 71275 CT ANGIOGRAPHY CHEST: CPT

## 2020-09-18 PROCEDURE — 93971 EXTREMITY STUDY: CPT

## 2020-09-18 PROCEDURE — 80048 BASIC METABOLIC PNL TOTAL CA: CPT | Performed by: EMERGENCY MEDICINE

## 2020-09-18 PROCEDURE — 83880 ASSAY OF NATRIURETIC PEPTIDE: CPT | Performed by: EMERGENCY MEDICINE

## 2020-09-18 RX ORDER — SULFAMETHOXAZOLE AND TRIMETHOPRIM 800; 160 MG/1; MG/1
1 TABLET ORAL ONCE
Status: DISCONTINUED | OUTPATIENT
Start: 2020-09-18 | End: 2020-09-18 | Stop reason: HOSPADM

## 2020-09-18 RX ORDER — SULFAMETHOXAZOLE AND TRIMETHOPRIM 800; 160 MG/1; MG/1
1 TABLET ORAL 2 TIMES DAILY
Qty: 14 TABLET | Refills: 0 | Status: SHIPPED | OUTPATIENT
Start: 2020-09-18 | End: 2020-09-25

## 2020-09-18 RX ADMIN — IOHEXOL 100 ML: 350 INJECTION, SOLUTION INTRAVENOUS at 18:25

## 2020-09-18 NOTE — ED PROVIDER NOTES
History  Chief Complaint   Patient presents with    Leg Swelling     worsening RLE swelling, redness, "heatin up" since June  Subjective fever  77 yo F with h/o multiple comorbidities presenting for evaluation of R lower leg swelling/wounds and SOB  Pt reports that about 3 weeks ago, she scraped her R shin on a piece of furniture and sustained an abrasion/wound  Since that time, the area has spread, she reports that it is very itchy and she keeps reopening the wounds with scratching  Reports that she has been applying Neosporin but this seems to be making it worse  Reports subjective fevers described as feeling hot, mostly at night, no recorded fevers at home and afebrile here  Pt noted to be tachypneic on interview  She just moved from her motorized wheelchair a few steps to a chair and is working to breathe  She states that this is new and has been worsening over the past 2 days  Reports history of asthma/chronic bronchitis, has not been taking her inhalers  Patient denies any chest pain, change in cough, abdominal pain, back pain  Patient with history of CHF, recently stopped her torsemide on 08/25, does not weigh herself at home  History of DVT in the past that was provoked after a knee surgery, no current anticoagulation      Wt Readings from Last 3 Encounters:  09/18/20 : 133 kg (292 lb 5 3 oz)  08/25/20 : 130 kg (286 lb)  06/12/20 : 131 kg (288 lb)    MDM:  50-year-old female with right leg swelling with nonhealing wounds and surrounding erythema, will get x-ray to rule out osseous abnormality/foreign body/gas, venous duplex to rule out DVT, will need antibiotics, instructed to discontinue the Neosporin  - shortness of breath- EKG to rule out arrhythmia/ischemic changes, troponin to evaluate for ischemia, CBC to rule out anemia, BMP to assess renal function/electrolytes, CXR to assess for pneumonia/CHF, ambulate pt/ambulatoy pulse ox           CAD s/p stenting, HTN, HLD, obesity, COPD    8/25 cardiology note- wanted to stay off toremide 2/2 polyuria/urgency    Prior to Admission Medications   Prescriptions Last Dose Informant Patient Reported? Taking? ACCU-CHEK FASTCLIX LANCETS MISC  Self Yes Yes   ACCU-CHEK GUIDE test strip  Self Yes Yes   Blood Glucose Monitoring Suppl (ACCU-CHEK GUIDE) w/Device KIT  Self Yes Yes   EPINEPHrine (EPIPEN) 0 3 mg/0 3 mL SOAJ   No No   Sig: Inject 0 3 mL (0 3 mg total) into a muscle once for 1 dose Use for upper airway obstruction   Multiple Vitamin (MULTIVITAMIN) tablet  Self No Yes   Sig: Take 1 tablet by mouth daily   acetaminophen (TYLENOL) 325 mg tablet  Self No Yes   Sig: Take 2 tablets (650 mg total) by mouth every 6 (six) hours as needed for mild pain   amLODIPine (NORVASC) 5 mg tablet   No Yes   Sig: Take 1 tablet (5 mg total) by mouth daily   aspirin (ECOTRIN LOW STRENGTH) 81 mg EC tablet   No Yes   Sig: Take 1 tablet (81 mg total) by mouth daily   atorvastatin (LIPITOR) 40 mg tablet   No Yes   Sig: Take 1 tablet (40 mg total) by mouth daily with dinner   clopidogrel (PLAVIX) 75 mg tablet   No No   Sig: Take 1 tablet (75 mg total) by mouth daily   Patient not taking: Reported on 8/25/2020   clotrimazole-betamethasone (LOTRISONE) 1-0 05 % cream  Self Yes Yes   fluocinonide (LIDEX) 0 05 % ointment  Self Yes Yes   fluticasone-vilanterol (BREO ELLIPTA) 100-25 mcg/inh inhaler  Self No No   Sig: Inhale 1 puff daily Rinse mouth after use     Patient not taking: Reported on 3/18/2020   gabapentin (NEURONTIN) 300 mg capsule   No Yes   Sig: Take 1 capsule (300 mg total) by mouth daily at bedtime   guaiFENesin (ROBITUSSIN) 100 MG/5ML oral liquid  Self No Yes   Sig: Take 10 mL (200 mg total) by mouth 3 (three) times a day as needed for cough   ketoconazole (NIZORAL) 2 % cream  Self No No   Sig: Apply topically daily   Patient not taking: Reported on 8/25/2020   montelukast (SINGULAIR) 10 mg tablet   No Yes   Sig: Take 1 tablet (10 mg total) by mouth daily   mupirocin (BACTROBAN) 2 % ointment  Self No No   Sig: Apply TID to left external ear   Patient not taking: Reported on 8/25/2020   neomycin-polymyxin-hydrocortisone (CORTISPORIN) 0 35%-10,000 units/mL-1% otic suspension  Self No No   Sig: Administer 4 drops into the left ear every 6 (six) hours   Patient not taking: Reported on 8/25/2020   nitroglycerin (NITROSTAT) 0 4 mg SL tablet  Self No Yes   Sig: Place 1 tablet (0 4 mg total) under the tongue every 5 (five) minutes as needed for chest pain   pantoprazole (PROTONIX) 40 mg tablet   No Yes   Sig: Take 1 tablet (40 mg total) by mouth daily   torsemide (DEMADEX) 20 mg tablet   No No   Sig: Take 0 5 tablets (10 mg total) by mouth daily   Patient not taking: Reported on 8/25/2020      Facility-Administered Medications: None       Past Medical History:   Diagnosis Date    COPD with acute exacerbation ([facility] ) 1/29/2019    Diabetes mellitus ([facility] )     Full dentures     GERD (gastroesophageal reflux disease)     Hyperlipidemia     Hypertension     Seasonal allergies     Sleep apnea        Past Surgical History:   Procedure Laterality Date    BILATERAL KNEE ARTHROSCOPY      CATARACT EXTRACTION Right     DENTAL SURGERY      HYSTERECTOMY      TOTAL KNEE ARTHROPLASTY Bilateral     TUBAL LIGATION         Family History   Problem Relation Age of Onset    No Known Problems Mother     Coronary artery disease Father      I have reviewed and agree with the history as documented      E-Cigarette/Vaping    E-Cigarette Use Never User      E-Cigarette/Vaping Substances    Nicotine No     THC No     CBD No     Flavoring No     Other No     Unknown No      Social History     Tobacco Use    Smoking status: Never Smoker    Smokeless tobacco: Never Used    Tobacco comment: childhood smoke exposure   Substance Use Topics    Alcohol use: Never     Frequency: Never    Drug use: Never       Review of Systems   Constitutional: Negative for chills, fever and unexpected weight change  HENT: Negative for ear pain, rhinorrhea and sore throat  Eyes: Negative for pain and visual disturbance  Respiratory: Positive for shortness of breath  Negative for cough  Cardiovascular: Negative for chest pain and leg swelling  Gastrointestinal: Negative for abdominal pain, constipation, diarrhea, nausea and vomiting  Endocrine: Negative for polydipsia, polyphagia and polyuria  Genitourinary: Negative for dysuria, frequency, hematuria and urgency  Musculoskeletal: Negative for back pain, myalgias and neck pain  Skin: Positive for rash and wound  Negative for color change  Right lower leg erythema with multiple open wounds   Allergic/Immunologic: Negative for environmental allergies and immunocompromised state  Neurological: Negative for dizziness, weakness, light-headedness, numbness and headaches  Hematological: Negative for adenopathy  Does not bruise/bleed easily  Psychiatric/Behavioral: Negative for agitation and confusion  All other systems reviewed and are negative  Physical Exam  Physical Exam  Vitals signs and nursing note reviewed  Constitutional:       Appearance: Normal appearance  She is well-developed  She is obese  She is not diaphoretic  HENT:      Head: Normocephalic and atraumatic  Nose: Nose normal    Eyes:      Conjunctiva/sclera: Conjunctivae normal    Neck:      Musculoskeletal: Normal range of motion and neck supple  Cardiovascular:      Rate and Rhythm: Normal rate and regular rhythm  Heart sounds: Normal heart sounds  Pulmonary:      Effort: Pulmonary effort is normal  No respiratory distress  Breath sounds: Normal breath sounds  No stridor  No wheezing or rales  Comments: Increased work of breathing with exertion, lungs clear to auscultation bilaterally  Chest:      Chest wall: No tenderness  Abdominal:      General: There is no distension  Palpations: Abdomen is soft  Tenderness:  There is no abdominal tenderness  There is no guarding or rebound  Musculoskeletal:         General: No deformity  Skin:     General: Skin is warm and dry  Findings: No rash  Comments: Bilateral lower extremities with edema, no calf swelling/tenderness, right lower leg with open wounds to shin, no purulence drainage, distal sensation intact, palpable DP/PT pulses, full range of motion and equal strength   Neurological:      General: No focal deficit present  Mental Status: She is alert and oriented to person, place, and time  Motor: No abnormal muscle tone  Coordination: Coordination normal       Comments: Normal steady gait   Psychiatric:         Thought Content:  Thought content normal          Judgment: Judgment normal          Vital Signs  ED Triage Vitals   Temperature Pulse Respirations Blood Pressure SpO2   09/18/20 1606 09/18/20 1606 09/18/20 1606 09/18/20 1607 09/18/20 1606   97 6 °F (36 4 °C) 104 18 157/82 98 %      Temp Source Heart Rate Source Patient Position - Orthostatic VS BP Location FiO2 (%)   09/18/20 1606 09/18/20 1606 09/18/20 1606 09/18/20 1606 --   Temporal Monitor Sitting Right arm       Pain Score       09/18/20 1606       6           Vitals:    09/18/20 1606 09/18/20 1607 09/18/20 1745 09/18/20 1910   BP:  157/82 155/78 151/86   Pulse: 104  89 88   Patient Position - Orthostatic VS: Sitting   Sitting         Visual Acuity      ED Medications  Medications   iohexol (OMNIPAQUE) 350 MG/ML injection (MULTI-DOSE) 100 mL (100 mL Intravenous Given 9/18/20 1825)       Diagnostic Studies  Results Reviewed     Procedure Component Value Units Date/Time    Troponin I [[id number]]  (Normal) Collected:  09/18/20 1641    Lab Status:  Final result Specimen:  Blood from Arm, Left Updated:  09/18/20 1726     Troponin I <0 02 ng/mL     Basic metabolic panel [[id number]] Collected:  09/18/20 1641    Lab Status:  Final result Specimen:  Blood from Arm, Left Updated:  09/18/20 1711     Sodium 139 mmol/L Potassium 4 6 mmol/L      Chloride 103 mmol/L      CO2 32 mmol/L      ANION GAP 4 mmol/L      BUN 14 mg/dL      Creatinine 1 18 mg/dL      Glucose 118 mg/dL      Calcium 9 0 mg/dL      eGFR 56 ml/min/1 73sq m     Narrative:       [place] guidelines for Chronic Kidney Disease (CKD):     Stage 1 with normal or high GFR (GFR > 90 mL/min/1 73 square meters)    Stage 2 Mild CKD (GFR = 60-89 mL/min/1 73 square meters)    Stage 3A Moderate CKD (GFR = 45-59 mL/min/1 73 square meters)    Stage 3B Moderate CKD (GFR = 30-44 mL/min/1 73 square meters)    Stage 4 Severe CKD (GFR = 15-29 mL/min/1 73 square meters)    Stage 5 End Stage CKD (GFR <15 mL/min/1 73 square meters)  Note: GFR calculation is accurate only with a steady state creatinine    NT-BNP PRO [[id number]]  (Abnormal) Collected:  09/18/20 1641    Lab Status:  Final result Specimen:  Blood from Arm, Left Updated:  09/18/20 1711     NT-proBNP 126 pg/mL     D-dimer, quantitative [[id number]]  (Abnormal) Collected:  09/18/20 1641    Lab Status:  Final result Specimen:  Blood from Arm, Left Updated:  09/18/20 1702     D-Dimer, Quant 1 14 ug/ml FEU     CBC and differential [[id number]]  (Abnormal) Collected:  09/18/20 1641    Lab Status:  Final result Specimen:  Blood from Arm, Left Updated:  09/18/20 1650     WBC 9 36 Thousand/uL      RBC 4 53 Million/uL      Hemoglobin 13 3 g/dL      Hematocrit 41 0 %      MCV 91 fL      MCH 29 4 pg      MCHC 32 4 g/dL      RDW 14 5 %      MPV 10 0 fL      Platelets 810 Thousands/uL      nRBC 0 /100 WBCs      Neutrophils Relative 43 %      Immat GRANS % 0 %      Lymphocytes Relative 45 %      Monocytes Relative 8 %      Eosinophils Relative 4 %      Basophils Relative 0 %      Neutrophils Absolute 4 06 Thousands/µL      Immature Grans Absolute 0 02 Thousand/uL      Lymphocytes Absolute 4 12 Thousands/µL      Monocytes Absolute 0 73 Thousand/µL      Eosinophils Absolute 0 39 Thousand/µL      Basophils Absolute 0 04 Thousands/µL                  CTA ED chest PE Study   ED Interpretation by Walter Johnson DO (09/18 4127)   INDICATION:   PE suspected, intermediate prob, positive D-dimer  SOB, leg swelling, elevated ddimer      COMPARISON: Outside CT chest 2/14/2018      TECHNIQUE: CTA examination of the chest was performed using angiographic technique according to a protocol specifically tailored to evaluate for pulmonary embolism  Axial, sagittal, and coronal 2D reformatted images were created from the source data and   submitted for interpretation  In addition, coronal 3D MIP postprocessing was performed on the acquisition scanner        Radiation dose length product (DLP) for this visit:  2716 mGy-cm   This examination, like all CT scans performed in the [facility], was performed utilizing techniques to minimize radiation dose exposure, including the use of iterative   reconstruction and automated exposure control      IV Contrast:  100 mL of iohexol (OMNIPAQUE)     FINDINGS:     PULMONARY ARTERIAL TREE:  No pulmonary embolus is seen       LUNGS:  No infiltrate or consolidation  Mi   ld mosaic attenuation of the pulmonary parenchyma most apparent at the lung bases likely on the basis of mild chronic small vessel or small airway disease      PLEURA:  Unremarkable      HEART/GREAT VESSELS:  Cardiomegaly  Coronary artery calcifications  No thoracic aortic aneurysm  No pericardial effusion     MEDIASTINUM AND HAY:  Unremarkable      CHEST WALL AND LOWER NECK:   Subcentimeter thyroid hypodensities  These do not meet the size criteria necessitating follow-up in patient this age      VISUALIZED STRUCTURES IN THE UPPER ABDOMEN:  Unremarkable      OSSEOUS STRUCTURES:  No acute fracture or destructive osseous lesion      IMPRESSION:     No acute findings   No pulmonary arterial embolism or pulmonary infiltrate/consolidation      Cardiomegaly          Final Result by Russell Villagran MD (09/18 1029) No acute findings  No pulmonary arterial embolism or pulmonary infiltrate/consolidation  Cardiomegaly  Workstation performed: [id number]         XR chest 2 views   Final Result by Caroline Gutierrez MD (09/18 1705)      No acute cardiopulmonary disease  Workstation performed: [id number]         VAS lower limb venous duplex study, unilateral/limited    (Results Pending)   XR tibia fibula 2 views RIGHT    (Results Pending)              Procedures  Procedures         ED Course  ED Course as of Sep 18 2305   Fri Sep 18, 2020   1610 Pulse: 851   5522 Proceeding with duplex and CTA   D-Dimer, Quant(!): 1 14   1726 EKG: NSR @ 89 bpm, normal axis, normal intervals, no ST changes/t wave changes      1736 Vascular at bedside doing duplex      1748 Venous duplex negative per US tech      1918 Pt reports feeling well  Starting abx  Will walk and make sure she is safe for DC      1933 Patient was able to ambulate in the ED with normal pulse ox, no increased work of breathing, patient feels stable and well enough to go home  HEART Risk Score      Most Recent Value   Heart Score Risk Calculator   History  0 Filed at: 09/18/2020 1636   ECG  --   Age  2 Filed at: 09/18/2020 1636   Risk Factors  2 Filed at: 09/18/2020 1636   Troponin  0 Filed at: 09/18/2020 1636   HEART Score  --                      SBIRT 20yo+      Most Recent Value   SBIRT (23 yo +)   In order to provide better care to our patients, we are screening all of our patients for alcohol and drug use  Would it be okay to ask you these screening questions? Yes Filed at: 09/18/2020 1613   Initial Alcohol Screen: US AUDIT-C    1  How often do you have a drink containing alcohol?  0 Filed at: 09/18/2020 1613   2  How many drinks containing alcohol do you have on a typical day you are drinking? 0 Filed at: 09/18/2020 1613   3b  FEMALE Any Age, or MALE 65+: How often do you have 4 or more drinks on one occassion?   0 Filed at: 09/18/2020 1613   Audit-C Score  0 Filed at: 09/18/2020 1613   BRIANNA: How many times in the past year have you    Used an illegal drug or used a prescription medication for non-medical reasons? Never Filed at: 09/18/2020 1613          Wells' Criteria for PE      Most Recent Value   Wells' Criteria for PE   Clinical signs and symptoms of DVT  3 Filed at: 09/18/2020 1635   PE is primary diagnosis or equally likely  0 Filed at: 09/18/2020 1635   HR >100  1 5 Filed at: 09/18/2020 1635   Immobilization at least 3 days or Surgery in the previous 4 weeks  0 Filed at: 09/18/2020 1635   Previous, objectively diagnosed PE or DVT  1 5 Filed at: 09/18/2020 1635   Hemoptysis  0 Filed at: 09/18/2020 1635   Malignancy with treatment within 6 months or palliative  0 Filed at: 09/18/2020 1635   Wells' Criteria Total  6 Filed at: 09/18/2020 1635              MDM  Number of Diagnoses or Management Options  Cellulitis of right lower leg:   Delayed wound healing:   Diagnosis management comments: 58-year-old female presenting with right lower leg erythema and nonhealing wounds- instructed to stop using Neosporin, started on oral antibiotics, instructed to follow up with PCP this week for recheck  Return precautions discussed and patient expressed understanding with plan  - reported shortness of breath, unremarkable ED workup regarding this, ambulatory pulse ox normal and without any increased work of breathing on ambulation    Patient felt comfortable with discharge       Amount and/or Complexity of Data Reviewed  Clinical lab tests: ordered and reviewed  Tests in the radiology section of CPT®: ordered and reviewed  Tests in the medicine section of CPT®: ordered and reviewed  Review and summarize past medical records: yes  Independent visualization of images, tracings, or specimens: yes        Disposition  Final diagnoses:   Cellulitis of right lower leg   Delayed wound healing     Time reflects when diagnosis was documented in both MDM as applicable and the Disposition within this note     Time User Action Codes Description Comment    9/18/2020  7:16 PM Robi FRIAS Add [T15 [id number]] Cellulitis of right lower leg     9/18/2020  7:16 PM Yoselyn Lopez Add Irlanda Parker  [id number]] Delayed wound healing       ED Disposition     ED Disposition Condition Date/Time Comment    Discharge Stable Fri Sep 18, 2020  7:16 PM Ce Araya discharge to home/self care              Follow-up Information     Follow up With Specialties Details Why 10 Shan Gamboa MD Family Medicine   [address] [place] [address] [facility] [address]             Discharge Medication List as of 9/18/2020  7:18 PM      START taking these medications    Details   sulfamethoxazole-trimethoprim (BACTRIM DS) 800-160 mg per tablet Take 1 tablet by mouth 2 (two) times a day for 7 days smx-tmp DS (BACTRIM) 800-160 mg tabs (1tab q12 D10), Starting Fri 9/18/2020, Until Fri 9/25/2020, Normal         CONTINUE these medications which have NOT CHANGED    Details   ACCU-CHEK FASTCLIX LANCETS [facility] Starting Thu 6/21/2018, Historical Med      ACCU-CHEK GUIDE test strip Historical Med      acetaminophen (TYLENOL) 325 mg tablet Take 2 tablets (650 mg total) by mouth every 6 (six) hours as needed for mild pain, Starting Fri 5/24/2019, Normal      amLODIPine (NORVASC) 5 mg tablet Take 1 tablet (5 mg total) by mouth daily, Starting Fri 6/12/2020, Normal      aspirin (ECOTRIN LOW STRENGTH) 81 mg EC tablet Take 1 tablet (81 mg total) by mouth daily, Starting Fri 6/12/2020, Normal      atorvastatin (LIPITOR) 40 mg tablet Take 1 tablet (40 mg total) by mouth daily with dinner, Starting Tue 8/25/2020, Normal      Blood Glucose Monitoring Suppl (ACCU-CHEK GUIDE) w/Device KIT Starting Thu 6/21/2018, Historical Med      clotrimazole-betamethasone (LOTRISONE) 1-0 05 % cream Starting Thu 11/7/2019, Historical Med      fluocinonide (LIDEX) 0 05 % ointment Starting Mon 11/4/2019, Historical Med      gabapentin (NEURONTIN) 300 mg capsule Take 1 capsule (300 mg total) by mouth daily at bedtime, Starting Thu 6/25/2020, Normal      guaiFENesin (ROBITUSSIN) 100 MG/5ML oral liquid Take 10 mL (200 mg total) by mouth 3 (three) times a day as needed for cough, Starting Thu 2/27/2020, Normal      montelukast (SINGULAIR) 10 mg tablet Take 1 tablet (10 mg total) by mouth daily, Starting Fri 6/12/2020, Normal      Multiple Vitamin (MULTIVITAMIN) tablet Take 1 tablet by mouth daily, Starting Fri 5/24/2019, Normal      nitroglycerin (NITROSTAT) 0 4 mg SL tablet Place 1 tablet (0 4 mg total) under the tongue every 5 (five) minutes as needed for chest pain, Starting Wed 2/20/2019, Print      pantoprazole (PROTONIX) 40 mg tablet Take 1 tablet (40 mg total) by mouth daily, Starting Fri 6/12/2020, Normal      clopidogrel (PLAVIX) 75 mg tablet Take 1 tablet (75 mg total) by mouth daily, Starting Wed 3/18/2020, Normal      EPINEPHrine (EPIPEN) 0 3 mg/0 3 mL SOAJ Inject 0 3 mL (0 3 mg total) into a muscle once for 1 dose Use for upper airway obstruction, Starting Thu 1/2/2020, Normal      fluticasone-vilanterol (BREO ELLIPTA) 100-25 mcg/inh inhaler Inhale 1 puff daily Rinse mouth after use , Starting Fri 5/24/2019, Normal      ketoconazole (NIZORAL) 2 % cream Apply topically daily, Starting Tue 1/29/2019, Normal      mupirocin (BACTROBAN) 2 % ointment Apply TID to left external ear, Normal      neomycin-polymyxin-hydrocortisone (CORTISPORIN) 0 35%-10,000 units/mL-1% otic suspension Administer 4 drops into the left ear every 6 (six) hours, Starting Sat 12/28/2019, Print      torsemide (DEMADEX) 20 mg tablet Take 0 5 tablets (10 mg total) by mouth daily, Starting Wed 3/18/2020, Normal           No discharge procedures on file      PDMP Review     None          ED Provider  Electronically Signed by           Eusebia Monday, DO  09/18/20 2217

## 2020-09-18 NOTE — DISCHARGE INSTRUCTIONS
Take antibiotics as prescribed  Follow up with family doctor  Return to ED if area of infection spreads after being on antibiotics for 2 days, fevers, difficulty breathing, chest pain, etc

## 2020-09-19 PROCEDURE — 93971 EXTREMITY STUDY: CPT | Performed by: SURGERY

## 2020-10-02 ENCOUNTER — OFFICE VISIT (OUTPATIENT)
Dept: FAMILY MEDICINE CLINIC | Facility: CLINIC | Age: 65
End: 2020-10-02

## 2020-10-02 VITALS
RESPIRATION RATE: 18 BRPM | HEIGHT: 57 IN | SYSTOLIC BLOOD PRESSURE: 140 MMHG | OXYGEN SATURATION: 98 % | WEIGHT: 291 LBS | BODY MASS INDEX: 62.78 KG/M2 | DIASTOLIC BLOOD PRESSURE: 86 MMHG | TEMPERATURE: 97 F | HEART RATE: 109 BPM

## 2020-10-02 DIAGNOSIS — E11.69 DIABETES MELLITUS TYPE 2 IN OBESE (HCC): ICD-10-CM

## 2020-10-02 DIAGNOSIS — L03.115 CELLULITIS OF RIGHT LOWER EXTREMITY: Primary | ICD-10-CM

## 2020-10-02 DIAGNOSIS — E66.9 DIABETES MELLITUS TYPE 2 IN OBESE (HCC): ICD-10-CM

## 2020-10-02 LAB — SL AMB POCT HEMOGLOBIN AIC: 5.9 (ref ?–6.5)

## 2020-10-02 PROCEDURE — 87205 SMEAR GRAM STAIN: CPT | Performed by: FAMILY MEDICINE

## 2020-10-02 PROCEDURE — 87070 CULTURE OTHR SPECIMN AEROBIC: CPT | Performed by: FAMILY MEDICINE

## 2020-10-02 PROCEDURE — 83036 HEMOGLOBIN GLYCOSYLATED A1C: CPT | Performed by: FAMILY MEDICINE

## 2020-10-02 PROCEDURE — 99213 OFFICE O/P EST LOW 20 MIN: CPT | Performed by: FAMILY MEDICINE

## 2020-10-02 PROCEDURE — 87186 SC STD MICRODIL/AGAR DIL: CPT | Performed by: FAMILY MEDICINE

## 2020-10-02 RX ORDER — BACITRACIN ZINC AND POLYMYXIN B SULFATE 500; 1000 [USP'U]/G; [USP'U]/G
OINTMENT TOPICAL 2 TIMES DAILY
Qty: 15 G | Refills: 0 | Status: SHIPPED | OUTPATIENT
Start: 2020-10-02 | End: 2020-10-02

## 2020-10-02 RX ORDER — DOXYCYCLINE HYCLATE 100 MG
100 TABLET ORAL 2 TIMES DAILY
Qty: 28 TABLET | Refills: 0 | Status: SHIPPED | OUTPATIENT
Start: 2020-10-02 | End: 2020-10-08 | Stop reason: HOSPADM

## 2020-10-05 ENCOUNTER — APPOINTMENT (OUTPATIENT)
Dept: LAB | Facility: HOSPITAL | Age: 65
End: 2020-10-05
Payer: MEDICARE

## 2020-10-05 DIAGNOSIS — L03.115 CELLULITIS OF RIGHT LOWER EXTREMITY: ICD-10-CM

## 2020-10-05 LAB
BACTERIA WND AEROBE CULT: ABNORMAL
BACTERIA WND AEROBE CULT: ABNORMAL
BASOPHILS # BLD AUTO: 0 THOUSANDS/ΜL (ref 0–0.1)
BASOPHILS NFR BLD AUTO: 0 % (ref 0–1)
EOSINOPHIL # BLD AUTO: 0.3 THOUSAND/ΜL (ref 0–0.4)
EOSINOPHIL NFR BLD AUTO: 4 % (ref 0–6)
ERYTHROCYTE [DISTWIDTH] IN BLOOD BY AUTOMATED COUNT: 15 %
GRAM STN SPEC: ABNORMAL
HCT VFR BLD AUTO: 41.1 % (ref 36–46)
HGB BLD-MCNC: 13.8 G/DL (ref 12–16)
LYMPHOCYTES # BLD AUTO: 3.3 THOUSANDS/ΜL (ref 0.5–4)
LYMPHOCYTES NFR BLD AUTO: 38 % (ref 25–45)
MCH RBC QN AUTO: 30.1 PG (ref 26–34)
MCHC RBC AUTO-ENTMCNC: 33.5 G/DL (ref 31–36)
MCV RBC AUTO: 90 FL (ref 80–100)
MONOCYTES # BLD AUTO: 1 THOUSAND/ΜL (ref 0.2–0.9)
MONOCYTES NFR BLD AUTO: 11 % (ref 1–10)
NEUTROPHILS # BLD AUTO: 4.1 THOUSANDS/ΜL (ref 1.8–7.8)
NEUTS SEG NFR BLD AUTO: 47 % (ref 45–65)
PLATELET # BLD AUTO: 270 THOUSANDS/UL (ref 150–450)
PMV BLD AUTO: 8.2 FL (ref 8.9–12.7)
RBC # BLD AUTO: 4.58 MILLION/UL (ref 4–5.2)
WBC # BLD AUTO: 8.8 THOUSAND/UL (ref 4.5–11)

## 2020-10-05 PROCEDURE — 36415 COLL VENOUS BLD VENIPUNCTURE: CPT

## 2020-10-05 PROCEDURE — 85025 COMPLETE CBC W/AUTO DIFF WBC: CPT

## 2020-10-07 ENCOUNTER — HOSPITAL ENCOUNTER (OUTPATIENT)
Facility: HOSPITAL | Age: 65
Setting detail: OBSERVATION
Discharge: HOME | End: 2020-10-08
Attending: EMERGENCY MEDICINE | Admitting: FAMILY MEDICINE
Payer: MEDICARE

## 2020-10-07 ENCOUNTER — TELEPHONE (OUTPATIENT)
Dept: FAMILY MEDICINE CLINIC | Facility: CLINIC | Age: 65
End: 2020-10-07

## 2020-10-07 ENCOUNTER — OFFICE VISIT (OUTPATIENT)
Dept: FAMILY MEDICINE CLINIC | Facility: CLINIC | Age: 65
End: 2020-10-07

## 2020-10-07 VITALS
TEMPERATURE: 98.6 F | WEIGHT: 288.8 LBS | BODY MASS INDEX: 62.31 KG/M2 | RESPIRATION RATE: 20 BRPM | HEART RATE: 106 BPM | HEIGHT: 57 IN | DIASTOLIC BLOOD PRESSURE: 78 MMHG | OXYGEN SATURATION: 97 % | SYSTOLIC BLOOD PRESSURE: 138 MMHG

## 2020-10-07 DIAGNOSIS — L03.115 CELLULITIS OF RIGHT LOWER EXTREMITY: ICD-10-CM

## 2020-10-07 DIAGNOSIS — T78.2XXA ANAPHYLAXIS, INITIAL ENCOUNTER: ICD-10-CM

## 2020-10-07 DIAGNOSIS — Z23 ENCOUNTER FOR IMMUNIZATION: ICD-10-CM

## 2020-10-07 DIAGNOSIS — T78.40XA ALLERGIC REACTION, INITIAL ENCOUNTER: Primary | ICD-10-CM

## 2020-10-07 DIAGNOSIS — I87.2 CHRONIC VENOUS INSUFFICIENCY OF LOWER EXTREMITY: Primary | ICD-10-CM

## 2020-10-07 DIAGNOSIS — K14.8: ICD-10-CM

## 2020-10-07 DIAGNOSIS — Z59.9 FINANCIAL DIFFICULTIES: ICD-10-CM

## 2020-10-07 LAB
ALBUMIN SERPL BCP-MCNC: 4.3 G/DL (ref 3–5.2)
ALP SERPL-CCNC: 105 U/L (ref 43–122)
ALT SERPL W P-5'-P-CCNC: 13 U/L (ref 9–52)
ANION GAP SERPL CALCULATED.3IONS-SCNC: 10 MMOL/L (ref 5–14)
AST SERPL W P-5'-P-CCNC: 27 U/L (ref 14–36)
BILIRUB SERPL-MCNC: 0.6 MG/DL
BUN SERPL-MCNC: 17 MG/DL (ref 5–25)
CALCIUM SERPL-MCNC: 9.3 MG/DL (ref 8.4–10.2)
CHLORIDE SERPL-SCNC: 102 MMOL/L (ref 97–108)
CO2 SERPL-SCNC: 28 MMOL/L (ref 22–30)
CREAT SERPL-MCNC: 0.99 MG/DL (ref 0.6–1.2)
EOSINOPHIL # BLD AUTO: 0.67 THOUSAND/UL (ref 0–0.4)
EOSINOPHIL NFR BLD MANUAL: 5 % (ref 0–6)
ERYTHROCYTE [DISTWIDTH] IN BLOOD BY AUTOMATED COUNT: 15.1 %
GFR SERPL CREATININE-BSD FRML MDRD: 69 ML/MIN/1.73SQ M
GLUCOSE SERPL-MCNC: 154 MG/DL (ref 70–99)
HCT VFR BLD AUTO: 42.3 % (ref 36–46)
HGB BLD-MCNC: 13.8 G/DL (ref 12–16)
LYMPHOCYTES # BLD AUTO: 48 % (ref 25–45)
LYMPHOCYTES # BLD AUTO: 6.38 THOUSAND/UL (ref 0.5–4)
MCH RBC QN AUTO: 29.1 PG (ref 26–34)
MCHC RBC AUTO-ENTMCNC: 32.7 G/DL (ref 31–36)
MCV RBC AUTO: 89 FL (ref 80–100)
MONOCYTES # BLD AUTO: 0.53 THOUSAND/UL (ref 0.2–0.9)
MONOCYTES NFR BLD AUTO: 4 % (ref 1–10)
NEUTS BAND NFR BLD MANUAL: 2 % (ref 0–8)
NEUTS SEG # BLD: 5.72 THOUSAND/UL (ref 1.8–7.8)
NEUTS SEG NFR BLD AUTO: 41 %
PLATELET # BLD AUTO: 326 THOUSANDS/UL (ref 150–450)
PLATELET BLD QL SMEAR: ADEQUATE
PMV BLD AUTO: 8 FL (ref 8.9–12.7)
POTASSIUM SERPL-SCNC: 3.9 MMOL/L (ref 3.6–5)
PROT SERPL-MCNC: 8.1 G/DL (ref 5.9–8.4)
RBC # BLD AUTO: 4.75 MILLION/UL (ref 4–5.2)
RBC MORPH BLD: NORMAL
SODIUM SERPL-SCNC: 140 MMOL/L (ref 137–147)
TOTAL CELLS COUNTED SPEC: 100
TROPONIN I SERPL-MCNC: 0.01 NG/ML (ref 0–0.03)
WBC # BLD AUTO: 13.3 THOUSAND/UL (ref 4.5–11)

## 2020-10-07 PROCEDURE — 90471 IMMUNIZATION ADMIN: CPT | Performed by: FAMILY MEDICINE

## 2020-10-07 PROCEDURE — 99285 EMERGENCY DEPT VISIT HI MDM: CPT

## 2020-10-07 PROCEDURE — 36415 COLL VENOUS BLD VENIPUNCTURE: CPT | Performed by: PHYSICIAN ASSISTANT

## 2020-10-07 PROCEDURE — 99285 EMERGENCY DEPT VISIT HI MDM: CPT | Performed by: PHYSICIAN ASSISTANT

## 2020-10-07 PROCEDURE — 93005 ELECTROCARDIOGRAM TRACING: CPT

## 2020-10-07 PROCEDURE — 90715 TDAP VACCINE 7 YRS/> IM: CPT | Performed by: FAMILY MEDICINE

## 2020-10-07 PROCEDURE — 96375 TX/PRO/DX INJ NEW DRUG ADDON: CPT

## 2020-10-07 PROCEDURE — 85007 BL SMEAR W/DIFF WBC COUNT: CPT | Performed by: PHYSICIAN ASSISTANT

## 2020-10-07 PROCEDURE — 85027 COMPLETE CBC AUTOMATED: CPT | Performed by: PHYSICIAN ASSISTANT

## 2020-10-07 PROCEDURE — 1124F ACP DISCUSS-NO DSCNMKR DOCD: CPT | Performed by: PHYSICIAN ASSISTANT

## 2020-10-07 PROCEDURE — 96374 THER/PROPH/DIAG INJ IV PUSH: CPT

## 2020-10-07 PROCEDURE — 80053 COMPREHEN METABOLIC PANEL: CPT | Performed by: PHYSICIAN ASSISTANT

## 2020-10-07 PROCEDURE — 99219 PR INITIAL OBSERVATION CARE/DAY 50 MINUTES: CPT | Performed by: FAMILY MEDICINE

## 2020-10-07 PROCEDURE — 84484 ASSAY OF TROPONIN QUANT: CPT | Performed by: PHYSICIAN ASSISTANT

## 2020-10-07 PROCEDURE — 96361 HYDRATE IV INFUSION ADD-ON: CPT

## 2020-10-07 PROCEDURE — 99214 OFFICE O/P EST MOD 30 MIN: CPT | Performed by: FAMILY MEDICINE

## 2020-10-07 RX ORDER — MONTELUKAST SODIUM 10 MG/1
10 TABLET ORAL
COMMUNITY
End: 2020-10-07

## 2020-10-07 RX ORDER — SODIUM CHLORIDE 9 MG/ML
100 INJECTION, SOLUTION INTRAVENOUS CONTINUOUS
Status: DISCONTINUED | OUTPATIENT
Start: 2020-10-07 | End: 2020-10-08 | Stop reason: HOSPADM

## 2020-10-07 RX ORDER — KETOROLAC TROMETHAMINE 30 MG/ML
15 INJECTION, SOLUTION INTRAMUSCULAR; INTRAVENOUS ONCE
Status: DISCONTINUED | OUTPATIENT
Start: 2020-10-07 | End: 2020-10-07

## 2020-10-07 RX ORDER — KETOROLAC TROMETHAMINE 30 MG/ML
15 INJECTION, SOLUTION INTRAMUSCULAR; INTRAVENOUS ONCE
Status: COMPLETED | OUTPATIENT
Start: 2020-10-07 | End: 2020-10-07

## 2020-10-07 RX ORDER — ASPIRIN 81 MG/1
81 TABLET ORAL DAILY
Status: DISCONTINUED | OUTPATIENT
Start: 2020-10-08 | End: 2020-10-08 | Stop reason: HOSPADM

## 2020-10-07 RX ORDER — AMLODIPINE BESYLATE 5 MG/1
5 TABLET ORAL DAILY
Status: DISCONTINUED | OUTPATIENT
Start: 2020-10-08 | End: 2020-10-08 | Stop reason: HOSPADM

## 2020-10-07 RX ORDER — METHYLPREDNISOLONE SODIUM SUCCINATE 125 MG/2ML
125 INJECTION, POWDER, LYOPHILIZED, FOR SOLUTION INTRAMUSCULAR; INTRAVENOUS ONCE
Status: COMPLETED | OUTPATIENT
Start: 2020-10-07 | End: 2020-10-07

## 2020-10-07 RX ORDER — GABAPENTIN 300 MG/1
300 CAPSULE ORAL
Status: DISCONTINUED | OUTPATIENT
Start: 2020-10-07 | End: 2020-10-08 | Stop reason: HOSPADM

## 2020-10-07 RX ORDER — DIPHENHYDRAMINE HYDROCHLORIDE 50 MG/ML
25 INJECTION INTRAMUSCULAR; INTRAVENOUS ONCE
Status: COMPLETED | OUTPATIENT
Start: 2020-10-07 | End: 2020-10-07

## 2020-10-07 RX ORDER — DIPHENHYDRAMINE HCL 25 MG
25 TABLET ORAL EVERY 6 HOURS PRN
Status: DISCONTINUED | OUTPATIENT
Start: 2020-10-07 | End: 2020-10-08 | Stop reason: HOSPADM

## 2020-10-07 RX ORDER — PANTOPRAZOLE SODIUM 40 MG/1
40 TABLET, DELAYED RELEASE ORAL DAILY
Status: DISCONTINUED | OUTPATIENT
Start: 2020-10-08 | End: 2020-10-08 | Stop reason: HOSPADM

## 2020-10-07 RX ORDER — ATORVASTATIN CALCIUM 40 MG/1
40 TABLET, FILM COATED ORAL
Status: DISCONTINUED | OUTPATIENT
Start: 2020-10-07 | End: 2020-10-08 | Stop reason: HOSPADM

## 2020-10-07 RX ORDER — ACETAMINOPHEN 325 MG/1
975 TABLET ORAL EVERY 6 HOURS PRN
Status: DISCONTINUED | OUTPATIENT
Start: 2020-10-07 | End: 2020-10-08 | Stop reason: HOSPADM

## 2020-10-07 RX ADMIN — SODIUM CHLORIDE 1000 ML: 0.9 INJECTION, SOLUTION INTRAVENOUS at 16:33

## 2020-10-07 RX ADMIN — METHYLPREDNISOLONE SODIUM SUCCINATE 125 MG: 125 INJECTION, POWDER, FOR SOLUTION INTRAMUSCULAR; INTRAVENOUS at 16:17

## 2020-10-07 RX ADMIN — ATORVASTATIN CALCIUM 40 MG: 40 TABLET, FILM COATED ORAL at 19:46

## 2020-10-07 RX ADMIN — SODIUM CHLORIDE 100 ML/HR: 0.9 INJECTION, SOLUTION INTRAVENOUS at 19:46

## 2020-10-07 RX ADMIN — DIPHENHYDRAMINE HYDROCHLORIDE 25 MG: 50 INJECTION INTRAMUSCULAR; INTRAVENOUS at 16:14

## 2020-10-07 RX ADMIN — ACETAMINOPHEN 975 MG: 325 TABLET ORAL at 21:29

## 2020-10-07 RX ADMIN — GABAPENTIN 300 MG: 300 CAPSULE ORAL at 21:29

## 2020-10-07 RX ADMIN — FAMOTIDINE 20 MG: 10 INJECTION INTRAVENOUS at 16:15

## 2020-10-07 RX ADMIN — KETOROLAC TROMETHAMINE 15 MG: 30 INJECTION, SOLUTION INTRAMUSCULAR; INTRAVENOUS at 17:41

## 2020-10-07 SDOH — ECONOMIC STABILITY - INCOME SECURITY: PROBLEM RELATED TO HOUSING AND ECONOMIC CIRCUMSTANCES, UNSPECIFIED: Z59.9

## 2020-10-08 ENCOUNTER — TRANSITIONAL CARE MANAGEMENT (OUTPATIENT)
Dept: FAMILY MEDICINE CLINIC | Facility: CLINIC | Age: 65
End: 2020-10-08

## 2020-10-08 VITALS
OXYGEN SATURATION: 98 % | TEMPERATURE: 96.4 F | RESPIRATION RATE: 20 BRPM | BODY MASS INDEX: 62.21 KG/M2 | HEART RATE: 89 BPM | SYSTOLIC BLOOD PRESSURE: 151 MMHG | WEIGHT: 288.36 LBS | DIASTOLIC BLOOD PRESSURE: 98 MMHG | HEIGHT: 57 IN

## 2020-10-08 LAB
ALBUMIN SERPL BCP-MCNC: 3.3 G/DL (ref 3–5.2)
ALP SERPL-CCNC: 79 U/L (ref 43–122)
ALT SERPL W P-5'-P-CCNC: 12 U/L (ref 9–52)
ANION GAP SERPL CALCULATED.3IONS-SCNC: 8 MMOL/L (ref 5–14)
AST SERPL W P-5'-P-CCNC: 26 U/L (ref 14–36)
ATRIAL RATE: 119 BPM
BASOPHILS # BLD AUTO: 0 THOUSANDS/ΜL (ref 0–0.1)
BASOPHILS NFR BLD AUTO: 0 % (ref 0–1)
BILIRUB SERPL-MCNC: 0.3 MG/DL
BUN SERPL-MCNC: 16 MG/DL (ref 5–25)
CALCIUM ALBUM COR SERPL-MCNC: 9.6 MG/DL (ref 8.3–10.1)
CALCIUM SERPL-MCNC: 9 MG/DL (ref 8.4–10.2)
CHLORIDE SERPL-SCNC: 109 MMOL/L (ref 97–108)
CO2 SERPL-SCNC: 20 MMOL/L (ref 22–30)
CREAT SERPL-MCNC: 0.8 MG/DL (ref 0.6–1.2)
EOSINOPHIL # BLD AUTO: 0 THOUSAND/ΜL (ref 0–0.4)
EOSINOPHIL NFR BLD AUTO: 0 % (ref 0–6)
ERYTHROCYTE [DISTWIDTH] IN BLOOD BY AUTOMATED COUNT: 15.1 %
GFR SERPL CREATININE-BSD FRML MDRD: 89 ML/MIN/1.73SQ M
GLUCOSE SERPL-MCNC: 154 MG/DL (ref 70–99)
HCT VFR BLD AUTO: 38.3 % (ref 36–46)
HGB BLD-MCNC: 12.7 G/DL (ref 12–16)
LYMPHOCYTES # BLD AUTO: 1.2 THOUSANDS/ΜL (ref 0.5–4)
LYMPHOCYTES NFR BLD AUTO: 12 % (ref 25–45)
MCH RBC QN AUTO: 29.5 PG (ref 26–34)
MCHC RBC AUTO-ENTMCNC: 33.1 G/DL (ref 31–36)
MCV RBC AUTO: 89 FL (ref 80–100)
MONOCYTES # BLD AUTO: 0.2 THOUSAND/ΜL (ref 0.2–0.9)
MONOCYTES NFR BLD AUTO: 2 % (ref 1–10)
NEUTROPHILS # BLD AUTO: 8.4 THOUSANDS/ΜL (ref 1.8–7.8)
NEUTS SEG NFR BLD AUTO: 86 % (ref 45–65)
P AXIS: 54 DEGREES
PLATELET # BLD AUTO: 268 THOUSANDS/UL (ref 150–450)
PMV BLD AUTO: 7.9 FL (ref 8.9–12.7)
POTASSIUM SERPL-SCNC: 4.4 MMOL/L (ref 3.6–5)
PR INTERVAL: 156 MS
PROT SERPL-MCNC: 6.5 G/DL (ref 5.9–8.4)
QRS AXIS: 22 DEGREES
QRSD INTERVAL: 88 MS
QT INTERVAL: 350 MS
QTC INTERVAL: 492 MS
RBC # BLD AUTO: 4.29 MILLION/UL (ref 4–5.2)
SODIUM SERPL-SCNC: 137 MMOL/L (ref 137–147)
T WAVE AXIS: 76 DEGREES
VENTRICULAR RATE: 119 BPM
WBC # BLD AUTO: 9.8 THOUSAND/UL (ref 4.5–11)

## 2020-10-08 PROCEDURE — 93010 ELECTROCARDIOGRAM REPORT: CPT

## 2020-10-08 PROCEDURE — 80053 COMPREHEN METABOLIC PANEL: CPT | Performed by: FAMILY MEDICINE

## 2020-10-08 PROCEDURE — 85025 COMPLETE CBC W/AUTO DIFF WBC: CPT | Performed by: FAMILY MEDICINE

## 2020-10-08 PROCEDURE — 99217 PR OBSERVATION CARE DISCHARGE MANAGEMENT: CPT | Performed by: FAMILY MEDICINE

## 2020-10-08 RX ORDER — EPINEPHRINE 0.3 MG/.3ML
0.3 INJECTION SUBCUTANEOUS ONCE
Qty: 0.6 ML | Refills: 1 | Status: SHIPPED | OUTPATIENT
Start: 2020-10-08 | End: 2020-11-25

## 2020-10-08 RX ORDER — DIPHENHYDRAMINE HCL 25 MG
25 TABLET ORAL EVERY 6 HOURS PRN
Qty: 30 TABLET | Refills: 0 | Status: SHIPPED | OUTPATIENT
Start: 2020-10-08 | End: 2021-04-03

## 2020-10-08 RX ADMIN — AMLODIPINE BESYLATE 5 MG: 5 TABLET ORAL at 08:47

## 2020-10-08 RX ADMIN — ASPIRIN 81 MG: 81 TABLET, COATED ORAL at 08:47

## 2020-10-08 RX ADMIN — PANTOPRAZOLE SODIUM 40 MG: 40 TABLET, DELAYED RELEASE ORAL at 06:11

## 2020-10-08 RX ADMIN — ENOXAPARIN SODIUM 40 MG: 40 INJECTION SUBCUTANEOUS at 08:47

## 2020-11-20 ENCOUNTER — TELEPHONE (OUTPATIENT)
Dept: FAMILY MEDICINE CLINIC | Facility: CLINIC | Age: 65
End: 2020-11-20

## 2020-11-25 ENCOUNTER — OFFICE VISIT (OUTPATIENT)
Dept: FAMILY MEDICINE CLINIC | Facility: CLINIC | Age: 65
End: 2020-11-25

## 2020-11-25 VITALS
TEMPERATURE: 98 F | HEART RATE: 105 BPM | WEIGHT: 293 LBS | BODY MASS INDEX: 63.21 KG/M2 | RESPIRATION RATE: 16 BRPM | HEIGHT: 57 IN | OXYGEN SATURATION: 96 % | SYSTOLIC BLOOD PRESSURE: 148 MMHG | DIASTOLIC BLOOD PRESSURE: 90 MMHG

## 2020-11-25 DIAGNOSIS — I87.2 CHRONIC VENOUS INSUFFICIENCY OF LOWER EXTREMITY: Primary | ICD-10-CM

## 2020-11-25 DIAGNOSIS — L97.911 ULCER OF RIGHT LOWER EXTREMITY, LIMITED TO BREAKDOWN OF SKIN (HCC): ICD-10-CM

## 2020-11-25 PROBLEM — L03.115 CELLULITIS OF RIGHT LOWER EXTREMITY: Status: RESOLVED | Noted: 2020-10-02 | Resolved: 2020-11-25

## 2020-11-25 PROBLEM — T78.2XXA ANAPHYLAXIS: Status: RESOLVED | Noted: 2020-10-07 | Resolved: 2020-11-25

## 2020-11-25 PROCEDURE — 99213 OFFICE O/P EST LOW 20 MIN: CPT | Performed by: FAMILY MEDICINE

## 2020-12-04 ENCOUNTER — OFFICE VISIT (OUTPATIENT)
Dept: WOUND CARE | Facility: HOSPITAL | Age: 65
End: 2020-12-04
Payer: MEDICARE

## 2020-12-04 VITALS
DIASTOLIC BLOOD PRESSURE: 80 MMHG | SYSTOLIC BLOOD PRESSURE: 146 MMHG | RESPIRATION RATE: 18 BRPM | HEART RATE: 82 BPM | TEMPERATURE: 97.6 F

## 2020-12-04 DIAGNOSIS — L97.211 NON-PRESSURE CHRONIC ULCER OF RIGHT CALF LIMITED TO BREAKDOWN OF SKIN (HCC): ICD-10-CM

## 2020-12-04 DIAGNOSIS — E66.9 DIABETES MELLITUS TYPE 2 IN OBESE (HCC): ICD-10-CM

## 2020-12-04 DIAGNOSIS — E66.01 MORBID OBESITY WITH BMI OF 60.0-69.9, ADULT (HCC): ICD-10-CM

## 2020-12-04 DIAGNOSIS — F42.4 EXCORIATION (SKIN-PICKING) DISORDER: ICD-10-CM

## 2020-12-04 DIAGNOSIS — E11.69 DIABETES MELLITUS TYPE 2 IN OBESE (HCC): ICD-10-CM

## 2020-12-04 DIAGNOSIS — I87.311 CHRONIC VENOUS HYPERTENSION (IDIOPATHIC) WITH ULCER OF RIGHT LOWER EXTREMITY (CODE) (HCC): Primary | ICD-10-CM

## 2020-12-04 PROCEDURE — 29581 APPL MULTLAYER CMPRN SYS LEG: CPT

## 2020-12-04 PROCEDURE — 99204 OFFICE O/P NEW MOD 45 MIN: CPT | Performed by: FAMILY MEDICINE

## 2020-12-04 PROCEDURE — 99213 OFFICE O/P EST LOW 20 MIN: CPT | Performed by: FAMILY MEDICINE

## 2020-12-04 RX ORDER — LIDOCAINE 40 MG/G
CREAM TOPICAL ONCE
Status: COMPLETED | OUTPATIENT
Start: 2020-12-04 | End: 2020-12-04

## 2020-12-04 RX ADMIN — LIDOCAINE 1 APPLICATION: 40 CREAM TOPICAL at 14:01

## 2020-12-09 ENCOUNTER — OFFICE VISIT (OUTPATIENT)
Dept: WOUND CARE | Facility: HOSPITAL | Age: 65
End: 2020-12-09
Payer: MEDICARE

## 2020-12-09 VITALS
DIASTOLIC BLOOD PRESSURE: 78 MMHG | TEMPERATURE: 97.9 F | RESPIRATION RATE: 18 BRPM | HEART RATE: 72 BPM | SYSTOLIC BLOOD PRESSURE: 122 MMHG

## 2020-12-09 DIAGNOSIS — F42.4 EXCORIATION (SKIN-PICKING) DISORDER: ICD-10-CM

## 2020-12-09 DIAGNOSIS — E66.01 MORBID OBESITY WITH BMI OF 60.0-69.9, ADULT (HCC): ICD-10-CM

## 2020-12-09 DIAGNOSIS — E11.69 DIABETES MELLITUS TYPE 2 IN OBESE (HCC): ICD-10-CM

## 2020-12-09 DIAGNOSIS — I87.311 CHRONIC VENOUS HYPERTENSION (IDIOPATHIC) WITH ULCER OF RIGHT LOWER EXTREMITY (CODE) (HCC): Primary | ICD-10-CM

## 2020-12-09 DIAGNOSIS — E66.9 DIABETES MELLITUS TYPE 2 IN OBESE (HCC): ICD-10-CM

## 2020-12-09 DIAGNOSIS — L97.211 NON-PRESSURE CHRONIC ULCER OF RIGHT CALF LIMITED TO BREAKDOWN OF SKIN (HCC): ICD-10-CM

## 2020-12-09 PROCEDURE — 99213 OFFICE O/P EST LOW 20 MIN: CPT | Performed by: FAMILY MEDICINE

## 2020-12-09 PROCEDURE — 29581 APPL MULTLAYER CMPRN SYS LEG: CPT

## 2020-12-09 RX ORDER — LIDOCAINE HYDROCHLORIDE 40 MG/ML
5 SOLUTION TOPICAL ONCE
Status: COMPLETED | OUTPATIENT
Start: 2020-12-09 | End: 2020-12-09

## 2020-12-09 RX ADMIN — LIDOCAINE HYDROCHLORIDE 5 ML: 40 SOLUTION TOPICAL at 10:08

## 2020-12-24 ENCOUNTER — OFFICE VISIT (OUTPATIENT)
Dept: WOUND CARE | Facility: HOSPITAL | Age: 65
End: 2020-12-24
Payer: MEDICARE

## 2020-12-24 VITALS
RESPIRATION RATE: 20 BRPM | SYSTOLIC BLOOD PRESSURE: 140 MMHG | TEMPERATURE: 98.7 F | HEART RATE: 92 BPM | DIASTOLIC BLOOD PRESSURE: 92 MMHG

## 2020-12-24 DIAGNOSIS — I87.311 CHRONIC VENOUS HYPERTENSION (IDIOPATHIC) WITH ULCER OF RIGHT LOWER EXTREMITY (CODE) (HCC): Primary | ICD-10-CM

## 2020-12-24 DIAGNOSIS — L97.211 NON-PRESSURE CHRONIC ULCER OF RIGHT CALF LIMITED TO BREAKDOWN OF SKIN (HCC): ICD-10-CM

## 2020-12-24 PROCEDURE — 99212 OFFICE O/P EST SF 10 MIN: CPT | Performed by: FAMILY MEDICINE

## 2020-12-24 PROCEDURE — 99213 OFFICE O/P EST LOW 20 MIN: CPT | Performed by: FAMILY MEDICINE

## 2020-12-31 ENCOUNTER — OFFICE VISIT (OUTPATIENT)
Dept: WOUND CARE | Facility: HOSPITAL | Age: 65
End: 2020-12-31
Payer: MEDICARE

## 2020-12-31 ENCOUNTER — HOSPITAL ENCOUNTER (OUTPATIENT)
Dept: NON INVASIVE DIAGNOSTICS | Facility: HOSPITAL | Age: 65
Discharge: HOME | End: 2020-12-31
Payer: MEDICARE

## 2020-12-31 VITALS
TEMPERATURE: 97.1 F | DIASTOLIC BLOOD PRESSURE: 86 MMHG | RESPIRATION RATE: 20 BRPM | SYSTOLIC BLOOD PRESSURE: 126 MMHG | HEART RATE: 88 BPM

## 2020-12-31 DIAGNOSIS — I87.2 CHRONIC VENOUS INSUFFICIENCY OF LOWER EXTREMITY: ICD-10-CM

## 2020-12-31 DIAGNOSIS — I87.311 CHRONIC VENOUS HYPERTENSION (IDIOPATHIC) WITH ULCER OF RIGHT LOWER EXTREMITY (CODE) (HCC): Primary | ICD-10-CM

## 2020-12-31 DIAGNOSIS — L97.211 NON-PRESSURE CHRONIC ULCER OF RIGHT CALF LIMITED TO BREAKDOWN OF SKIN (HCC): ICD-10-CM

## 2020-12-31 PROCEDURE — 93923 UPR/LXTR ART STDY 3+ LVLS: CPT

## 2020-12-31 PROCEDURE — 99212 OFFICE O/P EST SF 10 MIN: CPT | Performed by: FAMILY MEDICINE

## 2020-12-31 PROCEDURE — 99213 OFFICE O/P EST LOW 20 MIN: CPT | Performed by: FAMILY MEDICINE

## 2020-12-31 PROCEDURE — 93923 UPR/LXTR ART STDY 3+ LVLS: CPT | Performed by: SURGERY

## 2021-01-27 ENCOUNTER — TELEPHONE (OUTPATIENT)
Dept: FAMILY MEDICINE CLINIC | Facility: CLINIC | Age: 66
End: 2021-01-27

## 2021-01-27 NOTE — TELEPHONE ENCOUNTER
SIGNATURES NEEDED FOR [employer] FORM RECEIVED VIA FAX  WILL BE PLACED IN YOUR BIN AT ASSIGNED DELIVERY TIMES      [employer]

## 2021-03-23 RX ORDER — MONTELUKAST SODIUM 10 MG/1
TABLET ORAL
COMMUNITY
Start: 2021-01-20 | End: 2021-03-24 | Stop reason: ALTCHOICE

## 2021-03-24 ENCOUNTER — OFFICE VISIT (OUTPATIENT)
Dept: FAMILY MEDICINE CLINIC | Facility: CLINIC | Age: 66
End: 2021-03-24

## 2021-03-24 VITALS
BODY MASS INDEX: 61.06 KG/M2 | WEIGHT: 283 LBS | HEART RATE: 100 BPM | RESPIRATION RATE: 20 BRPM | DIASTOLIC BLOOD PRESSURE: 82 MMHG | TEMPERATURE: 97.2 F | HEIGHT: 57 IN | OXYGEN SATURATION: 98 % | SYSTOLIC BLOOD PRESSURE: 120 MMHG

## 2021-03-24 DIAGNOSIS — I87.2 CHRONIC VENOUS INSUFFICIENCY OF LOWER EXTREMITY: Primary | ICD-10-CM

## 2021-03-24 DIAGNOSIS — M54.50 CHRONIC LOW BACK PAIN WITHOUT SCIATICA, UNSPECIFIED BACK PAIN LATERALITY: ICD-10-CM

## 2021-03-24 DIAGNOSIS — I21.4 NON-ST ELEVATION MYOCARDIAL INFARCTION (NSTEMI) (HCC): ICD-10-CM

## 2021-03-24 DIAGNOSIS — E66.9 DIABETES MELLITUS TYPE 2 IN OBESE (HCC): ICD-10-CM

## 2021-03-24 DIAGNOSIS — E11.69 DIABETES MELLITUS TYPE 2 IN OBESE (HCC): ICD-10-CM

## 2021-03-24 DIAGNOSIS — G47.33 OBSTRUCTIVE SLEEP APNEA OF ADULT: ICD-10-CM

## 2021-03-24 DIAGNOSIS — L30.9 ECZEMA, UNSPECIFIED TYPE: ICD-10-CM

## 2021-03-24 DIAGNOSIS — J30.1 NON-SEASONAL ALLERGIC RHINITIS DUE TO POLLEN: ICD-10-CM

## 2021-03-24 DIAGNOSIS — L97.911 ULCER OF RIGHT LOWER EXTREMITY, LIMITED TO BREAKDOWN OF SKIN (HCC): ICD-10-CM

## 2021-03-24 DIAGNOSIS — M47.816 LUMBAR SPONDYLOSIS: ICD-10-CM

## 2021-03-24 DIAGNOSIS — J41.0 SIMPLE CHRONIC BRONCHITIS (HCC): ICD-10-CM

## 2021-03-24 DIAGNOSIS — Z09 FOLLOW UP: ICD-10-CM

## 2021-03-24 DIAGNOSIS — G89.29 CHRONIC LOW BACK PAIN WITHOUT SCIATICA, UNSPECIFIED BACK PAIN LATERALITY: ICD-10-CM

## 2021-03-24 DIAGNOSIS — M16.11 OSTEOARTHRITIS OF RIGHT HIP, UNSPECIFIED OSTEOARTHRITIS TYPE: ICD-10-CM

## 2021-03-24 DIAGNOSIS — I10 ESSENTIAL HYPERTENSION: Chronic | ICD-10-CM

## 2021-03-24 DIAGNOSIS — M79.10 MYALGIA: ICD-10-CM

## 2021-03-24 DIAGNOSIS — E78.2 MIXED HYPERLIPIDEMIA: ICD-10-CM

## 2021-03-24 DIAGNOSIS — I50.31 ACUTE DIASTOLIC CHF (CONGESTIVE HEART FAILURE) (HCC): ICD-10-CM

## 2021-03-24 PROCEDURE — 99213 OFFICE O/P EST LOW 20 MIN: CPT | Performed by: FAMILY MEDICINE

## 2021-03-24 RX ORDER — LORATADINE 10 MG/1
10 TABLET ORAL DAILY
Qty: 30 TABLET | Refills: 3 | Status: SHIPPED | OUTPATIENT
Start: 2021-03-24 | End: 2021-09-09

## 2021-03-24 RX ORDER — TRIAMCINOLONE ACETONIDE 0.25 MG/G
CREAM TOPICAL 2 TIMES DAILY
Qty: 30 G | Refills: 2 | Status: SHIPPED | OUTPATIENT
Start: 2021-03-24 | End: 2021-04-03

## 2021-03-24 RX ORDER — PANTOPRAZOLE SODIUM 40 MG/1
40 TABLET, DELAYED RELEASE ORAL DAILY
Qty: 90 TABLET | Refills: 3 | Status: SHIPPED | OUTPATIENT
Start: 2021-03-24 | End: 2021-05-06

## 2021-03-24 RX ORDER — GABAPENTIN 300 MG/1
300 CAPSULE ORAL
Qty: 30 CAPSULE | Refills: 2 | Status: SHIPPED | OUTPATIENT
Start: 2021-03-24 | End: 2021-07-19 | Stop reason: SDUPTHER

## 2021-03-24 RX ORDER — AMLODIPINE BESYLATE 5 MG/1
5 TABLET ORAL DAILY
Qty: 90 TABLET | Refills: 4 | Status: SHIPPED | OUTPATIENT
Start: 2021-03-24 | End: 2021-07-19 | Stop reason: SDUPTHER

## 2021-03-24 RX ORDER — ASPIRIN 81 MG/1
81 TABLET ORAL DAILY
Qty: 90 TABLET | Refills: 2 | Status: SHIPPED | OUTPATIENT
Start: 2021-03-24 | End: 2021-07-19 | Stop reason: SDUPTHER

## 2021-03-24 RX ORDER — ATORVASTATIN CALCIUM 40 MG/1
40 TABLET, FILM COATED ORAL
Qty: 90 TABLET | Refills: 4 | Status: SHIPPED | OUTPATIENT
Start: 2021-03-24

## 2021-03-24 RX ORDER — MULTIVITAMIN
1 TABLET ORAL DAILY
Qty: 30 TABLET | Refills: 2 | Status: SHIPPED | OUTPATIENT
Start: 2021-03-24 | End: 2021-07-19 | Stop reason: SDUPTHER

## 2021-03-24 NOTE — ASSESSMENT & PLAN NOTE
States she has a CPAP at home but does not use it   Endorses feeling tired and not sleeping well  Educated patient on the benefits of CPAP and patient agreed to start using CPAP again  Encouraged patient to follow up with sleep medicine

## 2021-03-24 NOTE — ASSESSMENT & PLAN NOTE
Same leg as last time, however different ulcer locations  Not as deep as previously and appear to just be starting     See venous statis as well as media   Follow up with wound care

## 2021-03-24 NOTE — ASSESSMENT & PLAN NOTE
Previously taking Singulair, however will switch to Claritin given the possible side effect of mental health/SI with singular

## 2021-03-24 NOTE — PROGRESS NOTES
Assessment/Plan:    Diabetes mellitus type 2 in obese (HCC)  HbA1C in the office today was 6 0, patient is not on any medications  States she did not tolerate janumet that she was on previously and has not been taking anything  She does not want to take metformin 1000 mg nor 500 mg BID, will start at 500 mg once a day and see if she tolerates this, especially the added cardioprotection metformin provides  Educated on proper diet and exercise  Needs f/u for DM foot exam and eye exam (does follow with podiatry regularly)    Obstructive sleep apnea of adult  States she has a CPAP at home but does not use it   Endorses feeling tired and not sleeping well  Educated patient on the benefits of CPAP and patient agreed to start using CPAP again  Encouraged patient to follow up with sleep medicine     Allergic rhinitis  Previously taking Singulair, however will switch to Claritin given the possible side effect of mental health/SI with singular     Chronic venous insufficiency of lower extremity  Recurrent wounds that reopened  Last seen by would care 12/31/21  Unable to tolerate the SCD's as they are too tight around her upper calf  Refer to media for images of right leg  Will place referral to wound care and asked patient to call them and schedule apt    Advised patient to not use neosporin, hydrogen peroxide, or other OTC remedies as this may worsen ulcers, instead she can use the remainder of the cream she has from when she was at wound care  Essential hypertension  Goal is < 140/90, today it is 120/82  Home medications include: amlodipine 5 mg daily  Patient admits to be compliant with medications  Patient's blood pressure is controlled  Patient denies any side effects with medications  - Patient educated on the importance of weight loss, and appropriate dieting (low salt)     - Continue current medications       Ulcer of right lower extremity, limited to breakdown of skin (HCC)  Same leg as last time, however different ulcer locations  Not as deep as previously and appear to just be starting  See venous statis as well as media   Follow up with wound care    Eczema  bilateral forearms with scarring  Will start triamcinilone bid      Diagnoses and all orders for this visit:    Chronic venous insufficiency of lower extremity  -     Ambulatory referral to Wound Care; Future    Essential hypertension  -     amLODIPine (NORVASC) 5 mg tablet; Take 1 tablet (5 mg total) by mouth daily    Acute diastolic CHF (congestive heart failure) ([facility])  -     aspirin (ECOTRIN LOW STRENGTH) 81 mg EC tablet; Take 1 tablet (81 mg total) by mouth daily    Follow up  Comments:  declined flu vaccine  Orders:  -     pantoprazole (PROTONIX) 40 mg tablet; Take 1 tablet (40 mg total) by mouth daily    Mixed hyperlipidemia  -     atorvastatin (LIPITOR) 40 mg tablet; Take 1 tablet (40 mg total) by mouth daily with dinner    Non-ST elevation myocardial infarction (NSTEMI) ([facility])  -     atorvastatin (LIPITOR) 40 mg tablet; Take 1 tablet (40 mg total) by mouth daily with dinner    Myalgia  -     Multiple Vitamin (multivitamin) tablet; Take 1 tablet by mouth daily    Osteoarthritis of right hip, unspecified osteoarthritis type  -     gabapentin (NEURONTIN) 300 mg capsule; Take 1 capsule (300 mg total) by mouth daily at bedtime    Chronic low back pain without sciatica, unspecified back pain laterality  -     gabapentin (NEURONTIN) 300 mg capsule; Take 1 capsule (300 mg total) by mouth daily at bedtime    Lumbar spondylosis  -     gabapentin (NEURONTIN) 300 mg capsule; Take 1 capsule (300 mg total) by mouth daily at bedtime    Non-seasonal allergic rhinitis due to pollen  -     loratadine (CLARITIN) 10 mg tablet; Take 1 tablet (10 mg total) by mouth daily    Diabetes mellitus type 2 in obese ([facility])  -     metFORMIN (GLUCOPHAGE) 500 mg tablet;  Take 1 tablet (500 mg total) by mouth daily with breakfast    Eczema, unspecified type  Comments:  bilateral forearms with scarring  Will start triamcinilone bid   Orders:  -     triamcinolone (KENALOG) 0 025 % cream; Apply topically 2 (two) times a day To bilateral arms    Obstructive sleep apnea of adult    Ulcer of right lower extremity, limited to breakdown of skin (HCC)    Simple chronic bronchitis (HCC)  -     guaiFENesin (ROBITUSSIN) 100 MG/5ML oral liquid; Take 10 mL (200 mg total) by mouth 3 (three) times a day as needed for cough    Other orders  -     Discontinue: montelukast (SINGULAIR) 10 mg tablet  -     Cancel: influenza vaccine, high-dose, PF 0 7 mL (FLUZONE HIGH-DOSE)          Subjective:      Patient ID: Delphine Pickering is a 76 y o  female  This is a very pleasant 76 y o  female who presents to the clinic for management of their chronic medical conditions  She reports wounds on her right lower leg that are starting to open up again, with no improvement despite using Amlactin cream  She also reports using OTC claritin instead of Singulair as she ran out of the singular  Her symptoms are well controlled with claritin  Doesn't sleep well at night, however does have history of YENNI and does not use her CPAP machine  Has question about daily multivitamin; can't find one a day; should she take the one she has or cut in half (concerned this one might be too strong)  Also takes vitamin C 500mg and wants to know if can take every day, every other day  Educated patient her multivitamin is okay to take the whole pill and can take the Vitamin C daily  Patient reports chronic progressive right shoulder pain and difficulty lifting her right shoulder  Pain radiates to elbow  Losing strength in R hand  Tingling in first 3 digits of both hands, which causes difficulty with feeling things  Sometimes drops things from right hand e g  out of cabinet; puts pillow down  She would like to schedule an appointment to discuss this further  Patient's medical conditions are stable unless noted otherwise above    Patient has not had any recent hospitalizations, or medical emergencies since last visit  Patient has no further complaints other than what is mentioned in the ROS  Care team is as follows and patient encouraged to continue following up with them:   Sport medicine/ortho: Dr Christopher Tong  Cardiologist is Dr Lita Prasad      The following portions of the patient's history were reviewed and updated as appropriate: allergies, current medications, past family history, past medical history, past social history, past surgical history and problem list     Review of Systems   Constitutional: Positive for fatigue  Negative for activity change, appetite change, chills and fever  HENT: Negative for congestion, dental problem, rhinorrhea and sore throat  Eyes: Negative for photophobia, pain and visual disturbance  Respiratory: Negative for cough, chest tightness, shortness of breath and wheezing  Cardiovascular: Negative for chest pain, palpitations and leg swelling  Gastrointestinal: Negative for abdominal pain, blood in stool, constipation, diarrhea, nausea and vomiting  Genitourinary: Negative for difficulty urinating, dysuria, frequency, hematuria and urgency  Musculoskeletal: Positive for arthralgias (right hip and right shoulder)  Negative for back pain, myalgias and neck pain  Skin: Positive for color change, rash and wound  Allergic/Immunologic: Positive for environmental allergies  Neurological: Negative for dizziness, weakness, light-headedness, numbness and headaches  Psychiatric/Behavioral: Positive for sleep disturbance  Negative for agitation, behavioral problems, self-injury and suicidal ideas  The patient is not nervous/anxious            Objective:    /82 (BP Location: Left arm, Patient Position: Sitting, Cuff Size: Extra-Large)   Pulse 100   Temp (!) 97 2 °F (36 2 °C) (Temporal)   Resp 20   Ht 4' 9" (1 448 m)   Wt 128 kg (283 lb)   LMP  (LMP Unknown) SpO2 98%   Breastfeeding No   BMI 61 24 kg/m²      Physical Exam  Vitals signs and nursing note reviewed  Constitutional:       General: She is not in acute distress  Appearance: She is well-developed  She is obese  She is not ill-appearing or diaphoretic  HENT:      Head: Normocephalic and atraumatic  Right Ear: External ear normal       Left Ear: External ear normal       Nose: Nose normal       Mouth/Throat:      Mouth: Mucous membranes are moist       Pharynx: Oropharynx is clear  Eyes:      Pupils: Pupils are equal, round, and reactive to light  Neck:      Musculoskeletal: Normal range of motion and neck supple  Cardiovascular:      Rate and Rhythm: Normal rate and regular rhythm  Pulses: Normal pulses  Heart sounds: Normal heart sounds  No murmur  Pulmonary:      Effort: Pulmonary effort is normal  No respiratory distress  Breath sounds: Normal breath sounds  No wheezing  Abdominal:      General: Bowel sounds are normal  There is no distension  Palpations: Abdomen is soft  Tenderness: There is no abdominal tenderness  Musculoskeletal: Normal range of motion  General: Swelling present  No tenderness or signs of injury  Comments: evidence of venous insufficiency bilaterally    Skin:     General: Skin is warm and dry  Capillary Refill: Capillary refill takes less than 2 seconds  Findings: Lesion (on right lower leg) present  Neurological:      General: No focal deficit present  Mental Status: She is alert and oriented to person, place, and time     Psychiatric:         Mood and Affect: Mood normal          Behavior: Behavior normal                    Bev Kim MD  03/24/21  3:10 PM

## 2021-03-24 NOTE — ASSESSMENT & PLAN NOTE
HbA1C in the office today was 6 0, patient is not on any medications  States she did not tolerate janumet that she was on previously and has not been taking anything  She does not want to take metformin 1000 mg nor 500 mg BID, will start at 500 mg once a day and see if she tolerates this, especially the added cardioprotection metformin provides     Educated on proper diet and exercise  Needs f/u for DM foot exam and eye exam (does follow with podiatry regularly)

## 2021-03-24 NOTE — PATIENT INSTRUCTIONS
Start taking Metformin 500 mg once a day  Call wound care for an appointment  Referral is placed  Follow up with sleep medicine for your cpap machine  Lifestyle Medicine Tip Sheet    1  Eat predominantly less processed foods such as fast food, [employer]  dinners, and [employer]  2  Eat [employer], [employer] or [employer])    3  Eat a predominantly plant based diet   a  Dark Leafy Greens  b  Fruits/Vegetables  c  Whole Grains: Whole wheat, barely, wheat berries, quinoa, steel cut oats, brown rice, whole wheat pasta  d  Legumes: kidney beans, krishnan beans, white beans, black beans, garbanzo beans (chickpeas), lima beans (mature, dried), split peas, lentils, and edamame (green soybeans)      4  At least half of the plate should contain fruits or vegetables        5  Liquid should be predominantly water  (limit soda and juice)    6  Watch portion size  7  Foods you should avoid or limit?  - Fats - Specifically saturated and trans-fats  They are found in margarines, many fast foods, and some store-bought baked goods  Saturated and Trans-fats can raise your cholesterol level and your chance of getting heart disease    - When you cook, it's best to use no oils but if needed try to limit the amount of oil used as oil contains many calories per volume and is very unhealthy when heated during cooking    - Sugar -Limit or avoid sugar, sweets, and refined grains  Refined grains are found in white bread, white rice, most forms of pasta, and most packaged "snack" foods    - Try not to cook with salt and avoid  adding extra salt to your  meals   - Meat - Studies have shown that eating a lot of red meat and poultry can increase your risk of certain health problems, including heart disease, diabetes, obesity and cancer  So try to limit the intake of it  8  Practice good sleep hygiene by getting 7-9 hours of sleep a night    9   Daily exercise minimum of 30 minutes (walking around the block)    10  Socialization (friends and family)   - Explore your neighborhood  Go to the park, spend time at [employer]  - Consider taking a class or volunteering to connect with new people    If you are interested you can read more about healthy food choices at the following websites:  a  Nutritionfacts  org  b  Home cooking recipes: https://www [employer]/  c  http://[employer] info/  d  Familydoctor  org

## 2021-03-24 NOTE — ASSESSMENT & PLAN NOTE
Goal is < 140/90, today it is 120/82  Home medications include: amlodipine 5 mg daily  Patient admits to be compliant with medications  Patient's blood pressure is controlled  Patient denies any side effects with medications  - Patient educated on the importance of weight loss, and appropriate dieting (low salt)     - Continue current medications

## 2021-03-24 NOTE — ASSESSMENT & PLAN NOTE
Recurrent wounds that reopened  Last seen by would care 12/31/21  Unable to tolerate the SCD's as they are too tight around her upper calf  Refer to media for images of right leg  Will place referral to wound care and asked patient to call them and schedule apt    Advised patient to not use neosporin, hydrogen peroxide, or other OTC remedies as this may worsen ulcers, instead she can use the remainder of the cream she has from when she was at wound care

## 2021-03-25 ENCOUNTER — TELEPHONE (OUTPATIENT)
Dept: FAMILY MEDICINE CLINIC | Facility: CLINIC | Age: 66
End: 2021-03-25

## 2021-03-25 NOTE — TELEPHONE ENCOUNTER
Pt states she called the wound doctor stating he doesn't want to be bothered as there are no open wounds  Please advise on what the pt should do next   Thank you

## 2021-03-29 NOTE — TELEPHONE ENCOUNTER
I called and spoke with wound care and got the pt scheduled for 04/08/21 that is their soonest available for 10:15am        Pt informed them she did not have any open wounds only that their were black spots, swelling and pain  This is the reason why they informed the pt they could not treat her if there were no open wounds  They did inform the pt to go to the ED if there were black spots noted

## 2021-03-30 NOTE — TELEPHONE ENCOUNTER
I called pt and informed her of the appt date and time and I also reminded her of the appt here in out office   I also informed the pt if her leg is becoming more severe in pain W /swelling and redness she should report to the ED for further evaluation

## 2021-04-03 ENCOUNTER — HOSPITAL ENCOUNTER (INPATIENT)
Facility: HOSPITAL | Age: 66
LOS: 3 days | Discharge: HOME HEALTH | DRG: 872 | End: 2021-04-07
Attending: EMERGENCY MEDICINE | Admitting: INTERNAL MEDICINE
Payer: MEDICARE

## 2021-04-03 ENCOUNTER — APPOINTMENT (EMERGENCY)
Dept: CT IMAGING | Facility: HOSPITAL | Age: 66
DRG: 872 | End: 2021-04-03
Payer: MEDICARE

## 2021-04-03 DIAGNOSIS — E66.01 MORBID OBESITY WITH BMI OF 60.0-69.9, ADULT (HCC): ICD-10-CM

## 2021-04-03 DIAGNOSIS — E87.2 LACTIC ACIDOSIS: ICD-10-CM

## 2021-04-03 DIAGNOSIS — L03.115 CELLULITIS OF RIGHT LOWER EXTREMITY: ICD-10-CM

## 2021-04-03 DIAGNOSIS — N39.0 UTI (URINARY TRACT INFECTION): Primary | ICD-10-CM

## 2021-04-03 DIAGNOSIS — F42.4 EXCORIATION (SKIN-PICKING) DISORDER: ICD-10-CM

## 2021-04-03 PROBLEM — L97.911 ULCER OF RIGHT LOWER EXTREMITY, LIMITED TO BREAKDOWN OF SKIN (HCC): Status: RESOLVED | Noted: 2020-11-25 | Resolved: 2021-04-03

## 2021-04-03 PROBLEM — A41.9 SEPSIS (HCC): Status: ACTIVE | Noted: 2021-04-03

## 2021-04-03 PROBLEM — L03.90 CELLULITIS: Status: ACTIVE | Noted: 2021-04-03

## 2021-04-03 LAB
ALBUMIN SERPL BCP-MCNC: 3.5 G/DL (ref 3.5–5)
ALP SERPL-CCNC: 102 U/L (ref 46–116)
ALT SERPL W P-5'-P-CCNC: 22 U/L (ref 12–78)
ANION GAP SERPL CALCULATED.3IONS-SCNC: 8 MMOL/L (ref 4–13)
AST SERPL W P-5'-P-CCNC: 21 U/L (ref 5–45)
BACTERIA UR QL AUTO: ABNORMAL /HPF
BASOPHILS # BLD AUTO: 0.05 THOUSANDS/ΜL (ref 0–0.1)
BASOPHILS NFR BLD AUTO: 1 % (ref 0–1)
BILIRUB SERPL-MCNC: 0.24 MG/DL (ref 0.2–1)
BILIRUB UR QL STRIP: NEGATIVE
BUN SERPL-MCNC: 13 MG/DL (ref 5–25)
CALCIUM SERPL-MCNC: 9.3 MG/DL (ref 8.3–10.1)
CHLORIDE SERPL-SCNC: 105 MMOL/L (ref 100–108)
CLARITY UR: CLEAR
CO2 SERPL-SCNC: 30 MMOL/L (ref 21–32)
COLOR UR: YELLOW
CREAT SERPL-MCNC: 1.03 MG/DL (ref 0.6–1.3)
EOSINOPHIL # BLD AUTO: 0.61 THOUSAND/ΜL (ref 0–0.61)
EOSINOPHIL NFR BLD AUTO: 6 % (ref 0–6)
ERYTHROCYTE [DISTWIDTH] IN BLOOD BY AUTOMATED COUNT: 14.8 % (ref 11.6–15.1)
GFR SERPL CREATININE-BSD FRML MDRD: 65 ML/MIN/1.73SQ M
GLUCOSE SERPL-MCNC: 103 MG/DL (ref 65–140)
GLUCOSE SERPL-MCNC: 104 MG/DL (ref 65–140)
GLUCOSE SERPL-MCNC: 111 MG/DL (ref 65–140)
GLUCOSE UR STRIP-MCNC: NEGATIVE MG/DL
HCT VFR BLD AUTO: 41.2 % (ref 34.8–46.1)
HGB BLD-MCNC: 13.5 G/DL (ref 11.5–15.4)
HGB UR QL STRIP.AUTO: ABNORMAL
IMM GRANULOCYTES # BLD AUTO: 0.02 THOUSAND/UL (ref 0–0.2)
IMM GRANULOCYTES NFR BLD AUTO: 0 % (ref 0–2)
KETONES UR STRIP-MCNC: NEGATIVE MG/DL
LACTATE SERPL-SCNC: 1.5 MMOL/L (ref 0.5–2)
LACTATE SERPL-SCNC: 2.2 MMOL/L (ref 0.5–2)
LEUKOCYTE ESTERASE UR QL STRIP: NEGATIVE
LYMPHOCYTES # BLD AUTO: 3.92 THOUSANDS/ΜL (ref 0.6–4.47)
LYMPHOCYTES NFR BLD AUTO: 40 % (ref 14–44)
MCH RBC QN AUTO: 29.3 PG (ref 26.8–34.3)
MCHC RBC AUTO-ENTMCNC: 32.8 G/DL (ref 31.4–37.4)
MCV RBC AUTO: 89 FL (ref 82–98)
MONOCYTES # BLD AUTO: 0.7 THOUSAND/ΜL (ref 0.17–1.22)
MONOCYTES NFR BLD AUTO: 7 % (ref 4–12)
NEUTROPHILS # BLD AUTO: 4.51 THOUSANDS/ΜL (ref 1.85–7.62)
NEUTS SEG NFR BLD AUTO: 46 % (ref 43–75)
NITRITE UR QL STRIP: NEGATIVE
NON-SQ EPI CELLS URNS QL MICRO: ABNORMAL /HPF
NRBC BLD AUTO-RTO: 0 /100 WBCS
PH UR STRIP.AUTO: 7 [PH] (ref 4.5–8)
PLATELET # BLD AUTO: 308 THOUSANDS/UL (ref 149–390)
PMV BLD AUTO: 9.5 FL (ref 8.9–12.7)
POTASSIUM SERPL-SCNC: 4.5 MMOL/L (ref 3.5–5.3)
PROCALCITONIN SERPL-MCNC: <0.05 NG/ML
PROT SERPL-MCNC: 7.8 G/DL (ref 6.4–8.2)
PROT UR STRIP-MCNC: NEGATIVE MG/DL
RBC # BLD AUTO: 4.61 MILLION/UL (ref 3.81–5.12)
RBC #/AREA URNS AUTO: ABNORMAL /HPF
SODIUM SERPL-SCNC: 143 MMOL/L (ref 136–145)
SP GR UR STRIP.AUTO: 1.02 (ref 1–1.03)
UROBILINOGEN UR QL STRIP.AUTO: 0.2 E.U./DL
WBC # BLD AUTO: 9.81 THOUSAND/UL (ref 4.31–10.16)
WBC #/AREA URNS AUTO: ABNORMAL /HPF

## 2021-04-03 PROCEDURE — 87040 BLOOD CULTURE FOR BACTERIA: CPT | Performed by: EMERGENCY MEDICINE

## 2021-04-03 PROCEDURE — 36415 COLL VENOUS BLD VENIPUNCTURE: CPT | Performed by: EMERGENCY MEDICINE

## 2021-04-03 PROCEDURE — 93005 ELECTROCARDIOGRAM TRACING: CPT

## 2021-04-03 PROCEDURE — 99220 PR INITIAL OBSERVATION CARE/DAY 70 MINUTES: CPT | Performed by: INTERNAL MEDICINE

## 2021-04-03 PROCEDURE — 74176 CT ABD & PELVIS W/O CONTRAST: CPT

## 2021-04-03 PROCEDURE — 81001 URINALYSIS AUTO W/SCOPE: CPT

## 2021-04-03 PROCEDURE — 84145 PROCALCITONIN (PCT): CPT | Performed by: EMERGENCY MEDICINE

## 2021-04-03 PROCEDURE — 80053 COMPREHEN METABOLIC PANEL: CPT | Performed by: EMERGENCY MEDICINE

## 2021-04-03 PROCEDURE — 85025 COMPLETE CBC W/AUTO DIFF WBC: CPT | Performed by: EMERGENCY MEDICINE

## 2021-04-03 PROCEDURE — 96361 HYDRATE IV INFUSION ADD-ON: CPT

## 2021-04-03 PROCEDURE — 1123F ACP DISCUSS/DSCN MKR DOCD: CPT | Performed by: EMERGENCY MEDICINE

## 2021-04-03 PROCEDURE — 96374 THER/PROPH/DIAG INJ IV PUSH: CPT

## 2021-04-03 PROCEDURE — 83605 ASSAY OF LACTIC ACID: CPT | Performed by: EMERGENCY MEDICINE

## 2021-04-03 PROCEDURE — 99285 EMERGENCY DEPT VISIT HI MDM: CPT

## 2021-04-03 PROCEDURE — 99285 EMERGENCY DEPT VISIT HI MDM: CPT | Performed by: EMERGENCY MEDICINE

## 2021-04-03 PROCEDURE — 82948 REAGENT STRIP/BLOOD GLUCOSE: CPT

## 2021-04-03 PROCEDURE — G1004 CDSM NDSC: HCPCS

## 2021-04-03 RX ORDER — PANTOPRAZOLE SODIUM 40 MG/1
40 TABLET, DELAYED RELEASE ORAL DAILY
Status: DISCONTINUED | OUTPATIENT
Start: 2021-04-03 | End: 2021-04-07 | Stop reason: HOSPADM

## 2021-04-03 RX ORDER — GABAPENTIN 300 MG/1
300 CAPSULE ORAL
Status: DISCONTINUED | OUTPATIENT
Start: 2021-04-03 | End: 2021-04-07 | Stop reason: HOSPADM

## 2021-04-03 RX ORDER — AMLODIPINE BESYLATE 5 MG/1
5 TABLET ORAL DAILY
Status: DISCONTINUED | OUTPATIENT
Start: 2021-04-03 | End: 2021-04-07 | Stop reason: HOSPADM

## 2021-04-03 RX ORDER — TRIAMCINOLONE ACETONIDE 0.25 MG/G
CREAM TOPICAL 2 TIMES DAILY
Status: DISCONTINUED | OUTPATIENT
Start: 2021-04-03 | End: 2021-04-07 | Stop reason: HOSPADM

## 2021-04-03 RX ORDER — ACETAMINOPHEN 325 MG/1
650 TABLET ORAL EVERY 6 HOURS PRN
Status: DISCONTINUED | OUTPATIENT
Start: 2021-04-03 | End: 2021-04-07 | Stop reason: HOSPADM

## 2021-04-03 RX ORDER — ATORVASTATIN CALCIUM 40 MG/1
40 TABLET, FILM COATED ORAL
Status: DISCONTINUED | OUTPATIENT
Start: 2021-04-03 | End: 2021-04-07 | Stop reason: HOSPADM

## 2021-04-03 RX ORDER — FENTANYL CITRATE 50 UG/ML
50 INJECTION, SOLUTION INTRAMUSCULAR; INTRAVENOUS ONCE
Status: COMPLETED | OUTPATIENT
Start: 2021-04-03 | End: 2021-04-03

## 2021-04-03 RX ORDER — POLYETHYLENE GLYCOL 3350 17 G/17G
17 POWDER, FOR SOLUTION ORAL DAILY
Status: DISCONTINUED | OUTPATIENT
Start: 2021-04-03 | End: 2021-04-07 | Stop reason: HOSPADM

## 2021-04-03 RX ORDER — CEFAZOLIN SODIUM 2 G/50ML
2000 SOLUTION INTRAVENOUS EVERY 8 HOURS
Status: DISCONTINUED | OUTPATIENT
Start: 2021-04-03 | End: 2021-04-07 | Stop reason: HOSPADM

## 2021-04-03 RX ORDER — ASPIRIN 81 MG/1
81 TABLET ORAL DAILY
Status: DISCONTINUED | OUTPATIENT
Start: 2021-04-03 | End: 2021-04-07 | Stop reason: HOSPADM

## 2021-04-03 RX ORDER — SACCHAROMYCES BOULARDII 250 MG
250 CAPSULE ORAL 2 TIMES DAILY
Status: DISCONTINUED | OUTPATIENT
Start: 2021-04-03 | End: 2021-04-07 | Stop reason: HOSPADM

## 2021-04-03 RX ORDER — HEPARIN SODIUM 5000 [USP'U]/ML
5000 INJECTION, SOLUTION INTRAVENOUS; SUBCUTANEOUS EVERY 8 HOURS SCHEDULED
Status: DISCONTINUED | OUTPATIENT
Start: 2021-04-03 | End: 2021-04-07 | Stop reason: HOSPADM

## 2021-04-03 RX ORDER — AMMONIUM LACTATE 12 G/100G
LOTION TOPICAL 2 TIMES DAILY
Status: DISCONTINUED | OUTPATIENT
Start: 2021-04-03 | End: 2021-04-07 | Stop reason: HOSPADM

## 2021-04-03 RX ADMIN — CEFTRIAXONE SODIUM 1000 MG: 10 INJECTION, POWDER, FOR SOLUTION INTRAVENOUS at 13:41

## 2021-04-03 RX ADMIN — CEFAZOLIN SODIUM 2000 MG: 2 SOLUTION INTRAVENOUS at 21:42

## 2021-04-03 RX ADMIN — HEPARIN SODIUM 5000 UNITS: 5000 INJECTION INTRAVENOUS; SUBCUTANEOUS at 21:42

## 2021-04-03 RX ADMIN — AMLODIPINE BESYLATE 5 MG: 5 TABLET ORAL at 16:27

## 2021-04-03 RX ADMIN — GABAPENTIN 300 MG: 300 CAPSULE ORAL at 21:42

## 2021-04-03 RX ADMIN — SODIUM CHLORIDE 500 ML: 0.9 INJECTION, SOLUTION INTRAVENOUS at 12:22

## 2021-04-03 RX ADMIN — PANTOPRAZOLE SODIUM 40 MG: 40 TABLET, DELAYED RELEASE ORAL at 16:27

## 2021-04-03 RX ADMIN — HEPARIN SODIUM 5000 UNITS: 5000 INJECTION INTRAVENOUS; SUBCUTANEOUS at 16:28

## 2021-04-03 RX ADMIN — SODIUM CHLORIDE 2000 ML: 0.9 INJECTION, SOLUTION INTRAVENOUS at 16:28

## 2021-04-03 RX ADMIN — CEFAZOLIN SODIUM 2000 MG: 2 SOLUTION INTRAVENOUS at 16:27

## 2021-04-03 RX ADMIN — ATORVASTATIN CALCIUM 40 MG: 40 TABLET, FILM COATED ORAL at 16:27

## 2021-04-03 RX ADMIN — FENTANYL CITRATE 50 MCG: 50 INJECTION, SOLUTION INTRAMUSCULAR; INTRAVENOUS at 11:36

## 2021-04-03 RX ADMIN — ASPIRIN 81 MG: 81 TABLET, COATED ORAL at 16:27

## 2021-04-03 RX ADMIN — Medication 250 MG: at 17:18

## 2021-04-03 NOTE — PLAN OF CARE
Problem: Potential for Falls  Goal: Patient will remain free of falls  Description: INTERVENTIONS:  - Assess patient frequently for physical needs  -  Identify cognitive and physical deficits and behaviors that affect risk of falls  -  [place] fall precautions as indicated by assessment   - Educate patient/family on patient safety including physical limitations  - Instruct patient to call for assistance with activity based on assessment  - Modify environment to reduce risk of injury  - Consider OT/PT consult to assist with strengthening/mobility  Outcome: Progressing     Problem: Nutrition/Hydration-ADULT  Goal: Nutrient/Hydration intake appropriate for improving, restoring or maintaining nutritional needs  Description: Monitor and assess patient's nutrition/hydration status for malnutrition  Collaborate with interdisciplinary team and initiate plan and interventions as ordered  Monitor patient's weight and dietary intake as ordered or per policy  Utilize nutrition screening tool and intervene as necessary  Determine patient's food preferences and provide high-protein, high-caloric foods as appropriate       INTERVENTIONS:  - Monitor oral intake, urinary output, labs, and treatment plans  - Assess nutrition and hydration status and recommend course of action  - Evaluate amount of meals eaten  - Assist patient with eating if necessary   - Allow adequate time for meals  - Recommend/ encourage appropriate diets, oral nutritional supplements, and vitamin/mineral supplements  - Order, calculate, and assess calorie counts as needed  - Recommend, monitor, and adjust tube feedings and TPN/PPN based on assessed needs  - Assess need for intravenous fluids  - Provide specific nutrition/hydration education as appropriate  - Include patient/family/caregiver in decisions related to nutrition  Outcome: Progressing     Problem: NEUROSENSORY - ADULT  Goal: Achieves stable or improved neurological status  Description: INTERVENTIONS  - Monitor and report changes in neurological status  - Monitor vital signs such as temperature, blood pressure, glucose, and any other labs ordered   - Initiate measures to prevent increased intracranial pressure  - Monitor for seizure activity and implement precautions if appropriate      Outcome: Progressing  Goal: Remains free of injury related to seizures activity  Description: INTERVENTIONS  - Maintain airway, patient safety  and administer oxygen as ordered  - Monitor patient for seizure activity, document and report duration and description of seizure to physician/advanced practitioner  - If seizure occurs,  ensure patient safety during seizure  - Reorient patient post seizure  - Seizure pads on all 4 side rails  - Instruct patient/family to notify RN of any seizure activity including if an aura is experienced  - Instruct patient/family to call for assistance with activity based on nursing assessment  - Administer anti-seizure medications if ordered    Outcome: Progressing  Goal: Achieves maximal functionality and self care  Description: INTERVENTIONS  - Monitor swallowing and airway patency with patient fatigue and changes in neurological status  - Encourage and assist patient to increase activity and self care     - Encourage visually impaired, hearing impaired and aphasic patients to use assistive/communication devices  Outcome: Progressing     Problem: CARDIOVASCULAR - ADULT  Goal: Maintains optimal cardiac output and hemodynamic stability  Description: INTERVENTIONS:  - Monitor I/O, vital signs and rhythm  - Monitor for S/S and trends of decreased cardiac output  - Administer and titrate ordered vasoactive medications to optimize hemodynamic stability  - Assess quality of pulses, skin color and temperature  - Assess for signs of decreased coronary artery perfusion  - Instruct patient to report change in severity of symptoms  Outcome: Progressing  Goal: Absence of cardiac dysrhythmias or at baseline rhythm  Description: INTERVENTIONS:  - Continuous cardiac monitoring, vital signs, obtain 12 lead EKG if ordered  - Administer antiarrhythmic and heart rate control medications as ordered  - Monitor electrolytes and administer replacement therapy as ordered  Outcome: Progressing     Problem: RESPIRATORY - ADULT  Goal: Achieves optimal ventilation and oxygenation  Description: INTERVENTIONS:  - Assess for changes in respiratory status  - Assess for changes in mentation and behavior  - Position to facilitate oxygenation and minimize respiratory effort  - Oxygen administered by appropriate delivery if ordered  - Initiate smoking cessation education as indicated  - Encourage broncho-pulmonary hygiene including cough, deep breathe, Incentive Spirometry  - Assess the need for suctioning and aspirate as needed  - Assess and instruct to report SOB or any respiratory difficulty  - Respiratory Therapy support as indicated  Outcome: Progressing     Problem: GASTROINTESTINAL - ADULT  Goal: Minimal or absence of nausea and/or vomiting  Description: INTERVENTIONS:  - Administer IV fluids if ordered to ensure adequate hydration  - Maintain NPO status until nausea and vomiting are resolved  - Nasogastric tube if ordered  - Administer ordered antiemetic medications as needed  - Provide nonpharmacologic comfort measures as appropriate  - Advance diet as tolerated, if ordered  - Consider nutrition services referral to assist patient with adequate nutrition and appropriate food choices  Outcome: Progressing  Goal: Maintains or returns to baseline bowel function  Description: INTERVENTIONS:  - Assess bowel function  - Encourage oral fluids to ensure adequate hydration  - Administer IV fluids if ordered to ensure adequate hydration  - Administer ordered medications as needed  - Encourage mobilization and activity  - Consider nutritional services referral to assist patient with adequate nutrition and appropriate food choices  Outcome: Progressing  Goal: Maintains adequate nutritional intake  Description: INTERVENTIONS:  - Monitor percentage of each meal consumed  - Identify factors contributing to decreased intake, treat as appropriate  - Assist with meals as needed  - Monitor I&O, weight, and lab values if indicated  - Obtain nutrition services referral as needed  Outcome: Progressing     Problem: GENITOURINARY - ADULT  Goal: Maintains or returns to baseline urinary function  Description: INTERVENTIONS:  - Assess urinary function  - Encourage oral fluids to ensure adequate hydration if ordered  - Administer IV fluids as ordered to ensure adequate hydration  - Administer ordered medications as needed  - Offer frequent toileting  - Follow urinary retention protocol if ordered  Outcome: Progressing  Goal: Absence of urinary retention  Description: INTERVENTIONS:  - Assess patients ability to void and empty bladder  - Monitor I/O  - Bladder scan as needed  - Discuss with physician/AP medications to alleviate retention as needed  - Discuss catheterization for long term situations as appropriate  Outcome: Progressing     Problem: METABOLIC, FLUID AND ELECTROLYTES - ADULT  Goal: Electrolytes maintained within normal limits  Description: INTERVENTIONS:  - Monitor labs and assess patient for signs and symptoms of electrolyte imbalances  - Administer electrolyte replacement as ordered  - Monitor response to electrolyte replacements, including repeat lab results as appropriate  - Instruct patient on fluid and nutrition as appropriate  Outcome: Progressing  Goal: Fluid balance maintained  Description: INTERVENTIONS:  - Monitor labs   - Monitor I/O and WT  - Instruct patient on fluid and nutrition as appropriate  - Assess for signs & symptoms of volume excess or deficit  Outcome: Progressing  Goal: Glucose maintained within target range  Description: INTERVENTIONS:  - Monitor Blood Glucose as ordered  - Assess for signs and symptoms of hyperglycemia and hypoglycemia  - Administer ordered medications to maintain glucose within target range  - Assess nutritional intake and initiate nutrition service referral as needed  Outcome: Progressing     Problem: SKIN/TISSUE INTEGRITY - ADULT  Goal: Skin integrity remains intact  Description: INTERVENTIONS  - Identify patients at risk for skin breakdown  - Assess and monitor skin integrity  - Assess and monitor nutrition and hydration status  - Monitor labs (i e  albumin)  - Assess for incontinence   - Turn and reposition patient  - Assist with mobility/ambulation  - Relieve pressure over bony prominences  - Avoid friction and shearing  - Provide appropriate hygiene as needed including keeping skin clean and dry  - Evaluate need for skin moisturizer/barrier cream  - Collaborate with interdisciplinary team (i e  Nutrition, Rehabilitation, etc )   - Patient/family teaching  Outcome: Progressing  Goal: Incision(s), wounds(s) or drain site(s) healing without S/S of infection  Description: INTERVENTIONS  - Assess and document risk factors for skin impairment   - Assess and document dressing, incision, wound bed, drain sites and surrounding tissue  - Consider nutrition services referral as needed  - Oral mucous membranes remain intact  - Provide patient/ family education  Outcome: Progressing  Goal: Oral mucous membranes remain intact  Description: INTERVENTIONS  - Assess oral mucosa and hygiene practices  - Implement preventative oral hygiene regimen  - Implement oral medicated treatments as ordered  - Initiate Nutrition services referral as needed  Outcome: Progressing     Problem: HEMATOLOGIC - ADULT  Goal: Maintains hematologic stability  Description: INTERVENTIONS  - Assess for signs and symptoms of bleeding or hemorrhage  - Monitor labs  - Administer supportive blood products/factors as ordered and appropriate  Outcome: Progressing     Problem: MUSCULOSKELETAL - ADULT  Goal: Maintain or return mobility to safest level of function  Description: INTERVENTIONS:  - Assess patient's ability to carry out ADLs; assess patient's baseline for ADL function and identify physical deficits which impact ability to perform ADLs (bathing, care of mouth/teeth, toileting, grooming, dressing, etc )  - Assess/evaluate cause of self-care deficits   - Assess range of motion  - Assess patient's mobility  - Assess patient's need for assistive devices and provide as appropriate  - Encourage maximum independence but intervene and supervise when necessary  - Involve family in performance of ADLs  - Assess for home care needs following discharge   - Consider OT consult to assist with ADL evaluation and planning for discharge  - Provide patient education as appropriate  Outcome: Progressing  Goal: Maintain proper alignment of affected body part  Description: INTERVENTIONS:  - Support, maintain and protect limb and body alignment  - Provide patient/ family with appropriate education  Outcome: Progressing

## 2021-04-03 NOTE — ED PROVIDER NOTES
History  Chief Complaint   Patient presents with    Abdominal Pain     right flank pain, abd pain, frequent urination, pain and buring with urination also pain in right leg     58-year-old female with history of hypertension, morbid obesity, borderline diabetes, CAD, CHF, chronic venous insufficiency of lower extremities, chronic back pain presents to the emergency department with a 1 day history of frequency of urination, dark colored urine and heaviness of urine  She also complains of right flank pain that started this morning  No fevers, chills, nausea, vomiting or diarrhea  Patient also complaining of pain to the right lower extremity over the area of her chronic wound  She is due to see wound care this week  History provided by:  Patient   used: No    Flank Pain  Pain location:  R flank  Pain quality: shooting    Pain radiates to:  Does not radiate  Pain severity:  Severe  Onset quality:  Sudden  Duration:  4 hours  Timing:  Constant  Progression:  Unchanged  Chronicity:  New  Context: not eating, not recent illness and not trauma    Relieved by:  None tried  Worsened by:  Nothing  Ineffective treatments:  None tried  Associated symptoms: no anorexia, no belching, no chest pain, no chills, no constipation, no cough, no diarrhea, no dysuria, no fatigue, no fever, no flatus, no hematemesis, no hematochezia, no hematuria, no melena, no nausea, no shortness of breath, no sore throat, no vaginal bleeding, no vaginal discharge and no vomiting    Risk factors: obesity        Prior to Admission Medications   Prescriptions Last Dose Informant Patient Reported? Taking?    ACCU-CHEK FASTCLIX LANCETS MISC  Self Yes No   ACCU-CHEK GUIDE test strip  Self Yes No   Blood Glucose Monitoring Suppl (ACCU-CHEK GUIDE) w/Device KIT  Self Yes No   Multiple Vitamin (multivitamin) tablet   No No   Sig: Take 1 tablet by mouth daily   acetaminophen (TYLENOL) 325 mg tablet  Self No No   Sig: Take 2 tablets (650 mg total) by mouth every 6 (six) hours as needed for mild pain   amLODIPine (NORVASC) 5 mg tablet   No No   Sig: Take 1 tablet (5 mg total) by mouth daily   aspirin (ECOTRIN LOW STRENGTH) 81 mg EC tablet   No No   Sig: Take 1 tablet (81 mg total) by mouth daily   atorvastatin (LIPITOR) 40 mg tablet   No No   Sig: Take 1 tablet (40 mg total) by mouth daily with dinner   diphenhydrAMINE (BENADRYL) 25 mg tablet   No No   Sig: Take 1 tablet (25 mg total) by mouth every 6 (six) hours as needed for itching or allergies   fluocinonide (LIDEX) 0 05 % ointment  Self Yes No   gabapentin (NEURONTIN) 300 mg capsule   No No   Sig: Take 1 capsule (300 mg total) by mouth daily at bedtime   guaiFENesin (ROBITUSSIN) 100 MG/5ML oral liquid   No No   Sig: Take 10 mL (200 mg total) by mouth 3 (three) times a day as needed for cough   loratadine (CLARITIN) 10 mg tablet   No No   Sig: Take 1 tablet (10 mg total) by mouth daily   metFORMIN (GLUCOPHAGE) 500 mg tablet   No No   Sig: Take 1 tablet (500 mg total) by mouth daily with breakfast   nitroglycerin (NITROSTAT) 0 4 mg SL tablet  Self No No   Sig: Place 1 tablet (0 4 mg total) under the tongue every 5 (five) minutes as needed for chest pain   Patient not taking: Reported on 10/7/2020   pantoprazole (PROTONIX) 40 mg tablet   No No   Sig: Take 1 tablet (40 mg total) by mouth daily   triamcinolone (KENALOG) 0 025 % cream   No No   Sig: Apply topically 2 (two) times a day To bilateral arms      Facility-Administered Medications: None       Past Medical History:   Diagnosis Date    Full dentures     GERD (gastroesophageal reflux disease)     Hyperlipidemia        Past Surgical History:   Procedure Laterality Date    BILATERAL KNEE ARTHROSCOPY      CATARACT EXTRACTION Right     DENTAL SURGERY      HYSTERECTOMY      TOTAL KNEE ARTHROPLASTY Bilateral     TUBAL LIGATION         Family History   Problem Relation Age of Onset    No Known Problems Mother     Coronary artery disease Father      I have reviewed and agree with the history as documented  E-Cigarette/Vaping    E-Cigarette Use Never User      E-Cigarette/Vaping Substances    Nicotine No     THC No     CBD No     Flavoring No     Other No     Unknown No      Social History     Tobacco Use    Smoking status: Never Smoker    Smokeless tobacco: Never Used    Tobacco comment: childhood smoke exposure   Substance Use Topics    Alcohol use: Never     Frequency: Never    Drug use: Never       Review of Systems   Constitutional: Negative  Negative for chills, diaphoresis, fatigue and fever  HENT: Negative  Negative for congestion, rhinorrhea and sore throat  Eyes: Negative  Negative for discharge, redness and itching  Respiratory: Negative  Negative for apnea, cough, chest tightness, shortness of breath and wheezing  Cardiovascular: Negative for chest pain, palpitations and leg swelling  Gastrointestinal: Negative  Negative for abdominal pain, anorexia, constipation, diarrhea, flatus, hematemesis, hematochezia, melena, nausea and vomiting  Endocrine: Negative  Genitourinary: Positive for flank pain and frequency  Negative for difficulty urinating, dysuria, hematuria, urgency, vaginal bleeding and vaginal discharge  Musculoskeletal: Positive for back pain  Skin: Negative  Allergic/Immunologic: Negative  Neurological: Negative  Negative for dizziness, syncope, weakness, light-headedness, numbness and headaches  Hematological: Negative  All other systems reviewed and are negative  Physical Exam  Physical Exam  Vitals signs and nursing note reviewed  Constitutional:       General: She is not in acute distress  Appearance: She is well-developed  She is obese  She is not ill-appearing, toxic-appearing or diaphoretic  Comments: Patient is tearful secondary to flank pain   HENT:      Head: Normocephalic and atraumatic        Right Ear: External ear normal       Left Ear: External ear normal       Nose: Nose normal       Mouth/Throat:      Pharynx: No oropharyngeal exudate  Eyes:      General: No scleral icterus  Right eye: No discharge  Left eye: No discharge  Conjunctiva/sclera: Conjunctivae normal       Pupils: Pupils are equal, round, and reactive to light  Neck:      Thyroid: No thyromegaly  Vascular: No JVD  Trachea: No tracheal deviation  Cardiovascular:      Rate and Rhythm: Regular rhythm  Tachycardia present  Heart sounds: Murmur present  Systolic murmur present with a grade of 2/6  No friction rub  No gallop  Pulmonary:      Effort: Pulmonary effort is normal  No respiratory distress  Breath sounds: Normal breath sounds  No stridor  No wheezing, rhonchi or rales  Chest:      Chest wall: No tenderness  Abdominal:      General: Bowel sounds are normal  There is no distension  Palpations: Abdomen is soft  There is no mass  Tenderness: There is no abdominal tenderness  There is right CVA tenderness  Hernia: No hernia is present  Comments: Fungal infection and right flank fold   Musculoskeletal: Normal range of motion  General: No tenderness or deformity  Right lower leg: Edema (Chronic  Chronic wound to lateral aspect right lower leg  No significant surrounding erythema or purulent drainage) present  Left lower leg: Edema (Chronic) present  Skin:     General: Skin is warm and dry  Coloration: Skin is not jaundiced or pale  Findings: No bruising, erythema, lesion or rash  Neurological:      General: No focal deficit present  Mental Status: She is alert and oriented to person, place, and time  Motor: No weakness or abnormal muscle tone  Deep Tendon Reflexes: Reflexes are normal and symmetric     Psychiatric:         Mood and Affect: Mood normal          Vital Signs  ED Triage Vitals [04/03/21 1107]   Temperature Pulse Respirations Blood Pressure SpO2   98 8 °F (37 1 °C) (!) 121 22 (!) 167/105 99 %      Temp Source Heart Rate Source Patient Position - Orthostatic VS BP Location FiO2 (%)   Oral Monitor Sitting -- --      Pain Score       5           Vitals:    04/03/21 1107   BP: (!) 167/105   Pulse: (!) 121   Patient Position - Orthostatic VS: Sitting         Visual Acuity      ED Medications  Medications   fentanyl citrate (PF) 100 MCG/2ML 50 mcg (has no administration in time range)       Diagnostic Studies  Results Reviewed     Procedure Component Value Units Date/Time    CBC and differential [[id number]]     Lab Status: No result Specimen: Blood     Comprehensive metabolic panel [[id number]]     Lab Status: No result Specimen: Blood     Lactic acid [[id number]]     Lab Status: No result Specimen: Blood                  CT renal stone study abdomen pelvis without contrast    (Results Pending)              Procedures  ECG 12 Lead Documentation Only    Date/Time: 4/3/2021 11:47 AM  Performed by: Saintclair Crest, DO  Authorized by: Saintclair Crest, DO     Indications / Diagnosis:  Tachycardia  ECG reviewed by me, the ED Provider: yes    Patient location:  ED  Interpretation:     Interpretation: normal    Rate:     ECG rate:  98    ECG rate assessment: normal    Rhythm:     Rhythm: sinus rhythm    Ectopy:     Ectopy: none    Conduction:     Conduction: normal    ST segments:     ST segments:  Normal  T waves:     T waves: normal               ED Course                                           MDM  Number of Diagnoses or Management Options  Diagnosis management comments: 58-year-old female presents with frequency of urination and discolored urine over the last day  Today she started with right flank pain  No fevers, chills, nausea or vomiting  She also complains of right leg pain which seems to be chronic  She has chronic wounds to the right lower extremity below the knee for which she is being followed by wound care and has an appointment this week    On exam she seems somewhat uncomfortable and tearful  She does have right CVA tenderness on exam   No abdominal tenderness  She does have intertrigo to the folds of the right back region  The wounds to the right lower extremity do not appear acutely infected  Will check basic labs as she does meet SIRS criteria and at a lactic acid  Will check urine to rule out UTI  Will do CT stone study to rule out kidney stone/pyelonephritis  Will also control pain  Amount and/or Complexity of Data Reviewed  Clinical lab tests: ordered and reviewed  Tests in the radiology section of CPT®: ordered and reviewed  Review and summarize past medical records: yes  Independent visualization of images, tracings, or specimens: yes        Disposition  Final diagnoses:   None     ED Disposition     None      Follow-up Information    None         Patient's Medications   Discharge Prescriptions    No medications on file     No discharge procedures on file      PDMP Review     None          ED Provider  Electronically Signed by           Olvin Anthony DO  04/03/21 [address],   04/04/21 8919

## 2021-04-03 NOTE — H&P
[address] [facility] [place] 1955, 76 y o  female MRN: [id number]  Unit/Bed#: E5 -01 Encounter: [id number]  Primary Care Provider: Margaretann Seip, MD   Date and time admitted to hospital: 4/3/2021 11:10 AM    Assessment and Plan  Cellulitis  Assessment & Plan  Are presenting with sepsis secondary to skin picking  See treatment below for sepsis of a skin and soft tissue etiology    Excoriation (skin-picking) disorder  Assessment & Plan  Appears to be somewhat infected secondary to skin excoriation  Will place on Ancef for now    Chronic diastolic congestive heart failure ([facility] )  Assessment & Plan  Wt Readings from Last 3 Encounters:   04/03/21 129 kg (284 lb 6 3 oz)   03/24/21 128 kg (283 lb)   11/25/20 133 kg (293 lb)     Weights appear stable, monitor with intravenous fluid        Diabetes mellitus type 2 in obese [facility])  Assessment & Plan  Lab Results   Component Value Date    HGBA1C 5 9 10/02/2020       No results for input(s): POCGLU in the last 72 hours  Blood Sugar Average: Last 72 hrs: Will place on sliding scale and basal bolus protocol  Update hemoglobin A1c as from last  Diabetic diet while admitted  She is no longer on metformin      Obstructive sleep apnea of adult  Assessment & Plan  Patient refuses COVID-19 testing - unfortunately because of this cannot start CPAP  She is in agreement with this care plan    Restrictive lung disease  Assessment & Plan  History of restrictive lung disease, not on inhaler therapy, likely a component of obesity hypoventilation syndrome  Monitor for now    Morbid obesity with BMI of 60 0-69 9, adult ([facility] )  Assessment & Plan  History of morbid obesity with BMI greater than 60 - will need outpatient weight loss recommendations    * Sepsis [facility])  Assessment & Plan  Patient presenting with sepsis of skin and soft tissue etiology as evidence by tachycardia 121, and respiratory rate of 22  Suspected skin and soft tissue etiology  She does have a history of ulcer of the right lower extremity secondary to skin picking  This does appear to be erythematous  Will start on Ancef, podiatry consultation placed  Will elevate extremity  Will be given sepsis bolus of 30 cc/kilogram - this equates to approximately 3 5 L  Evidence of lactic acidosis  Will trend        Code Status: Level 1 - Full Code **    VTE Prophylaxis: Heparin  / sequential compression device     POLST: There is no POLST form on file for this patient (pre-hospital)  Discussion with family: Daughter     Anticipated Length of Stay:  Patient will be admitted on an Observation basis with an anticipated length of stay of  less than 2 midnights  Justification for Hospital Stay: Sepsis [facility])     Total Time for Visit, including Counseling / Coordination of Care: 45 minutes  Greater than 50% of this total time spent on direct patient counseling and coordination of care  Chief Complaint:     Chief Complaint   Patient presents with    Abdominal Pain     right flank pain, abd pain, frequent urination, pain and buring with urination also pain in right leg       History of Present Illness:    Farrah Neri is a 76 y o  female who presents with multiple complaints including abdominal pain, right lower extremity pain and intermittent dysuria  The patient has a past medical history of diabetes mellitus, GERD, morbid obesity  She is also followed in the [address] [place] [address] for chronic right lower extremity venous stasis with skin picking disorder  She does have a history of obstructive sleep apnea, as well as allergic rhinitis  She does have recurrent right lower extremity wounds that her reopen recently  She was last seen in her primary care's office on 03/24/2021, with planned referral to the wound care center  She was advised not to use Neosporin hydro peroxide or other over-the-counter remedies as these may worsen her ulcers      At the time examination the patient reports much improved every given intravenous fluid as well as antibiotics  She reports that she does have diabetes but not currently on any medications  Review of Systems:    A complete and comprehensive 14 point organ system review was performed and all other systems are negative other than stated above in the HPI    Past Medical and Surgical History:     Past Medical History:   Diagnosis Date    Full dentures     GERD (gastroesophageal reflux disease)     Hyperlipidemia        Past Surgical History:   Procedure Laterality Date    BILATERAL KNEE ARTHROSCOPY      CATARACT EXTRACTION Right     DENTAL SURGERY      HYSTERECTOMY      TOTAL KNEE ARTHROPLASTY Bilateral     TUBAL LIGATION         Meds/Allergies:    Prior to Admission medications    Medication Sig Start Date End Date Taking?  Authorizing Provider   acetaminophen (TYLENOL) 325 mg tablet Take 2 tablets (650 mg total) by mouth every 6 (six) hours as needed for mild pain 5/24/19  Yes Katia Zayas MD   amLODIPine (NORVASC) 5 mg tablet Take 1 tablet (5 mg total) by mouth daily 3/24/21  Yes Ericka Reyes MD   aspirin (ECOTRIN LOW STRENGTH) 81 mg EC tablet Take 1 tablet (81 mg total) by mouth daily 3/24/21  Yes Ericka Reyes MD   atorvastatin (LIPITOR) 40 mg tablet Take 1 tablet (40 mg total) by mouth daily with dinner 3/24/21  Yes Ericka Reyes MD   gabapentin (NEURONTIN) 300 mg capsule Take 1 capsule (300 mg total) by mouth daily at bedtime 3/24/21  Yes Ericka Reyes MD   guaiFENesin (ROBITUSSIN) 100 MG/5ML oral liquid Take 10 mL (200 mg total) by mouth 3 (three) times a day as needed for cough 3/24/21  Yes Ericka Reyes MD   loratadine (CLARITIN) 10 mg tablet Take 1 tablet (10 mg total) by mouth daily 3/24/21  Yes Ronnie Stanley MD   Multiple Vitamin (multivitamin) tablet Take 1 tablet by mouth daily 3/24/21  Yes Ericka Reyes MD   pantoprazole (PROTONIX) 40 mg tablet Take 1 tablet (40 mg total) by mouth daily 3/24/21  Yes Kristina Sheehan MD Eric   ACCU-CHEK FASTCLIX LANCETS MISC  6/21/18   Historical Provider, MD   ACCU-CHEK GUIDE test strip  6/21/18   Historical Provider, MD   Blood Glucose Monitoring Suppl (ACCU-CHEK GUIDE) w/Device KIT  6/21/18   Historical Provider, MD   metFORMIN (GLUCOPHAGE) 500 mg tablet Take 1 tablet (500 mg total) by mouth daily with breakfast  Patient not taking: Reported on 4/3/2021 3/24/21   Parker Trent MD   diphenhydrAMINE (BENADRYL) 25 mg tablet Take 1 tablet (25 mg total) by mouth every 6 (six) hours as needed for itching or allergies  Patient not taking: Reported on 4/3/2021 10/8/20 4/3/21  Rehab DO Olive   fluocinonide (LIDEX) 0 05 % ointment  11/4/19 4/3/21  Historical Provider, MD   nitroglycerin (NITROSTAT) 0 4 mg SL tablet Place 1 tablet (0 4 mg total) under the tongue every 5 (five) minutes as needed for chest pain  Patient not taking: Reported on 10/7/2020 2/20/19 4/3/21  Sana Devine MD   triamcinolone (KENALOG) 0 025 % cream Apply topically 2 (two) times a day To bilateral arms 3/24/21 4/3/21  Parker Trent MD     I have reviewed home medications with patient personally  Allergies:    Allergies   Allergen Reactions    Januvia [Sitagliptin] Swelling    Tetanus-Diphth-Acell Pertussis Anaphylaxis    Clindamycin     Hydrocodone     Morphine Hives    Omeprazole     Penicillins     Percocet [Oxycodone-Acetaminophen]     Tolterodine     Ciprofloxacin Rash    Sulfa Antibiotics Swelling and Rash       Social History:     Marital Status: Single   Occupation:  Retired    Substance Use History:   Social History     Substance and Sexual Activity   Alcohol Use Never    Frequency: Never     Social History     Tobacco Use   Smoking Status Never Smoker   Smokeless Tobacco Never Used   Tobacco Comment    childhood smoke exposure     Social History     Substance and Sexual Activity   Drug Use Never       Family History:    Family History   Problem Relation Age of Onset    No Known Problems Mother     Coronary artery disease Father        Physical Exam:     Vitals:   Blood Pressure: 140/64 (04/03/21 1436)  Pulse: 80 (04/03/21 1436)  Temperature: (!) 96 8 °F (36 °C) (04/03/21 1436)  Temp Source: Temporal (04/03/21 1436)  Respirations: 20 (04/03/21 1436)  Weight - Scale: 129 kg (284 lb 6 3 oz) (04/03/21 1107)  SpO2: 98 % (04/03/21 1334)      General: well appearing, no acute distress, obese  HEENT: atraumatic, PERRLA, moist mucosa, normal pharynx, normal tonsils and adenoids, normal tongue, no fluid in sinuses  Neck: Trachea midline, no carotid bruit, no masses  Respiratory: normal chest wall expansion, CTA B, no r/r/w, no rubs  Cardiovascular: RRR, no m/r/g, Normal S1 and S2  Abdomen: Soft, non-tender, non-distended, normal bowel sounds in all quadrants, no hepatosplenomegaly, no tympany  Rectal: deferred  Musculoskeletal: normal ROM in upper and lower extremities  Integumentary:  Chronic venous stasis changes the right lower extremity with erythema and areas of excoriation   Heme/Lymph: no lymphadenopathy, no bruises  Neurological: Cranial Nerves II-XII grossly intact  Psychiatric: cooperative with normal mood, affect, and cognition    Additional Data:     Lab Results: I have personally reviewed pertinent reports        Results from last 7 days   Lab Units 04/03/21  1135   WBC Thousand/uL 9 81   HEMOGLOBIN g/dL 13 5   HEMATOCRIT % 41 2   PLATELETS Thousands/uL 308   NEUTROS PCT % 46   LYMPHS PCT % 40   MONOS PCT % 7   EOS PCT % 6     Results from last 7 days   Lab Units 04/03/21  1135   SODIUM mmol/L 143   POTASSIUM mmol/L 4 5   CHLORIDE mmol/L 105   CO2 mmol/L 30   BUN mg/dL 13   CREATININE mg/dL 1 03   ANION GAP mmol/L 8   CALCIUM mg/dL 9 3   ALBUMIN g/dL 3 5   TOTAL BILIRUBIN mg/dL 0 24   ALK PHOS U/L 102   ALT U/L 22   AST U/L 21   GLUCOSE RANDOM mg/dL 104         Results from last 7 days   Lab Units 04/03/21  1434   POC GLUCOSE mg/dl 103         Results from last 7 days   Lab Units 04/03/21  1336 04/03/21  1135   LACTIC ACID mmol/L 1 5 2 2*       Imaging: I have personally reviewed pertinent reports  CT renal stone study abdomen pelvis without contrast   Final Result by Sheryl Boas, MD (04/03 6428)      No radiopaque urinary tract calculi or obstructive uropathy  No evidence of acute abdominopelvic process  Colonic diverticulosis  Workstation performed: [id number]             EKG, Pathology, and Other Studies Reviewed on Admission:   · CT reviewed, no evidence of acute abdominal/pelvic process    Allscripts / Epic Records Reviewed: Yes     ** Please Note: This note was completed in part utilizing M-Modal Fluency Direct Software  Grammatical errors, random word insertions, spelling mistakes, and incomplete sentences may be an occasional consequence of this system secondary to software limitations, ambient noise, and hardware issues  If you have any questions or concerns about the content, text, or information contained within the body of this dictation, please contact the provider for clarification  **

## 2021-04-03 NOTE — ASSESSMENT & PLAN NOTE
Lab Results   Component Value Date    HGBA1C 5 9 10/02/2020       No results for input(s): POCGLU in the last 72 hours  Blood Sugar Average: Last 72 hrs:    Will place on sliding scale and basal bolus protocol  Update hemoglobin A1c as from last  Diabetic diet while admitted  She is no longer on metformin

## 2021-04-03 NOTE — ASSESSMENT & PLAN NOTE
History of morbid obesity with BMI greater than 60 - will need outpatient weight loss recommendations

## 2021-04-03 NOTE — PROGRESS NOTES
Patient is refusing to do a Covid test to rule out having Covid-19  Dr Joanie Correia was notified and aware

## 2021-04-03 NOTE — ASSESSMENT & PLAN NOTE
Patient refuses COVID-19 testing - unfortunately because of this cannot start CPAP    She is in agreement with this care plan

## 2021-04-03 NOTE — ASSESSMENT & PLAN NOTE
Are presenting with sepsis secondary to skin picking  See treatment below for sepsis of a skin and soft tissue etiology

## 2021-04-03 NOTE — ASSESSMENT & PLAN NOTE
History of restrictive lung disease, not on inhaler therapy, likely a component of obesity hypoventilation syndrome  Monitor for now

## 2021-04-03 NOTE — ASSESSMENT & PLAN NOTE
Wt Readings from Last 3 Encounters:   04/03/21 129 kg (284 lb 6 3 oz)   03/24/21 128 kg (283 lb)   11/25/20 133 kg (293 lb)     Weights appear stable, monitor with intravenous fluid

## 2021-04-03 NOTE — ASSESSMENT & PLAN NOTE
Patient presenting with sepsis of skin and soft tissue etiology as evidence by tachycardia 121, and respiratory rate of 22  Suspected skin and soft tissue etiology  She does have a history of ulcer of the right lower extremity secondary to skin picking  This does appear to be erythematous    Will start on Ancef, podiatry consultation placed  Will elevate extremity  Will be given sepsis bolus of 30 cc/kilogram - this equates to approximately 3 5 L  Evidence of lactic acidosis  Will trend

## 2021-04-03 NOTE — CONSULTS
Podiatry - Consultation    Patient Information:   Mary Jane Bell 76 y o  female MRN: [id number]  Unit/Bed#: E5 -01 Encounter: [id number]  PCP: Amber Ozuna MD  Date of Admission:  4/3/2021  Date of Consultation: 04/03/21  Requesting Physician: Jolanta Magana DO      ASSESSMENT:    Mary Jane Bell is a 76 y o  female with:    1  Venous stasis  2  Excoriation disorder with wounds limited to breakdown of skin  3  T2DM  4  Morbid Obesity     PLAN:    · Local wound care consisting of adaptic,dsd, ACE wrap compression  Wound care instructions placed  · Pt will benefit from triamcinolone cream to help with pruritus likely secondary to venous stasis  This along with wound dressing as above will hopefully prevent the pt from scratching RLE  · Elevation on green foam wedges or pillows when non-ambulatory  · Pt will f/u with Dr Clifton Harrell at wound care  · Rest of care per primary team   · Will discuss this plan with my attending and update as needed  Weightbearing status: as tolerated    SUBJECTIVE:    History of Present Illness:    Mary Jane Bell is a 76 y o  female who is originally admitted 4/3/2021  Patient has a past medical history of T2DM, morbid obesity, Chronic diastolic congestive CHF, excoriation disorder  We are consulted for skin breakdown to tight lower extremity  Pt reports that her right lower extremity is itchy  Pt states that she does not like a lot of compression to her legs  PT states that she has a wound care appointment with Dr Clifton Harrell this upcomming week  Pt reports that she is supposed to wear compression stocking to lower extremity; however, she can't tolerate because "they are too tight"  To denies pain to b/l lower extremity  Review of Systems:    Constitutional: Negative  HENT: Negative  Eyes: Negative  Respiratory: Negative  Cardiovascular: Negative  Gastrointestinal: Negative      Musculoskeletal: negative  Skin:superficial wound   Neurological: neuropathy  Psych: Negative       Past Medical and Surgical History:     Past Medical History:   Diagnosis Date    Full dentures     GERD (gastroesophageal reflux disease)     Hyperlipidemia        Past Surgical History:   Procedure Laterality Date    BILATERAL KNEE ARTHROSCOPY      CATARACT EXTRACTION Right     DENTAL SURGERY      HYSTERECTOMY      TOTAL KNEE ARTHROPLASTY Bilateral     TUBAL LIGATION         Meds/Allergies:    Medications Prior to Admission   Medication    acetaminophen (TYLENOL) 325 mg tablet    amLODIPine (NORVASC) 5 mg tablet    aspirin (ECOTRIN LOW STRENGTH) 81 mg EC tablet    atorvastatin (LIPITOR) 40 mg tablet    gabapentin (NEURONTIN) 300 mg capsule    guaiFENesin (ROBITUSSIN) 100 MG/5ML oral liquid    loratadine (CLARITIN) 10 mg tablet    Multiple Vitamin (multivitamin) tablet    pantoprazole (PROTONIX) 40 mg tablet    ACCU-CHEK FASTCLIX LANCETS MISC    ACCU-CHEK GUIDE test strip    Blood Glucose Monitoring Suppl (ACCU-CHEK GUIDE) w/Device KIT    metFORMIN (GLUCOPHAGE) 500 mg tablet       Allergies   Allergen Reactions    Januvia [Sitagliptin] Swelling    Tetanus-Diphth-Acell Pertussis Anaphylaxis    Clindamycin     Hydrocodone     Morphine Hives    Omeprazole     Penicillins     Percocet [Oxycodone-Acetaminophen]     Tolterodine     Ciprofloxacin Rash    Sulfa Antibiotics Swelling and Rash       Social History:     Marital Status: Single    Substance Use History:   Social History     Substance and Sexual Activity   Alcohol Use Never    Frequency: Never     Social History     Tobacco Use   Smoking Status Never Smoker   Smokeless Tobacco Never Used   Tobacco Comment    childhood smoke exposure     Social History     Substance and Sexual Activity   Drug Use Never       Family History:    Family History   Problem Relation Age of Onset    No Known Problems Mother     Coronary artery disease Father          OBJECTIVE:    Vitals:   Blood Pressure: 140/64 (04/03/21 1436)  Pulse: 80 (04/03/21 1436)  Temperature: (!) 96 8 °F (36 °C) (04/03/21 1436)  Temp Source: Temporal (04/03/21 1436)  Respirations: 20 (04/03/21 1436)  Weight - Scale: 129 kg (284 lb 6 3 oz) (04/03/21 1107)  SpO2: 98 % (04/03/21 1334)    Physical Exam:    General Appearance: Alert, cooperative, no distress  HEENT: Head normocephalic, atraumatic, without obvious abnormality  Heart: Normal rate and rhythm  Lungs: Non-labored breathing  No respiratory distress  Abdomen: Without distension  Psychiatric: AAOx3  Lower Extremity:  Vascular:   Right DP and PT pulses are  Dopplerble  Left DP and PT pulses are Dopplerble  CRT < 3 seconds at the digits  +2/4 edema noted at bilateral lower extremities  Skin temperature is WNL bilaterally  Musculoskeletal:  MMT is 5/5 in all muscle compartments bilaterally  ROM at the 1st MPJ and ankle joint are decreased bilaterally with the leg extended  No Pain on palpation of b/l lower extremity  Dermatological:  Lower extremity wound(s) as noted below:    Multiple superficial wound limited to breakdown of skin secondary to scratching  Venous stasis noted  No gross signs of infection noted at this time  No purulence, no drainage, no fluctuance, no pain, no crepitus  Neurological:  Gross sensation is intact  Protective sensation is diminished  Patient Reports numbness and/or paresthesias      Additional data:     Lab Results: I have personally reviewed pertinent labs including:    Results from last 7 days   Lab Units 04/03/21  1135   WBC Thousand/uL 9 81   HEMOGLOBIN g/dL 13 5   HEMATOCRIT % 41 2   PLATELETS Thousands/uL 308   NEUTROS PCT % 46   LYMPHS PCT % 40   MONOS PCT % 7   EOS PCT % 6     Results from last 7 days   Lab Units 04/03/21  1135   POTASSIUM mmol/L 4 5   CHLORIDE mmol/L 105   CO2 mmol/L 30   BUN mg/dL 13   CREATININE mg/dL 1 03   CALCIUM mg/dL 9 3   ALK PHOS U/L 102   ALT U/L 22   AST U/L 21           Cultures: I have personally reviewed pertinent cultures including:              Imaging: I have personally reviewed pertinent reports in PACS  EKG, Pathology, and Other Studies: I have personally reviewed pertinent reports  ** Please Note: Portions of the record may have been created with voice recognition software  Occasional wrong word or "sound a like" substitutions may have occurred due to the inherent limitations of voice recognition software  Read the chart carefully and recognize, using context, where substitutions have occurred   **

## 2021-04-04 LAB
ANION GAP SERPL CALCULATED.3IONS-SCNC: 6 MMOL/L (ref 4–13)
ATRIAL RATE: 98 BPM
BUN SERPL-MCNC: 14 MG/DL (ref 5–25)
CALCIUM SERPL-MCNC: 8.6 MG/DL (ref 8.3–10.1)
CHLORIDE SERPL-SCNC: 109 MMOL/L (ref 100–108)
CO2 SERPL-SCNC: 31 MMOL/L (ref 21–32)
CREAT SERPL-MCNC: 1 MG/DL (ref 0.6–1.3)
CREAT UR-MCNC: 113 MG/DL
ERYTHROCYTE [DISTWIDTH] IN BLOOD BY AUTOMATED COUNT: 14.8 % (ref 11.6–15.1)
EST. AVERAGE GLUCOSE BLD GHB EST-MCNC: 131 MG/DL
GFR SERPL CREATININE-BSD FRML MDRD: 68 ML/MIN/1.73SQ M
GLUCOSE SERPL-MCNC: 104 MG/DL (ref 65–140)
GLUCOSE SERPL-MCNC: 111 MG/DL (ref 65–140)
GLUCOSE SERPL-MCNC: 112 MG/DL (ref 65–140)
GLUCOSE SERPL-MCNC: 113 MG/DL (ref 65–140)
GLUCOSE SERPL-MCNC: 99 MG/DL (ref 65–140)
HBA1C MFR BLD: 6.2 %
HCT VFR BLD AUTO: 36.9 % (ref 34.8–46.1)
HGB BLD-MCNC: 11.9 G/DL (ref 11.5–15.4)
MCH RBC QN AUTO: 29.6 PG (ref 26.8–34.3)
MCHC RBC AUTO-ENTMCNC: 32.2 G/DL (ref 31.4–37.4)
MCV RBC AUTO: 92 FL (ref 82–98)
MICROALBUMIN UR-MCNC: <5 MG/L (ref 0–20)
MICROALBUMIN/CREAT 24H UR: <4 MG/G CREATININE (ref 0–30)
P AXIS: 58 DEGREES
PLATELET # BLD AUTO: 246 THOUSANDS/UL (ref 149–390)
PMV BLD AUTO: 10 FL (ref 8.9–12.7)
POTASSIUM SERPL-SCNC: 3.5 MMOL/L (ref 3.5–5.3)
PR INTERVAL: 156 MS
PROCALCITONIN SERPL-MCNC: <0.05 NG/ML
QRS AXIS: 39 DEGREES
QRSD INTERVAL: 88 MS
QT INTERVAL: 358 MS
QTC INTERVAL: 457 MS
RBC # BLD AUTO: 4.02 MILLION/UL (ref 3.81–5.12)
SODIUM SERPL-SCNC: 146 MMOL/L (ref 136–145)
T WAVE AXIS: 55 DEGREES
VENTRICULAR RATE: 98 BPM
WBC # BLD AUTO: 8.36 THOUSAND/UL (ref 4.31–10.16)

## 2021-04-04 PROCEDURE — 82043 UR ALBUMIN QUANTITATIVE: CPT | Performed by: INTERNAL MEDICINE

## 2021-04-04 PROCEDURE — 82948 REAGENT STRIP/BLOOD GLUCOSE: CPT

## 2021-04-04 PROCEDURE — 85027 COMPLETE CBC AUTOMATED: CPT | Performed by: INTERNAL MEDICINE

## 2021-04-04 PROCEDURE — 83036 HEMOGLOBIN GLYCOSYLATED A1C: CPT | Performed by: INTERNAL MEDICINE

## 2021-04-04 PROCEDURE — 99232 SBSQ HOSP IP/OBS MODERATE 35: CPT | Performed by: INTERNAL MEDICINE

## 2021-04-04 PROCEDURE — 93010 ELECTROCARDIOGRAM REPORT: CPT | Performed by: INTERNAL MEDICINE

## 2021-04-04 PROCEDURE — 80048 BASIC METABOLIC PNL TOTAL CA: CPT | Performed by: INTERNAL MEDICINE

## 2021-04-04 PROCEDURE — 82570 ASSAY OF URINE CREATININE: CPT | Performed by: INTERNAL MEDICINE

## 2021-04-04 PROCEDURE — 84145 PROCALCITONIN (PCT): CPT | Performed by: EMERGENCY MEDICINE

## 2021-04-04 RX ADMIN — GABAPENTIN 300 MG: 300 CAPSULE ORAL at 22:20

## 2021-04-04 RX ADMIN — ACETAMINOPHEN 650 MG: 325 TABLET, FILM COATED ORAL at 09:25

## 2021-04-04 RX ADMIN — Medication 250 MG: at 09:18

## 2021-04-04 RX ADMIN — CEFAZOLIN SODIUM 2000 MG: 2 SOLUTION INTRAVENOUS at 14:03

## 2021-04-04 RX ADMIN — CEFAZOLIN SODIUM 2000 MG: 2 SOLUTION INTRAVENOUS at 05:38

## 2021-04-04 RX ADMIN — Medication 250 MG: at 17:27

## 2021-04-04 RX ADMIN — ATORVASTATIN CALCIUM 40 MG: 40 TABLET, FILM COATED ORAL at 17:27

## 2021-04-04 RX ADMIN — ASPIRIN 81 MG: 81 TABLET, COATED ORAL at 09:18

## 2021-04-04 RX ADMIN — TRIAMCINOLONE ACETONIDE: 0.25 CREAM TOPICAL at 17:28

## 2021-04-04 RX ADMIN — CEFAZOLIN SODIUM 2000 MG: 2 SOLUTION INTRAVENOUS at 22:21

## 2021-04-04 RX ADMIN — TRIAMCINOLONE ACETONIDE: 0.25 CREAM TOPICAL at 09:30

## 2021-04-04 RX ADMIN — PANTOPRAZOLE SODIUM 40 MG: 40 TABLET, DELAYED RELEASE ORAL at 05:38

## 2021-04-04 RX ADMIN — HEPARIN SODIUM 5000 UNITS: 5000 INJECTION INTRAVENOUS; SUBCUTANEOUS at 05:37

## 2021-04-04 RX ADMIN — HEPARIN SODIUM 5000 UNITS: 5000 INJECTION INTRAVENOUS; SUBCUTANEOUS at 14:03

## 2021-04-04 RX ADMIN — HEPARIN SODIUM 5000 UNITS: 5000 INJECTION INTRAVENOUS; SUBCUTANEOUS at 22:20

## 2021-04-04 NOTE — PROGRESS NOTES
Progress Note - Ce Araya 76 y o  female MRN: [id number]    Unit/Bed#: E5 -01 Encounter: [id number]      Subjective: The patient feels pretty well today  Said that she slept reasonably well  She is not having as much pain and itching in her leg as she was  She has had no chest pain, shortness of breath, abdominal pain, nausea, or vomiting  Physical Exam:   Temp:  [96 8 °F (36 °C)-98 8 °F (37 1 °C)] 97 3 °F (36 3 °C)  HR:  [] 103  Resp:  [18-22] 20  BP: (118-167)/() 118/73    Gen:  Well-developed, severely obese, in no distress  Neck:  Supple  No lymphadenopathy, goiter, or bruit  Heart:  Regular rhythm  No murmur, gallop, or rub  Lungs:  Clear to auscultation and percussion  No wheezing, rales, or rhonchi    Abd:  Soft with active bowel sounds  No mass, tenderness, or organomegaly  Extremities:  No clubbing, cyanosis, or edema  Right leg is wrapped  Neuro:  Alert and oriented  No focal sign  Skin:  Warm and dry      LABS:   CBC:   Lab Results   Component Value Date    WBC 8 36 04/04/2021    HGB 11 9 04/04/2021    HCT 36 9 04/04/2021    MCV 92 04/04/2021     04/04/2021    MCH 29 6 04/04/2021    MCHC 32 2 04/04/2021    RDW 14 8 04/04/2021    MPV 10 0 04/04/2021    NRBC 0 04/03/2021   , CMP:   Lab Results   Component Value Date    SODIUM 146 (H) 04/04/2021    K 3 5 04/04/2021     (H) 04/04/2021    CO2 31 04/04/2021    BUN 14 04/04/2021    CREATININE 1 00 04/04/2021    CALCIUM 8 6 04/04/2021    AST 21 04/03/2021    ALT 22 04/03/2021    ALKPHOS 102 04/03/2021    EGFR 68 04/04/2021             Assessment/Plan:  1  Right leg cellulitis, with sepsis on admission  2  Right leg excoriations  3  Type 2 diabetes, diet controlled  4  Chronic diastolic congestive heart failure  5  Essential hypertension  6  Obstructive sleep apnea  7  Restrictive lung disease likely in part obesity hypoventilation syndrome    The patient has improved somewhat    IV antibiotics will be continued for the moment  Hemoglobin A1c is pending  The patient did not want a COVID-19 test so she is not on CPAP at night  Her blood pressure has been high at times  She is currently on amlodipine and hydrochlorothiazide  If this is an issue, an ACE-inhibitor was seem like a reasonable addition  The patient is not ready to be discharged because of the need for further IV antibiotics  Thus, her hospitalization will span 2 midnights  She is converted to inpatient status      VTE Pharmacologic Prophylaxis: Heparin  VTE Mechanical Prophylaxis: sequential compression device

## 2021-04-04 NOTE — UTILIZATION REVIEW
Initial Clinical Review    OBS 04-03-21 @ 1319   CONVERT TO INPATIENT 04-04-21 @ 4527 FOR CONTINUATION OF IV ANTIBIOTICS THERAPY    Admission: Date/Time/Statement:   Admission Orders (From admission, onward)     Ordered        04/04/21 5810  Inpatient Admission  Once         04/03/21 1319  Place in Observation  Once                   Orders Placed This Encounter   Procedures    Place in Observation     Standing Status:   Standing     Number of Occurrences:   1     Order Specific Question:   Level of Care     Answer:   Med Surg [16]    Inpatient Admission     Standing Status:   Standing     Number of Occurrences:   1     Order Specific Question:   Level of Care     Answer:   Med Surg [16]     Order Specific Question:   Estimated length of stay     Answer:   More than 2 Midnights     Order Specific Question:   Certification     Answer:   I certify that inpatient services are medically necessary for this patient for a duration of greater than two midnights  See H&P and MD Progress Notes for additional information about the patient's course of treatment  ED Arrival Information     Expected Arrival Acuity Means of Arrival Escorted By Service Admission Type    - 4/3/2021 10:59 [facility]gent [facility] Urgent    Arrival Complaint    Possible yeast infection        Chief Complaint   Patient presents with    Abdominal Pain     right flank pain, abd pain, frequent urination, pain and buring with urination also pain in right leg     Assessment/Plan: 76 y o  female who presents with multiple complaints including abdominal pain, right lower extremity pain and intermittent dysuria  The patient has a past medical history of diabetes mellitus, GERD, morbid obesity  She is also followed in the [address] [place] [address] Road for chronic right lower extremity venous stasis with skin picking disorder  She does have a history of obstructive sleep apnea, as well as allergic rhinitis    She does have recurrent right lower extremity wounds that her reopen recently  She was last seen in her primary care's office on 03/24/2021, with planned referral to the wound care center  She was advised not to use Neosporin hydro peroxide or other over-the-counter remedies as these may worsen her ulcers    ED Triage Vitals   Temperature Pulse Respirations Blood Pressure SpO2   04/03/21 1107 04/03/21 1107 04/03/21 1107 04/03/21 1107 04/03/21 1107   98 8 °F (37 1 °C) (!) 121 22 (!) 167/105 99 %      Temp Source Heart Rate Source Patient Position - Orthostatic VS BP Location FiO2 (%)   04/03/21 1107 04/03/21 1107 04/03/21 1107 04/03/21 1334 --   Oral Monitor Sitting Right arm       Pain Score       04/03/21 1107       5          Wt Readings from Last 1 Encounters:   04/03/21 129 kg (284 lb 6 3 oz)     Additional Vital Signs:   Date/Time  Temp  Pulse  Resp  BP  MAP (mmHg)  SpO2  O2 Device  Patient Position - Orthostatic VS   04/04/21 0918  --  --  --  99/58  --  --  --  --   04/04/21 0825  97 3 °F (36 3 °C)Abnormal   103  20  118/73  --  97 %  None (Room air)  Sitting   04/03/21 2229  97 8 °F (36 6 °C)  93  18  137/78  --  95 %  None (Room air)  Lying   04/03/21 1436  96 8 °F (36 °C)Abnormal   80  20  140/64  --  --  --  Lying   04/03/21 1334  --  87  20  137/78  102  98 %  None (Room air)  Lying       Pertinent Labs/Diagnostic Test Results:       Results from last 7 days   Lab Units 04/04/21  0523 04/03/21  1135   WBC Thousand/uL 8 36 9 81   HEMOGLOBIN g/dL 11 9 13 5   HEMATOCRIT % 36 9 41 2   PLATELETS Thousands/uL 246 308   NEUTROS ABS Thousands/µL  --  4 51         Results from last 7 days   Lab Units 04/04/21  0523 04/03/21  1135   SODIUM mmol/L 146* 143   POTASSIUM mmol/L 3 5 4 5   CHLORIDE mmol/L 109* 105   CO2 mmol/L 31 30   ANION GAP mmol/L 6 8   BUN mg/dL 14 13   CREATININE mg/dL 1 00 1 03   EGFR ml/min/1 73sq m 68 65   CALCIUM mg/dL 8 6 9 3     Results from last 7 days   Lab Units 04/03/21  1135   AST U/L 21   ALT U/L 22   ALK PHOS U/L 102 TOTAL PROTEIN g/dL 7 8   ALBUMIN g/dL 3 5   TOTAL BILIRUBIN mg/dL 0 24     Results from last 7 days   Lab Units 04/04/21  0825 04/03/21  2141 04/03/21  1434   POC GLUCOSE mg/dl 99 111 103     Results from last 7 days   Lab Units 04/04/21  0523 04/03/21  1135   GLUCOSE RANDOM mg/dL 111 104             No results found for: BETA-HYDROXYBUTYRATE                                   Results from last 7 days   Lab Units 04/03/21  1336   PROCALCITONIN ng/ml <0 05     Results from last 7 days   Lab Units 04/03/21  1336 04/03/21  1135   LACTIC ACID mmol/L 1 5 2 2*                                     Results from last 7 days   Lab Units 04/03/21  1137   CLARITY UA  Clear   COLOR UA  Yellow   SPEC GRAV UA  1 020   PH UA  7 0   GLUCOSE UA mg/dl Negative   KETONES UA mg/dl Negative   BLOOD UA  Trace*   PROTEIN UA mg/dl Negative   NITRITE UA  Negative   BILIRUBIN UA  Negative   UROBILINOGEN UA E U /dl 0 2   LEUKOCYTES UA  Negative   WBC UA /hpf 4-10*   RBC UA /hpf 4-10*   BACTERIA UA /hpf Occasional   EPITHELIAL CELLS WET PREP /hpf Occasional                                 Results from last 7 days   Lab Units 04/03/21  1339 04/03/21  1336   BLOOD CULTURE  Received in Microbiology Lab  Culture in Progress  Received in Microbiology Lab  Culture in Progress                 ED Treatment:   Medication Administration from 04/03/2021 1059 to 04/03/2021 1422       Date/Time Order Dose Route Action Action by Comments     04/03/2021 1136 fentanyl citrate (PF) 100 MCG/2ML 50 mcg 50 mcg Intravenous Given Archie Lara RN      04/03/2021 1340 sodium chloride 0 9 % bolus 500 mL 0 mL Intravenous Stopped Archie Lara RN      04/03/2021 1222 sodium chloride 0 9 % bolus 500 mL 500 mL Intravenous [address] Archie Lara RN      04/03/2021 1341 ceftriaxone (ROCEPHIN) 1 g/50 mL in dextrose IVPB 1,000 mg Intravenous New Bag Archie Lara RN         Past Medical History:   Diagnosis Date    Full dentures     GERD (gastroesophageal reflux disease)     Hyperlipidemia      Present on Admission:   Chronic diastolic congestive heart failure (HCC)   Diabetes mellitus type 2 in obese (HCC)   Excoriation (skin-picking) disorder   Restrictive lung disease   Obstructive sleep apnea of adult   (Resolved) Ulcer of right lower extremity, limited to breakdown of skin (HCC)      Admitting Diagnosis: Lactic acidosis [E87 2]  UTI (urinary tract infection) [N39 0]  Abdominal pain [R10 9]  Age/Sex: 76 y o  female  Admission Orders:  Scheduled Medications:  amLODIPine, 5 mg, Oral, Daily  ammonium lactate, , Topical, BID  aspirin, 81 mg, Oral, Daily  atorvastatin, 40 mg, Oral, Daily With Dinner  cefazolin, 2,000 mg, Intravenous, Q8H  gabapentin, 300 mg, Oral, HS  heparin (porcine), 5,000 Units, Subcutaneous, Q8H AINSLEY  insulin lispro, 2-12 Units, Subcutaneous, TID AC  pantoprazole, 40 mg, Oral, Daily  polyethylene glycol, 17 g, Oral, Daily  saccharomyces boulardii, 250 mg, Oral, BID  triamcinolone, , Topical, BID      Continuous IV Infusions:     PRN Meds:  acetaminophen, 650 mg, Oral, Q6H PRN        IP CONSULT TO PODIATRY    Network Utilization Review Department  ATTENTION: Please call with any questions or concerns to [phone] and carefully listen to the prompts so that you are directed to the right person  All voicemails are confidential   Ashok Ramirez all requests for admission clinical reviews, approved or denied determinations and any other requests to dedicated fax number below belonging to the campus where the patient is receiving treatment   List of dedicated fax numbers for the Facilities:  [address] DENIALS (Administrative/Medical Necessity) [phone]   [address] (Maternity/NICU/Pediatrics) 261 [facility] [address]) [address] [place] [address] Surekha Daniels 1481 [phone]   [address]51 [phone]

## 2021-04-05 PROBLEM — R65.20 SEVERE SEPSIS (HCC): Status: ACTIVE | Noted: 2021-04-03

## 2021-04-05 LAB
ANION GAP SERPL CALCULATED.3IONS-SCNC: 7 MMOL/L (ref 4–13)
BUN SERPL-MCNC: 17 MG/DL (ref 5–25)
CALCIUM SERPL-MCNC: 9 MG/DL (ref 8.3–10.1)
CHLORIDE SERPL-SCNC: 108 MMOL/L (ref 100–108)
CO2 SERPL-SCNC: 30 MMOL/L (ref 21–32)
CREAT SERPL-MCNC: 0.97 MG/DL (ref 0.6–1.3)
ERYTHROCYTE [DISTWIDTH] IN BLOOD BY AUTOMATED COUNT: 14.9 % (ref 11.6–15.1)
GFR SERPL CREATININE-BSD FRML MDRD: 70 ML/MIN/1.73SQ M
GLUCOSE SERPL-MCNC: 100 MG/DL (ref 65–140)
GLUCOSE SERPL-MCNC: 113 MG/DL (ref 65–140)
GLUCOSE SERPL-MCNC: 114 MG/DL (ref 65–140)
GLUCOSE SERPL-MCNC: 94 MG/DL (ref 65–140)
GLUCOSE SERPL-MCNC: 97 MG/DL (ref 65–140)
HCT VFR BLD AUTO: 38.5 % (ref 34.8–46.1)
HGB BLD-MCNC: 12.4 G/DL (ref 11.5–15.4)
MCH RBC QN AUTO: 29.3 PG (ref 26.8–34.3)
MCHC RBC AUTO-ENTMCNC: 32.2 G/DL (ref 31.4–37.4)
MCV RBC AUTO: 91 FL (ref 82–98)
PLATELET # BLD AUTO: 261 THOUSANDS/UL (ref 149–390)
PMV BLD AUTO: 10 FL (ref 8.9–12.7)
POTASSIUM SERPL-SCNC: 3.7 MMOL/L (ref 3.5–5.3)
RBC # BLD AUTO: 4.23 MILLION/UL (ref 3.81–5.12)
SODIUM SERPL-SCNC: 145 MMOL/L (ref 136–145)
WBC # BLD AUTO: 8.37 THOUSAND/UL (ref 4.31–10.16)

## 2021-04-05 PROCEDURE — 80048 BASIC METABOLIC PNL TOTAL CA: CPT | Performed by: INTERNAL MEDICINE

## 2021-04-05 PROCEDURE — 82948 REAGENT STRIP/BLOOD GLUCOSE: CPT

## 2021-04-05 PROCEDURE — 99232 SBSQ HOSP IP/OBS MODERATE 35: CPT | Performed by: FAMILY MEDICINE

## 2021-04-05 PROCEDURE — 85027 COMPLETE CBC AUTOMATED: CPT | Performed by: INTERNAL MEDICINE

## 2021-04-05 RX ORDER — GUAIFENESIN 600 MG
600 TABLET, EXTENDED RELEASE 12 HR ORAL EVERY 12 HOURS PRN
Status: DISCONTINUED | OUTPATIENT
Start: 2021-04-05 | End: 2021-04-07 | Stop reason: HOSPADM

## 2021-04-05 RX ADMIN — ASPIRIN 81 MG: 81 TABLET, COATED ORAL at 08:39

## 2021-04-05 RX ADMIN — AMLODIPINE BESYLATE 5 MG: 5 TABLET ORAL at 08:39

## 2021-04-05 RX ADMIN — HEPARIN SODIUM 5000 UNITS: 5000 INJECTION INTRAVENOUS; SUBCUTANEOUS at 14:08

## 2021-04-05 RX ADMIN — TRIAMCINOLONE ACETONIDE: 0.25 CREAM TOPICAL at 08:39

## 2021-04-05 RX ADMIN — ATORVASTATIN CALCIUM 40 MG: 40 TABLET, FILM COATED ORAL at 17:25

## 2021-04-05 RX ADMIN — CEFAZOLIN SODIUM 2000 MG: 2 SOLUTION INTRAVENOUS at 05:07

## 2021-04-05 RX ADMIN — Medication: at 17:25

## 2021-04-05 RX ADMIN — PANTOPRAZOLE SODIUM 40 MG: 40 TABLET, DELAYED RELEASE ORAL at 05:06

## 2021-04-05 RX ADMIN — GABAPENTIN 300 MG: 300 CAPSULE ORAL at 21:28

## 2021-04-05 RX ADMIN — Medication 250 MG: at 08:39

## 2021-04-05 RX ADMIN — TRIAMCINOLONE ACETONIDE: 0.25 CREAM TOPICAL at 17:25

## 2021-04-05 RX ADMIN — CEFAZOLIN SODIUM 2000 MG: 2 SOLUTION INTRAVENOUS at 23:12

## 2021-04-05 RX ADMIN — Medication 250 MG: at 17:25

## 2021-04-05 RX ADMIN — HEPARIN SODIUM 5000 UNITS: 5000 INJECTION INTRAVENOUS; SUBCUTANEOUS at 05:06

## 2021-04-05 RX ADMIN — GUAIFENESIN 600 MG: 600 TABLET ORAL at 23:12

## 2021-04-05 RX ADMIN — HEPARIN SODIUM 5000 UNITS: 5000 INJECTION INTRAVENOUS; SUBCUTANEOUS at 21:28

## 2021-04-05 RX ADMIN — CEFAZOLIN SODIUM 2000 MG: 2 SOLUTION INTRAVENOUS at 14:08

## 2021-04-05 RX ADMIN — Medication: at 08:39

## 2021-04-05 NOTE — PROGRESS NOTES
[address] [facility]  Progress Note - Ce Araya 1955, 76 y o  female MRN: [id number]  Unit/Bed#: E5 -01 Encounter: [id number]  Primary Care Provider: Nasima Yun MD   Date and time admitted to hospital: 4/3/2021 11:10 AM    * Severe sepsis [facility])  Assessment & Plan  Patient presenting meeting SIRS criteria initially felt to be due to skin and soft tissue etiology as evidenced by tachycardia 121, and respiratory rate of 22, also had lactic acidosis, resolved  Known history of ulcer of the right lower extremity, underlying venous stasis  Continue Ancef for now, anticipate transitioning to oral Abx next 24 hours   Elevate extremity  Was given sepsis bolus of 30 cc/kilogram - this equates to approximately 3 5 L  Monitor fever curve, VS      Cellulitis  Assessment & Plan  RLE ucler, venous stasis, possible superimposed cellulitis   On Ancef with short-term transition to Keflex  See treatment for sepsis of a skin and soft tissue etiology    Excoriation (skin-picking) disorder  Assessment & Plan  Appears to be somewhat infected secondary to skin excoriation  Continue Ancef for now - transition to Keflex next 24-48 hrs    Chronic diastolic congestive heart failure (HCC)  Assessment & Plan  Wt Readings from Last 3 Encounters:   04/03/21 129 kg (284 lb 6 3 oz)   03/24/21 128 kg (283 lb)   11/25/20 133 kg (293 lb)     Weights appear stable, monitor daily        Diabetes mellitus type 2 in obese [facility])  Assessment & Plan  Lab Results   Component Value Date    HGBA1C 6 2 (H) 04/04/2021       Recent Labs     04/04/21  1524 04/04/21  2117 04/05/21  0729 04/05/21  1133   POCGLU 112 104 97 94       Blood Sugar Average: Last 72 hrs:  (P) 104  125   Continue on sliding scale and basal bolus protocol  Update hemoglobin A1c as from last  Diabetic diet while admitted  She is no longer on metformin      Obstructive sleep apnea of adult  Assessment & Plan  Patient refuses COVID-19 testing - unfortunately because of this cannot start CPAP  She is in agreement with this care plan    Restrictive lung disease  Assessment & Plan  History of restrictive lung disease, not on inhaler therapy, likely a component of obesity hypoventilation syndrome  No respiratory symptoms, continue to monitor    Morbid obesity with BMI of 60 0-69 9, adult (HCC)  Assessment & Plan  History of morbid obesity with BMI greater than 60 - will need outpatient weight loss recommendations        VTE Pharmacologic Prophylaxis:   Pharmacologic: Heparin  Mechanical VTE Prophylaxis in Place: Yes    Patient Centered Rounds: I have performed bedside rounds with nursing staff today  Discussions with Specialists or Other Care Team Provider: LUCIA    Education and Discussions with Family / Patient: patient    Time Spent for Care: 45 minutes  More than 50% of total time spent on counseling and coordination of care as described above  Current Length of Stay: 1 day(s)    Current Patient Status: Inpatient   Certification Statement: The patient will continue to require additional inpatient hospital stay due to close monitoring     Discharge Plan: 1-2 days    Code Status: Level 1 - Full Code      Subjective:   Patient seen and examined  She reports feeling improved in regards to her leg pain  No new complaints, no o/n events  Objective:     Vitals:   Temp (24hrs), Av 2 °F (36 2 °C), Min:97 °F (36 1 °C), Max:97 3 °F (36 3 °C)    Temp:  [97 °F (36 1 °C)-97 3 °F (36 3 °C)] 97 °F (36 1 °C)  HR:  [87-96] 87  Resp:  [20] 20  BP: (131-149)/(78-85) 149/85  SpO2:  [96 %] 96 %  Body mass index is 61 54 kg/m²  Input and Output Summary (last 24 hours): Intake/Output Summary (Last 24 hours) at 2021 1514  Last data filed at 2021 0801  Gross per 24 hour   Intake 320 ml   Output --   Net 320 ml       Physical Exam:     Physical Exam  Constitutional:       General: She is not in acute distress  Appearance: She is obese     HENT:      Head: Normocephalic and atraumatic  Mouth/Throat:      Pharynx: Oropharynx is clear  Eyes:      Conjunctiva/sclera: Conjunctivae normal    Cardiovascular:      Rate and Rhythm: Normal rate and regular rhythm  Heart sounds: No murmur  Pulmonary:      Effort: No respiratory distress  Breath sounds: No wheezing or rales  Abdominal:      General: There is no distension  Tenderness: There is no abdominal tenderness  There is no guarding  Musculoskeletal:      Right lower leg: No edema  Left lower leg: No edema  Skin:     Comments: RLE dressing dry clean intact    Neurological:      Mental Status: She is oriented to person, place, and time  Psychiatric:         Mood and Affect: Mood normal          Additional Data:     Labs:    Results from last 7 days   Lab Units 04/05/21  0458  04/03/21  1135   WBC Thousand/uL 8 37   < > 9 81   HEMOGLOBIN g/dL 12 4   < > 13 5   HEMATOCRIT % 38 5   < > 41 2   PLATELETS Thousands/uL 261   < > 308   NEUTROS PCT %  --   --  46   LYMPHS PCT %  --   --  40   MONOS PCT %  --   --  7   EOS PCT %  --   --  6    < > = values in this interval not displayed  Results from last 7 days   Lab Units 04/05/21  0458  04/03/21  1135   SODIUM mmol/L 145   < > 143   POTASSIUM mmol/L 3 7   < > 4 5   CHLORIDE mmol/L 108   < > 105   CO2 mmol/L 30   < > 30   BUN mg/dL 17   < > 13   CREATININE mg/dL 0 97   < > 1 03   ANION GAP mmol/L 7   < > 8   CALCIUM mg/dL 9 0   < > 9 3   ALBUMIN g/dL  --   --  3 5   TOTAL BILIRUBIN mg/dL  --   --  0 24   ALK PHOS U/L  --   --  102   ALT U/L  --   --  22   AST U/L  --   --  21   GLUCOSE RANDOM mg/dL 100   < > 104    < > = values in this interval not displayed           Results from last 7 days   Lab Units 04/05/21  1133 04/05/21  0729 04/04/21  2117 04/04/21  1524 04/04/21  1107 04/04/21  0825 04/03/21  2141 04/03/21  1434   POC GLUCOSE mg/dl 94 97 104 112 113 99 111 103     Results from last 7 days   Lab Units 04/04/21  0523   HEMOGLOBIN A1C % 6 2*     Results from last 7 days   Lab Units 04/04/21  0523 04/03/21  1336 04/03/21  1135   LACTIC ACID mmol/L  --  1 5 2 2*   PROCALCITONIN ng/ml <0 05 <0 05  --            * I Have Reviewed All Lab Data Listed Above  * Additional Pertinent Lab Tests Reviewed: [address] Admission Reviewed    Imaging:    Imaging Reports Reviewed Today Include: no new       Recent Cultures (last 7 days):     Results from last 7 days   Lab Units 04/03/21  1339 04/03/21  1336   BLOOD CULTURE  No Growth at 24 hrs  No Growth at 24 hrs  Last 24 Hours Medication List:   Current Facility-Administered Medications   Medication Dose Route Frequency Provider Last Rate    acetaminophen  650 mg Oral Q6H PRN El Leap, DO      amLODIPine  5 mg Oral Daily Charles D Elmore, DO      ammonium lactate   Topical BID Charles D Elmore, DO      aspirin  81 mg Oral Daily Charles JULIO Elmore, DO      atorvastatin  40 mg Oral Daily With Dinner Charlestamera Elmore, DO      cefazolin  2,000 mg Intravenous Q8H El Leap, DO 2,000 mg (04/05/21 1408)    gabapentin  300 mg Oral HS Charles D Elmore, DO      heparin (porcine)  5,000 Units Subcutaneous Q8H [address] Charles D Elmore, DO      insulin lispro  2-12 Units Subcutaneous TID AC Charles JULIO Elmore, DO      pantoprazole  40 mg Oral Daily Charles D Elmore, DO      polyethylene glycol  17 g Oral Daily Charles JULIO Elmore, DO      saccharomyces boulardii  250 mg Oral BID El Leap, DO      triamcinolone   Topical BID Francis Tyler DPM          Today, Patient Was Seen By: Delores Parmar MD    ** Please Note: Dictation voice to text software may have been used in the creation of this document   **

## 2021-04-05 NOTE — PLAN OF CARE
Problem: Potential for Falls  Goal: Patient will remain free of falls  Description: INTERVENTIONS:  - Assess patient frequently for physical needs  -  Identify cognitive and physical deficits and behaviors that affect risk of falls  -  [place] fall precautions as indicated by assessment   - Educate patient/family on patient safety including physical limitations  - Instruct patient to call for assistance with activity based on assessment  - Modify environment to reduce risk of injury  - Consider OT/PT consult to assist with strengthening/mobility  Outcome: Progressing     Problem: Nutrition/Hydration-ADULT  Goal: Nutrient/Hydration intake appropriate for improving, restoring or maintaining nutritional needs  Description: Monitor and assess patient's nutrition/hydration status for malnutrition  Collaborate with interdisciplinary team and initiate plan and interventions as ordered  Monitor patient's weight and dietary intake as ordered or per policy  Utilize nutrition screening tool and intervene as necessary  Determine patient's food preferences and provide high-protein, high-caloric foods as appropriate       INTERVENTIONS:  - Monitor oral intake, urinary output, labs, and treatment plans  - Assess nutrition and hydration status and recommend course of action  - Evaluate amount of meals eaten  - Assist patient with eating if necessary   - Allow adequate time for meals  - Recommend/ encourage appropriate diets, oral nutritional supplements, and vitamin/mineral supplements  - Order, calculate, and assess calorie counts as needed  - Recommend, monitor, and adjust tube feedings and TPN/PPN based on assessed needs  - Assess need for intravenous fluids  - Provide specific nutrition/hydration education as appropriate  - Include patient/family/caregiver in decisions related to nutrition  Outcome: Progressing     Problem: NEUROSENSORY - ADULT  Goal: Achieves stable or improved neurological status  Description: INTERVENTIONS  - Monitor and report changes in neurological status  - Monitor vital signs such as temperature, blood pressure, glucose, and any other labs ordered   - Initiate measures to prevent increased intracranial pressure  - Monitor for seizure activity and implement precautions if appropriate      Outcome: Progressing  Goal: Remains free of injury related to seizures activity  Description: INTERVENTIONS  - Maintain airway, patient safety  and administer oxygen as ordered  - Monitor patient for seizure activity, document and report duration and description of seizure to physician/advanced practitioner  - If seizure occurs,  ensure patient safety during seizure  - Reorient patient post seizure  - Seizure pads on all 4 side rails  - Instruct patient/family to notify RN of any seizure activity including if an aura is experienced  - Instruct patient/family to call for assistance with activity based on nursing assessment  - Administer anti-seizure medications if ordered    Outcome: Progressing  Goal: Achieves maximal functionality and self care  Description: INTERVENTIONS  - Monitor swallowing and airway patency with patient fatigue and changes in neurological status  - Encourage and assist patient to increase activity and self care     - Encourage visually impaired, hearing impaired and aphasic patients to use assistive/communication devices  Outcome: Progressing     Problem: CARDIOVASCULAR - ADULT  Goal: Maintains optimal cardiac output and hemodynamic stability  Description: INTERVENTIONS:  - Monitor I/O, vital signs and rhythm  - Monitor for S/S and trends of decreased cardiac output  - Administer and titrate ordered vasoactive medications to optimize hemodynamic stability  - Assess quality of pulses, skin color and temperature  - Assess for signs of decreased coronary artery perfusion  - Instruct patient to report change in severity of symptoms  Outcome: Progressing  Goal: Absence of cardiac dysrhythmias or at baseline rhythm  Description: INTERVENTIONS:  - Continuous cardiac monitoring, vital signs, obtain 12 lead EKG if ordered  - Administer antiarrhythmic and heart rate control medications as ordered  - Monitor electrolytes and administer replacement therapy as ordered  Outcome: Progressing     Problem: RESPIRATORY - ADULT  Goal: Achieves optimal ventilation and oxygenation  Description: INTERVENTIONS:  - Assess for changes in respiratory status  - Assess for changes in mentation and behavior  - Position to facilitate oxygenation and minimize respiratory effort  - Oxygen administered by appropriate delivery if ordered  - Initiate smoking cessation education as indicated  - Encourage broncho-pulmonary hygiene including cough, deep breathe, Incentive Spirometry  - Assess the need for suctioning and aspirate as needed  - Assess and instruct to report SOB or any respiratory difficulty  - Respiratory Therapy support as indicated  Outcome: Progressing     Problem: GASTROINTESTINAL - ADULT  Goal: Minimal or absence of nausea and/or vomiting  Description: INTERVENTIONS:  - Administer IV fluids if ordered to ensure adequate hydration  - Maintain NPO status until nausea and vomiting are resolved  - Nasogastric tube if ordered  - Administer ordered antiemetic medications as needed  - Provide nonpharmacologic comfort measures as appropriate  - Advance diet as tolerated, if ordered  - Consider nutrition services referral to assist patient with adequate nutrition and appropriate food choices  Outcome: Progressing  Goal: Maintains or returns to baseline bowel function  Description: INTERVENTIONS:  - Assess bowel function  - Encourage oral fluids to ensure adequate hydration  - Administer IV fluids if ordered to ensure adequate hydration  - Administer ordered medications as needed  - Encourage mobilization and activity  - Consider nutritional services referral to assist patient with adequate nutrition and appropriate food choices  Outcome: Progressing  Goal: Maintains adequate nutritional intake  Description: INTERVENTIONS:  - Monitor percentage of each meal consumed  - Identify factors contributing to decreased intake, treat as appropriate  - Assist with meals as needed  - Monitor I&O, weight, and lab values if indicated  - Obtain nutrition services referral as needed  Outcome: Progressing     Problem: GENITOURINARY - ADULT  Goal: Maintains or returns to baseline urinary function  Description: INTERVENTIONS:  - Assess urinary function  - Encourage oral fluids to ensure adequate hydration if ordered  - Administer IV fluids as ordered to ensure adequate hydration  - Administer ordered medications as needed  - Offer frequent toileting  - Follow urinary retention protocol if ordered  Outcome: Progressing  Goal: Absence of urinary retention  Description: INTERVENTIONS:  - Assess patients ability to void and empty bladder  - Monitor I/O  - Bladder scan as needed  - Discuss with physician/AP medications to alleviate retention as needed  - Discuss catheterization for long term situations as appropriate  Outcome: Progressing     Problem: METABOLIC, FLUID AND ELECTROLYTES - ADULT  Goal: Electrolytes maintained within normal limits  Description: INTERVENTIONS:  - Monitor labs and assess patient for signs and symptoms of electrolyte imbalances  - Administer electrolyte replacement as ordered  - Monitor response to electrolyte replacements, including repeat lab results as appropriate  - Instruct patient on fluid and nutrition as appropriate  Outcome: Progressing  Goal: Fluid balance maintained  Description: INTERVENTIONS:  - Monitor labs   - Monitor I/O and WT  - Instruct patient on fluid and nutrition as appropriate  - Assess for signs & symptoms of volume excess or deficit  Outcome: Progressing  Goal: Glucose maintained within target range  Description: INTERVENTIONS:  - Monitor Blood Glucose as ordered  - Assess for signs and symptoms of hyperglycemia and hypoglycemia  - Administer ordered medications to maintain glucose within target range  - Assess nutritional intake and initiate nutrition service referral as needed  Outcome: Progressing     Problem: SKIN/TISSUE INTEGRITY - ADULT  Goal: Skin integrity remains intact  Description: INTERVENTIONS  - Identify patients at risk for skin breakdown  - Assess and monitor skin integrity  - Assess and monitor nutrition and hydration status  - Monitor labs (i e  albumin)  - Assess for incontinence   - Turn and reposition patient  - Assist with mobility/ambulation  - Relieve pressure over bony prominences  - Avoid friction and shearing  - Provide appropriate hygiene as needed including keeping skin clean and dry  - Evaluate need for skin moisturizer/barrier cream  - Collaborate with interdisciplinary team (i e  Nutrition, Rehabilitation, etc )   - Patient/family teaching  Outcome: Progressing  Goal: Incision(s), wounds(s) or drain site(s) healing without S/S of infection  Description: INTERVENTIONS  - Assess and document risk factors for skin impairment   - Assess and document dressing, incision, wound bed, drain sites and surrounding tissue  - Consider nutrition services referral as needed  - Oral mucous membranes remain intact  - Provide patient/ family education  Outcome: Progressing  Goal: Oral mucous membranes remain intact  Description: INTERVENTIONS  - Assess oral mucosa and hygiene practices  - Implement preventative oral hygiene regimen  - Implement oral medicated treatments as ordered  - Initiate Nutrition services referral as needed  Outcome: Progressing     Problem: HEMATOLOGIC - ADULT  Goal: Maintains hematologic stability  Description: INTERVENTIONS  - Assess for signs and symptoms of bleeding or hemorrhage  - Monitor labs  - Administer supportive blood products/factors as ordered and appropriate  Outcome: Progressing     Problem: MUSCULOSKELETAL - ADULT  Goal: Maintain or return mobility to safest level of function  Description: INTERVENTIONS:  - Assess patient's ability to carry out ADLs; assess patient's baseline for ADL function and identify physical deficits which impact ability to perform ADLs (bathing, care of mouth/teeth, toileting, grooming, dressing, etc )  - Assess/evaluate cause of self-care deficits   - Assess range of motion  - Assess patient's mobility  - Assess patient's need for assistive devices and provide as appropriate  - Encourage maximum independence but intervene and supervise when necessary  - Involve family in performance of ADLs  - Assess for home care needs following discharge   - Consider OT consult to assist with ADL evaluation and planning for discharge  - Provide patient education as appropriate  Outcome: Progressing  Goal: Maintain proper alignment of affected body part  Description: INTERVENTIONS:  - Support, maintain and protect limb and body alignment  - Provide patient/ family with appropriate education  Outcome: Progressing     Problem: Prexisting or High Potential for Compromised Skin Integrity  Goal: Skin integrity is maintained or improved  Description: INTERVENTIONS:  - Identify patients at risk for skin breakdown  - Assess and monitor skin integrity  - Assess and monitor nutrition and hydration status  - Monitor labs   - Assess for incontinence   - Turn and reposition patient  - Assist with mobility/ambulation  - Relieve pressure over bony prominences  - Avoid friction and shearing  - Provide appropriate hygiene as needed including keeping skin clean and dry  - Evaluate need for skin moisturizer/barrier cream  - Collaborate with interdisciplinary team   - Patient/family teaching  - Consider wound care consult   Outcome: Progressing

## 2021-04-05 NOTE — ASSESSMENT & PLAN NOTE
Appears to be somewhat infected secondary to skin excoriation  Continue Ancef for now - transition to Keflex next 24-48 hrs

## 2021-04-05 NOTE — ASSESSMENT & PLAN NOTE
History of restrictive lung disease, not on inhaler therapy, likely a component of obesity hypoventilation syndrome  No respiratory symptoms, continue to monitor

## 2021-04-05 NOTE — ASSESSMENT & PLAN NOTE
Wt Readings from Last 3 Encounters:   04/03/21 129 kg (284 lb 6 3 oz)   03/24/21 128 kg (283 lb)   11/25/20 133 kg (293 lb)     Weights appear stable, monitor daily

## 2021-04-05 NOTE — ASSESSMENT & PLAN NOTE
Patient presenting meeting SIRS criteria initially felt to be due to skin and soft tissue etiology as evidenced by tachycardia 121, and respiratory rate of 22, also had lactic acidosis, resolved  Known history of ulcer of the right lower extremity, underlying venous stasis  Continue Ancef for now, anticipate transitioning to oral Abx next 24 hours   Elevate extremity  Was given sepsis bolus of 30 cc/kilogram - this equates to approximately 3 5 L  Monitor fever curve, VS

## 2021-04-05 NOTE — ASSESSMENT & PLAN NOTE
RLE ucler, venous stasis, possible superimposed cellulitis   On Ancef with short-term transition to Keflex  See treatment for sepsis of a skin and soft tissue etiology

## 2021-04-05 NOTE — ASSESSMENT & PLAN NOTE
Lab Results   Component Value Date    HGBA1C 6 2 (H) 04/04/2021       Recent Labs     04/04/21  1524 04/04/21  2117 04/05/21  0729 04/05/21  1133   POCGLU 112 104 97 94       Blood Sugar Average: Last 72 hrs:  (P) 104  125   Continue on sliding scale and basal bolus protocol  Update hemoglobin A1c as from last  Diabetic diet while admitted  She is no longer on metformin

## 2021-04-05 NOTE — CASE MANAGEMENT
GMLOS:  2             LOS: 1  BUNDLED? No  UNPLANNED READMISSION LEVEL: 13 low  30 DAY READMISSION? No    Pt was admitted on 4/4/21 with Sepsis  SW met with pt at bedside to conduct a CM assessment and discuss discharge planning  Pt lives in an apartment complex by herself  No steps to enter, elevator accessible  She is independent with all her ADL's  She has a cane, rollator and scooter as DME  She is not currently open with any home services or rehab  She uses [facility] on [address] for RX needs  Her PCP is Ericka Reyes  She denies any hx of MH or D&A  She reports her dtr is POA  She does not work or drive  She uses [facility] to get to medical appts  At discharge pt will need transport  SW will continue to follow

## 2021-04-06 LAB
ANION GAP SERPL CALCULATED.3IONS-SCNC: 7 MMOL/L (ref 4–13)
BUN SERPL-MCNC: 15 MG/DL (ref 5–25)
CALCIUM SERPL-MCNC: 8.8 MG/DL (ref 8.3–10.1)
CHLORIDE SERPL-SCNC: 107 MMOL/L (ref 100–108)
CO2 SERPL-SCNC: 30 MMOL/L (ref 21–32)
CREAT SERPL-MCNC: 0.86 MG/DL (ref 0.6–1.3)
ERYTHROCYTE [DISTWIDTH] IN BLOOD BY AUTOMATED COUNT: 15 % (ref 11.6–15.1)
GFR SERPL CREATININE-BSD FRML MDRD: 81 ML/MIN/1.73SQ M
GLUCOSE SERPL-MCNC: 104 MG/DL (ref 65–140)
GLUCOSE SERPL-MCNC: 111 MG/DL (ref 65–140)
GLUCOSE SERPL-MCNC: 95 MG/DL (ref 65–140)
GLUCOSE SERPL-MCNC: 97 MG/DL (ref 65–140)
GLUCOSE SERPL-MCNC: 97 MG/DL (ref 65–140)
HCT VFR BLD AUTO: 37.2 % (ref 34.8–46.1)
HGB BLD-MCNC: 12.1 G/DL (ref 11.5–15.4)
MCH RBC QN AUTO: 29.3 PG (ref 26.8–34.3)
MCHC RBC AUTO-ENTMCNC: 32.5 G/DL (ref 31.4–37.4)
MCV RBC AUTO: 90 FL (ref 82–98)
PLATELET # BLD AUTO: 268 THOUSANDS/UL (ref 149–390)
PMV BLD AUTO: 9.8 FL (ref 8.9–12.7)
POTASSIUM SERPL-SCNC: 4 MMOL/L (ref 3.5–5.3)
RBC # BLD AUTO: 4.13 MILLION/UL (ref 3.81–5.12)
SODIUM SERPL-SCNC: 144 MMOL/L (ref 136–145)
WBC # BLD AUTO: 7.92 THOUSAND/UL (ref 4.31–10.16)

## 2021-04-06 PROCEDURE — 80048 BASIC METABOLIC PNL TOTAL CA: CPT | Performed by: INTERNAL MEDICINE

## 2021-04-06 PROCEDURE — 82948 REAGENT STRIP/BLOOD GLUCOSE: CPT

## 2021-04-06 PROCEDURE — 85027 COMPLETE CBC AUTOMATED: CPT | Performed by: INTERNAL MEDICINE

## 2021-04-06 PROCEDURE — 99232 SBSQ HOSP IP/OBS MODERATE 35: CPT | Performed by: STUDENT IN AN ORGANIZED HEALTH CARE EDUCATION/TRAINING PROGRAM

## 2021-04-06 RX ADMIN — ATORVASTATIN CALCIUM 40 MG: 40 TABLET, FILM COATED ORAL at 17:12

## 2021-04-06 RX ADMIN — CEFAZOLIN SODIUM 2000 MG: 2 SOLUTION INTRAVENOUS at 21:46

## 2021-04-06 RX ADMIN — PANTOPRAZOLE SODIUM 40 MG: 40 TABLET, DELAYED RELEASE ORAL at 06:16

## 2021-04-06 RX ADMIN — TRIAMCINOLONE ACETONIDE: 0.25 CREAM TOPICAL at 10:01

## 2021-04-06 RX ADMIN — CEFAZOLIN SODIUM 2000 MG: 2 SOLUTION INTRAVENOUS at 06:15

## 2021-04-06 RX ADMIN — CEFAZOLIN SODIUM 2000 MG: 2 SOLUTION INTRAVENOUS at 14:18

## 2021-04-06 RX ADMIN — Medication 250 MG: at 10:01

## 2021-04-06 RX ADMIN — HEPARIN SODIUM 5000 UNITS: 5000 INJECTION INTRAVENOUS; SUBCUTANEOUS at 21:46

## 2021-04-06 RX ADMIN — Medication 250 MG: at 17:12

## 2021-04-06 RX ADMIN — TRIAMCINOLONE ACETONIDE: 0.25 CREAM TOPICAL at 17:11

## 2021-04-06 RX ADMIN — HEPARIN SODIUM 5000 UNITS: 5000 INJECTION INTRAVENOUS; SUBCUTANEOUS at 06:16

## 2021-04-06 RX ADMIN — HEPARIN SODIUM 5000 UNITS: 5000 INJECTION INTRAVENOUS; SUBCUTANEOUS at 14:18

## 2021-04-06 RX ADMIN — Medication: at 10:01

## 2021-04-06 RX ADMIN — AMLODIPINE BESYLATE 5 MG: 5 TABLET ORAL at 10:01

## 2021-04-06 RX ADMIN — Medication: at 17:12

## 2021-04-06 RX ADMIN — GABAPENTIN 300 MG: 300 CAPSULE ORAL at 21:46

## 2021-04-06 RX ADMIN — ASPIRIN 81 MG: 81 TABLET, COATED ORAL at 10:01

## 2021-04-06 NOTE — PLAN OF CARE
Problem: Potential for Falls  Goal: Patient will remain free of falls  Description: INTERVENTIONS:  - Assess patient frequently for physical needs  -  Identify cognitive and physical deficits and behaviors that affect risk of falls  -  [place] fall precautions as indicated by assessment   - Educate patient/family on patient safety including physical limitations  - Instruct patient to call for assistance with activity based on assessment  - Modify environment to reduce risk of injury  - Consider OT/PT consult to assist with strengthening/mobility  4/6/2021 1139 by Flo Cabot, RN  Outcome: Progressing  4/6/2021 1139 by Flo Cabot, RN  Outcome: Progressing     Problem: Nutrition/Hydration-ADULT  Goal: Nutrient/Hydration intake appropriate for improving, restoring or maintaining nutritional needs  Description: Monitor and assess patient's nutrition/hydration status for malnutrition  Collaborate with interdisciplinary team and initiate plan and interventions as ordered  Monitor patient's weight and dietary intake as ordered or per policy  Utilize nutrition screening tool and intervene as necessary  Determine patient's food preferences and provide high-protein, high-caloric foods as appropriate       INTERVENTIONS:  - Monitor oral intake, urinary output, labs, and treatment plans  - Assess nutrition and hydration status and recommend course of action  - Evaluate amount of meals eaten  - Assist patient with eating if necessary   - Allow adequate time for meals  - Recommend/ encourage appropriate diets, oral nutritional supplements, and vitamin/mineral supplements  - Order, calculate, and assess calorie counts as needed  - Recommend, monitor, and adjust tube feedings and TPN/PPN based on assessed needs  - Assess need for intravenous fluids  - Provide specific nutrition/hydration education as appropriate  - Include patient/family/caregiver in decisions related to nutrition  4/6/2021 1139 by Darlin Romberg Hughes-Behler, RN  Outcome: Progressing  4/6/2021 1139 by Bertha Pruitt RN  Outcome: Progressing     Problem: NEUROSENSORY - ADULT  Goal: Achieves stable or improved neurological status  Description: INTERVENTIONS  - Monitor and report changes in neurological status  - Monitor vital signs such as temperature, blood pressure, glucose, and any other labs ordered   - Initiate measures to prevent increased intracranial pressure  - Monitor for seizure activity and implement precautions if appropriate      4/6/2021 1139 by Bertha Pruitt RN  Outcome: Progressing  4/6/2021 1139 by Bertha Pruitt RN  Outcome: Progressing  Goal: Remains free of injury related to seizures activity  Description: INTERVENTIONS  - Maintain airway, patient safety  and administer oxygen as ordered  - Monitor patient for seizure activity, document and report duration and description of seizure to physician/advanced practitioner  - If seizure occurs,  ensure patient safety during seizure  - Reorient patient post seizure  - Seizure pads on all 4 side rails  - Instruct patient/family to notify RN of any seizure activity including if an aura is experienced  - Instruct patient/family to call for assistance with activity based on nursing assessment  - Administer anti-seizure medications if ordered    4/6/2021 1139 by Bertha Pruitt RN  Outcome: Progressing  4/6/2021 1139 by Bertha Pruitt RN  Outcome: Progressing  Goal: Achieves maximal functionality and self care  Description: INTERVENTIONS  - Monitor swallowing and airway patency with patient fatigue and changes in neurological status  - Encourage and assist patient to increase activity and self care     - Encourage visually impaired, hearing impaired and aphasic patients to use assistive/communication devices  4/6/2021 1139 by Bertha Pruitt RN  Outcome: Progressing  4/6/2021 1139 by Bertha Pruitt RN  Outcome: Progressing     Problem: CARDIOVASCULAR - ADULT  Goal: Maintains optimal cardiac output and hemodynamic stability  Description: INTERVENTIONS:  - Monitor I/O, vital signs and rhythm  - Monitor for S/S and trends of decreased cardiac output  - Administer and titrate ordered vasoactive medications to optimize hemodynamic stability  - Assess quality of pulses, skin color and temperature  - Assess for signs of decreased coronary artery perfusion  - Instruct patient to report change in severity of symptoms  4/6/2021 1139 by Pura Astudillo RN  Outcome: Progressing  4/6/2021 1139 by Pura Astudillo RN  Outcome: Progressing  Goal: Absence of cardiac dysrhythmias or at baseline rhythm  Description: INTERVENTIONS:  - Continuous cardiac monitoring, vital signs, obtain 12 lead EKG if ordered  - Administer antiarrhythmic and heart rate control medications as ordered  - Monitor electrolytes and administer replacement therapy as ordered  4/6/2021 1139 by Pura Astudillo RN  Outcome: Progressing  4/6/2021 1139 by Pura Astudillo RN  Outcome: Progressing     Problem: RESPIRATORY - ADULT  Goal: Achieves optimal ventilation and oxygenation  Description: INTERVENTIONS:  - Assess for changes in respiratory status  - Assess for changes in mentation and behavior  - Position to facilitate oxygenation and minimize respiratory effort  - Oxygen administered by appropriate delivery if ordered  - Initiate smoking cessation education as indicated  - Encourage broncho-pulmonary hygiene including cough, deep breathe, Incentive Spirometry  - Assess the need for suctioning and aspirate as needed  - Assess and instruct to report SOB or any respiratory difficulty  - Respiratory Therapy support as indicated  4/6/2021 1139 by Pura Astudillo RN  Outcome: Progressing  4/6/2021 1139 by Pura Astudillo RN  Outcome: Progressing     Problem: GASTROINTESTINAL - ADULT  Goal: Minimal or absence of nausea and/or vomiting  Description: INTERVENTIONS:  - Administer IV fluids if ordered to ensure adequate hydration  - Maintain NPO status until nausea and vomiting are resolved  - Nasogastric tube if ordered  - Administer ordered antiemetic medications as needed  - Provide nonpharmacologic comfort measures as appropriate  - Advance diet as tolerated, if ordered  - Consider nutrition services referral to assist patient with adequate nutrition and appropriate food choices  4/6/2021 1139 by Flo Cabot, RN  Outcome: Progressing  4/6/2021 1139 by Flo Cabot, RN  Outcome: Progressing  Goal: Maintains or returns to baseline bowel function  Description: INTERVENTIONS:  - Assess bowel function  - Encourage oral fluids to ensure adequate hydration  - Administer IV fluids if ordered to ensure adequate hydration  - Administer ordered medications as needed  - Encourage mobilization and activity  - Consider nutritional services referral to assist patient with adequate nutrition and appropriate food choices  4/6/2021 1139 by Flo Cabot, RN  Outcome: Progressing  4/6/2021 1139 by Flo Cabot, RN  Outcome: Progressing  Goal: Maintains adequate nutritional intake  Description: INTERVENTIONS:  - Monitor percentage of each meal consumed  - Identify factors contributing to decreased intake, treat as appropriate  - Assist with meals as needed  - Monitor I&O, weight, and lab values if indicated  - Obtain nutrition services referral as needed  4/6/2021 1139 by Flo Cabot, RN  Outcome: Progressing  4/6/2021 1139 by Flo Cabot, RN  Outcome: Progressing     Problem: GENITOURINARY - ADULT  Goal: Maintains or returns to baseline urinary function  Description: INTERVENTIONS:  - Assess urinary function  - Encourage oral fluids to ensure adequate hydration if ordered  - Administer IV fluids as ordered to ensure adequate hydration  - Administer ordered medications as needed  - Offer frequent toileting  - Follow urinary retention protocol if ordered  4/6/2021 1139 by Yohana Verduzco RN  Outcome: Progressing  4/6/2021 1139 by Yohana Verduzco RN  Outcome: Progressing  Goal: Absence of urinary retention  Description: INTERVENTIONS:  - Assess patients ability to void and empty bladder  - Monitor I/O  - Bladder scan as needed  - Discuss with physician/AP medications to alleviate retention as needed  - Discuss catheterization for long term situations as appropriate  4/6/2021 1139 by Yohana Verduzco RN  Outcome: Progressing  4/6/2021 1139 by Yohana Verduzco RN  Outcome: Progressing     Problem: METABOLIC, FLUID AND ELECTROLYTES - ADULT  Goal: Electrolytes maintained within normal limits  Description: INTERVENTIONS:  - Monitor labs and assess patient for signs and symptoms of electrolyte imbalances  - Administer electrolyte replacement as ordered  - Monitor response to electrolyte replacements, including repeat lab results as appropriate  - Instruct patient on fluid and nutrition as appropriate  4/6/2021 1139 by Yohana Verduzco RN  Outcome: Progressing  4/6/2021 1139 by Yohana Verduzco RN  Outcome: Progressing  Goal: Fluid balance maintained  Description: INTERVENTIONS:  - Monitor labs   - Monitor I/O and WT  - Instruct patient on fluid and nutrition as appropriate  - Assess for signs & symptoms of volume excess or deficit  4/6/2021 1139 by Yohana Verduzco RN  Outcome: Progressing  4/6/2021 1139 by Yohana Verduzco RN  Outcome: Progressing  Goal: Glucose maintained within target range  Description: INTERVENTIONS:  - Monitor Blood Glucose as ordered  - Assess for signs and symptoms of hyperglycemia and hypoglycemia  - Administer ordered medications to maintain glucose within target range  - Assess nutritional intake and initiate nutrition service referral as needed  4/6/2021 1139 by Yohana Verduzco RN  Outcome: Progressing  4/6/2021 1139 by Yohana Verduzco RN  Outcome: Progressing     Problem: SKIN/TISSUE INTEGRITY - ADULT  Goal: Skin integrity remains intact  Description: INTERVENTIONS  - Identify patients at risk for skin breakdown  - Assess and monitor skin integrity  - Assess and monitor nutrition and hydration status  - Monitor labs (i e  albumin)  - Assess for incontinence   - Turn and reposition patient  - Assist with mobility/ambulation  - Relieve pressure over bony prominences  - Avoid friction and shearing  - Provide appropriate hygiene as needed including keeping skin clean and dry  - Evaluate need for skin moisturizer/barrier cream  - Collaborate with interdisciplinary team (i e  Nutrition, Rehabilitation, etc )   - Patient/family teaching  4/6/2021 1139 by Luis Muir RN  Outcome: Progressing  4/6/2021 1139 by Luis Muir RN  Outcome: Progressing  Goal: Incision(s), wounds(s) or drain site(s) healing without S/S of infection  Description: INTERVENTIONS  - Assess and document risk factors for skin impairment   - Assess and document dressing, incision, wound bed, drain sites and surrounding tissue  - Consider nutrition services referral as needed  - Oral mucous membranes remain intact  - Provide patient/ family education  4/6/2021 1139 by Luis Muir RN  Outcome: Progressing  4/6/2021 1139 by Luis Muir RN  Outcome: Progressing  Goal: Oral mucous membranes remain intact  Description: INTERVENTIONS  - Assess oral mucosa and hygiene practices  - Implement preventative oral hygiene regimen  - Implement oral medicated treatments as ordered  - Initiate Nutrition services referral as needed  4/6/2021 1139 by Luis Muir RN  Outcome: Progressing  4/6/2021 1139 by Luis Muir RN  Outcome: Progressing     Problem: HEMATOLOGIC - ADULT  Goal: Maintains hematologic stability  Description: INTERVENTIONS  - Assess for signs and symptoms of bleeding or hemorrhage  - Monitor labs  - Administer supportive blood products/factors as ordered and appropriate  4/6/2021 1139 by Williams Bermudez RN  Outcome: Progressing  4/6/2021 1139 by Williams Bermudez RN  Outcome: Progressing     Problem: MUSCULOSKELETAL - ADULT  Goal: Maintain or return mobility to safest level of function  Description: INTERVENTIONS:  - Assess patient's ability to carry out ADLs; assess patient's baseline for ADL function and identify physical deficits which impact ability to perform ADLs (bathing, care of mouth/teeth, toileting, grooming, dressing, etc )  - Assess/evaluate cause of self-care deficits   - Assess range of motion  - Assess patient's mobility  - Assess patient's need for assistive devices and provide as appropriate  - Encourage maximum independence but intervene and supervise when necessary  - Involve family in performance of ADLs  - Assess for home care needs following discharge   - Consider OT consult to assist with ADL evaluation and planning for discharge  - Provide patient education as appropriate  4/6/2021 1139 by Williams Bermudez RN  Outcome: Progressing  4/6/2021 1139 by Williams Bermudez RN  Outcome: Progressing  Goal: Maintain proper alignment of affected body part  Description: INTERVENTIONS:  - Support, maintain and protect limb and body alignment  - Provide patient/ family with appropriate education  4/6/2021 1139 by Williams Bermudez RN  Outcome: Progressing  4/6/2021 1139 by Williams Bermudez RN  Outcome: Progressing     Problem: Prexisting or High Potential for Compromised Skin Integrity  Goal: Skin integrity is maintained or improved  Description: INTERVENTIONS:  - Identify patients at risk for skin breakdown  - Assess and monitor skin integrity  - Assess and monitor nutrition and hydration status  - Monitor labs   - Assess for incontinence   - Turn and reposition patient  - Assist with mobility/ambulation  - Relieve pressure over bony prominences  - Avoid friction and shearing  - Provide appropriate hygiene as needed including keeping skin clean and dry  - Evaluate need for skin moisturizer/barrier cream  - Collaborate with interdisciplinary team   - Patient/family teaching  - Consider wound care consult   4/6/2021 1139 by Nina Jacobson RN  Outcome: Progressing  4/6/2021 1139 by Nina Jacobson, RN  Outcome: Progressing

## 2021-04-06 NOTE — PROGRESS NOTES
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  2  B/L LE Venous stasis  3  T2DM  4  Morbid Obesity     Plan:  - RLE appears stable, pruritis improving, wounds superficial and not draining, no SOI  C/w lwc consisting of topical steroid to wound areas, amlactin surrounding wounds, non adherent, compression    - F/u with Dr Lazarus Segura  Podiatry will sign off at this time - contact for questions/concerns  - No leukocytosis, vss   - Abx per primary team   - Rest of care per primary and consulting teams    Subjective/Objective   Chief Complaint:   Chief Complaint   Patient presents with    Abdominal Pain     right flank pain, abd pain, frequent urination, pain and buring with urination also pain in right leg       Subjective: 76 y o  y/o female was seen and evaluated at bedside  Feels well today, itching to RLE improved  Nurse notes that patient has been seen pushing bandage down and itching legs  I discussed this with the patient and kindly asked her to stop  Blood pressure 138/81, pulse 87, temperature 97 8 °F (36 6 °C), temperature source Temporal, resp  rate 18, weight 130 kg (286 lb 2 5 oz), SpO2 95 %, not currently breastfeeding  ,Body mass index is 61 92 kg/m²  Invasive Devices     Peripheral Intravenous Line            Peripheral IV 04/03/21 Left Forearm 2 days                Physical Exam:   General: Alert, cooperative and no distress  Lungs: Non labored breathing  Heart: Positive S1, S2  Abdomen: Soft, non-tender  Extremity: B/L LE msk, nvs, derm baseline  Right lower extremity with medial tibial and medial/dorsal foot superficial excoriations, no active drainage or signs of infection  Mild itching noted  No pain  No calf pain                  Lab, Imaging and other studies:   CBC:   Lab Results   Component Value Date    WBC 7 92 04/06/2021    HGB 12 1 04/06/2021    HCT 37 2 04/06/2021    MCV 90 04/06/2021     04/06/2021    MCH 29 3 04/06/2021    MCHC 32 5 04/06/2021    RDW 15 0 04/06/2021    MPV 9 8 04/06/2021   , CMP:   Lab Results   Component Value Date    SODIUM 144 04/06/2021    K 4 0 04/06/2021     04/06/2021    CO2 30 04/06/2021    BUN 15 04/06/2021    CREATININE 0 86 04/06/2021    CALCIUM 8 8 04/06/2021    EGFR 81 04/06/2021       Imaging: I have personally reviewed pertinent films in PACS  EKG, Pathology, and Other Studies: I have personally reviewed pertinent reports

## 2021-04-06 NOTE — PROGRESS NOTES
[address] [place] [address]  Progress Note Blayne Araya 1955, 76 y o  female MRN: [id number]  Unit/Bed#: E5 -01 Encounter: [id number]  Primary Care Provider: Monet Kothari MD   Date and time admitted to hospital: 4/3/2021 11:10 AM    * Severe sepsis [facility])  Assessment & Plan  76year old female admitted due to severe sepsis secondary to superimposed cellulitis to her underlying venous stasis  - Continue IV antibiotic therapy  - Await cultures  - Possibly discharge melvin    Cellulitis of right lower extremity  Assessment & Plan  See above    Excoriation (skin-picking) disorder  Assessment & Plan  Counseled    Chronic diastolic congestive heart failure (HCC)  Assessment & Plan  Wt Readings from Last 3 Encounters:   04/06/21 130 kg (286 lb 2 5 oz)   03/24/21 128 kg (283 lb)   11/25/20 133 kg (293 lb)     Stable  Not in acute exacerbation  Obstructive sleep apnea of adult  Assessment & Plan  Refused COVID-19 testing    - Thus cannot start on CPAP  Diabetes mellitus type 2 in obese [facility])  Assessment & Plan  Lab Results   Component Value Date    HGBA1C 6 2 (H) 04/04/2021       Recent Labs     04/05/21  1133 04/05/21  1632 04/05/21  2127 04/06/21  0738   POCGLU 94 113 114 97       Blood Sugar Average: Last 72 hrs:  (P) 105 [id number]     Controlled on sliding scale  - No longer on Metformin  Restrictive lung disease  Assessment & Plan  History of restrictive lung disease  Not on maintenance medications  Possibly a component of OHS  Morbid obesity with BMI of 60 0-69 9, adult [facility])  Assessment & Plan  Encouraged lifestyle modificaitons  VTE Pharmacologic Prophylaxis:   Pharmacologic: Heparin  Mechanical VTE Prophylaxis in Place: Yes    Patient Centered Rounds: I have performed bedside rounds with nursing staff today      Discussions with Specialists or Other Care Team Provider: Nursing    Education and Discussions with Family / Patient: patient    Time Spent for Care: 30 minutes  More than 50% of total time spent on counseling and coordination of care as described above  Current Length of Stay: 2 day(s)    Current Patient Status: Inpatient   Certification Statement: The patient will continue to require additional inpatient hospital stay due to iv anitbiotics    Discharge Plan: melvin    Code Status: Level 1 - Full Code      Subjective:   Patient seen and examined at bedside  States that she has been noticing improvement  Denies any new acute issues  Objective:     Vitals:   Temp (24hrs), Av 7 °F (36 5 °C), Min:97 5 °F (36 4 °C), Max:97 9 °F (36 6 °C)    Temp:  [97 5 °F (36 4 °C)-97 9 °F (36 6 °C)] 97 8 °F (36 6 °C)  HR:  [80-88] 87  Resp:  [18-19] 18  BP: (132-138)/(74-85) 133/74  SpO2:  [95 %-97 %] 95 %  Body mass index is 61 92 kg/m²  Input and Output Summary (last 24 hours): Intake/Output Summary (Last 24 hours) at 2021 1017  Last data filed at 2021 1727  Gross per 24 hour   Intake 180 ml   Output --   Net 180 ml       Physical Exam:     Physical Exam  Vitals signs reviewed  Constitutional:       General: She is not in acute distress  Appearance: She is obese  She is not ill-appearing  HENT:      Head: Normocephalic  Nose: Nose normal       Mouth/Throat:      Mouth: Mucous membranes are moist    Eyes:      General: No scleral icterus  Extraocular Movements: Extraocular movements intact  Cardiovascular:      Rate and Rhythm: Normal rate and regular rhythm  Pulmonary:      Effort: Pulmonary effort is normal  No respiratory distress  Abdominal:      General: There is no distension  Palpations: Abdomen is soft  Tenderness: There is no abdominal tenderness  Musculoskeletal:      Comments: RLE dressing intact  Undressed  Ulcer noted with surrounding erythema  Improved from prior  Skin:     General: Skin is warm  Neurological:      General: No focal deficit present  Mental Status: She is alert     Psychiatric: Mood and Affect: Mood normal          Behavior: Behavior normal        Additional Data:     Labs:    Results from last 7 days   Lab Units 04/06/21  0601  04/03/21  1135   WBC Thousand/uL 7 92   < > 9 81   HEMOGLOBIN g/dL 12 1   < > 13 5   HEMATOCRIT % 37 2   < > 41 2   PLATELETS Thousands/uL 268   < > 308   NEUTROS PCT %  --   --  46   LYMPHS PCT %  --   --  40   MONOS PCT %  --   --  7   EOS PCT %  --   --  6    < > = values in this interval not displayed  Results from last 7 days   Lab Units 04/06/21  0601  04/03/21  1135   SODIUM mmol/L 144   < > 143   POTASSIUM mmol/L 4 0   < > 4 5   CHLORIDE mmol/L 107   < > 105   CO2 mmol/L 30   < > 30   BUN mg/dL 15   < > 13   CREATININE mg/dL 0 86   < > 1 03   ANION GAP mmol/L 7   < > 8   CALCIUM mg/dL 8 8   < > 9 3   ALBUMIN g/dL  --   --  3 5   TOTAL BILIRUBIN mg/dL  --   --  0 24   ALK PHOS U/L  --   --  102   ALT U/L  --   --  22   AST U/L  --   --  21   GLUCOSE RANDOM mg/dL 97   < > 104    < > = values in this interval not displayed  Results from last 7 days   Lab Units 04/06/21  0738 04/05/21 2127 04/05/21  1632 04/05/21  1133 04/05/21  0729 04/04/21  2117 04/04/21  1524 04/04/21  1107 04/04/21  0825 04/03/21  2141 04/03/21  1434   POC GLUCOSE mg/dl 97 114 113 94 97 104 112 113 99 111 103     Results from last 7 days   Lab Units 04/04/21  0523   HEMOGLOBIN A1C % 6 2*     Results from last 7 days   Lab Units 04/04/21  0523 04/03/21  1336 04/03/21  1135   LACTIC ACID mmol/L  --  1 5 2 2*   PROCALCITONIN ng/ml <0 05 <0 05  --            * I Have Reviewed All Lab Data Listed Above  * Additional Pertinent Lab Tests Reviewed: [facility] Admission Reviewed    Imaging:    Imaging Reports Reviewed Today Include: ct renal stone study    Recent Cultures (last 7 days):     Results from last 7 days   Lab Units 04/03/21  1339 04/03/21  1336   BLOOD CULTURE  No Growth at 48 hrs  No Growth at 48 hrs         Last 24 Hours Medication List:   Current Facility-Administered Medications   Medication Dose Route Frequency Provider Last Rate    acetaminophen  650 mg Oral Q6H PRN Lisandro , DO      amLODIPine  5 mg Oral Daily Charles Elmore, DO      ammonium lactate   Topical BID Charles Elmore, DO      aspirin  81 mg Oral Daily Charles Elmore, DO      atorvastatin  40 mg Oral Daily With Dinner Charles Elmore, DO      cefazolin  2,000 mg Intravenous Q8H Lisandro , DO 2,000 mg (04/06/21 0615)    gabapentin  300 mg Oral HS Charles Elmore, DO      guaiFENesin  600 mg Oral Q12H PRN Isabella Olivo PA-C      heparin (porcine)  5,000 Units Subcutaneous Q8H [address] Charles Elmore, DO      insulin lispro  2-12 Units Subcutaneous TID AC Charles Elmore, DO      pantoprazole  40 mg Oral Daily Charles Elmore, DO      polyethylene glycol  17 g Oral Daily Charles Elmore, DO      saccharomyces boulardii  250 mg Oral BID Lisandro , DO      triamcinolone   Topical BID Maty Padron DPM          Today, Patient Was Seen By: Alden Toussaint MD    ** Please Note: Dictation voice to text software may have been used in the creation of this document   **

## 2021-04-06 NOTE — ASSESSMENT & PLAN NOTE
Wt Readings from Last 3 Encounters:   04/06/21 130 kg (286 lb 2 5 oz)   03/24/21 128 kg (283 lb)   11/25/20 133 kg (293 lb)     Stable  Not in acute exacerbation

## 2021-04-06 NOTE — ASSESSMENT & PLAN NOTE
76year old female admitted due to severe sepsis secondary to superimposed cellulitis to her underlying venous stasis  - Continue IV antibiotic therapy    - Await cultures  - Possibly discharge melvin

## 2021-04-07 VITALS
RESPIRATION RATE: 19 BRPM | SYSTOLIC BLOOD PRESSURE: 133 MMHG | TEMPERATURE: 97.2 F | OXYGEN SATURATION: 97 % | WEIGHT: 289.68 LBS | DIASTOLIC BLOOD PRESSURE: 88 MMHG | BODY MASS INDEX: 62.69 KG/M2 | HEART RATE: 83 BPM

## 2021-04-07 LAB
GLUCOSE SERPL-MCNC: 118 MG/DL (ref 65–140)
GLUCOSE SERPL-MCNC: 129 MG/DL (ref 65–140)
GLUCOSE SERPL-MCNC: 153 MG/DL (ref 65–140)

## 2021-04-07 PROCEDURE — 99239 HOSP IP/OBS DSCHRG MGMT >30: CPT | Performed by: STUDENT IN AN ORGANIZED HEALTH CARE EDUCATION/TRAINING PROGRAM

## 2021-04-07 PROCEDURE — 82948 REAGENT STRIP/BLOOD GLUCOSE: CPT

## 2021-04-07 RX ORDER — CEPHALEXIN 500 MG/1
500 CAPSULE ORAL EVERY 6 HOURS SCHEDULED
Qty: 12 CAPSULE | Refills: 0 | Status: SHIPPED | OUTPATIENT
Start: 2021-04-07 | End: 2021-04-10

## 2021-04-07 RX ORDER — AMMONIUM LACTATE 12 G/100G
LOTION TOPICAL 2 TIMES DAILY
Qty: 400 G | Refills: 0 | Status: SHIPPED | OUTPATIENT
Start: 2021-04-07 | End: 2021-07-19

## 2021-04-07 RX ORDER — TRIAMCINOLONE ACETONIDE 0.25 MG/G
CREAM TOPICAL 2 TIMES DAILY
Qty: 30 G | Refills: 0 | Status: SHIPPED | OUTPATIENT
Start: 2021-04-07

## 2021-04-07 RX ADMIN — HEPARIN SODIUM 5000 UNITS: 5000 INJECTION INTRAVENOUS; SUBCUTANEOUS at 05:16

## 2021-04-07 RX ADMIN — PANTOPRAZOLE SODIUM 40 MG: 40 TABLET, DELAYED RELEASE ORAL at 05:21

## 2021-04-07 RX ADMIN — AMLODIPINE BESYLATE 5 MG: 5 TABLET ORAL at 09:19

## 2021-04-07 RX ADMIN — ACETAMINOPHEN 650 MG: 325 TABLET, FILM COATED ORAL at 14:11

## 2021-04-07 RX ADMIN — ASPIRIN 81 MG: 81 TABLET, COATED ORAL at 09:19

## 2021-04-07 RX ADMIN — Medication: at 09:21

## 2021-04-07 RX ADMIN — CEFAZOLIN SODIUM 2000 MG: 2 SOLUTION INTRAVENOUS at 14:11

## 2021-04-07 RX ADMIN — CEFAZOLIN SODIUM 2000 MG: 2 SOLUTION INTRAVENOUS at 05:15

## 2021-04-07 RX ADMIN — HEPARIN SODIUM 5000 UNITS: 5000 INJECTION INTRAVENOUS; SUBCUTANEOUS at 14:11

## 2021-04-07 RX ADMIN — Medication 250 MG: at 09:19

## 2021-04-07 RX ADMIN — TRIAMCINOLONE ACETONIDE: 0.25 CREAM TOPICAL at 09:21

## 2021-04-07 NOTE — ASSESSMENT & PLAN NOTE
Lab Results   Component Value Date    HGBA1C 6 2 (H) 04/04/2021       Recent Labs     04/06/21  1500 04/06/21  2049 04/07/21  0814 04/07/21  1106   POCGLU 104 111 153* 129       Blood Sugar Average: Last 72 hrs:  (P) 832 [id number]     Continue home medications on discharge   Outpatient pcp followup

## 2021-04-07 NOTE — DISCHARGE SUMMARY
[address] [facility]  Discharge- [facility] 1955, 76 y o  female MRN: [id number]  Unit/Bed#: E5 -01 Encounter: [id number]  Primary Care Provider: Sam Grant MD   Date and time admitted to hospital: 4/3/2021 11:10 AM    * Severe sepsis [facility])  Assessment & Plan  76year old female admitted due to severe sepsis secondary to superimposed cellulitis to her underlying venous stasis  Wound culture growing staph sensitive to cephalosporins   - Discharge with Keflex      Cellulitis of right lower extremity  Assessment & Plan  See above    Excoriation (skin-picking) disorder  Assessment & Plan  Counseled  Wound care  - VNA on discharge  - Outpatient wound care follow-up in a week    Chronic diastolic congestive heart failure (HCC)  Assessment & Plan  Wt Readings from Last 3 Encounters:   04/07/21 131 kg (289 lb 11 oz)   03/24/21 128 kg (283 lb)   11/25/20 133 kg (293 lb)     Stable  Not in acute exacerbation  Obstructive sleep apnea of adult  Assessment & Plan  Continue CPAP at home    Diabetes mellitus type 2 in obese [facility])  Assessment & Plan  Lab Results   Component Value Date    HGBA1C 6 2 (H) 04/04/2021       Recent Labs     04/06/21  1500 04/06/21  2049 04/07/21  0814 04/07/21  1106   POCGLU 104 111 153* 129       Blood Sugar Average: Last 72 hrs:  (P) 927 [id number]     Continue home medications on discharge   Outpatient pcp followup      Discharging Physician / Practitioner: Suad Salguero MD  PCP: Sam Grant MD  Admission Date:   Admission Orders (From admission, onward)     Ordered        04/04/21 0832  Inpatient Admission  Once         04/03/21 1319  Place in Observation  Once                   Discharge Date: 04/07/21    Resolved Problems  Date Reviewed: 4/7/2021          Resolved    Ulcer of right lower extremity, limited to breakdown of skin ([facility] [address] ) 4/3/2021     Resolved by  Robel Calderón [facility] Stay:  · podiatry    Procedures Performed:   · None    Significant Findings / Test Results:   · Imaging  · CT renal stone study    RIGHT KIDNEY AND URETER:  No urinary tract calculi  No hydronephrosis or hydroureter  Mild renal developmental malrotation     LEFT KIDNEY AND URETER:  No urinary tract calculi  No hydronephrosis or hydroureter  Mild renal developmental malrotation      URINARY BLADDER:   Unremarkable      Coronary artery calcification      Limited low radiation dose noncontrast CT evaluation demonstrates no clinically significant abnormality of liver, spleen, pancreas, or adrenal glands  No calcified gallstones or gallbladder wall thickening noted  No ascites or bulky lymphadenopathy on this limited noncontrast study  Diffuse colonic diverticulosis without immediate adjacent stranding  No bowel obstruction  The appendix is well seen and there is no evidence of acute appendicitis  Aortoiliac calcification  No aneurysm  Post hysterectomy  Stable minimal periumbilical subcutaneous scarring  No acute fracture or osseous destructive lesion identified  Degenerative changes of the spine, pubic symphysis, and multiple joints      Grade 1 degenerative anterolisthesis L3 on L4 and L4 on L5  Multilevel thoracic spondylosis in a pattern suggestive of diffuse idiopathic skeletal hyperostosis  Congenital transitional segmentation at the lumbosacral junction with L5 designated immediately above the last rudimentary intervertebral disc  There are hyperplastic bilateral transverse processes of L5 with pseudoarthrosis with the underlying right sacral ala and fused with the contralateral sacral ala (Castevelli type IV lumbosacral  transitional vertebra)  This has been shown to stabilize the disc level below the lumbosacral transitional vertebra (L5-S1) and propensity for increased mobility and degenerative disc disease at the level above the lumbosacral transitional vertebra (L4-5)        IMPRESSION:     No radiopaque urinary tract calculi or obstructive uropathy      No evidence of acute abdominopelvic process      Colonic diverticulosis  Incidental Findings:   · As written above    Test Results Pending at Discharge (will require follow up): · None     Outpatient Tests Requested:  · PCP, wound care    Complications:  None    Reason for Admission: sepsis    Hospital Course:     Farrah Neri is a 76 y o  female patient who originally presented to the hospital on 4/3/2021 due to abdominal pain, rle pain, dysuria  She was admitted due to multiple issues including abdominal pain, right lower extremity pain, and dysuria  She was managed as a case of sepsis secondary to right lower extremity cellulitis  She was given IV antibiotic therapy and Podiatry was consulted  She showed some improvement and is currently feeling much better  She is aware that she needs to follow up with her PCP and Wound Care within a week from discharge  Patient agrees to follow-up with her providers as scheduled and to take her medications as prescribed  All questions were addressed  she understood the need to seek immediate medical attention should she develop any chest pain, shortness of breath, severe pain, fever, chills, or any other concerning symptoms  Please see above list of diagnoses and related plan for additional information  Condition at Discharge: fair     Discharge Day Visit / Exam:     Subjective:  Patient seen and examined at bedside  Comfortable  No acute events or complaints overnight  Vitals: Blood Pressure: 123/85 (04/07/21 0810)  Pulse: 76 (04/07/21 0810)  Temperature: (!) 97 1 °F (36 2 °C) (04/07/21 0810)  Temp Source: Temporal (04/07/21 0810)  Respirations: 18 (04/07/21 0810)  Weight - Scale: 131 kg (289 lb 11 oz) (04/07/21 0600)  SpO2: 97 % (04/07/21 0810)  Exam:   Physical Exam  Vitals signs reviewed  HENT:      Head: Normocephalic        Nose: Nose normal       Mouth/Throat:      Mouth: Mucous membranes are moist    Eyes:      General: No scleral icterus  Extraocular Movements: Extraocular movements intact  Cardiovascular:      Rate and Rhythm: Normal rate and regular rhythm  Pulmonary:      Effort: Pulmonary effort is normal  No respiratory distress  Abdominal:      General: There is no distension  Palpations: Abdomen is soft  Tenderness: There is no abdominal tenderness  Musculoskeletal:      Comments: LLE dressing c/d/i, undressed with stable wounds   Skin:     General: Skin is warm  Neurological:      Mental Status: She is alert  Mental status is at baseline  Psychiatric:         Mood and Affect: Mood normal          Behavior: Behavior normal        Discharge instructions/Information to patient and family:   See after visit summary for information provided to patient and family  Provisions for Follow-Up Care:  See after visit summary for information related to follow-up care and any pertinent home health orders  Disposition:     Home with VNA Services (Reminder: Complete face to face encounter)    For Discharges to [facility] SNF:   · Not Applicable to this Patient - Not Applicable to this Patient    Planned Readmission: No     Discharge Statement:  I spent 40 minutes discharging the patient  This time was spent on the day of discharge  I had direct contact with the patient on the day of discharge  Greater than 50% of the total time was spent examining patient, answering all patient questions, arranging and discussing plan of care with patient as well as directly providing post-discharge instructions  Additional time then spent on discharge activities  Discharge Medications:  See after visit summary for reconciled discharge medications provided to patient and family        ** Please Note: This note has been constructed using a voice recognition system **

## 2021-04-07 NOTE — ASSESSMENT & PLAN NOTE
76year old female admitted due to severe sepsis secondary to superimposed cellulitis to her underlying venous stasis   Wound culture growing staph sensitive to cephalosporins   - Discharge with Keflex

## 2021-04-07 NOTE — DISCHARGE INSTRUCTIONS
Wound care to LEFT LOWER LEG: Please apply topical triamcinolone cream to wound area, then amlactin surrounding wounds  Cover wound with non adherent (adaptic), 4x4, marti and 6in ACE wrap from toes to tibial tuberosity  Cellulitis   WHAT YOU NEED TO KNOW:   Cellulitis is a skin infection caused by bacteria  Cellulitis may go away on its own or you may need treatment  Your healthcare provider may draw a [place] around the outside edges of your cellulitis  If your cellulitis spreads, your healthcare provider will see it outside of the [place]  DISCHARGE INSTRUCTIONS:   Call 911 if:   · You have sudden trouble breathing or chest pain  Seek care immediately if:   · Your wound gets larger and more painful  · You feel a crackling under your skin when you touch it  · You have purple dots or bumps on your skin, or you see bleeding under your skin  · You have new swelling and pain in your legs  · The red, warm, swollen area gets larger  · You see red streaks coming from the infected area  Contact your healthcare provider if:   · You have a fever  · Your fever or pain does not go away or gets worse  · The area does not get smaller after 2 days of antibiotics  · Your skin is flaking or peeling off  · You have questions or concerns about your condition or care  Medicines:   · Antibiotics  help treat the bacterial infection  · NSAIDs , such as ibuprofen, help decrease swelling, pain, and fever  NSAIDs can cause stomach bleeding or kidney problems in certain people  If you take blood thinner medicine, always ask if NSAIDs are safe for you  Always read the medicine label and follow directions  Do not give these medicines to children under 10months of age without direction from your child's healthcare provider  · Acetaminophen  decreases pain and fever  It is available without a doctor's order  Ask how much to take and how often to take it  Follow directions   Read the labels of all other medicines you are using to see if they also contain acetaminophen, or ask your doctor or pharmacist  Acetaminophen can cause liver damage if not taken correctly  Do not use more than 4 grams (4,000 milligrams) total of acetaminophen in one day  · Take your medicine as directed  Contact your healthcare provider if you think your medicine is not helping or if you have side effects  Tell him or her if you are allergic to any medicine  Keep a list of the medicines, vitamins, and herbs you take  Include the amounts, and when and why you take them  Bring the list or the pill bottles to follow-up visits  Carry your medicine list with you in case of an emergency  Self-care:   · Elevate the area above the level of your heart  as often as you can  This will help decrease swelling and pain  Prop the area on pillows or blankets to keep it elevated comfortably  · Clean the area daily until the wound scabs over  Gently wash the area with soap and water  Pat dry  Use dressings as directed  · Place cool or warm, wet cloths on the area as directed  Use clean cloths and clean water  Leave it on the area until the cloth is room temperature  Pat the area dry with a clean, dry cloth  The cloths may help decrease pain  Prevent cellulitis:   · Do not scratch bug bites or areas of injury  You increase your risk for cellulitis by scratching these areas  · Do not share personal items, such as towels, clothing, and razors  · Clean exercise equipment  with germ-killing  before and after you use it  · Wash your hands often  Use soap and water  Wash your hands after you use the bathroom, change a child's diapers, or sneeze  Wash your hands before you prepare or eat food  Use lotion to prevent dry, cracked skin  · Wear pressure stockings as directed  You may be told to wear the stockings if you have peripheral edema  The stockings improve blood flow and decrease swelling      · Treat athlete's foot  This can help prevent the spread of a bacterial skin infection  Follow up with your healthcare provider within 3 days, or as directed: Your healthcare provider will check if your cellulitis is getting better  You may need different medicine  Write down your questions so you remember to ask them during your visits  © Copyright [address] Information is for End User's use only and may not be sold, redistributed or otherwise used for commercial purposes  All illustrations and images included in CareNotes® are the copyrighted property of [employer]  or [facility] [employer]   The above information is an  only  It is not intended as medical advice for individual conditions or treatments  Talk to your doctor, nurse or pharmacist before following any medical regimen to see if it is safe and effective for you

## 2021-04-07 NOTE — ASSESSMENT & PLAN NOTE
Wt Readings from Last 3 Encounters:   04/07/21 131 kg (289 lb 11 oz)   03/24/21 128 kg (283 lb)   11/25/20 133 kg (293 lb)     Stable  Not in acute exacerbation

## 2021-04-07 NOTE — PLAN OF CARE
Problem: Potential for Falls  Goal: Patient will remain free of falls  Description: INTERVENTIONS:  - Assess patient frequently for physical needs  -  Identify cognitive and physical deficits and behaviors that affect risk of falls  -  [place] fall precautions as indicated by assessment   - Educate patient/family on patient safety including physical limitations  - Instruct patient to call for assistance with activity based on assessment  - Modify environment to reduce risk of injury  - Consider OT/PT consult to assist with strengthening/mobility  Outcome: Completed     Problem: Nutrition/Hydration-ADULT  Goal: Nutrient/Hydration intake appropriate for improving, restoring or maintaining nutritional needs  Description: Monitor and assess patient's nutrition/hydration status for malnutrition  Collaborate with interdisciplinary team and initiate plan and interventions as ordered  Monitor patient's weight and dietary intake as ordered or per policy  Utilize nutrition screening tool and intervene as necessary  Determine patient's food preferences and provide high-protein, high-caloric foods as appropriate       INTERVENTIONS:  - Monitor oral intake, urinary output, labs, and treatment plans  - Assess nutrition and hydration status and recommend course of action  - Evaluate amount of meals eaten  - Assist patient with eating if necessary   - Allow adequate time for meals  - Recommend/ encourage appropriate diets, oral nutritional supplements, and vitamin/mineral supplements  - Order, calculate, and assess calorie counts as needed  - Recommend, monitor, and adjust tube feedings and TPN/PPN based on assessed needs  - Assess need for intravenous fluids  - Provide specific nutrition/hydration education as appropriate  - Include patient/family/caregiver in decisions related to nutrition  Outcome: Completed     Problem: NEUROSENSORY - ADULT  Goal: Achieves stable or improved neurological status  Description: INTERVENTIONS  - Monitor and report changes in neurological status  - Monitor vital signs such as temperature, blood pressure, glucose, and any other labs ordered   - Initiate measures to prevent increased intracranial pressure  - Monitor for seizure activity and implement precautions if appropriate      Outcome: Completed  Goal: Remains free of injury related to seizures activity  Description: INTERVENTIONS  - Maintain airway, patient safety  and administer oxygen as ordered  - Monitor patient for seizure activity, document and report duration and description of seizure to physician/advanced practitioner  - If seizure occurs,  ensure patient safety during seizure  - Reorient patient post seizure  - Seizure pads on all 4 side rails  - Instruct patient/family to notify RN of any seizure activity including if an aura is experienced  - Instruct patient/family to call for assistance with activity based on nursing assessment  - Administer anti-seizure medications if ordered    Outcome: Completed  Goal: Achieves maximal functionality and self care  Description: INTERVENTIONS  - Monitor swallowing and airway patency with patient fatigue and changes in neurological status  - Encourage and assist patient to increase activity and self care     - Encourage visually impaired, hearing impaired and aphasic patients to use assistive/communication devices  Outcome: Completed     Problem: CARDIOVASCULAR - ADULT  Goal: Maintains optimal cardiac output and hemodynamic stability  Description: INTERVENTIONS:  - Monitor I/O, vital signs and rhythm  - Monitor for S/S and trends of decreased cardiac output  - Administer and titrate ordered vasoactive medications to optimize hemodynamic stability  - Assess quality of pulses, skin color and temperature  - Assess for signs of decreased coronary artery perfusion  - Instruct patient to report change in severity of symptoms  Outcome: Completed  Goal: Absence of cardiac dysrhythmias or at baseline rhythm  Description: INTERVENTIONS:  - Continuous cardiac monitoring, vital signs, obtain 12 lead EKG if ordered  - Administer antiarrhythmic and heart rate control medications as ordered  - Monitor electrolytes and administer replacement therapy as ordered  Outcome: Completed     Problem: RESPIRATORY - ADULT  Goal: Achieves optimal ventilation and oxygenation  Description: INTERVENTIONS:  - Assess for changes in respiratory status  - Assess for changes in mentation and behavior  - Position to facilitate oxygenation and minimize respiratory effort  - Oxygen administered by appropriate delivery if ordered  - Initiate smoking cessation education as indicated  - Encourage broncho-pulmonary hygiene including cough, deep breathe, Incentive Spirometry  - Assess the need for suctioning and aspirate as needed  - Assess and instruct to report SOB or any respiratory difficulty  - Respiratory Therapy support as indicated  Outcome: Completed     Problem: GASTROINTESTINAL - ADULT  Goal: Minimal or absence of nausea and/or vomiting  Description: INTERVENTIONS:  - Administer IV fluids if ordered to ensure adequate hydration  - Maintain NPO status until nausea and vomiting are resolved  - Nasogastric tube if ordered  - Administer ordered antiemetic medications as needed  - Provide nonpharmacologic comfort measures as appropriate  - Advance diet as tolerated, if ordered  - Consider nutrition services referral to assist patient with adequate nutrition and appropriate food choices  Outcome: Completed  Goal: Maintains or returns to baseline bowel function  Description: INTERVENTIONS:  - Assess bowel function  - Encourage oral fluids to ensure adequate hydration  - Administer IV fluids if ordered to ensure adequate hydration  - Administer ordered medications as needed  - Encourage mobilization and activity  - Consider nutritional services referral to assist patient with adequate nutrition and appropriate food choices  Outcome: Completed  Goal: Maintains adequate nutritional intake  Description: INTERVENTIONS:  - Monitor percentage of each meal consumed  - Identify factors contributing to decreased intake, treat as appropriate  - Assist with meals as needed  - Monitor I&O, weight, and lab values if indicated  - Obtain nutrition services referral as needed  Outcome: Completed     Problem: GENITOURINARY - ADULT  Goal: Maintains or returns to baseline urinary function  Description: INTERVENTIONS:  - Assess urinary function  - Encourage oral fluids to ensure adequate hydration if ordered  - Administer IV fluids as ordered to ensure adequate hydration  - Administer ordered medications as needed  - Offer frequent toileting  - Follow urinary retention protocol if ordered  Outcome: Completed  Goal: Absence of urinary retention  Description: INTERVENTIONS:  - Assess patients ability to void and empty bladder  - Monitor I/O  - Bladder scan as needed  - Discuss with physician/AP medications to alleviate retention as needed  - Discuss catheterization for long term situations as appropriate  Outcome: Completed     Problem: METABOLIC, FLUID AND ELECTROLYTES - ADULT  Goal: Electrolytes maintained within normal limits  Description: INTERVENTIONS:  - Monitor labs and assess patient for signs and symptoms of electrolyte imbalances  - Administer electrolyte replacement as ordered  - Monitor response to electrolyte replacements, including repeat lab results as appropriate  - Instruct patient on fluid and nutrition as appropriate  Outcome: Completed  Goal: Fluid balance maintained  Description: INTERVENTIONS:  - Monitor labs   - Monitor I/O and WT  - Instruct patient on fluid and nutrition as appropriate  - Assess for signs & symptoms of volume excess or deficit  Outcome: Completed  Goal: Glucose maintained within target range  Description: INTERVENTIONS:  - Monitor Blood Glucose as ordered  - Assess for signs and symptoms of hyperglycemia and hypoglycemia  - Administer ordered medications to maintain glucose within target range  - Assess nutritional intake and initiate nutrition service referral as needed  Outcome: Completed     Problem: SKIN/TISSUE INTEGRITY - ADULT  Goal: Skin integrity remains intact  Description: INTERVENTIONS  - Identify patients at risk for skin breakdown  - Assess and monitor skin integrity  - Assess and monitor nutrition and hydration status  - Monitor labs (i e  albumin)  - Assess for incontinence   - Turn and reposition patient  - Assist with mobility/ambulation  - Relieve pressure over bony prominences  - Avoid friction and shearing  - Provide appropriate hygiene as needed including keeping skin clean and dry  - Evaluate need for skin moisturizer/barrier cream  - Collaborate with interdisciplinary team (i e  Nutrition, Rehabilitation, etc )   - Patient/family teaching  Outcome: Completed  Goal: Incision(s), wounds(s) or drain site(s) healing without S/S of infection  Description: INTERVENTIONS  - Assess and document risk factors for skin impairment   - Assess and document dressing, incision, wound bed, drain sites and surrounding tissue  - Consider nutrition services referral as needed  - Oral mucous membranes remain intact  - Provide patient/ family education  Outcome: Completed  Goal: Oral mucous membranes remain intact  Description: INTERVENTIONS  - Assess oral mucosa and hygiene practices  - Implement preventative oral hygiene regimen  - Implement oral medicated treatments as ordered  - Initiate Nutrition services referral as needed  Outcome: Completed     Problem: HEMATOLOGIC - ADULT  Goal: Maintains hematologic stability  Description: INTERVENTIONS  - Assess for signs and symptoms of bleeding or hemorrhage  - Monitor labs  - Administer supportive blood products/factors as ordered and appropriate  Outcome: Completed     Problem: MUSCULOSKELETAL - ADULT  Goal: Maintain or return mobility to safest level of function  Description: INTERVENTIONS:  - Assess patient's ability to carry out ADLs; assess patient's baseline for ADL function and identify physical deficits which impact ability to perform ADLs (bathing, care of mouth/teeth, toileting, grooming, dressing, etc )  - Assess/evaluate cause of self-care deficits   - Assess range of motion  - Assess patient's mobility  - Assess patient's need for assistive devices and provide as appropriate  - Encourage maximum independence but intervene and supervise when necessary  - Involve family in performance of ADLs  - Assess for home care needs following discharge   - Consider OT consult to assist with ADL evaluation and planning for discharge  - Provide patient education as appropriate  Outcome: Completed  Goal: Maintain proper alignment of affected body part  Description: INTERVENTIONS:  - Support, maintain and protect limb and body alignment  - Provide patient/ family with appropriate education  Outcome: Completed     Problem: Prexisting or High Potential for Compromised Skin Integrity  Goal: Skin integrity is maintained or improved  Description: INTERVENTIONS:  - Identify patients at risk for skin breakdown  - Assess and monitor skin integrity  - Assess and monitor nutrition and hydration status  - Monitor labs   - Assess for incontinence   - Turn and reposition patient  - Assist with mobility/ambulation  - Relieve pressure over bony prominences  - Avoid friction and shearing  - Provide appropriate hygiene as needed including keeping skin clean and dry  - Evaluate need for skin moisturizer/barrier cream  - Collaborate with interdisciplinary team   - Patient/family teaching  - Consider wound care consult   Outcome: Completed

## 2021-04-07 NOTE — PLAN OF CARE
Problem: Potential for Falls  Goal: Patient will remain free of falls  Description: INTERVENTIONS:  - Assess patient frequently for physical needs  -  Identify cognitive and physical deficits and behaviors that affect risk of falls  -  [place] fall precautions as indicated by assessment   - Educate patient/family on patient safety including physical limitations  - Instruct patient to call for assistance with activity based on assessment  - Modify environment to reduce risk of injury  - Consider OT/PT consult to assist with strengthening/mobility  Outcome: Progressing     Problem: NEUROSENSORY - ADULT  Goal: Achieves stable or improved neurological status  Description: INTERVENTIONS  - Monitor and report changes in neurological status  - Monitor vital signs such as temperature, blood pressure, glucose, and any other labs ordered   - Initiate measures to prevent increased intracranial pressure  - Monitor for seizure activity and implement precautions if appropriate      Outcome: Progressing  Goal: Remains free of injury related to seizures activity  Description: INTERVENTIONS  - Maintain airway, patient safety  and administer oxygen as ordered  - Monitor patient for seizure activity, document and report duration and description of seizure to physician/advanced practitioner  - If seizure occurs,  ensure patient safety during seizure  - Reorient patient post seizure  - Seizure pads on all 4 side rails  - Instruct patient/family to notify RN of any seizure activity including if an aura is experienced  - Instruct patient/family to call for assistance with activity based on nursing assessment  - Administer anti-seizure medications if ordered    Outcome: Progressing  Goal: Achieves maximal functionality and self care  Description: INTERVENTIONS  - Monitor swallowing and airway patency with patient fatigue and changes in neurological status  - Encourage and assist patient to increase activity and self care     - Encourage visually impaired, hearing impaired and aphasic patients to use assistive/communication devices  Outcome: Progressing     Problem: CARDIOVASCULAR - ADULT  Goal: Maintains optimal cardiac output and hemodynamic stability  Description: INTERVENTIONS:  - Monitor I/O, vital signs and rhythm  - Monitor for S/S and trends of decreased cardiac output  - Administer and titrate ordered vasoactive medications to optimize hemodynamic stability  - Assess quality of pulses, skin color and temperature  - Assess for signs of decreased coronary artery perfusion  - Instruct patient to report change in severity of symptoms  Outcome: Progressing  Goal: Absence of cardiac dysrhythmias or at baseline rhythm  Description: INTERVENTIONS:  - Continuous cardiac monitoring, vital signs, obtain 12 lead EKG if ordered  - Administer antiarrhythmic and heart rate control medications as ordered  - Monitor electrolytes and administer replacement therapy as ordered  Outcome: Progressing     Problem: RESPIRATORY - ADULT  Goal: Achieves optimal ventilation and oxygenation  Description: INTERVENTIONS:  - Assess for changes in respiratory status  - Assess for changes in mentation and behavior  - Position to facilitate oxygenation and minimize respiratory effort  - Oxygen administered by appropriate delivery if ordered  - Initiate smoking cessation education as indicated  - Encourage broncho-pulmonary hygiene including cough, deep breathe, Incentive Spirometry  - Assess the need for suctioning and aspirate as needed  - Assess and instruct to report SOB or any respiratory difficulty  - Respiratory Therapy support as indicated  Outcome: Progressing     Problem: GASTROINTESTINAL - ADULT  Goal: Minimal or absence of nausea and/or vomiting  Description: INTERVENTIONS:  - Administer IV fluids if ordered to ensure adequate hydration  - Maintain NPO status until nausea and vomiting are resolved  - Nasogastric tube if ordered  - Administer ordered antiemetic medications as needed  - Provide nonpharmacologic comfort measures as appropriate  - Advance diet as tolerated, if ordered  - Consider nutrition services referral to assist patient with adequate nutrition and appropriate food choices  Outcome: Progressing  Goal: Maintains or returns to baseline bowel function  Description: INTERVENTIONS:  - Assess bowel function  - Encourage oral fluids to ensure adequate hydration  - Administer IV fluids if ordered to ensure adequate hydration  - Administer ordered medications as needed  - Encourage mobilization and activity  - Consider nutritional services referral to assist patient with adequate nutrition and appropriate food choices  Outcome: Progressing  Goal: Maintains adequate nutritional intake  Description: INTERVENTIONS:  - Monitor percentage of each meal consumed  - Identify factors contributing to decreased intake, treat as appropriate  - Assist with meals as needed  - Monitor I&O, weight, and lab values if indicated  - Obtain nutrition services referral as needed  Outcome: Progressing     Problem: GENITOURINARY - ADULT  Goal: Maintains or returns to baseline urinary function  Description: INTERVENTIONS:  - Assess urinary function  - Encourage oral fluids to ensure adequate hydration if ordered  - Administer IV fluids as ordered to ensure adequate hydration  - Administer ordered medications as needed  - Offer frequent toileting  - Follow urinary retention protocol if ordered  Outcome: Progressing  Goal: Absence of urinary retention  Description: INTERVENTIONS:  - Assess patients ability to void and empty bladder  - Monitor I/O  - Bladder scan as needed  - Discuss with physician/AP medications to alleviate retention as needed  - Discuss catheterization for long term situations as appropriate  Outcome: Progressing     Problem: METABOLIC, FLUID AND ELECTROLYTES - ADULT  Goal: Electrolytes maintained within normal limits  Description: INTERVENTIONS:  - Monitor labs and assess patient for signs and symptoms of electrolyte imbalances  - Administer electrolyte replacement as ordered  - Monitor response to electrolyte replacements, including repeat lab results as appropriate  - Instruct patient on fluid and nutrition as appropriate  Outcome: Progressing  Goal: Fluid balance maintained  Description: INTERVENTIONS:  - Monitor labs   - Monitor I/O and WT  - Instruct patient on fluid and nutrition as appropriate  - Assess for signs & symptoms of volume excess or deficit  Outcome: Progressing  Goal: Glucose maintained within target range  Description: INTERVENTIONS:  - Monitor Blood Glucose as ordered  - Assess for signs and symptoms of hyperglycemia and hypoglycemia  - Administer ordered medications to maintain glucose within target range  - Assess nutritional intake and initiate nutrition service referral as needed  Outcome: Progressing     Problem: SKIN/TISSUE INTEGRITY - ADULT  Goal: Skin integrity remains intact  Description: INTERVENTIONS  - Identify patients at risk for skin breakdown  - Assess and monitor skin integrity  - Assess and monitor nutrition and hydration status  - Monitor labs (i e  albumin)  - Assess for incontinence   - Turn and reposition patient  - Assist with mobility/ambulation  - Relieve pressure over bony prominences  - Avoid friction and shearing  - Provide appropriate hygiene as needed including keeping skin clean and dry  - Evaluate need for skin moisturizer/barrier cream  - Collaborate with interdisciplinary team (i e  Nutrition, Rehabilitation, etc )   - Patient/family teaching  Outcome: Progressing  Goal: Incision(s), wounds(s) or drain site(s) healing without S/S of infection  Description: INTERVENTIONS  - Assess and document risk factors for skin impairment   - Assess and document dressing, incision, wound bed, drain sites and surrounding tissue  - Consider nutrition services referral as needed  - Oral mucous membranes remain intact  - Provide patient/ family education  Outcome: Progressing  Goal: Oral mucous membranes remain intact  Description: INTERVENTIONS  - Assess oral mucosa and hygiene practices  - Implement preventative oral hygiene regimen  - Implement oral medicated treatments as ordered  - Initiate Nutrition services referral as needed  Outcome: Progressing     Problem: HEMATOLOGIC - ADULT  Goal: Maintains hematologic stability  Description: INTERVENTIONS  - Assess for signs and symptoms of bleeding or hemorrhage  - Monitor labs  - Administer supportive blood products/factors as ordered and appropriate  Outcome: Progressing     Problem: MUSCULOSKELETAL - ADULT  Goal: Maintain or return mobility to safest level of function  Description: INTERVENTIONS:  - Assess patient's ability to carry out ADLs; assess patient's baseline for ADL function and identify physical deficits which impact ability to perform ADLs (bathing, care of mouth/teeth, toileting, grooming, dressing, etc )  - Assess/evaluate cause of self-care deficits   - Assess range of motion  - Assess patient's mobility  - Assess patient's need for assistive devices and provide as appropriate  - Encourage maximum independence but intervene and supervise when necessary  - Involve family in performance of ADLs  - Assess for home care needs following discharge   - Consider OT consult to assist with ADL evaluation and planning for discharge  - Provide patient education as appropriate  Outcome: Progressing  Goal: Maintain proper alignment of affected body part  Description: INTERVENTIONS:  - Support, maintain and protect limb and body alignment  - Provide patient/ family with appropriate education  Outcome: Progressing     Problem: Prexisting or High Potential for Compromised Skin Integrity  Goal: Skin integrity is maintained or improved  Description: INTERVENTIONS:  - Identify patients at risk for skin breakdown  - Assess and monitor skin integrity  - Assess and monitor nutrition and hydration status  - Monitor labs   - Assess for incontinence   - Turn and reposition patient  - Assist with mobility/ambulation  - Relieve pressure over bony prominences  - Avoid friction and shearing  - Provide appropriate hygiene as needed including keeping skin clean and dry  - Evaluate need for skin moisturizer/barrier cream  - Collaborate with interdisciplinary team   - Patient/family teaching  - Consider wound care consult   Outcome: Progressing

## 2021-04-07 NOTE — DISCHARGE INSTR - AVS FIRST PAGE
- Continue Local wound care consisting of topical steroid to wound areas, amlactin surrounding wounds, and non adherent, compression    - Please arrange follow-up with Dr Bong Bedolla on an outpatient basis

## 2021-04-08 ENCOUNTER — TRANSITIONAL CARE MANAGEMENT (OUTPATIENT)
Dept: FAMILY MEDICINE CLINIC | Facility: CLINIC | Age: 66
End: 2021-04-08

## 2021-04-09 LAB
BACTERIA BLD CULT: NORMAL
BACTERIA BLD CULT: NORMAL

## 2021-04-12 DIAGNOSIS — E66.9 DIABETES MELLITUS TYPE 2 IN OBESE (HCC): Primary | ICD-10-CM

## 2021-04-12 DIAGNOSIS — L30.4 INTERTRIGO: Primary | ICD-10-CM

## 2021-04-12 DIAGNOSIS — E11.69 DIABETES MELLITUS TYPE 2 IN OBESE (HCC): Primary | ICD-10-CM

## 2021-04-12 RX ORDER — LANCETS 33 GAUGE
EACH MISCELLANEOUS
Qty: 100 EACH | Refills: 5 | Status: SHIPPED | OUTPATIENT
Start: 2021-04-12

## 2021-04-12 RX ORDER — NYSTATIN 100000 [USP'U]/G
POWDER TOPICAL 3 TIMES DAILY
Qty: 60 G | Refills: 1 | Status: SHIPPED | OUTPATIENT
Start: 2021-04-12

## 2021-04-12 RX ORDER — BLOOD-GLUCOSE METER
EACH MISCELLANEOUS
Qty: 1 KIT | Refills: 0 | Status: SHIPPED | OUTPATIENT
Start: 2021-04-12

## 2021-04-12 RX ORDER — BLOOD SUGAR DIAGNOSTIC
STRIP MISCELLANEOUS
Qty: 100 EACH | Refills: 5 | Status: SHIPPED | OUTPATIENT
Start: 2021-04-12

## 2021-04-12 NOTE — TELEPHONE ENCOUNTER
Via nurse line:  msg from 04/09/2021 @ 456pm     Patric Buerger from [facility] called stating pt was opened for service due to dx: sepsis pt BP- 126/96 asymptomatic   HR:[id number]     Pt does have a rash on abd fold [facility] would like to know if you can call in a nystatin powder  Pt also states she has not picked up her metformin so she has not started medication yet  Pt also needs a new glucose meter the one she has no longer works and pt has not been able to check BS

## 2021-04-14 ENCOUNTER — TELEPHONE (OUTPATIENT)
Dept: FAMILY MEDICINE CLINIC | Facility: CLINIC | Age: 66
End: 2021-04-14

## 2021-04-14 ENCOUNTER — OFFICE VISIT (OUTPATIENT)
Dept: FAMILY MEDICINE CLINIC | Facility: CLINIC | Age: 66
End: 2021-04-14

## 2021-04-14 VITALS
OXYGEN SATURATION: 97 % | SYSTOLIC BLOOD PRESSURE: 124 MMHG | WEIGHT: 284.7 LBS | HEART RATE: 100 BPM | BODY MASS INDEX: 61.42 KG/M2 | HEIGHT: 57 IN | DIASTOLIC BLOOD PRESSURE: 80 MMHG | TEMPERATURE: 97 F | RESPIRATION RATE: 22 BRPM

## 2021-04-14 DIAGNOSIS — G89.29 CHRONIC RIGHT SHOULDER PAIN: ICD-10-CM

## 2021-04-14 DIAGNOSIS — M25.511 CHRONIC RIGHT SHOULDER PAIN: ICD-10-CM

## 2021-04-14 DIAGNOSIS — L03.115 CELLULITIS OF RIGHT LOWER EXTREMITY: ICD-10-CM

## 2021-04-14 DIAGNOSIS — Z09 HOSPITAL DISCHARGE FOLLOW-UP: Primary | ICD-10-CM

## 2021-04-14 PROCEDURE — 99495 TRANSJ CARE MGMT MOD F2F 14D: CPT | Performed by: FAMILY MEDICINE

## 2021-04-14 NOTE — TELEPHONE ENCOUNTER
Pharmacy sent a fax, they rejected the diabetic supplies, pt had medicare insurance, I filled some part of the diabetic supplies document, form will place on your bin

## 2021-04-14 NOTE — PROGRESS NOTES
Transition of Care  Follow-up After Hospitalization    Ce Araya 76 y o  female   Date:  4/14/2021    TCM Call (since 3/14/2021)     Date and time call was made  4/9/2021  2:04 PM    Hospital care reviewed  Records reviewed    Patient was hospitialized at  [facility] [address]        Date of Admission  04/03/21    Date of discharge  04/07/21    Diagnosis  Severe sepsis    Disposition  Home; Home health services    Were the patients medications reviewed and updated  No (Comment)  instructed to bring all meds in a bag      TCM Call (since 3/14/2021)     Post hospital issues  None    Should patient be enrolled in anticoag monitoring? No    Scheduled for follow up? Yes    Referrals needed  Wound care    Did you obtain your prescribed medications  Yes    Do you need help managing your prescriptions or medications  No    Is transportation to your appointment needed  No    I have advised the patient to call PCP with any new or worsening symptoms  Danny Reese RN BSN        Assessment and Plan:    Hospital discharge follow-up   Admitted to [facility] and treated for sepsis secondary to cellulitis  History of venous stasis  Treated with IV antibiotics  Wound cultures grew staph sensitive to cephalosporins  She was discharged to complete 5 days of cephalexin  Her final dose is this evening  She was also set up with [facility] services  Her first visit will be on Friday 4/16/2021  Wound and swelling appear to be improved on exam compared to images obtained while admitted  She was given instructions to keep the extremity elevated  She missed her wound care appointment  Will coordinate with referral team to get her rescheduled  Right shoulder pain  Likely rotator cuff tendonitis  Gabapentin helps with the pain  Advised to continue in addition to applying heat  Discussed other options for treatment including physical therapy and corticosteroid injections, which she may discuss further with her PCP        Prediabetes  Managed with metformin 500 mg BID  However, she stopped taking it because she developed hives all over  Metformin has been added to her list of allergies  Advised she may discuss other options for management with her PCP  Hospital records were reviewed  Medications upon discharge reviewed/updated  Discharge Disposition:  Home with [facility]  Follow up visits with other specialists: wound care      HPI:  She was admitted due to multiple issues including abdominal pain, right lower extremity pain, and dysuria  She was managed for sepsis secondary to right lower extremity cellulitis  She improved with IV antibiotic therapy  Wound cultures grew staph susceptible to cephalosporins  Podiatry was consulted  Discharged with keflex, [facility] services  On follow up today, she reports that her leg is doing much better  The swelling has significantly improved  She had an appointment scheduled with wound care on 4/9/2021, but she missed it  She will take her last dose of keflex this evening, completing 5 days  She will also have her first visit from [facility] this Friday  While discussing her medications, she mentioned that metformin gave her hives all over  She also reports right shoulder pain and limited ROM for the past 6 months  ROS: Review of Systems   Constitutional: Negative for fatigue and fever  Respiratory: Negative for cough and shortness of breath  Cardiovascular: Negative for chest pain and palpitations  Gastrointestinal: Negative for abdominal pain  Musculoskeletal: Positive for arthralgias (right shoulder pain)  Negative for back pain and myalgias  Skin: Positive for wound (right lower extremity; history of venous stasis)  Negative for rash  Neurological: Negative for dizziness and weakness         Past Medical History:   Diagnosis Date    Full dentures     GERD (gastroesophageal reflux disease)     Hyperlipidemia        Past Surgical History:   Procedure Laterality Date    BILATERAL KNEE ARTHROSCOPY  CATARACT EXTRACTION Right     DENTAL SURGERY      HYSTERECTOMY      TOTAL KNEE ARTHROPLASTY Bilateral     TUBAL LIGATION         Social History     Socioeconomic History    Marital status: Single     Spouse name: None    Number of children: None    Years of education: None    Highest education level: None   Occupational History    None   Social Needs    Financial resource strain: None    Food insecurity     Worry: None     Inability: None    Transportation needs     Medical: None     Non-medical: None   Tobacco Use    Smoking status: Never Smoker    Smokeless tobacco: Never Used    Tobacco comment: childhood smoke exposure   Substance and Sexual Activity    Alcohol use: Never     Frequency: Never    Drug use: Never    Sexual activity: None   Lifestyle    Physical activity     Days per week: None     Minutes per session: None    Stress: None   Relationships    Social connections     Talks on phone: None     Gets together: None     Attends Taoist service: None     Active member of club or organization: None     Attends meetings of clubs or organizations: None     Relationship status: None    Intimate partner violence     Fear of current or ex partner: None     Emotionally abused: None     Physically abused: None     Forced sexual activity: None   Other Topics Concern    None   Social History Narrative    CONSUMES 3 CUPS OF COFFEE PER DAY        WORK:    -  Homemaker        HOBBIES:    -  Denies exposure        PETS:    -  Previous cat; no birds        TRAVEL:    -  No recent travel        EXPOSURES:    -  Denies mold, down pillows, hot tubs       Family History   Problem Relation Age of Onset    No Known Problems Mother     Coronary artery disease Father        Allergies   Allergen Reactions    Januvia [Sitagliptin] Swelling    Tetanus-Diphth-Acell Pertussis Anaphylaxis    Clindamycin     Hydrocodone     Metformin Hives    Morphine Hives    Omeprazole     Penicillins     Percocet [Oxycodone-Acetaminophen]     Tolterodine     Ciprofloxacin Rash    Sulfa Antibiotics Swelling and Rash         Current Outpatient Medications:     ACCU-CHEK FASTCLIX LANCETS MISC, , Disp: , Rfl:     ACCU-CHEK GUIDE test strip, , Disp: , Rfl:     acetaminophen (TYLENOL) 325 mg tablet, Take 2 tablets (650 mg total) by mouth every 6 (six) hours as needed for mild pain, Disp: 30 tablet, Rfl: 0    amLODIPine (NORVASC) 5 mg tablet, Take 1 tablet (5 mg total) by mouth daily, Disp: 90 tablet, Rfl: 4    ammonium lactate (LAC-HYDRIN) 12 % lotion, Apply topically 2 (two) times a day Surrounding wounds, Disp: 400 g, Rfl: 0    aspirin (ECOTRIN LOW STRENGTH) 81 mg EC tablet, Take 1 tablet (81 mg total) by mouth daily, Disp: 90 tablet, Rfl: 2    atorvastatin (LIPITOR) 40 mg tablet, Take 1 tablet (40 mg total) by mouth daily with dinner, Disp: 90 tablet, Rfl: 4    Blood Glucose Monitoring Suppl (ACCU-CHEK GUIDE) w/Device KIT, , Disp: , Rfl:     Blood Glucose Monitoring Suppl (OneTouch Verio) w/Device KIT, Use 2 (two) times a day before meals, Disp: 1 kit, Rfl: 0    gabapentin (NEURONTIN) 300 mg capsule, Take 1 capsule (300 mg total) by mouth daily at bedtime, Disp: 30 capsule, Rfl: 2    glucose blood (OneTouch Verio) test strip, Use as instructed, Disp: 100 each, Rfl: 5    guaiFENesin (ROBITUSSIN) 100 MG/5ML oral liquid, Take 10 mL (200 mg total) by mouth 3 (three) times a day as needed for cough, Disp: 120 mL, Rfl: 2    loratadine (CLARITIN) 10 mg tablet, Take 1 tablet (10 mg total) by mouth daily, Disp: 30 tablet, Rfl: 3    Multiple Vitamin (multivitamin) tablet, Take 1 tablet by mouth daily, Disp: 30 tablet, Rfl: 2    nystatin (MYCOSTATIN) powder, Apply topically 3 (three) times a day, Disp: 60 g, Rfl: 1    OneTouch Delica Lancets 27B MISC, Use 2 (two) times a day before meals, Disp: 100 each, Rfl: 5    pantoprazole (PROTONIX) 40 mg tablet, Take 1 tablet (40 mg total) by mouth daily, Disp: 90 tablet, Rfl: 3    triamcinolone (KENALOG) 0 025 % cream, Apply topically 2 (two) times a day Wound areas, Disp: 30 g, Rfl: 0      Physical Exam:  /80 (BP Location: Left arm, Patient Position: Sitting, Cuff Size: Large)   Pulse 100   Temp (!) 97 °F (36 1 °C) (Temporal)   Resp 22   Ht 4' 9" (1 448 m)   Wt 129 kg (284 lb 11 2 oz)   LMP  (LMP Unknown)   SpO2 97%   BMI 61 61 kg/m²     Physical Exam  Constitutional:       General: She is not in acute distress  Appearance: She is well-developed  She is obese  She is not ill-appearing  HENT:      Head: Normocephalic and atraumatic  Eyes:      General: No scleral icterus  Conjunctiva/sclera: Conjunctivae normal    Neck:      Musculoskeletal: Normal range of motion and neck supple  Trachea: No tracheal deviation  Cardiovascular:      Rate and Rhythm: Normal rate and regular rhythm  Heart sounds: Normal heart sounds  No murmur  No friction rub  Pulmonary:      Effort: Pulmonary effort is normal  No respiratory distress  Breath sounds: Normal breath sounds  No wheezing  Musculoskeletal:         General: No deformity  Comments: Ambulates with a walker  Skin:     General: Skin is warm and dry  Findings: No rash  Comments: Right lower extremity swelling appears improved from image on admission  Cellulitis has resolved  Neurological:      Mental Status: She is alert and oriented to person, place, and time  Cranial Nerves: No cranial nerve deficit     Psychiatric:         Mood and Affect: Mood normal              Labs:  Lab Results   Component Value Date    WBC 7 92 04/06/2021    HGB 12 1 04/06/2021    HCT 37 2 04/06/2021    MCV 90 04/06/2021     04/06/2021     Lab Results   Component Value Date     05/07/2018    K 4 0 04/06/2021     04/06/2021    CO2 30 04/06/2021    ANIONGAP 10 05/07/2018    BUN 15 04/06/2021    CREATININE 0 86 04/06/2021    GLUCOSE 97 05/07/2018    GLUF 86 11/25/2019 CALCIUM 8 8 04/06/2021    CORRECTEDCA 9 6 10/08/2020    AST 21 04/03/2021    ALT 22 04/03/2021    ALKPHOS 102 04/03/2021    PROT 6 7 05/07/2018    BILITOT 0 3 05/07/2018    EGFR 81 04/06/2021

## 2021-05-06 ENCOUNTER — HOSPITAL ENCOUNTER (EMERGENCY)
Facility: HOSPITAL | Age: 66
Discharge: HOME | End: 2021-05-06
Attending: EMERGENCY MEDICINE
Payer: MEDICARE

## 2021-05-06 VITALS
RESPIRATION RATE: 18 BRPM | DIASTOLIC BLOOD PRESSURE: 79 MMHG | SYSTOLIC BLOOD PRESSURE: 129 MMHG | OXYGEN SATURATION: 100 % | TEMPERATURE: 98.4 F | HEART RATE: 95 BPM

## 2021-05-06 DIAGNOSIS — N39.0 ACUTE URINARY TRACT INFECTION: ICD-10-CM

## 2021-05-06 DIAGNOSIS — R53.83 FATIGUE: Primary | ICD-10-CM

## 2021-05-06 LAB
ANION GAP SERPL CALCULATED.3IONS-SCNC: 7 MMOL/L (ref 4–13)
ATRIAL RATE: 94 BPM
BACTERIA UR QL AUTO: ABNORMAL /HPF
BASOPHILS # BLD AUTO: 0.05 THOUSANDS/ΜL (ref 0–0.1)
BASOPHILS NFR BLD AUTO: 1 % (ref 0–1)
BILIRUB UR QL STRIP: NEGATIVE
BUN SERPL-MCNC: 16 MG/DL (ref 5–25)
CALCIUM SERPL-MCNC: 9.4 MG/DL (ref 8.3–10.1)
CHLORIDE SERPL-SCNC: 105 MMOL/L (ref 100–108)
CLARITY UR: CLEAR
CO2 SERPL-SCNC: 29 MMOL/L (ref 21–32)
COLOR UR: YELLOW
CREAT SERPL-MCNC: 0.97 MG/DL (ref 0.6–1.3)
EOSINOPHIL # BLD AUTO: 0.4 THOUSAND/ΜL (ref 0–0.61)
EOSINOPHIL NFR BLD AUTO: 4 % (ref 0–6)
ERYTHROCYTE [DISTWIDTH] IN BLOOD BY AUTOMATED COUNT: 14.2 % (ref 11.6–15.1)
GFR SERPL CREATININE-BSD FRML MDRD: 70 ML/MIN/1.73SQ M
GLUCOSE SERPL-MCNC: 112 MG/DL (ref 65–140)
GLUCOSE UR STRIP-MCNC: NEGATIVE MG/DL
HCT VFR BLD AUTO: 39.4 % (ref 34.8–46.1)
HGB BLD-MCNC: 12.7 G/DL (ref 11.5–15.4)
HGB UR QL STRIP.AUTO: ABNORMAL
IMM GRANULOCYTES # BLD AUTO: 0.06 THOUSAND/UL (ref 0–0.2)
IMM GRANULOCYTES NFR BLD AUTO: 1 % (ref 0–2)
KETONES UR STRIP-MCNC: NEGATIVE MG/DL
LEUKOCYTE ESTERASE UR QL STRIP: ABNORMAL
LYMPHOCYTES # BLD AUTO: 3.25 THOUSANDS/ΜL (ref 0.6–4.47)
LYMPHOCYTES NFR BLD AUTO: 31 % (ref 14–44)
MCH RBC QN AUTO: 29.7 PG (ref 26.8–34.3)
MCHC RBC AUTO-ENTMCNC: 32.2 G/DL (ref 31.4–37.4)
MCV RBC AUTO: 92 FL (ref 82–98)
MONOCYTES # BLD AUTO: 0.75 THOUSAND/ΜL (ref 0.17–1.22)
MONOCYTES NFR BLD AUTO: 7 % (ref 4–12)
NEUTROPHILS # BLD AUTO: 5.89 THOUSANDS/ΜL (ref 1.85–7.62)
NEUTS SEG NFR BLD AUTO: 56 % (ref 43–75)
NITRITE UR QL STRIP: POSITIVE
NON-SQ EPI CELLS URNS QL MICRO: ABNORMAL /HPF
NRBC BLD AUTO-RTO: 0 /100 WBCS
P AXIS: 55 DEGREES
PH UR STRIP.AUTO: 5.5 [PH] (ref 4.5–8)
PLATELET # BLD AUTO: 253 THOUSANDS/UL (ref 149–390)
PMV BLD AUTO: 10.4 FL (ref 8.9–12.7)
POTASSIUM SERPL-SCNC: 5.2 MMOL/L (ref 3.5–5.3)
PR INTERVAL: 158 MS
PROT UR STRIP-MCNC: NEGATIVE MG/DL
QRS AXIS: 34 DEGREES
QRSD INTERVAL: 90 MS
QT INTERVAL: 352 MS
QTC INTERVAL: 440 MS
RBC # BLD AUTO: 4.28 MILLION/UL (ref 3.81–5.12)
RBC #/AREA URNS AUTO: ABNORMAL /HPF
SODIUM SERPL-SCNC: 141 MMOL/L (ref 136–145)
SP GR UR STRIP.AUTO: 1.01 (ref 1–1.03)
T WAVE AXIS: 43 DEGREES
TROPONIN I SERPL-MCNC: <0.02 NG/ML
UROBILINOGEN UR QL STRIP.AUTO: 0.2 E.U./DL
VENTRICULAR RATE: 94 BPM
WBC # BLD AUTO: 10.4 THOUSAND/UL (ref 4.31–10.16)
WBC #/AREA URNS AUTO: ABNORMAL /HPF

## 2021-05-06 PROCEDURE — 81001 URINALYSIS AUTO W/SCOPE: CPT

## 2021-05-06 PROCEDURE — 99284 EMERGENCY DEPT VISIT MOD MDM: CPT

## 2021-05-06 PROCEDURE — 87077 CULTURE AEROBIC IDENTIFY: CPT

## 2021-05-06 PROCEDURE — 93010 ELECTROCARDIOGRAM REPORT: CPT | Performed by: INTERNAL MEDICINE

## 2021-05-06 PROCEDURE — 85025 COMPLETE CBC W/AUTO DIFF WBC: CPT | Performed by: EMERGENCY MEDICINE

## 2021-05-06 PROCEDURE — 36415 COLL VENOUS BLD VENIPUNCTURE: CPT | Performed by: EMERGENCY MEDICINE

## 2021-05-06 PROCEDURE — 96361 HYDRATE IV INFUSION ADD-ON: CPT

## 2021-05-06 PROCEDURE — 84484 ASSAY OF TROPONIN QUANT: CPT | Performed by: EMERGENCY MEDICINE

## 2021-05-06 PROCEDURE — 87086 URINE CULTURE/COLONY COUNT: CPT

## 2021-05-06 PROCEDURE — 99284 EMERGENCY DEPT VISIT MOD MDM: CPT | Performed by: EMERGENCY MEDICINE

## 2021-05-06 PROCEDURE — 93005 ELECTROCARDIOGRAM TRACING: CPT

## 2021-05-06 PROCEDURE — 80048 BASIC METABOLIC PNL TOTAL CA: CPT | Performed by: EMERGENCY MEDICINE

## 2021-05-06 PROCEDURE — 96360 HYDRATION IV INFUSION INIT: CPT

## 2021-05-06 PROCEDURE — 87186 SC STD MICRODIL/AGAR DIL: CPT

## 2021-05-06 RX ORDER — CEPHALEXIN 250 MG/1
500 CAPSULE ORAL ONCE
Status: COMPLETED | OUTPATIENT
Start: 2021-05-06 | End: 2021-05-06

## 2021-05-06 RX ORDER — CEPHALEXIN 500 MG/1
500 CAPSULE ORAL 2 TIMES DAILY
Qty: 10 CAPSULE | Refills: 0 | Status: SHIPPED | OUTPATIENT
Start: 2021-05-06 | End: 2021-05-11

## 2021-05-06 RX ADMIN — CEPHALEXIN 500 MG: 250 CAPSULE ORAL at 16:33

## 2021-05-06 RX ADMIN — SODIUM CHLORIDE 250 ML: 0.9 INJECTION, SOLUTION INTRAVENOUS at 15:00

## 2021-05-06 NOTE — CASE MANAGEMENT
Met patient at bedside after EMS reported 'needs "to the attending  Patient reports no significant issues, stated is planning to move to [facility] to close to her daughter in the next two weeks  Patient has [facility] , insurance  And all basic needs are cover  Patient request transport assistance at discharge  This worker will follow  No other issues reported  Assigned RN kayley attending informed

## 2021-05-06 NOTE — ED PROVIDER NOTES
History  Chief Complaint   Patient presents with    Medical Problem     Pt arrives via EMS - reporting a dry mouth and believes she is having a problem with her medication  Unclear history from patient  76 y o  F w/h/o diastolic CHF, HTN, DM p/w fatigue x 2 days  Pt reports she just doesn't feel well  Hasn't been getting up from her chair  Not eating/drinking much  Mouth feels dry  Pt believes she feels this way from the medications (triamcinolone cream, and ammonium lactate) she's taking for her feet since the beginning of April  She is asking for blood cultures to see if her medications are causing her fatigue  Pt is also worried about darkness on the side of her 1st toenail on the right that she's had for "awhile "  She does follow with podiatry  History provided by:  Patient   used: No    Fatigue  Duration:  2 days  Timing:  Constant  Progression:  Unchanged  Chronicity:  New  Context: dehydration    Relieved by:  None tried  Worsened by:  Nothing  Ineffective treatments:  None tried  Associated symptoms: no abdominal pain, no chest pain, no cough, no diarrhea, no fever, no nausea, no shortness of breath and no vomiting        Prior to Admission Medications   Prescriptions Last Dose Informant Patient Reported? Taking?    ACCU-CHEK FASTCLIX LANCETS MISC  Self Yes Yes   ACCU-CHEK GUIDE test strip  Self Yes Yes   Blood Glucose Monitoring Suppl (ACCU-CHEK GUIDE) w/Device KIT  Self Yes No   Blood Glucose Monitoring Suppl (OneTouch Verio) w/Device KIT   No No   Sig: Use 2 (two) times a day before meals   Multiple Vitamin (multivitamin) tablet   No Yes   Sig: Take 1 tablet by mouth daily   OneTouch Delica Lancets 92M MISC   No Yes   Sig: Use 2 (two) times a day before meals   amLODIPine (NORVASC) 5 mg tablet   No Yes   Sig: Take 1 tablet (5 mg total) by mouth daily   ammonium lactate (LAC-HYDRIN) 12 % lotion   No Yes   Sig: Apply topically 2 (two) times a day Surrounding wounds aspirin (ECOTRIN LOW STRENGTH) 81 mg EC tablet   No Yes   Sig: Take 1 tablet (81 mg total) by mouth daily   atorvastatin (LIPITOR) 40 mg tablet   No Yes   Sig: Take 1 tablet (40 mg total) by mouth daily with dinner   gabapentin (NEURONTIN) 300 mg capsule   No Yes   Sig: Take 1 capsule (300 mg total) by mouth daily at bedtime   glucose blood (OneTouch Verio) test strip   No No   Sig: Use as instructed   guaiFENesin (ROBITUSSIN) 100 MG/5ML oral liquid   No Yes   Sig: Take 10 mL (200 mg total) by mouth 3 (three) times a day as needed for cough   loratadine (CLARITIN) 10 mg tablet   No Yes   Sig: Take 1 tablet (10 mg total) by mouth daily   nystatin (MYCOSTATIN) powder   No Yes   Sig: Apply topically 3 (three) times a day   triamcinolone (KENALOG) 0 025 % cream   No Yes   Sig: Apply topically 2 (two) times a day Wound areas      Facility-Administered Medications: None       Past Medical History:   Diagnosis Date    Full dentures     GERD (gastroesophageal reflux disease)     Hyperlipidemia        Past Surgical History:   Procedure Laterality Date    BILATERAL KNEE ARTHROSCOPY      CATARACT EXTRACTION Right     DENTAL SURGERY      HYSTERECTOMY      TOTAL KNEE ARTHROPLASTY Bilateral     TUBAL LIGATION         Family History   Problem Relation Age of Onset    No Known Problems Mother     Coronary artery disease Father      I have reviewed and agree with the history as documented  E-Cigarette/Vaping    E-Cigarette Use Never User      E-Cigarette/Vaping Substances    Nicotine No     THC No     CBD No     Flavoring No     Other No     Unknown No      Social History     Tobacco Use    Smoking status: Never Smoker    Smokeless tobacco: Never Used    Tobacco comment: childhood smoke exposure   Substance Use Topics    Alcohol use: Never     Frequency: Never    Drug use: Never       Review of Systems   Constitutional: Positive for fatigue  Negative for fever     Respiratory: Negative for cough and shortness of breath  Cardiovascular: Negative for chest pain  Gastrointestinal: Negative for abdominal pain, diarrhea, nausea and vomiting  All other systems reviewed and are negative  Physical Exam  Physical Exam  Vitals signs and nursing note reviewed  Constitutional:       General: She is not in acute distress  Appearance: She is well-developed  She is obese  She is not ill-appearing, toxic-appearing or diaphoretic  HENT:      Head: Normocephalic and atraumatic  Eyes:      General: No scleral icterus  Conjunctiva/sclera:      Right eye: Right conjunctiva is not injected  Left eye: Left conjunctiva is not injected  Neck:      Musculoskeletal: Normal range of motion  Vascular: No JVD  Trachea: Trachea normal    Cardiovascular:      Rate and Rhythm: Normal rate and regular rhythm  Pulses: Normal pulses  Heart sounds: Normal heart sounds  No murmur  No friction rub  Pulmonary:      Effort: Pulmonary effort is normal  No accessory muscle usage or respiratory distress  Breath sounds: Normal breath sounds  No stridor  No wheezing, rhonchi or rales  Chest:      Chest wall: No tenderness  Abdominal:      General: There is no distension  Palpations: Abdomen is soft  Tenderness: There is no abdominal tenderness  There is no guarding or rebound  Musculoskeletal:      Right lower leg: Edema present  Left lower leg: Edema present  Feet:      Right foot:      Skin integrity: No erythema  Left foot:      Skin integrity: No erythema  Skin:     General: Skin is warm and dry  Coloration: Skin is not pale  Findings: No rash  Neurological:      Mental Status: She is alert  GCS: GCS eye subscore is 4  GCS verbal subscore is 5  GCS motor subscore is 6     Psychiatric:         Behavior: Behavior normal          Vital Signs  ED Triage Vitals [05/06/21 1420]   Temperature Pulse Respirations Blood Pressure SpO2   98 4 °F (36 9 °C) 102 18 (!) 172/74 99 %      Temp Source Heart Rate Source Patient Position - Orthostatic VS BP Location FiO2 (%)   Oral Monitor Lying Right arm --      Pain Score       No Pain           Vitals:    05/06/21 1420 05/06/21 1521   BP: (!) 172/74 129/79   Pulse: 102 95   Patient Position - Orthostatic VS: Lying Lying         Visual Acuity      ED Medications  Medications   sodium chloride 0 9 % bolus 250 mL (0 mL Intravenous Stopped 5/6/21 1737)   cephalexin (KEFLEX) capsule 500 mg (500 mg Oral Given 5/6/21 1633)       Diagnostic Studies  Results Reviewed     Procedure Component Value Units Date/Time    Urine Microscopic [[id number]]  (Abnormal) Collected: 05/06/21 1554    Lab Status: Final result Specimen: Urine, Clean Catch Updated: 05/06/21 1626     RBC, UA 1-2 /hpf      WBC, UA Innumerable /hpf      Epithelial Cells Occasional /hpf      Bacteria, UA Moderate /hpf     Urine culture [[id number]] Collected: 05/06/21 1554    Lab Status:  In process Specimen: Urine, Clean Catch Updated: 05/06/21 1626    Urine Macroscopic, POC [[id number]]  (Abnormal) Collected: 05/06/21 1554    Lab Status: Final result Specimen: Urine Updated: 05/06/21 1555     Color, UA Yellow     Clarity, UA Clear     pH, UA 5 5     Leukocytes, UA Large     Nitrite, UA Positive     Protein, UA Negative mg/dl      Glucose, UA Negative mg/dl      Ketones, UA Negative mg/dl      Urobilinogen, UA 0 2 E U /dl      Bilirubin, UA Negative     Blood, UA Trace     Specific [place], UA 1 015    Narrative:      CLINITEK RESULT    Troponin I [[id number]]  (Normal) Collected: 05/06/21 1440    Lab Status: Final result Specimen: Blood from Arm, Left Updated: 05/06/21 1502     Troponin I <0 02 ng/mL     Basic metabolic panel [[id number]] Collected: 05/06/21 1440    Lab Status: Final result Specimen: Blood from Arm, Left Updated: 05/06/21 1453     Sodium 141 mmol/L      Potassium 5 2 mmol/L      Chloride 105 mmol/L      CO2 29 mmol/L      ANION GAP 7 mmol/L      BUN 16 mg/dL Creatinine 0 97 mg/dL      Glucose 112 mg/dL      Calcium 9 4 mg/dL      eGFR 70 ml/min/1 73sq m     Narrative:      National Kidney Disease Foundation guidelines for Chronic Kidney Disease (CKD):     Stage 1 with normal or high GFR (GFR > 90 mL/min/1 73 square meters)    Stage 2 Mild CKD (GFR = 60-89 mL/min/1 73 square meters)    Stage 3A Moderate CKD (GFR = 45-59 mL/min/1 73 square meters)    Stage 3B Moderate CKD (GFR = 30-44 mL/min/1 73 square meters)    Stage 4 Severe CKD (GFR = 15-29 mL/min/1 73 square meters)    Stage 5 End Stage CKD (GFR <15 mL/min/1 73 square meters)  Note: GFR calculation is accurate only with a steady state creatinine    CBC and differential [[id number]]  (Abnormal) Collected: 05/06/21 1440    Lab Status: Final result Specimen: Blood from Arm, Left Updated: 05/06/21 1444     WBC 10 40 Thousand/uL      RBC 4 28 Million/uL      Hemoglobin 12 7 g/dL      Hematocrit 39 4 %      MCV 92 fL      MCH 29 7 pg      MCHC 32 2 g/dL      RDW 14 2 %      MPV 10 4 fL      Platelets 493 Thousands/uL      nRBC 0 /100 WBCs      Neutrophils Relative 56 %      Immat GRANS % 1 %      Lymphocytes Relative 31 %      Monocytes Relative 7 %      Eosinophils Relative 4 %      Basophils Relative 1 %      Neutrophils Absolute 5 89 Thousands/µL      Immature Grans Absolute 0 06 Thousand/uL      Lymphocytes Absolute 3 25 Thousands/µL      Monocytes Absolute 0 75 Thousand/µL      Eosinophils Absolute 0 40 Thousand/µL      Basophils Absolute 0 05 Thousands/µL                  No orders to display              Procedures  ECG 12 Lead Documentation Only    Date/Time: 5/6/2021 3:28 PM  Performed by: Lydia Bertrand DO  Authorized by: Lydia Bertrand DO     Indications / Diagnosis:  Fatigue  Rate:     ECG rate:  94    ECG rate assessment: normal    Rhythm:     Rhythm: sinus rhythm    Ectopy:     Ectopy: none    QRS:     QRS intervals:  Normal  ST segments:     ST segments:  Normal  T waves:     T waves: normal ED Course  ED Course as of May 06 1744   Thu May 06, 2021   1626 Will treat for UTI  Bacteria, UA(!): Moderate   1626 WBC, UA(!): Innumerable   1630 Pt updated on results  Wants abx sent to [id number]  SBIRT 22yo+      Most Recent Value   SBIRT (24 yo +)   In order to provide better care to our patients, we are screening all of our patients for alcohol and drug use  Would it be okay to ask you these screening questions? Unable to answer at this time Filed at: 05/06/2021 1443                    MDM    Disposition  Final diagnoses:   Fatigue   Acute urinary tract infection     Time reflects when diagnosis was documented in both MDM as applicable and the Disposition within this note     Time User Action Codes Description Comment    5/6/2021  3:16 PM Musa Salazar 47 [R53 [id number]] Fatigue     5/6/2021  4:26 PM Musa Salazar 47 [N39 0] Acute urinary tract infection       ED Disposition     ED Disposition Condition Date/Time Comment    Discharge Stable Thu May 6, 2021  4:30 PM Ce Araya discharge to home/self care  Follow-up Information    None         Discharge Medication List as of 5/6/2021  4:30 PM      START taking these medications    Details   cephalexin (KEFLEX) 500 mg capsule Take 1 capsule (500 mg total) by mouth 2 (two) times a day for 5 days, Starting Thu 5/6/2021, Until Tue 5/11/2021, Normal         CONTINUE these medications which have NOT CHANGED    Details   ! ! [place] MISC Starting Thu 6/21/2018, Historical Med      !! ACCU-CHEK GUIDE test strip Historical Med      amLODIPine (NORVASC) 5 mg tablet Take 1 tablet (5 mg total) by mouth daily, Starting Wed 3/24/2021, Normal      ammonium lactate (LAC-HYDRIN) 12 % lotion Apply topically 2 (two) times a day Surrounding wounds, Starting Wed 4/7/2021, Normal      aspirin (ECOTRIN LOW STRENGTH) 81 mg EC tablet Take 1 tablet (81 mg total) by mouth daily, Starting Wed 3/24/2021, Normal      atorvastatin (LIPITOR) 40 mg tablet Take 1 tablet (40 mg total) by mouth daily with dinner, Starting Wed 3/24/2021, Normal      !! Blood Glucose Monitoring Suppl (ACCU-CHEK GUIDE) w/Device KIT Starting u 6/21/2018, Historical Med      !! Blood Glucose Monitoring Suppl (OneTouch Verio) w/Device KIT Use 2 (two) times a day before meals, Starting Mon 4/12/2021, Normal      gabapentin (NEURONTIN) 300 mg capsule Take 1 capsule (300 mg total) by mouth daily at bedtime, Starting Wed 3/24/2021, Normal      !! glucose blood (OneTouch Verio) test strip Use as instructed, Normal      guaiFENesin (ROBITUSSIN) 100 MG/5ML oral liquid Take 10 mL (200 mg total) by mouth 3 (three) times a day as needed for cough, Starting Wed 3/24/2021, Normal      loratadine (CLARITIN) 10 mg tablet Take 1 tablet (10 mg total) by mouth daily, Starting Wed 3/24/2021, Normal      Multiple Vitamin (multivitamin) tablet Take 1 tablet by mouth daily, Starting Wed 3/24/2021, Normal      nystatin (MYCOSTATIN) powder Apply topically 3 (three) times a day, Starting Mon 4/12/2021, Normal      !! OneTouch Delica Lancets 08M MISC Use 2 (two) times a day before meals, Starting Mon 4/12/2021, Normal      triamcinolone (KENALOG) 0 025 % cream Apply topically 2 (two) times a day Wound areas, Starting Wed 4/7/2021, Normal       !! - Potential duplicate medications found  Please discuss with provider  No discharge procedures on file      PDMP Review     None          ED Provider  Electronically Signed by           Renetta Man 23, DO  05/06/21 8215

## 2021-05-08 LAB
BACTERIA UR CULT: ABNORMAL
BACTERIA UR CULT: ABNORMAL

## 2021-05-13 ENCOUNTER — OFFICE VISIT (OUTPATIENT)
Dept: CARDIOLOGY CLINIC | Facility: CLINIC | Age: 66
End: 2021-05-13
Payer: MEDICARE

## 2021-05-13 VITALS
HEIGHT: 57 IN | SYSTOLIC BLOOD PRESSURE: 132 MMHG | WEIGHT: 282.8 LBS | HEART RATE: 96 BPM | DIASTOLIC BLOOD PRESSURE: 72 MMHG | RESPIRATION RATE: 18 BRPM | BODY MASS INDEX: 61.01 KG/M2

## 2021-05-13 DIAGNOSIS — E78.5 DYSLIPIDEMIA: ICD-10-CM

## 2021-05-13 DIAGNOSIS — E66.01 MORBID OBESITY WITH BMI OF 60.0-69.9, ADULT (HCC): Primary | ICD-10-CM

## 2021-05-13 DIAGNOSIS — I25.10 CORONARY ARTERY DISEASE INVOLVING NATIVE CORONARY ARTERY OF NATIVE HEART WITHOUT ANGINA PECTORIS: ICD-10-CM

## 2021-05-13 DIAGNOSIS — I10 BENIGN ESSENTIAL HTN: ICD-10-CM

## 2021-05-13 PROCEDURE — 99214 OFFICE O/P EST MOD 30 MIN: CPT | Performed by: INTERNAL MEDICINE

## 2021-05-13 NOTE — PROGRESS NOTES
Cardiology   MD Gloria Mcdaniel MD Ather Mansoor, MD Margarite Gurney, DO, Truman Medeiros DO, Aparna Koenig DO, [facility] [place]  -------------------------------------------------------------------  [facility]  [address] [place] [id number]  [phone]                        Tawnya Roberson                     1955                     [id number]          Assessment/Plan:     1  CAD status post PCI/ALTHEA (2 75 x 20 mm Synergy MR) 2/18/19  2  Benign essential hypertension  3  Dyslipidemia  4  Morbid obesity  5  COPD  6  Osteoarthritis     · Patient without symptoms of angina, continue aspirin  · She states she is compliant with atorvastatin  She is overdue for lipid panel  · Patient has been reluctant to take torsemide regularly secondary to frequent urination  Lower extremity edema seems to be stable  Today's weight is 282 lb, stable  Will continue torsemide  · She states she is moving to [facility] in the near future, and will establish herself with a cardiologist there             Interval History:      This is a 75 yo female w/ a h/o morbid obesity, dyslipidemia, HTN, and COPD  She was admitted 02/2019 with COPD exacerbation   Troponin level was noted to be up to 4 with no symptoms of chest discomfort or ischemic ECG changes     Coronary angiography 02/18/2019 revealed 90% mid LAD stenosis and she underwent PCI/ALTHEA with a 2 75 x 20 mm Synergy MR ALTHEA    She had no residual obstructive disease  Echocardiogram 02/14/2019 revealed ejection fraction 55%, with mild aortic stenosis and grade 1 diastolic dysfunction      She presents today for follow-up with no complaints  She denies exertional chest pain, shortness of breath, dizziness, palpitations, lower extremity edema               Vitals:  Vitals:    05/13/21 0936   Resp: 18   Weight: 128 kg (282 lb 12 8 oz)   Height: 4' 9" (1 448 m)         Past Medical History:   Diagnosis Date    Full dentures     GERD (gastroesophageal reflux disease)     Hyperlipidemia      Social History     Socioeconomic History    Marital status: Single     Spouse name: Not on file    Number of children: Not on file    Years of education: Not on file    Highest education level: Not on file   Occupational History    Not on file   Social Needs    Financial resource strain: Not on file    Food insecurity     Worry: Not on file     Inability: Not on file    Transportation needs     Medical: Not on file     Non-medical: Not on file   Tobacco Use    Smoking status: Never Smoker    Smokeless tobacco: Never Used    Tobacco comment: childhood smoke exposure   Substance and Sexual Activity    Alcohol use: Never     Frequency: Never    Drug use: Never    Sexual activity: Not on file   Lifestyle    Physical activity     Days per week: Not on file     Minutes per session: Not on file    Stress: Not on file   Relationships    Social connections     Talks on phone: Not on file     Gets together: Not on file     Attends Latter day service: Not on file     Active member of club or organization: Not on file     Attends meetings of clubs or organizations: Not on file     Relationship status: Not on file    Intimate partner violence     Fear of current or ex partner: Not on file     Emotionally abused: Not on file     Physically abused: Not on file     Forced sexual activity: Not on file   Other Topics Concern    Not on file   Social History Narrative    CONSUMES [address]:    -  Homemaker        HOBBIES:    -  Denies exposure        PETS:    -  Previous cat; no birds        TRAVEL:    -  No recent travel        EXPOSURES:    -  Denies mold, down pillows, hot tubs      Family History   Problem Relation Age of Onset    No Known Problems Mother     Coronary artery disease Father      Past Surgical History:   Procedure Laterality Date    BILATERAL KNEE ARTHROSCOPY      CATARACT EXTRACTION Right     DENTAL SURGERY      HYSTERECTOMY      TOTAL KNEE ARTHROPLASTY Bilateral     TUBAL LIGATION         Current Outpatient Medications:     amLODIPine (NORVASC) 5 mg tablet, Take 1 tablet (5 mg total) by mouth daily, Disp: 90 tablet, Rfl: 4    aspirin (ECOTRIN LOW STRENGTH) 81 mg EC tablet, Take 1 tablet (81 mg total) by mouth daily, Disp: 90 tablet, Rfl: 2    atorvastatin (LIPITOR) 40 mg tablet, Take 1 tablet (40 mg total) by mouth daily with dinner, Disp: 90 tablet, Rfl: 4    Blood Glucose Monitoring Suppl (ACCU-CHEK GUIDE) w/Device KIT, , Disp: , Rfl:     Blood Glucose Monitoring Suppl (OneTouch Verio) w/Device KIT, Use 2 (two) times a day before meals, Disp: 1 kit, Rfl: 0    gabapentin (NEURONTIN) 300 mg capsule, Take 1 capsule (300 mg total) by mouth daily at bedtime, Disp: 30 capsule, Rfl: 2    glucose blood (OneTouch Verio) test strip, Use as instructed, Disp: 100 each, Rfl: 5    guaiFENesin (ROBITUSSIN) 100 MG/5ML oral liquid, Take 10 mL (200 mg total) by mouth 3 (three) times a day as needed for cough, Disp: 120 mL, Rfl: 2    loratadine (CLARITIN) 10 mg tablet, Take 1 tablet (10 mg total) by mouth daily, Disp: 30 tablet, Rfl: 3    Multiple Vitamin (multivitamin) tablet, Take 1 tablet by mouth daily, Disp: 30 tablet, Rfl: 2    nystatin (MYCOSTATIN) powder, Apply topically 3 (three) times a day, Disp: 60 g, Rfl: 1    OneTouch Delica Lancets 20Z MISC, Use 2 (two) times a day before meals, Disp: 100 each, Rfl: 5    triamcinolone (KENALOG) 0 025 % cream, Apply topically 2 (two) times a day Wound areas, Disp: 30 g, Rfl: 0    ACCU-CHEK FASTCLIX LANCETS MISC, , Disp: , Rfl:     ACCU-CHEK GUIDE test strip, , Disp: , Rfl:     ammonium lactate (LAC-HYDRIN) 12 % lotion, Apply topically 2 (two) times a day Surrounding wounds, Disp: 400 g, Rfl: 0        Review of Systems:  Review of Systems   Constitutional: Negative for activity change, fever and unexpected weight change     HENT: Negative for facial swelling, nosebleeds and voice change  Respiratory: Negative for chest tightness, shortness of breath and wheezing  Cardiovascular: Negative for chest pain, palpitations and leg swelling  Gastrointestinal: Negative for abdominal distention  Genitourinary: Negative for hematuria  Musculoskeletal: Negative for arthralgias  Skin: Negative for color change, pallor, rash and wound  Neurological: Negative for dizziness, seizures and syncope  Psychiatric/Behavioral: Negative for agitation  Physical Exam:  Physical Exam  Vitals signs reviewed  Constitutional:       Appearance: She is well-developed  HENT:      Head: Normocephalic and atraumatic  Neck:      Musculoskeletal: Normal range of motion and neck supple  Cardiovascular:      Rate and Rhythm: Normal rate and regular rhythm  Heart sounds: Normal heart sounds  Pulmonary:      Effort: Pulmonary effort is normal       Breath sounds: Normal breath sounds  Abdominal:      Palpations: Abdomen is soft  Musculoskeletal: Normal range of motion  Skin:     General: Skin is warm and dry  Neurological:      Mental Status: She is alert and oriented to person, place, and time  Psychiatric:         Behavior: Behavior normal          Thought Content: Thought content normal          Judgment: Judgment normal          This note was completed in part utilizing [employer] Flu[employer] Direct Software  Grammatical errors, random word insertions, spelling mistakes, and incomplete sentences can be an occasional consequence of this system secondary to software limitations, ambient noise, and hardware issues  If you have any questions or concerns about the content, text, or information contained within the body of this dictation, please contact the provider for clarification

## 2021-05-14 ENCOUNTER — OFFICE VISIT (OUTPATIENT)
Dept: FAMILY MEDICINE CLINIC | Facility: CLINIC | Age: 66
End: 2021-05-14

## 2021-05-14 VITALS
HEIGHT: 57 IN | BODY MASS INDEX: 61.27 KG/M2 | SYSTOLIC BLOOD PRESSURE: 128 MMHG | HEART RATE: 103 BPM | WEIGHT: 284 LBS | OXYGEN SATURATION: 98 % | TEMPERATURE: 97.5 F | RESPIRATION RATE: 18 BRPM | DIASTOLIC BLOOD PRESSURE: 84 MMHG

## 2021-05-14 DIAGNOSIS — I87.2 CHRONIC VENOUS INSUFFICIENCY OF LOWER EXTREMITY: Primary | ICD-10-CM

## 2021-05-14 DIAGNOSIS — F42.4 EXCORIATION (SKIN-PICKING) DISORDER: ICD-10-CM

## 2021-05-14 PROBLEM — L03.115 CELLULITIS OF RIGHT LOWER EXTREMITY: Status: RESOLVED | Noted: 2020-10-02 | Resolved: 2021-05-14

## 2021-05-14 PROBLEM — R65.20 SEVERE SEPSIS (HCC): Status: RESOLVED | Noted: 2021-04-03 | Resolved: 2021-05-14

## 2021-05-14 PROBLEM — Z09 HOSPITAL DISCHARGE FOLLOW-UP: Status: RESOLVED | Noted: 2021-04-14 | Resolved: 2021-05-14

## 2021-05-14 PROBLEM — L03.90 CELLULITIS: Status: RESOLVED | Noted: 2021-04-03 | Resolved: 2021-05-14

## 2021-05-14 PROBLEM — A41.9 SEVERE SEPSIS (HCC): Status: RESOLVED | Noted: 2021-04-03 | Resolved: 2021-05-14

## 2021-05-14 PROCEDURE — 99213 OFFICE O/P EST LOW 20 MIN: CPT | Performed by: FAMILY MEDICINE

## 2021-05-14 PROCEDURE — 1123F ACP DISCUSS/DSCN MKR DOCD: CPT | Performed by: FAMILY MEDICINE

## 2021-05-14 NOTE — ASSESSMENT & PLAN NOTE
Recurrent wounds that reopened  And seen in clinic on 03/24/2021 have since improved  Last seen by would care 12/31/21  Unable to tolerate the SCD's as they are too tight around her upper calf  Refer to media for images of right leg to compare with damage from 03/24/2021  For now we will continue with supportive management of washing with soap and water twice a day followed by Vaseline  Encourage patient to wear socks long enough to cover the area to help prevent her from picking  Advised patient to not use neosporin, hydrogen peroxide, or other OTC remedies as this may worsen ulcers    Will follow-up in 3 weeks

## 2021-05-14 NOTE — PROGRESS NOTES
Assessment/Plan:     Problem List Items Addressed This Visit        Cardiovascular and Mediastinum    Chronic venous insufficiency of lower extremity - Primary     Recurrent wounds that reopened  And seen in clinic on 03/24/2021 have since improved  Last seen by would care 12/31/21  Unable to tolerate the SCD's as they are too tight around her upper calf  Refer to media for images of right leg to compare with damage from 03/24/2021  For now we will continue with supportive management of washing with soap and water twice a day followed by Vaseline  Encourage patient to wear socks long enough to cover the area to help prevent her from picking  Advised patient to not use neosporin, hydrogen peroxide, or other OTC remedies as this may worsen ulcers    Will follow-up in 3 weeks  Musculoskeletal and Integument    Excoriation (skin-picking) disorder            Subjective:      Patient ID: Moris Lennon is a 76 y o  female  This is a very pleasant 76 y o  female who presents to the clinic for follow-up of her chronic venous insufficiency with sequelae of  wound on her right lower leg and foot  She was referred back to wound care as she followed with them previously in February  There confusion between patient and Sukh So, therefore patient never followed up  However patient has been using Vaseline daily to the area and her wounds look much improved  Patient denies any pain to the area  She continues to itch the area, and encourage patient to discontinue this  Wounds are superficial and appear to be more like excoriations at this point  Patient's medical conditions are stable unless noted otherwise above  Patient has not had any recent hospitalizations, or medical emergencies since last visit  Patient has no further complaints other than what is mentioned in the ROS        Patient is moving to [place] in the middle of June, and would like to follow-up in the clinic prior to her move     The following portions of the patient's history were reviewed and updated as appropriate: allergies, current medications, past family history, past medical history, past social history, past surgical history and problem list     Review of Systems   Constitutional: Positive for fatigue  Negative for activity change, appetite change, chills and fever  HENT: Negative for congestion, dental problem, rhinorrhea and sore throat  Eyes: Negative for photophobia, pain and visual disturbance  Respiratory: Negative for cough, chest tightness, shortness of breath and wheezing  Cardiovascular: Negative for chest pain, palpitations and leg swelling  Gastrointestinal: Negative for abdominal pain, blood in stool, constipation, diarrhea, nausea and vomiting  Genitourinary: Negative for difficulty urinating, dysuria, frequency, hematuria and urgency  Musculoskeletal: Positive for arthralgias (right hip and right shoulder)  Negative for back pain, myalgias and neck pain  Skin: Positive for wound  Allergic/Immunologic: Positive for environmental allergies  Neurological: Negative for dizziness, weakness, light-headedness, numbness and headaches  Psychiatric/Behavioral: Positive for sleep disturbance  Negative for agitation, behavioral problems, self-injury and suicidal ideas  The patient is not nervous/anxious  Objective:    /84 (BP Location: Left arm, Patient Position: Sitting, Cuff Size: Standard)   Pulse 103   Temp 97 5 °F (36 4 °C) (Temporal)   Resp 18   Ht 4' 9" (1 448 m)   Wt 129 kg (284 lb)   LMP  (LMP Unknown)   SpO2 98%   Breastfeeding No   BMI 61 46 kg/m²      Physical Exam  Vitals signs and nursing note reviewed  Constitutional:       General: She is not in acute distress  Appearance: She is well-developed  She is obese  She is not ill-appearing or diaphoretic  HENT:      Head: Normocephalic and atraumatic        Right Ear: External ear normal       Left Ear: External ear normal       Nose: Nose normal       Mouth/Throat:      Mouth: Mucous membranes are moist       Pharynx: Oropharynx is clear  Eyes:      Pupils: Pupils are equal, round, and reactive to light  Neck:      Musculoskeletal: Normal range of motion and neck supple  Cardiovascular:      Rate and Rhythm: Normal rate and regular rhythm  Pulses: Normal pulses  Heart sounds: Normal heart sounds  No murmur  Pulmonary:      Effort: Pulmonary effort is normal  No respiratory distress  Breath sounds: Normal breath sounds  No wheezing  Abdominal:      General: Bowel sounds are normal  There is no distension  Palpations: Abdomen is soft  Tenderness: There is no abdominal tenderness  Musculoskeletal: Normal range of motion  General: Swelling present  No tenderness or signs of injury  Comments: evidence of venous insufficiency bilaterally    Skin:     General: Skin is warm and dry  Capillary Refill: Capillary refill takes less than 2 seconds  Findings: Lesion (on right lower leg - much improved - refer to media to compare images) present  Neurological:      General: No focal deficit present  Mental Status: She is alert and oriented to person, place, and time     Psychiatric:         Mood and Affect: Mood normal          Behavior: Behavior normal            Jillian Garza MD  05/14/21  4:12 PM

## 2021-06-28 NOTE — UTILIZATION REVIEW
Initial Clinical Review    OBS 04-03-21 @ 1319   CONVERT TO INPATIENT 04-04-21 @ 9638 FOR CONTINUATION OF IV ANTIBIOTICS THERAPY    Admission: Date/Time/Statement:   Admission Orders (From admission, onward)     Ordered        04/04/21 6999  Inpatient Admission  Once         04/03/21 1319  Place in Observation  Once                   Orders Placed This Encounter   Procedures    Place in Observation     Standing Status:   Standing     Number of Occurrences:   1     Order Specific Question:   Level of Care     Answer:   Med Surg [16]    Inpatient Admission     Standing Status:   Standing     Number of Occurrences:   1     Order Specific Question:   Level of Care     Answer:   Med Surg [16]     Order Specific Question:   Estimated length of stay     Answer:   More than 2 Midnights     Order Specific Question:   Certification     Answer:   I certify that inpatient services are medically necessary for this patient for a duration of greater than two midnights  See H&P and MD Progress Notes for additional information about the patient's course of treatment  ED Arrival Information     Expected Arrival Acuity Means of Arrival Escorted By Service Admission Type    - 4/3/2021 10:59 Urgent [facility] Urgent    Arrival Complaint    Possible yeast infection        Chief Complaint   Patient presents with    Abdominal Pain     right flank pain, abd pain, frequent urination, pain and buring with urination also pain in right leg     Assessment/Plan: 76 y o  female who presents with multiple complaints including abdominal pain, right lower extremity pain and intermittent dysuria  The patient has a past medical history of diabetes mellitus, GERD, morbid obesity  She is also followed in the [address] [place] [address] Road for chronic right lower extremity venous stasis with skin picking disorder  She does have a history of obstructive sleep apnea, as well as allergic rhinitis    She does have recurrent right lower extremity wounds that her reopen recently  She was last seen in her primary care's office on 03/24/2021, with planned referral to the wound care center  She was advised not to use Neosporin hydro peroxide or other over-the-counter remedies as these may worsen her ulcers    ED Triage Vitals   Temperature Pulse Respirations Blood Pressure SpO2   04/03/21 1107 04/03/21 1107 04/03/21 1107 04/03/21 1107 04/03/21 1107   98 8 °F (37 1 °C) (!) 121 22 (!) 167/105 99 %      Temp Source Heart Rate Source Patient Position - Orthostatic VS BP Location FiO2 (%)   04/03/21 1107 04/03/21 1107 04/03/21 1107 04/03/21 1334 --   Oral Monitor Sitting Right arm       Pain Score       04/03/21 1107       5          Wt Readings from Last 1 Encounters:   05/14/21 129 kg (284 lb)     Additional Vital Signs:   Date/Time  Temp  Pulse  Resp  BP  MAP (mmHg)  SpO2  O2 Device  Patient Position - Orthostatic VS   04/04/21 0918  --  --  --  99/58  --  --  --  --   04/04/21 0825  97 3 °F (36 3 °C)Abnormal   103  20  118/73  --  97 %  None (Room air)  Sitting   04/03/21 2229  97 8 °F (36 6 °C)  93  18  137/78  --  95 %  None (Room air)  Lying   04/03/21 1436  96 8 °F (36 °C)Abnormal   80  20  140/64  --  --  --  Lying   04/03/21 1334  --  87  20  137/78  102  98 %  None (Room air)  Lying       Pertinent Labs/Diagnostic Test Results:                                       No results found for: BETA-HYDROXYBUTYRATE                                                                                                                         ED Treatment:   Medication Administration from 04/03/2021 1059 to 04/03/2021 1422       Date/Time Order Dose Route Action Action by Comments     04/03/2021 1136 fentanyl citrate (PF) 100 MCG/2ML 50 mcg 50 mcg Intravenous Given Alin Shen RN      04/03/2021 1340 sodium chloride 0 9 % bolus 500 mL 0 mL Intravenous Stopped Alin Shen RN      04/03/2021 1222 sodium chloride 0 9 % bolus 500 mL 500 mL Intravenous New Bag Kristian Farley RN      04/03/2021 1341 ceftriaxone (ROCEPHIN) 1 g/50 mL in dextrose IVPB 1,000 mg Intravenous New Bag Kristian Farley RN         Past Medical History:   Diagnosis Date    Full dentures     GERD (gastroesophageal reflux disease)     Hyperlipidemia      Present on Admission:   Chronic diastolic congestive heart failure ([facility] [address] )   Diabetes mellitus type 2 in obese ([facility] [address] )   Excoriation (skin-picking) disorder   Restrictive lung disease   Obstructive sleep apnea of adult   (Resolved) Ulcer of right lower extremity, limited to breakdown of skin (HCC)   (Resolved) Cellulitis of right lower extremity      Admitting Diagnosis: Lactic acidosis [E87 2]  UTI (urinary tract infection) [N39 0]  Abdominal pain [R10 9]  Age/Sex: 76 y o  female  Admission Orders:  Scheduled Medications:  No current facility-administered medications for this encounter  Continuous IV Infusions:  No current facility-administered medications for this encounter  PRN Meds:  No current facility-administered medications for this encounter  IP CONSULT TO PODIATRY    Network Utilization Review Department  ATTENTION: Please call with any questions or concerns to [phone] and carefully listen to the prompts so that you are directed to the right person  All voicemails are confidential   Harlene Pair all requests for admission clinical reviews, approved or denied determinations and any other requests to dedicated fax number below belonging to the campus where the patient is receiving treatment   List of dedicated fax numbers for the Facilities:  [address] DENIALS (Administrative/Medical Necessity) [phone]   1000  [facility] (Maternity/NICU/Pediatrics) [phone]   [address] [place] [phone]     [address]) [address] [place] [address] [address] Daniels [address] [phone]   [place] [id number] [phone]

## 2021-06-28 NOTE — UTILIZATION REVIEW
Inpatient Admission Authorization Request   NOTIFICATION OF INPATIENT ADMISSION/INPATIENT AUTHORIZATION REQUEST   SERVICING FACILITY:   [address], [facility], [address] [facility]  Tax ID: [id number]  NPI: [id number]  Place of Service: Inpatient [address] [facility]  [address]  Place of Service Code: 24     ATTENDING PROVIDER:  Attending Name and NPI#: Geetha Wells Md [[id number]]  Address: [address] [place] [address], [facility], [facility] [address] [facility]  Phone: [phone]     UTILIZATION REVIEW CONTACT:  Jack Pulido, Utilization   Network Utilization Review Department  Phone: [phone]  Fax: [phone]  Email: [email or web address]     PHYSICIAN ADVISORY SERVICES:  FOR [facility] REVIEW - MEDICAL NECESSITY DENIAL  Phone: [phone]  Fax: [phone]  Email: [email or web address]     TYPE OF REQUEST:  Inpatient Status     ADMISSION INFORMATION:  ADMISSION DATE/TIME: 4/4/21  8:32 AM  PATIENT DIAGNOSIS CODE/DESCRIPTION:  Lactic acidosis [E87 2]  UTI (urinary tract infection) [N39 0]  Abdominal pain [R10 9]  DISCHARGE DATE/TIME: 4/7/2021  4:52 PM  DISCHARGE DISPOSITION (IF DISCHARGED): Home/Self Care     IMPORTANT INFORMATION:  Please contact the Jack Pulido directly with any questions or concerns regarding this request  Department voicemails are confidential     Send requests for admission clinical reviews, concurrent reviews, approvals, and administrative denials due to lack of clinical to fax [phone]

## 2021-06-30 ENCOUNTER — TELEPHONE (OUTPATIENT)
Dept: FAMILY MEDICINE CLINIC | Facility: CLINIC | Age: 66
End: 2021-06-30

## 2021-07-16 ENCOUNTER — TELEPHONE (OUTPATIENT)
Dept: FAMILY MEDICINE CLINIC | Facility: CLINIC | Age: 66
End: 2021-07-16

## 2021-07-16 NOTE — TELEPHONE ENCOUNTER
Pt left 2 messages    States she needs appt for the rash on her legs  She reports she's having pain and "sweats"  She refuses to go to the ER for eval, tells me she was told she "used up all of her hospital time"  Can't come for a same day today  Scheduled for Monday in the meantime but urged her to go to the hospital if her condition worsens at all between now and then  Also states she moved to [place] about 3 weeks ago and has been unable to schedule a PCP visit there because "they don't have her records"  I told her there is absolutely no reason she can't schedule a PCP visit without having her records but we did process an MRO request for her  In the meantime, I told her I would fax them her most recent visit note, med list, problem list, and most recent labs in hopes that will help her schedule  I will try to follow up with her Monday

## 2021-07-19 ENCOUNTER — HOSPITAL ENCOUNTER (OUTPATIENT)
Dept: NON INVASIVE DIAGNOSTICS | Facility: HOSPITAL | Age: 66
Discharge: HOME | End: 2021-07-19
Attending: FAMILY MEDICINE
Payer: MEDICARE

## 2021-07-19 ENCOUNTER — OFFICE VISIT (OUTPATIENT)
Dept: FAMILY MEDICINE CLINIC | Facility: CLINIC | Age: 66
End: 2021-07-19

## 2021-07-19 VITALS
HEART RATE: 98 BPM | DIASTOLIC BLOOD PRESSURE: 84 MMHG | BODY MASS INDEX: 61.27 KG/M2 | HEIGHT: 57 IN | OXYGEN SATURATION: 96 % | RESPIRATION RATE: 16 BRPM | TEMPERATURE: 98 F | SYSTOLIC BLOOD PRESSURE: 112 MMHG | WEIGHT: 284 LBS

## 2021-07-19 DIAGNOSIS — M79.89 PAIN AND SWELLING OF LOWER LEG, RIGHT: ICD-10-CM

## 2021-07-19 DIAGNOSIS — M79.10 MYALGIA: ICD-10-CM

## 2021-07-19 DIAGNOSIS — I50.31 ACUTE DIASTOLIC CHF (CONGESTIVE HEART FAILURE) (HCC): ICD-10-CM

## 2021-07-19 DIAGNOSIS — E78.2 MIXED HYPERLIPIDEMIA: ICD-10-CM

## 2021-07-19 DIAGNOSIS — I87.311 CHRONIC VENOUS HYPERTENSION (IDIOPATHIC) WITH ULCER OF RIGHT LOWER EXTREMITY (CODE) (HCC): ICD-10-CM

## 2021-07-19 DIAGNOSIS — I21.4 NON-ST ELEVATION MYOCARDIAL INFARCTION (NSTEMI) (HCC): ICD-10-CM

## 2021-07-19 DIAGNOSIS — M47.816 LUMBAR SPONDYLOSIS: ICD-10-CM

## 2021-07-19 DIAGNOSIS — M79.661 PAIN AND SWELLING OF LOWER LEG, RIGHT: ICD-10-CM

## 2021-07-19 DIAGNOSIS — G89.29 CHRONIC LOW BACK PAIN WITHOUT SCIATICA, UNSPECIFIED BACK PAIN LATERALITY: ICD-10-CM

## 2021-07-19 DIAGNOSIS — M16.11 OSTEOARTHRITIS OF RIGHT HIP, UNSPECIFIED OSTEOARTHRITIS TYPE: ICD-10-CM

## 2021-07-19 DIAGNOSIS — I10 ESSENTIAL HYPERTENSION: Chronic | ICD-10-CM

## 2021-07-19 DIAGNOSIS — M79.89 PAIN AND SWELLING OF LOWER LEG, RIGHT: Primary | ICD-10-CM

## 2021-07-19 DIAGNOSIS — M79.661 PAIN AND SWELLING OF LOWER LEG, RIGHT: Primary | ICD-10-CM

## 2021-07-19 DIAGNOSIS — K21.9 GASTROESOPHAGEAL REFLUX DISEASE, UNSPECIFIED WHETHER ESOPHAGITIS PRESENT: ICD-10-CM

## 2021-07-19 DIAGNOSIS — M54.50 CHRONIC LOW BACK PAIN WITHOUT SCIATICA, UNSPECIFIED BACK PAIN LATERALITY: ICD-10-CM

## 2021-07-19 PROCEDURE — 93971 EXTREMITY STUDY: CPT

## 2021-07-19 PROCEDURE — 99213 OFFICE O/P EST LOW 20 MIN: CPT | Performed by: FAMILY MEDICINE

## 2021-07-19 PROCEDURE — 3074F SYST BP LT 130 MM HG: CPT | Performed by: FAMILY MEDICINE

## 2021-07-19 PROCEDURE — 3079F DIAST BP 80-89 MM HG: CPT | Performed by: FAMILY MEDICINE

## 2021-07-19 PROCEDURE — 93971 EXTREMITY STUDY: CPT | Performed by: SURGERY

## 2021-07-19 RX ORDER — MULTIVITAMIN
1 TABLET ORAL DAILY
Qty: 30 TABLET | Refills: 0 | Status: SHIPPED | OUTPATIENT
Start: 2021-07-19

## 2021-07-19 RX ORDER — GABAPENTIN 300 MG/1
300 CAPSULE ORAL
Qty: 30 CAPSULE | Refills: 0 | Status: SHIPPED | OUTPATIENT
Start: 2021-07-19

## 2021-07-19 RX ORDER — AMLODIPINE BESYLATE 5 MG/1
5 TABLET ORAL DAILY
Qty: 30 TABLET | Refills: 0 | Status: SHIPPED | OUTPATIENT
Start: 2021-07-19

## 2021-07-19 RX ORDER — PANTOPRAZOLE SODIUM 40 MG/1
40 TABLET, DELAYED RELEASE ORAL DAILY
Qty: 30 TABLET | Refills: 0 | Status: SHIPPED | OUTPATIENT
Start: 2021-07-19

## 2021-07-19 RX ORDER — ASPIRIN 81 MG/1
81 TABLET ORAL DAILY
Qty: 30 TABLET | Refills: 0 | Status: SHIPPED | OUTPATIENT
Start: 2021-07-19 | End: 2021-08-13

## 2021-07-21 NOTE — TELEPHONE ENCOUNTER
Pt informed of documents faxed  Says she's doing a little better   She has our number if she needs to come back before she sees new PCP

## 2021-09-09 DIAGNOSIS — J30.1 NON-SEASONAL ALLERGIC RHINITIS DUE TO POLLEN: ICD-10-CM

## 2021-09-09 RX ORDER — LORATADINE 10 MG/1
TABLET ORAL
Qty: 90 TABLET | Refills: 1 | Status: SHIPPED | OUTPATIENT
Start: 2021-09-09

## 2021-09-10 NOTE — TELEPHONE ENCOUNTER
Please schedule for early October Dm F/U if pt has not moved yet    If pt has moved F/U no longer needed pt will F/U with a different PCP

## 2022-01-04 ENCOUNTER — TELEPHONE (OUTPATIENT)
Dept: FAMILY MEDICINE CLINIC | Facility: CLINIC | Age: 67
End: 2022-01-04

## 2022-01-04 NOTE — TELEPHONE ENCOUNTER
[employer] form received by fax on 01/04/22  to be completed by PCP  Copy made and placed in PCP folder  Forms to be delivered to PCP mailbox at assigned time

## 2022-02-15 NOTE — TELEPHONE ENCOUNTER
[employer] form re-faxed (order #:[id number]) with Tang Blank Signature that back dated from 01/26/2022  Form went to Tang Blank, will be placing form in [place]

## 2022-03-25 NOTE — TELEPHONE ENCOUNTER
[employer] CALLED NEEDS PHYSICIAN WHO SIGNED ORDER TO PLACE NPI # AND LIC #   FOR WAS FAXED WITH INFORMATION [phone] CONFIRMATION RECEIVED

## 2022-05-14 DIAGNOSIS — I50.31 ACUTE DIASTOLIC CHF (CONGESTIVE HEART FAILURE) (HCC): ICD-10-CM

## 2022-05-25 RX ORDER — ASPIRIN 81 MG/1
TABLET ORAL
Qty: 30 TABLET | Refills: 0 | OUTPATIENT
Start: 2022-05-25

## 2023-07-03 ENCOUNTER — TELEPHONE (OUTPATIENT)
Dept: FAMILY MEDICINE CLINIC | Facility: CLINIC | Age: 68
End: 2023-07-03

## 2023-07-05 NOTE — TELEPHONE ENCOUNTER
07/05/23 9:43 AM        The office's request has been received, reviewed, and the patient chart updated. The PCP has successfully been removed with a patient attribution note. This message will now be completed.         Thank you  Briana Petit